# Patient Record
Sex: FEMALE | Race: WHITE | Employment: OTHER | URBAN - METROPOLITAN AREA
[De-identification: names, ages, dates, MRNs, and addresses within clinical notes are randomized per-mention and may not be internally consistent; named-entity substitution may affect disease eponyms.]

---

## 2018-10-03 NOTE — TELEPHONE ENCOUNTER
Current Medications:   Last Reviewed on 7/25/2018 02:35 PM by Brandon WINTER  Pravachol 40mg Tablet Take 2 tablet(s) by mouth at bedtime   Seroquel 50mg Tablet 1 tablet PO QHS   Losartan 100mg Tablet Take 1 tablet(s) by mouth daily (replaces lisinopril)   Metformin HCl 1,000mg Tablet 1 PO QD   Novolin N 100units/1ml Injection 40 U AM and 40 U PM   Metoprolol Succinate 25mg Tablets, Extended Release Take 1 tablet(s) by mouth daily   Norvasc 5mg Tablet Take 1 tablet(s) by mouth daily   Hydrochlorothiazide (HCTZ) 25mg Tablet 1 tab by mouth every morning.    Trilipix 135mg Capsules, Delayed Release Take 1 capsule(s) by mouth daily   Ranitidine 150mg Tablet 1 po bid   glucometer strips* 1 strip as directed Accucheck compact test strip four times a day   Melatonin 3mg Tablet Take 1 tablet(s) by mouth at bedtime prn sleep   Fish oil 2000 mg BID   Novolin R 100units/1ml Injection 25 units before breakfast, 25 units before lunch, 25 units before dinner subcutaneously   Protonix 40mg Tablets, Delayed Release Take 1 tablet(s) by mouth daily

## 2018-11-08 ENCOUNTER — OFFICE VISIT (OUTPATIENT)
Dept: FAMILY MEDICINE CLINIC | Age: 73
End: 2018-11-08

## 2018-11-08 VITALS
SYSTOLIC BLOOD PRESSURE: 114 MMHG | WEIGHT: 174 LBS | BODY MASS INDEX: 29.71 KG/M2 | DIASTOLIC BLOOD PRESSURE: 74 MMHG | HEIGHT: 64 IN | TEMPERATURE: 97.7 F | OXYGEN SATURATION: 98 % | RESPIRATION RATE: 17 BRPM | HEART RATE: 93 BPM

## 2018-11-08 DIAGNOSIS — Z00.00 WELLNESS EXAMINATION: Primary | ICD-10-CM

## 2018-11-08 DIAGNOSIS — I10 ESSENTIAL HYPERTENSION: ICD-10-CM

## 2018-11-08 DIAGNOSIS — Z79.4 CONTROLLED TYPE 2 DIABETES MELLITUS WITHOUT COMPLICATION, WITH LONG-TERM CURRENT USE OF INSULIN (HCC): ICD-10-CM

## 2018-11-08 DIAGNOSIS — Z23 ENCOUNTER FOR IMMUNIZATION: ICD-10-CM

## 2018-11-08 DIAGNOSIS — E78.5 HYPERLIPIDEMIA, UNSPECIFIED HYPERLIPIDEMIA TYPE: ICD-10-CM

## 2018-11-08 DIAGNOSIS — E11.9 CONTROLLED TYPE 2 DIABETES MELLITUS WITHOUT COMPLICATION, WITH LONG-TERM CURRENT USE OF INSULIN (HCC): ICD-10-CM

## 2018-11-08 RX ORDER — QUETIAPINE FUMARATE 50 MG/1
50 TABLET, FILM COATED ORAL
COMMUNITY
Start: 2018-04-13 | End: 2019-10-16 | Stop reason: SDUPTHER

## 2018-11-08 RX ORDER — PRAVASTATIN SODIUM 40 MG/1
80 TABLET ORAL
COMMUNITY
Start: 2018-07-16 | End: 2019-10-16 | Stop reason: SDUPTHER

## 2018-11-08 RX ORDER — METFORMIN HYDROCHLORIDE 1000 MG/1
1000 TABLET ORAL DAILY
COMMUNITY
Start: 2018-06-04 | End: 2019-10-16 | Stop reason: DRUGHIGH

## 2018-11-08 RX ORDER — PRAVASTATIN SODIUM 20 MG/1
TABLET ORAL
COMMUNITY
End: 2018-11-08 | Stop reason: SDUPTHER

## 2018-11-08 RX ORDER — METFORMIN HYDROCHLORIDE 500 MG/1
TABLET ORAL
COMMUNITY
Start: 2018-03-05 | End: 2019-09-23

## 2018-11-08 RX ORDER — MELOXICAM 15 MG/1
15 TABLET ORAL
COMMUNITY
Start: 2018-05-17 | End: 2019-05-17

## 2018-11-08 RX ORDER — DICLOFENAC SODIUM 10 MG/G
GEL TOPICAL
COMMUNITY
Start: 2018-05-04 | End: 2019-09-23

## 2018-11-08 NOTE — PROGRESS NOTES
SUBJECTIVE:     68 y.o. female for annual routine checkup. Last annual visit on 3/2017    HTN:  -BPs at home are usually 120s-130s/70s-80s  -\"soreness in chest\" at right chest, pleuritic, not felt on exertion, onset 2-3wks  -Does notice some SOB on exertion while walking at least 100ft to her mailbox   -some dizziness <30 sec duration, unsure exactly when it occurs, not positional, episodic   -Hx CAD s/p stent - reports having it 5 or more yrs ago, followed by Cardiologist Dr. Adele Paredes     DM:  -Blood sugars usually 120s -130s , has some low BS some times twice per week and sometimes once per week - takes glu tabs during episodes, usually occurs in the middle of the night   -diet: low carbs, lots of veggies  -exercise: not much exercise due to knee pain (BL) - has partial knee replacements BL  -eyes: last eye exam April-May 2018, told she has a normal exam  -endorses polyuria every 2-3hrs, no polydipsia  , nocturia ever 2-3hrs overnight   -Takes 40 units NPH BID, 30 units of novolin with meals (2-3 times per day based on how many meals she eats) - recently increased to 30 units from 25 units one week ago    HM:  - Is due for flu shot   - next colonoscopy 6/2019   - Gyn: is due for mammogram, states she needs to schedule it       Current Outpatient Medications   Medication Sig Dispense Refill    meloxicam (MOBIC) 15 mg tablet Take 15 mg by mouth.  insulin NPH (NOVOLIN N NPH U-100 INSULIN) 100 unit/mL injection 40 Units by SubCUTAneous route two (2) times a day.  metFORMIN (GLUCOPHAGE) 500 mg tablet       pravastatin (PRAVACHOL) 40 mg tablet Take 80 mg by mouth nightly.  QUEtiapine (SEROQUEL) 50 mg tablet Take 50 mg by mouth nightly.  hydroCHLOROthiazide (HYDRODIURIL) 25 mg tablet TAKE ONE TABLET BY MOUTH ONCE DAILY IN THE MORNING 90 Tab 1    insulin regular (NOVOLIN R, HUMULIN R) 100 unit/mL injection 25 units Subcutaneously 5-10 minutes prior to meals.  (Patient taking differently: 30 Units by SubCUTAneous route Before breakfast, lunch, and dinner. 25 units Subcutaneously 5-10 minutes prior to meals. ) 10 Vial 1    omeprazole (PRILOSEC) 20 mg capsule Take 20 mg by mouth daily.  metoprolol-XL (TOPROL-XL) 25 mg XL tablet Take 25 mg by mouth daily.  amLODIPine (NORVASC) 5 mg tablet Take 5 mg by mouth daily.  butalbital-aspirin-caffeine (BUTALBITAL COMPOUND) -40 mg tablet Take 1 Tab by mouth every four (4) hours as needed.  losartan (COZAAR) 100 mg tablet Take 100 mg by mouth daily.  ranitidine (ZANTAC) 150 mg tablet Take 150 mg by mouth nightly.  multivitamin (ONE A DAY) tablet Take 1 Tab by mouth daily.  diclofenac (VOLTAREN) 1 % gel       metFORMIN (GLUCOPHAGE) 1,000 mg tablet Take 1,000 mg by mouth daily.  traMADol (ULTRAM) 50 mg tablet Take 1 Tab by mouth every eight (8) hours as needed for Pain. Max Daily Amount: 150 mg. 20 Tab 0    acetaminophen (TYLENOL) 325 mg tablet Take 2 Tabs by mouth every four (4) hours as needed for Pain.  venlafaxine (EFFEXOR) 75 mg tablet Take 25 mg by mouth three (3) times daily.  QUEtiapine (SEROQUEL) 25 mg tablet Take 25 mg by mouth nightly.  rosuvastatin (CRESTOR) 20 mg tablet Take 10 mg by mouth nightly.  naproxen sodium 220 mg Cap Take 1 Tab by mouth two (2) times daily as needed. Allergies: Advicor [niacin-lovastatin]; Ambien [zolpidem]; Atorvastatin; Erythromycin; Niacin; Pitavastatin; Premarin [conjugated estrogens]; and Simvastatin   No LMP recorded. Patient has had a hysterectomy. ROS:  Feeling well. No dyspnea or chest pain on exertion. No abdominal pain, change in bowel habits, black or bloody stools. No urinary tract symptoms. GYN ROS: no vaginal bleeding. No neurological complaints. OBJECTIVE:   The patient appears well, alert, oriented x 3, in no distress.   Visit Vitals  /74 (BP 1 Location: Right arm, BP Patient Position: Sitting)   Pulse 93   Temp 97.7 °F (36.5 °C) (Oral)   Resp 17   Ht 5' 4\" (1.626 m)   Wt 174 lb (78.9 kg)   SpO2 98%   BMI 29.87 kg/m²     ENT normal.  Neck supple. No adenopathy or thyromegaly. JOESPH. Lungs are clear, good air entry, no wheezes, rhonchi or rales. S1 and S2 normal, no murmurs, regular rate and rhythm. Abdomen soft without tenderness, guarding, mass or organomegaly. Extremities show no edema, normal peripheral pulses. Neurological is normal, no focal findings. ASSESSMENT/plan: 79yo with hx DM, HTN presents to clinic for annual well women exam. Will obtain routine labs CBC, CMP, Lipid panel, A1c and refill prescriptions. Chest pain and SOB: Patient with pleuritic chest pain at rest and SOB on exertion.  Will send back to cardiologist to have repeat stress test.       RTC 1 yr for annual wellness visit       Patient discussed with Dr. Rohit Blair MD  Family Medicine Resident

## 2018-11-08 NOTE — PROGRESS NOTES
SUBJECTIVE:  
 
68 y.o. female for annual routine checkup. Last annual 3/2017 PmHx: DM on insulin, depression, insomnia, OA, HTN, HLD, GERD 
 
HTN: 
-monitors at home 120s-130s/70s-80s 
-\"soreness in chest\" at right chest, pleuritic, onset 2-3wks 
-some dizziness <30 sec, unsure exactly when it occurs, not positional, episodic  
-Some SOB on exertion when carying things in arms and walking about 100ft  
-Hx CAD s/p stent - 5 or more yrs ago, Dr Will Crump DM: 
-BS: 120s, 130s , some low BS some times twice per week sometimes once per week - takes glu tabs, usually occurs in the middle of the night , checks it 3-4x per day  
-diet: cereal, low carbs, lots of veggies,  
-exercise: not much exercise due to knee pain (BL) - has partial knee replacements BL and also getting injections  
-eyes: last eye exam April-May 2018, normal exam 
-foot: good foot care 
-urine: no polyuria every 2-3hrs, no polydipsia  , nocturia ever 2-3hrs overnight  
-40lantus BID, 30units of novolin with meals (2-3 times per day based on how many meals eat) - recently increased to 30 from 25 last week HM: 
- flu shot? - Due for your today  
- next colonoscopy 6/2019 - (DEXA screening? - had it 4-5yrs ago) - Gyn: mast matilde, due for it this, going to schedule it, previous have been negative Refills: insulin, PPI , amlodipine, metoprolol, losartan, zantac , metfromin 500mg daily - not higher due to GI upset, seroquel, pravastatin 80mg daily Current Outpatient Medications Medication Sig Dispense Refill  meloxicam (MOBIC) 15 mg tablet Take 15 mg by mouth.  insulin NPH (NOVOLIN N NPH U-100 INSULIN) 100 unit/mL injection 40 Units by SubCUTAneous route two (2) times a day.  metFORMIN (GLUCOPHAGE) 1,000 mg tablet Take 1,000 mg by mouth daily.  pravastatin (PRAVACHOL) 40 mg tablet Take 80 mg by mouth nightly.  QUEtiapine (SEROQUEL) 50 mg tablet Take 50 mg by mouth nightly.  hydroCHLOROthiazide (HYDRODIURIL) 25 mg tablet TAKE ONE TABLET BY MOUTH ONCE DAILY IN THE MORNING 90 Tab 1  
 insulin regular (NOVOLIN R, HUMULIN R) 100 unit/mL injection 25 units Subcutaneously 5-10 minutes prior to meals. (Patient taking differently: 30 Units by SubCUTAneous route Before breakfast, lunch, and dinner. 25 units Subcutaneously 5-10 minutes prior to meals. ) 10 Vial 1  
 traMADol (ULTRAM) 50 mg tablet Take 1 Tab by mouth every eight (8) hours as needed for Pain. Max Daily Amount: 150 mg. 20 Tab 0  
 omeprazole (PRILOSEC) 20 mg capsule Take 20 mg by mouth daily.  metoprolol-XL (TOPROL-XL) 25 mg XL tablet Take 25 mg by mouth daily.  amLODIPine (NORVASC) 5 mg tablet Take 5 mg by mouth daily.  butalbital-aspirin-caffeine (BUTALBITAL COMPOUND) -40 mg tablet Take 1 Tab by mouth every four (4) hours as needed.  losartan (COZAAR) 100 mg tablet Take 100 mg by mouth daily.  ranitidine (ZANTAC) 150 mg tablet Take 150 mg by mouth nightly.  multivitamin (ONE A DAY) tablet Take 1 Tab by mouth daily.  diclofenac (VOLTAREN) 1 % gel  metFORMIN (GLUCOPHAGE) 500 mg tablet  acetaminophen (TYLENOL) 325 mg tablet Take 2 Tabs by mouth every four (4) hours as needed for Pain.  venlafaxine (EFFEXOR) 75 mg tablet Take 25 mg by mouth three (3) times daily.  QUEtiapine (SEROQUEL) 25 mg tablet Take 25 mg by mouth nightly.  rosuvastatin (CRESTOR) 20 mg tablet Take 10 mg by mouth nightly.  naproxen sodium 220 mg Cap Take 1 Tab by mouth two (2) times daily as needed. Allergies: Advicor [niacin-lovastatin]; Ambien [zolpidem]; Atorvastatin; Erythromycin; Niacin; Pitavastatin; Premarin [conjugated estrogens]; and Simvastatin No LMP recorded. Patient has had a hysterectomy. ROS:  Feeling well. No dyspnea or chest pain on exertion. No abdominal pain, change in bowel habits, black or bloody stools.   No urinary tract symptoms. GYN ROS: {gyn ros:864744::\"normal menses, no abnormal bleeding, pelvic pain or discharge\",\"no breast pain or new or enlarging lumps on self exam\"}. No neurological complaints. OBJECTIVE: The patient appears well, alert, oriented x 3, in no distress. Visit Vitals /74 (BP 1 Location: Right arm, BP Patient Position: Sitting) Pulse 93 Temp 97.7 °F (36.5 °C) (Oral) Resp 17 Ht 5' 4\" (1.626 m) Wt 174 lb (78.9 kg) SpO2 98% BMI 29.87 kg/m² ENT normal.  Neck supple. No adenopathy or thyromegaly. JOESPH. Lungs are clear, good air entry, no wheezes, rhonchi or rales. S1 and S2 normal, no murmurs, regular rate and rhythm. Abdomen soft without tenderness, guarding, mass or organomegaly. Extremities show no edema, normal peripheral pulses. Neurological is normal, no focal findings. BREAST EXAM: {pe breast exam:782490::\"breasts appear normal, no suspicious masses, no skin or nipple changes or axillary nodes\"} PELVIC EXAM: {pelvic exam:349470::\"normal external genitalia, vulva, vagina, cervix, uterus and adnexa\"} ASSESSMENT:  
{gyn assessment:521539::\"well woman\"} PLAN:  
{gyn plan:189349::\"mammogram\",\"pap smear\",\"return annually or prn\"} CC: med refill, annual visit

## 2018-11-08 NOTE — PROGRESS NOTES
Identified pt with two pt identifiers(name and ). Chief Complaint   Patient presents with    Medication Refill     all medications         Health Maintenance Due   Topic    Hepatitis C Screening     DTaP/Tdap/Td series (1 - Tdap)    Shingrix Vaccine Age 50> (1 of 2)    BREAST CANCER SCRN MAMMOGRAM     FOBT Q 1 YEAR AGE 50-75     GLAUCOMA SCREENING Q2Y     Bone Densitometry (Dexa) Screening     Pneumococcal 65+ Low/Medium Risk (1 of 2 - PCV13)    MEDICARE YEARLY EXAM     Influenza Age 5 to Adult        Wt Readings from Last 3 Encounters:   05/24/15 168 lb (76.2 kg)   14 165 lb (74.8 kg)   13 168 lb (76.2 kg)     Temp Readings from Last 3 Encounters:   05/24/15 98.1 °F (36.7 °C)   14 98.3 °F (36.8 °C)   13 98 °F (36.7 °C)     BP Readings from Last 3 Encounters:   05/24/15 114/59   14 115/57   13 140/83     Pulse Readings from Last 3 Encounters:   05/24/15 88   14 (!) 110   13 82         Learning Assessment:  :     No flowsheet data found. Depression Screening:  :     No flowsheet data found. Fall Risk Assessment:  :     No flowsheet data found. Abuse Screening:  :     No flowsheet data found. Coordination of Care Questionnaire:  :     1) Have you been to an emergency room, urgent care clinic since your last visit? no   Hospitalized since your last visit? no             2) Have you seen or consulted any other health care providers outside of [address] [place] [address] since your last visit? yes [facility] for knees (Include any pap smears or colon screenings in this section.)    3) Do you have an Advance Directive on file? no  Are you interested in receiving information about Advance Directives? no    Patient is accompanied by self I have received verbal consent from Varun Kong to discuss any/all medical information while they are present in the room.     Reviewed record in preparation for visit and have obtained necessary documentation. Medication reconciliation up to date and corrected with patient at this time.

## 2018-11-09 LAB
ALBUMIN SERPL-MCNC: 4.3 G/DL (ref 3.5–4.8)
ALBUMIN/GLOB SERPL: 1.4 {RATIO} (ref 1.2–2.2)
ALP SERPL-CCNC: 110 IU/L (ref 39–117)
ALT SERPL-CCNC: 15 IU/L (ref 0–32)
AST SERPL-CCNC: 17 IU/L (ref 0–40)
BILIRUB SERPL-MCNC: 0.4 MG/DL (ref 0–1.2)
BUN SERPL-MCNC: 8 MG/DL (ref 8–27)
BUN/CREAT SERPL: 11 (ref 12–28)
CALCIUM SERPL-MCNC: 9.9 MG/DL (ref 8.7–10.3)
CHLORIDE SERPL-SCNC: 93 MMOL/L (ref 96–106)
CHOLEST SERPL-MCNC: 197 MG/DL (ref 100–199)
CO2 SERPL-SCNC: 29 MMOL/L (ref 20–29)
CREAT SERPL-MCNC: 0.72 MG/DL (ref 0.57–1)
ERYTHROCYTE [DISTWIDTH] IN BLOOD BY AUTOMATED COUNT: 14.8 % (ref 12.3–15.4)
EST. AVERAGE GLUCOSE BLD GHB EST-MCNC: 157 MG/DL
GLOBULIN SER CALC-MCNC: 3.1 G/DL (ref 1.5–4.5)
GLUCOSE SERPL-MCNC: 208 MG/DL (ref 65–99)
HBA1C MFR BLD: 7.1 % (ref 4.8–5.6)
HCT VFR BLD AUTO: 42.6 % (ref 34–46.6)
HDLC SERPL-MCNC: 36 MG/DL
HGB BLD-MCNC: 14.2 G/DL (ref 11.1–15.9)
LDLC SERPL CALC-MCNC: 112 MG/DL (ref 0–99)
MCH RBC QN AUTO: 28.8 PG (ref 26.6–33)
MCHC RBC AUTO-ENTMCNC: 33.3 G/DL (ref 31.5–35.7)
MCV RBC AUTO: 86 FL (ref 79–97)
PLATELET # BLD AUTO: 336 X10E3/UL (ref 150–379)
POTASSIUM SERPL-SCNC: 4.4 MMOL/L (ref 3.5–5.2)
PROT SERPL-MCNC: 7.4 G/DL (ref 6–8.5)
RBC # BLD AUTO: 4.93 X10E6/UL (ref 3.77–5.28)
SODIUM SERPL-SCNC: 137 MMOL/L (ref 134–144)
TRIGL SERPL-MCNC: 246 MG/DL (ref 0–149)
VLDLC SERPL CALC-MCNC: 49 MG/DL (ref 5–40)
WBC # BLD AUTO: 10 X10E3/UL (ref 3.4–10.8)

## 2018-11-09 NOTE — PROGRESS NOTES
I have reviewed the notes, assessments, and/or procedures performed, I concur with the residents documentation of Kori Bustillos.

## 2018-11-12 NOTE — PROGRESS NOTES
CMP with elevated GLu 208 but otherwise wnL. CBC wnL. Lipid panel with elevated  and , low HDL 36, but total cholesterol wnL. A1c 7.1, within target of <8 for age group.

## 2019-09-10 ENCOUNTER — OFFICE VISIT (OUTPATIENT)
Dept: FAMILY MEDICINE CLINIC | Age: 74
End: 2019-09-10

## 2019-09-10 VITALS
DIASTOLIC BLOOD PRESSURE: 67 MMHG | HEIGHT: 64 IN | WEIGHT: 177 LBS | TEMPERATURE: 98.1 F | HEART RATE: 86 BPM | SYSTOLIC BLOOD PRESSURE: 107 MMHG | OXYGEN SATURATION: 95 % | RESPIRATION RATE: 16 BRPM | BODY MASS INDEX: 30.22 KG/M2

## 2019-09-10 DIAGNOSIS — I10 ESSENTIAL HYPERTENSION: ICD-10-CM

## 2019-09-10 DIAGNOSIS — Z79.4 CONTROLLED TYPE 2 DIABETES MELLITUS WITHOUT COMPLICATION, WITH LONG-TERM CURRENT USE OF INSULIN (HCC): ICD-10-CM

## 2019-09-10 DIAGNOSIS — E11.9 CONTROLLED TYPE 2 DIABETES MELLITUS WITHOUT COMPLICATION, WITH LONG-TERM CURRENT USE OF INSULIN (HCC): ICD-10-CM

## 2019-09-10 DIAGNOSIS — E78.5 HYPERLIPIDEMIA, UNSPECIFIED HYPERLIPIDEMIA TYPE: ICD-10-CM

## 2019-09-10 DIAGNOSIS — R60.0 BILATERAL LEG EDEMA: Primary | ICD-10-CM

## 2019-09-10 PROBLEM — E11.319 DIABETIC RETINOPATHY (HCC): Status: ACTIVE | Noted: 2019-09-10

## 2019-09-10 NOTE — PROGRESS NOTES
Identified pt with two pt identifiers(name and ). Reviewed record in preparation for visit and have obtained necessary documentation. Chief Complaint   Patient presents with    Foot Swelling     Patient states that both feet are in pain for a week; swelling started last night        Health Maintenance Due   Topic    Hepatitis C Screening     FOOT EXAM Q1     MICROALBUMIN Q1     EYE EXAM RETINAL OR DILATED     DTaP/Tdap/Td series (1 - Tdap)    Shingrix Vaccine Age 50> (1 of 2)    BREAST CANCER SCRN MAMMOGRAM     GLAUCOMA SCREENING Q2Y     Bone Densitometry (Dexa) Screening     Pneumococcal 65+ years (1 of 2 - PCV13)    MEDICARE YEARLY EXAM     COLONOSCOPY     HEMOGLOBIN A1C Q6M     Influenza Age 5 to Adult        Coordination of Care Questionnaire:  :   1) Have you been to an emergency room, urgent care, or hospitalized since your last visit? If yes, where when, and reason for visit? no       2. Have seen or consulted any other health care provider since your last visit? If yes, where when, and reason for visit? NO        Patient is accompanied by self I have received verbal consent from Paul Lopez to discuss any/all medical information while they are present in the room.

## 2019-09-10 NOTE — PROGRESS NOTES
Patricio Plane  76 y.o. female  1945  [id number]    [facility]  Progress Note     Encounter Date: 9/10/2019    Assessment and Plan:     Encounter Diagnoses     ICD-10-CM ICD-9-CM   1. Bilateral leg edema R60.0 782.3   2. Essential hypertension I10 401.9   3. Controlled type 2 diabetes mellitus without complication, with long-term current use of insulin (HCC) E11.9 250.00    Z79.4 V58.67   4. Hyperlipidemia, unspecified hyperlipidemia type E78.5 272.4       1. Bilateral leg edema  Bilateral swelling no recent injury. Check duplex given recent prolonged sitting. Advised patient to wear more supportive footwear (currently wears flats or flip-flops without support). Reviewed that elevation needs to be above heart. - DUPLEX LOWER EXT VENOUS BILAT; Future    2. Essential hypertension  3. Controlled type 2 diabetes mellitus without complication, with long-term current use of insulin ([address]  4. Hyperlipidemia, unspecified hyperlipidemia type  Blood work ordered in advance of appt for chronic conditions.   - METABOLIC PANEL, BASIC  - HEMOGLOBIN A1C WITH EAG  - MICROALBUMIN, UR, RAND W/ MICROALB/CREAT RATIO  - LIPID PANEL      I have discussed the diagnosis with the patient and the intended plan as seen in the above orders. she has expressed understanding. The patient has received an after-visit summary and questions were answered concerning future plans. I have discussed medication side effects and warnings with the patient as well. Electronically Signed: Isatu Spears MD    Current Medications after this visit     Current Outpatient Medications   Medication Sig    insulin NPH (NOVOLIN N NPH U-100 INSULIN) 100 unit/mL injection 40 Units by SubCUTAneous route two (2) times a day. Please schedule appt for exam and lab work    metFORMIN (GLUCOPHAGE) 500 mg tablet     pravastatin (PRAVACHOL) 40 mg tablet Take 80 mg by mouth nightly.     QUEtiapine (SEROQUEL) 50 mg tablet Take 50 mg by mouth nightly.  hydroCHLOROthiazide (HYDRODIURIL) 25 mg tablet TAKE ONE TABLET BY MOUTH ONCE DAILY IN THE MORNING    insulin regular (NOVOLIN R, HUMULIN R) 100 unit/mL injection 25 units Subcutaneously 5-10 minutes prior to meals. (Patient taking differently: 30 Units by SubCUTAneous route Before breakfast, lunch, and dinner. 25 units Subcutaneously 5-10 minutes prior to meals.)    amLODIPine (NORVASC) 5 mg tablet Take 5 mg by mouth daily.  rosuvastatin (CRESTOR) 20 mg tablet Take 10 mg by mouth nightly.  losartan (COZAAR) 100 mg tablet Take 100 mg by mouth daily.  ranitidine (ZANTAC) 150 mg tablet Take 150 mg by mouth nightly.  naproxen sodium 220 mg Cap Take 1 Tab by mouth two (2) times daily as needed.  diclofenac (VOLTAREN) 1 % gel     metFORMIN (GLUCOPHAGE) 1,000 mg tablet Take 1,000 mg by mouth daily. No current facility-administered medications for this visit.       Medications Discontinued During This Encounter   Medication Reason    traMADol (ULTRAM) 50 mg tablet Not A Current Medication    acetaminophen (TYLENOL) 325 mg tablet Not A Current Medication    venlafaxine (EFFEXOR) 75 mg tablet Not A Current Medication    omeprazole (PRILOSEC) 20 mg capsule Not A Current Medication    metoprolol-XL (TOPROL-XL) 25 mg XL tablet Not A Current Medication    butalbital-aspirin-caffeine (BUTALBITAL COMPOUND) -40 mg tablet Not A Current Medication    QUEtiapine (SEROQUEL) 25 mg tablet Not A Current Medication    multivitamin (ONE A DAY) tablet Not A Current Medication     ~~~~~~~~~~~~~~~~~~~~~~~~~~~~~~~~~~~~~~~~~~~~~~    Chief Complaint   Patient presents with    Foot Swelling     Patient states that both feet are in pain for a week; swelling started last night       History provided by patient  History of Present Illness   Hayes Givens is a 76 y.o. female who presents to clinic today for:    Bilateral foot swelling  Patient present with cc of bilateral feet swelling. Patient reports that 1 week ago she had taken 4-5 hours bus trip and several other long car rides. She has never this before. Associated with medial ankle pain, described as throbbing. No hx of DVT pr PE. No CP, SOB or wheezing. Health Maintenance  Health Maintenance Due   Topic Date Due    Hepatitis C Screening  1945    FOOT EXAM Q1  03/24/1955    MICROALBUMIN Q1  03/24/1955    EYE EXAM RETINAL OR DILATED  03/24/1955    DTaP/Tdap/Td series (1 - Tdap) 03/24/1966    Shingrix Vaccine Age 50> (1 of 2) 03/24/1995    BREAST CANCER SCRN MAMMOGRAM  03/24/1995    GLAUCOMA SCREENING Q2Y  03/24/2010    Bone Densitometry (Dexa) Screening  03/24/2010    Pneumococcal 65+ years (1 of 2 - PCV13) 03/24/2010    MEDICARE YEARLY EXAM  03/20/2018    COLONOSCOPY  02/26/2019    HEMOGLOBIN A1C Q6M  05/08/2019    Influenza Age 9 to Adult  08/01/2019     Review of Systems   Review of Systems   Constitutional: Negative for chills and fever. Respiratory: Negative for cough, sputum production, shortness of breath and wheezing. Cardiovascular: Positive for leg swelling. Negative for chest pain. Musculoskeletal: Positive for joint pain. Negative for falls and myalgias. Skin: Negative for itching and rash. Vitals/Objective:     Vitals:    09/10/19 1331   BP: 107/67   Pulse: (!) 102   Resp: 16   Temp: 98.1 °F (36.7 °C)   TempSrc: Oral   SpO2: 95%   Weight: 177 lb (80.3 kg)   Height: 5' 4\" (1.626 m)     Body mass index is 30.38 kg/m². Wt Readings from Last 3 Encounters:   09/10/19 177 lb (80.3 kg)   11/08/18 174 lb (78.9 kg)   05/24/15 168 lb (76.2 kg)       Physical Exam   Constitutional: She is oriented to person, place, and time. She appears well-developed and well-nourished. No distress. HENT:   Head: Normocephalic and atraumatic. Right Ear: External ear normal.   Left Ear: External ear normal.   Mouth/Throat: Oropharynx is clear and moist. No oropharyngeal exudate.    Cardiovascular: Normal rate, S1 normal and S2 normal.   No murmur heard. Pulmonary/Chest: Effort normal and breath sounds normal. She has no rales. Musculoskeletal: She exhibits no edema. Right ankle: She exhibits swelling (pitting edema). Tenderness. Medial malleolus tenderness found. No lateral malleolus, no AITFL, no CF ligament, no posterior TFL, no head of 5th metatarsal and no proximal fibula tenderness found. Left ankle: She exhibits swelling (1 pitting edema). She exhibits normal range of motion, no ecchymosis, no deformity and no laceration. Tenderness. Medial malleolus tenderness found. No lateral malleolus, no AITFL, no CF ligament, no posterior TFL, no head of 5th metatarsal and no proximal fibula tenderness found. Neurological: She is alert and oriented to person, place, and time. Skin: Skin is warm and dry. No results found for this or any previous visit (from the past 24 hour(s)). Disposition     Follow-up and Dispositions  ·   Return if symptoms worsen or fail to improve. Future Appointments   Date Time Provider Jeffrey Willard   9/23/2019  2:00 PM Quentin Lowry MD [facility] SCHED       History   Patient's past medical, surgical and family histories were reviewed and updated.     Past Medical History:   Diagnosis Date    CAD (coronary artery disease)     Diabetes ([address]     Diabetes ([address]     type 2    GERD (gastroesophageal reflux disease)     H/O seasonal allergies     Hyperlipidemia     Hypertension     Insomnia     Nausea & vomiting     PUD (peptic ulcer disease)     Small bowel obstruction (HCC)     Vitamin D deficiency      Past Surgical History:   Procedure Laterality Date    BREAST SURGERY PROCEDURE UNLISTED      breast reduction    CARDIAC SURG PROCEDURE UNLIST      heart stent    HX APPENDECTOMY      HX BREAST BIOPSY      HX GYN  1971    partial hysterectomy    HX GYN  1991    complete hysterectomy    HX HYSTERECTOMY      HX ORTHOPAEDIC right rotator cuff repair    HX ORTHOPAEDIC      bilaterral wrist surgery    HX ORTHOPAEDIC      right knee surgery    HX ORTHOPAEDIC      bilateral bone spur removal from big toe    HX TONSIL AND ADENOIDECTOMY      HX TONSILLECTOMY       Family History   Problem Relation Age of Onset    Cancer Maternal Aunt         pancreatic cancer    Hypertension Mother     COPD Mother     Diabetes Father     Alcohol abuse Brother         murdered      Social History     Tobacco Use    Smoking status: Never Smoker    Smokeless tobacco: Never Used   Substance Use Topics    Alcohol use: No    Drug use: No       Allergies     Allergies   Allergen Reactions    Advicor [Niacin-Lovastatin] Swelling    Ambien [Zolpidem] Other (comments)    Atorvastatin Other (comments)     Muscle cramps    Erythromycin Nausea Only    Niacin Swelling    Pitavastatin Other (comments)     Abdominal pain, nausea    Premarin [Conjugated Estrogens] Other (comments)     Headaches        Simvastatin Other (comments)     Muscle cramps

## 2019-09-11 LAB
ALBUMIN/CREAT UR: 14 MG/G CREAT (ref 0–30)
BUN SERPL-MCNC: 11 MG/DL (ref 8–27)
BUN/CREAT SERPL: 16 (ref 12–28)
CALCIUM SERPL-MCNC: 9.4 MG/DL (ref 8.7–10.3)
CHLORIDE SERPL-SCNC: 102 MMOL/L (ref 96–106)
CHOLEST SERPL-MCNC: 215 MG/DL (ref 100–199)
CO2 SERPL-SCNC: 24 MMOL/L (ref 20–29)
CREAT SERPL-MCNC: 0.69 MG/DL (ref 0.57–1)
CREAT UR-MCNC: 105.5 MG/DL
EST. AVERAGE GLUCOSE BLD GHB EST-MCNC: 160 MG/DL
GLUCOSE SERPL-MCNC: 119 MG/DL (ref 65–99)
HBA1C MFR BLD: 7.2 % (ref 4.8–5.6)
HDLC SERPL-MCNC: 32 MG/DL
LDLC SERPL CALC-MCNC: 116 MG/DL (ref 0–99)
MICROALBUMIN UR-MCNC: 14.8 UG/ML
POTASSIUM SERPL-SCNC: 4 MMOL/L (ref 3.5–5.2)
SODIUM SERPL-SCNC: 142 MMOL/L (ref 134–144)
TRIGL SERPL-MCNC: 336 MG/DL (ref 0–149)
VLDLC SERPL CALC-MCNC: 67 MG/DL (ref 5–40)

## 2019-09-23 ENCOUNTER — OFFICE VISIT (OUTPATIENT)
Dept: FAMILY MEDICINE CLINIC | Age: 74
End: 2019-09-23

## 2019-09-23 VITALS
WEIGHT: 175 LBS | RESPIRATION RATE: 18 BRPM | OXYGEN SATURATION: 98 % | TEMPERATURE: 97.9 F | HEIGHT: 64 IN | BODY MASS INDEX: 29.88 KG/M2 | HEART RATE: 89 BPM | DIASTOLIC BLOOD PRESSURE: 88 MMHG | SYSTOLIC BLOOD PRESSURE: 133 MMHG

## 2019-09-23 DIAGNOSIS — E16.2 HYPOGLYCEMIA: Primary | ICD-10-CM

## 2019-09-23 DIAGNOSIS — E78.00 HYPERCHOLESTEREMIA: ICD-10-CM

## 2019-09-23 DIAGNOSIS — E11.9 CONTROLLED TYPE 2 DIABETES MELLITUS WITHOUT COMPLICATION, WITH LONG-TERM CURRENT USE OF INSULIN (HCC): ICD-10-CM

## 2019-09-23 DIAGNOSIS — Z79.4 CONTROLLED TYPE 2 DIABETES MELLITUS WITHOUT COMPLICATION, WITH LONG-TERM CURRENT USE OF INSULIN (HCC): ICD-10-CM

## 2019-09-23 DIAGNOSIS — R60.9 EDEMA, UNSPECIFIED TYPE: ICD-10-CM

## 2019-09-23 DIAGNOSIS — I10 ESSENTIAL HYPERTENSION: ICD-10-CM

## 2019-09-23 RX ORDER — HYDROCHLOROTHIAZIDE 25 MG/1
25 TABLET ORAL DAILY
Qty: 90 TAB | Refills: 1 | Status: SHIPPED | OUTPATIENT
Start: 2019-09-23 | End: 2019-10-16 | Stop reason: SDUPTHER

## 2019-09-23 NOTE — PATIENT INSTRUCTIONS
Bring all medicaDiabetes: 
Blood sugar goals: 
Hemoglobin A1c under 7 Fasting blood sugar  Blood sugar 2 hours after a meal under 180, 4 hours after a meal under 120 No hypoglycemia (sugars under 70 and symptomatic low sugars) Blood sugar control with diet and exercise: 
Exercise 45 minutes per day. This makes your insulin work better. It also allows insulin levels to fall helping with weight loss. Every night, try to fast from your evening meal to breakfast (at least 12 hours) without eating anything. This uses stored energy in your liver and makes insulin work better. Avoid simples sugars such as table sugar in drinks (sodas, lemonade, sweet tea, wine), desserts, candy. Also avoid fruit juices and high fructose corn syrup. Avoid frequent consumption of fruit especially grapes and bananas. A single serving of fruit daily is all you should have. Finally, drink less milk which has milk sugar in it. Control your starch intake. Men should have 3-4 carb portions per meal.  Women should have 2-3 carb portions per meal.  A carb serving is the equivalent of a slice of bread. Control your blood pressure and cholesterol. These problems are common in diabetes. AND, don't smoke. All of these problems contribute to heart disease and stroke risk. Get a yearly eye exam and flu shot. Make sure your vaccines are up to date particularly the pneumonia vaccines. Inspect your feet daily. Wear comfortable protective shoes and clean socks. Seek medical care if there are sore, calluses or numbness and burning of your feet. See your doctor at least every 6 months if you are on oral medicines or more often if the diabetic control is not at goal or if you are on insulin. Take your medicines religiously. tions to next appointment Learning About Low Blood Sugar (Hypoglycemia) in Diabetes What is low blood sugar (hypoglycemia)? Hypoglycemia means that your blood sugar is low and your body (especially your brain) is not getting enough fuel. If you have diabetes, your blood sugar can go too low if you take too much of some diabetes medicines. It can also go too low if you miss a meal. And it can happen if you exercise too hard without eating enough food. Some medicines used to treat other health problems can cause low blood sugar too. What are the symptoms? Symptoms of low blood sugar can start quickly. It may take just 10 to 15 minutes. If you have had diabetes for many years, you may not realize that your blood sugar is low until it drops very low. · If your blood sugar level drops below 70 (mild low blood sugar), you may feel tired, anxious, dizzy, weak, shaky, or sweaty. You may have a fast heartbeat or blurry vision. · If your blood sugar level continues to drop (usually below 40), your behavior may change. You may feel more irritable. You may find it hard to concentrate or talk. And you may feel unsteady when you stand or walk. You may become too weak or confused to eat something with sugar to raise your blood sugar level. · If your blood sugar level drops very low (usually below 20), you may pass out (lose consciousness). Or you may have a seizure or stroke. If you have symptoms of severe low blood sugar, you need to get medical care right away. If you had a low blood sugar level during the night, you may wake up tired or with a headache. Or you may sweat so much during the night that your pajamas or sheets are damp when you wake up. How is low blood sugar treated? You can treat low blood sugar by eating or drinking something that has 15 grams of carbohydrate. These should be quick-sugar foods. Check your blood sugar level again 15 minutes after having a quick-sugar food to make sure your level is getting back to your target range. Here are examples of quick-sugar foods that have 15 grams of carbohydrate: · 3 to 4 glucose tablets · 1 tube of glucose gel · Hard candy (such as 3 Jolly Ranchers or 5 to [employer]) · 1 tablespoon honey · 2 tablespoons of raisins · ½ cup to ¾ cup (4 to 6 ounces) of fruit juice or regular (not diet) soda · 1 tablespoon of sugar · 1 cup of fat-free milk If you have problems with severe low blood sugar, someone else may have to give you a shot of glucagon. This is a hormone that raises blood sugar levels quickly. How can you prevent low blood sugar? You can take steps to prevent low blood sugar. · Follow your treatment plan. Take your insulin or other diabetes medicine exactly as your doctor prescribed it. Talk with your doctor if you're having low blood sugar often. Your medicine may need to be adjusted if it's causing your low blood sugar. · Check your blood sugar levels often. This helps you find early changes before an emergency happens. · Keep a quick-sugar food with you in case your blood sugar level drops low. · Eat small meals more often so that you don't get too hungry between meals. Don't skip meals. · Balance extra exercise with eating more. Check your blood sugar and learn how it changes after exercise. If your blood sugar stays at a normal level, you may not need to eat after you exercise. · Limit how much alcohol you drink. Alcohol can make low blood sugar go even lower. Don't drink alcohol if you have problems recognizing the early signs of low blood sugar. · Keep a diary of your symptoms. This helps you learn when changes in your body may signal low blood sugar. And keep track of how often you have low blood sugar, including when you last ate and what you ate. This will help you learn what causes your blood sugar to drop. · Learn about diabetes and low blood sugar. Support groups or a diabetes education center can help you understand how medicines, diet, and exercise affect your blood sugar levels. Since low blood sugar levels can quickly become an emergency, be sure to wear medical alert jewelry, such as a medical alert bracelet. This is to let people know you have diabetes so they can get help for you. You can buy this at most drugstores. And make sure your family, friends, and coworkers know the symptoms of low blood sugar. Teach them what to do to get your sugar level up. Follow-up care is a key part of your treatment and safety. Be sure to make and go to all appointments, and call your doctor if you are having problems. It's also a good idea to know your test results and keep a list of the medicines you take. Where can you learn more? Go to http://guy-raina.info/. Enter L258 in the search box to learn more about \"Learning About Low Blood Sugar (Hypoglycemia) in Diabetes. \" Current as of: April 16, 2019 Content Version: 12.2 © 6132-0557 [employer]. Care instructions adapted under license by [employer] (which disclaims liability or warranty for this information). If you have questions about a medical condition or this instruction, always ask your healthcare professional. [address] any warranty or liability for your use of this information.

## 2019-09-23 NOTE — PROGRESS NOTES
Arelis Andrew is a 76 y.o. female      Chief Complaint   Patient presents with    Ankle swelling     Went to [facility]] last week for swelling feet and ankles     Labs     Patient had lab work last wek would like to discuss results          1. Have you been to the ER, urgent care clinic since your last visit? Hospitalized since your last visit? Yes Went to [facility]] last week for swelling feet and ankles       2. Have you seen or consulted any other health care providers outside of the [address] [place] [address] since your last visit? Include any pap smears or colon screening.   No

## 2019-09-23 NOTE — PROGRESS NOTES
Assessment and Plan    1. Controlled type 2 diabetes mellitus without complication, with long-term current use of insulin (HCC)  Reduce insulin dose as follows  Follow up in one month and bring all meds to the appointment  - insulin NPH (NOVOLIN N NPH U-100 INSULIN) 100 unit/mL injection; 36 Units by SubCUTAneous route Before breakfast and dinner. Please schedule appt for exam and lab work  Dispense: 1 Vial; Refill: 1  - insulin regular (NOVOLIN R, HUMULIN R) 100 unit/mL injection; 25 units Subcutaneously 5-10 minutes prior to meals. Dispense: 10 Vial; Refill: 1    2. Hypoglycemia  On insulin doses that are too high  -  DIABETES FOOT EXAM    3. Edema, unspecified type  Due to amlodipine. No signs of renal disease, CHF, cirrhosis    4. Essential hypertension  At goal. Refill HCTZ    5. Hypercholesteremia  Continue statin. Follow-up and Dispositions    · Return in about 1 month (around 10/23/2019) for Diabetes follow up, Blood pressure follow up, Medication follow up. Diagnosis and plan discussed with patient who verbillized understanding. History of present Albertina Garcia is a 76 y.o. female presenting for Ankle swelling (Went to [facility] last week for swelling feet and ankles ) and Labs (Patient had lab work last wek would like to discuss results )    Notes edema both legs to knees  Swollen for a month. No other sx. Breathing is fine. No problems with dyspnea including at night  No chest pain. DM2  Hypoglycemia daily or every other day  Has had low sugars at night, 5 times in last 2 weeks, sugars wake her in ringing sweat, as low as 41  Unclear on meds and doses  States on humulin N 40 units twice a day   Humulin R 30 with each meal.  A1c 7.2    Normal renal function and negative AC ratio    Review of Systems   Constitutional: Negative for chills and fever. Respiratory: Negative for shortness of breath. Cardiovascular: Positive for leg swelling.  Negative for chest pain, palpitations and orthopnea. Gastrointestinal: Negative for abdominal pain. Genitourinary: Negative. Psychiatric/Behavioral: Negative.           Past Medical History:   Diagnosis Date    CAD (coronary artery disease)     [facility] [address]     Diabetes ([facility] [address]     type 2    GERD (gastroesophageal reflux disease)     H/O seasonal allergies     Hyperlipidemia     Hypertension     Insomnia     Nausea & vomiting     PUD (peptic ulcer disease)     Small bowel obstruction (HCC)     Vitamin D deficiency      Past Surgical History:   Procedure Laterality Date    BREAST SURGERY PROCEDURE UNLISTED      breast reduction    CARDIAC SURG PROCEDURE UNLIST      heart stent    HX APPENDECTOMY      HX BREAST BIOPSY      HX GYN  1971    partial hysterectomy    HX GYN  1991    complete hysterectomy    HX HYSTERECTOMY      HX ORTHOPAEDIC      right rotator cuff repair    HX ORTHOPAEDIC      bilaterral wrist surgery    HX ORTHOPAEDIC      right knee surgery    HX ORTHOPAEDIC      bilateral bone spur removal from big toe    HX TONSIL AND ADENOIDECTOMY      HX TONSILLECTOMY       Family History   Problem Relation Age of Onset    Cancer Maternal Aunt         pancreatic cancer    Hypertension Mother     COPD Mother     Diabetes Father     Alcohol abuse Brother         murdered      Social History     Socioeconomic History    Marital status:      Spouse name: Not on file    Number of children: Not on file    Years of education: Not on file    Highest education level: Not on file   Occupational History    Not on file   Social Needs    Financial resource strain: Not on file    Food insecurity:     Worry: Not on file     Inability: Not on file    Transportation needs:     Medical: Not on file     Non-medical: Not on file   Tobacco Use    Smoking status: Never Smoker    Smokeless tobacco: Never Used   Substance and Sexual Activity    Alcohol use: No    Drug use: No    Sexual activity: Not Currently   Lifestyle    Physical activity:     Days per week: Not on file     Minutes per session: Not on file    Stress: Not on file   Relationships    Social connections:     Talks on phone: Not on file     Gets together: Not on file     Attends Gnosticist service: Not on file     Active member of club or organization: Not on file     Attends meetings of clubs or organizations: Not on file     Relationship status: Not on file    Intimate partner violence:     Fear of current or ex partner: Not on file     Emotionally abused: Not on file     Physically abused: Not on file     Forced sexual activity: Not on file   Other Topics Concern    Not on file   Social History Narrative    Not on file         Current Outpatient Medications   Medication Sig Dispense Refill    insulin NPH (NOVOLIN N NPH U-100 INSULIN) 100 unit/mL injection 40 Units by SubCUTAneous route two (2) times a day. Please schedule appt for exam and lab work 1 Vial 1    metFORMIN (GLUCOPHAGE) 1,000 mg tablet Take 1,000 mg by mouth daily.  pravastatin (PRAVACHOL) 40 mg tablet Take 80 mg by mouth nightly.  QUEtiapine (SEROQUEL) 50 mg tablet Take 50 mg by mouth nightly.  hydroCHLOROthiazide (HYDRODIURIL) 25 mg tablet TAKE ONE TABLET BY MOUTH ONCE DAILY IN THE MORNING 90 Tab 1    insulin regular (NOVOLIN R, HUMULIN R) 100 unit/mL injection 25 units Subcutaneously 5-10 minutes prior to meals. (Patient taking differently: 30 Units by SubCUTAneous route Before breakfast, lunch, and dinner. 25 units Subcutaneously 5-10 minutes prior to meals. ) 10 Vial 1    amLODIPine (NORVASC) 5 mg tablet Take 5 mg by mouth daily.  losartan (COZAAR) 100 mg tablet Take 100 mg by mouth daily.  ranitidine (ZANTAC) 150 mg tablet Take 150 mg by mouth nightly.  naproxen sodium 220 mg Cap Take 1 Tab by mouth two (2) times daily as needed.              Allergies   Allergen Reactions    Advicor [Niacin-Lovastatin] Swelling    Ambien [Zolpidem] Other (comments)    Deflazacort Unable to Obtain    Erythromycin Nausea Only    Erythromycin Base Nausea and Vomiting    Niacin Swelling    Pitavastatin Other (comments)     Abdominal pain, nausea    Premarin [Conjugated Estrogens] Other (comments)     Headaches        Simvastatin Other (comments)     Muscle cramps    Atorvastatin Other (comments)     Muscle cramps    Triamcinolone Unable to Obtain       Vitals:    09/23/19 1353   BP: 133/88   Pulse: 89   Resp: 18   Temp: 97.9 °F (36.6 °C)   TempSrc: Oral   SpO2: 98%   Weight: 175 lb (79.4 kg)   Height: 5' 4\" (1.626 m)     Body mass index is 30.04 kg/m². Objective  Physical Exam  General: Patient alert and oriented and in NAD  Neck: No thyromegaly or cervical lymphadenopathy  Cardiovascular: Heart has regular rate and rhythm, No murmurs, rubs or gallops. No edema  Respiratory: Lungs are clear to auscultation bilaterally, no wheezing, rales or rhonchi, normal chest excursion and no increased work of breathing. Musculoskeletal: All four extremities present and functional.   Skin: No rashes or lesions noted on exposed skin  Neuro: AAOx3, normal gait and speech. No gross neurologic deficits. Psych: Appropriate mood and affect, no homicidal or suicidal ideation, no obsessions, delusions or hallucinations, normal psychomotor status.     Diabetic Foot Exam:1+ edema bilaterall  Protective sensation is intact bilaterally. Pedal pulses are 2+ and normal bilaterally.   L foot skin inspection:  normal skin and soft tissue with no gross edema or evidence of acute injury or foot ulcer  R foot skin inspection:  skin and soft tissue appear normal with no significant edema or evidence of acute injury or foot ulcer

## 2019-10-16 ENCOUNTER — OFFICE VISIT (OUTPATIENT)
Dept: FAMILY MEDICINE CLINIC | Age: 74
End: 2019-10-16

## 2019-10-16 VITALS
OXYGEN SATURATION: 97 % | WEIGHT: 175 LBS | HEART RATE: 83 BPM | DIASTOLIC BLOOD PRESSURE: 75 MMHG | SYSTOLIC BLOOD PRESSURE: 122 MMHG | TEMPERATURE: 97.8 F | RESPIRATION RATE: 18 BRPM | HEIGHT: 64 IN | BODY MASS INDEX: 29.88 KG/M2

## 2019-10-16 DIAGNOSIS — Z79.4 CONTROLLED TYPE 2 DIABETES MELLITUS WITHOUT COMPLICATION, WITH LONG-TERM CURRENT USE OF INSULIN (HCC): ICD-10-CM

## 2019-10-16 DIAGNOSIS — Z00.00 MEDICARE ANNUAL WELLNESS VISIT, SUBSEQUENT: Primary | ICD-10-CM

## 2019-10-16 DIAGNOSIS — Z12.11 SCREEN FOR COLON CANCER: ICD-10-CM

## 2019-10-16 DIAGNOSIS — G47.00 INSOMNIA, UNSPECIFIED TYPE: ICD-10-CM

## 2019-10-16 DIAGNOSIS — Z13.39 SCREENING FOR ALCOHOLISM: ICD-10-CM

## 2019-10-16 DIAGNOSIS — Z11.59 NEED FOR HEPATITIS C SCREENING TEST: ICD-10-CM

## 2019-10-16 DIAGNOSIS — E11.9 CONTROLLED TYPE 2 DIABETES MELLITUS WITHOUT COMPLICATION, WITH LONG-TERM CURRENT USE OF INSULIN (HCC): ICD-10-CM

## 2019-10-16 DIAGNOSIS — K21.9 GASTROESOPHAGEAL REFLUX DISEASE WITHOUT ESOPHAGITIS: ICD-10-CM

## 2019-10-16 DIAGNOSIS — Z13.31 SCREENING FOR DEPRESSION: ICD-10-CM

## 2019-10-16 DIAGNOSIS — E78.5 HYPERLIPIDEMIA, UNSPECIFIED HYPERLIPIDEMIA TYPE: ICD-10-CM

## 2019-10-16 DIAGNOSIS — I10 ESSENTIAL HYPERTENSION: ICD-10-CM

## 2019-10-16 DIAGNOSIS — Z23 ENCOUNTER FOR IMMUNIZATION: ICD-10-CM

## 2019-10-16 RX ORDER — PRAVASTATIN SODIUM 40 MG/1
80 TABLET ORAL
Qty: 180 TAB | Refills: 1 | Status: SHIPPED | OUTPATIENT
Start: 2019-10-16 | End: 2020-03-02 | Stop reason: SDUPTHER

## 2019-10-16 RX ORDER — HYDROCHLOROTHIAZIDE 25 MG/1
25 TABLET ORAL DAILY
Qty: 90 TAB | Refills: 1 | Status: SHIPPED | OUTPATIENT
Start: 2019-10-16 | End: 2020-03-02 | Stop reason: SDUPTHER

## 2019-10-16 RX ORDER — METFORMIN HYDROCHLORIDE 500 MG/1
500 TABLET ORAL DAILY
COMMUNITY
End: 2019-10-16 | Stop reason: SDUPTHER

## 2019-10-16 RX ORDER — AMLODIPINE BESYLATE 5 MG/1
5 TABLET ORAL DAILY
Qty: 90 TAB | Refills: 1 | Status: SHIPPED | OUTPATIENT
Start: 2019-10-16 | End: 2020-03-02 | Stop reason: SDUPTHER

## 2019-10-16 RX ORDER — METFORMIN HYDROCHLORIDE 500 MG/1
500 TABLET ORAL DAILY
Qty: 90 TAB | Refills: 1 | Status: SHIPPED | OUTPATIENT
Start: 2019-10-16 | End: 2020-03-02 | Stop reason: SDUPTHER

## 2019-10-16 RX ORDER — MELATONIN 5 MG
5 CAPSULE ORAL
COMMUNITY
End: 2021-05-10

## 2019-10-16 RX ORDER — QUETIAPINE FUMARATE 50 MG/1
50 TABLET, FILM COATED ORAL
Qty: 90 TAB | Refills: 1 | Status: SHIPPED | OUTPATIENT
Start: 2019-10-16 | End: 2020-03-02 | Stop reason: SDUPTHER

## 2019-10-16 RX ORDER — LOSARTAN POTASSIUM 100 MG/1
100 TABLET ORAL DAILY
Qty: 90 TAB | Refills: 1 | Status: SHIPPED | OUTPATIENT
Start: 2019-10-16 | End: 2020-03-02 | Stop reason: SDUPTHER

## 2019-10-16 RX ORDER — ASCORBIC ACID 500 MG
1000 TABLET ORAL DAILY
COMMUNITY
End: 2021-08-08

## 2019-10-16 RX ORDER — CETIRIZINE HCL 10 MG
TABLET ORAL
COMMUNITY
End: 2020-09-30

## 2019-10-16 RX ORDER — RANITIDINE 150 MG/1
150 TABLET, FILM COATED ORAL
Qty: 90 TAB | Refills: 1 | Status: SHIPPED | OUTPATIENT
Start: 2019-10-16 | End: 2020-03-02 | Stop reason: SDUPTHER

## 2019-10-16 RX ORDER — OMEPRAZOLE 20 MG/1
20 CAPSULE, DELAYED RELEASE ORAL DAILY
COMMUNITY
End: 2019-10-16 | Stop reason: SDUPTHER

## 2019-10-16 RX ORDER — BUTALBITAL, ASPIRIN, AND CAFFEINE 325; 50; 40 MG/1; MG/1; MG/1
1 CAPSULE ORAL
COMMUNITY

## 2019-10-16 RX ORDER — OMEPRAZOLE 20 MG/1
20 CAPSULE, DELAYED RELEASE ORAL DAILY
Qty: 90 CAP | Refills: 1 | Status: SHIPPED | OUTPATIENT
Start: 2019-10-16 | End: 2020-03-02 | Stop reason: SDUPTHER

## 2019-10-16 NOTE — PROGRESS NOTES
Assessment and Plan    1. Essential hypertension  At goal, continue meds  - losartan (COZAAR) 100 mg tablet; Take 1 Tab by mouth daily. Dispense: 90 Tab; Refill: 1  - amLODIPine (NORVASC) 5 mg tablet; Take 1 Tab by mouth daily. Dispense: 90 Tab; Refill: 1  - hydroCHLOROthiazide (HYDRODIURIL) 25 mg tablet; Take 1 Tab by mouth daily. Dispense: 90 Tab; Refill: 1    2. Controlled type 2 diabetes mellitus without complication, with long-term current use of insulin (formerly [facility])  A1C at goal  No longer having hypoglycemia  - metFORMIN (GLUCOPHAGE) 500 mg tablet; Take 1 Tab by mouth daily. Dispense: 90 Tab; Refill: 1  - insulin NPH (NOVOLIN N NPH U-100 INSULIN) 100 unit/mL injection; 36 Units by SubCUTAneous route Before breakfast and dinner. Please schedule appt for exam and lab work  Dispense: 3 Vial; Refill: 1  - insulin regular (NOVOLIN R, HUMULIN R) 100 unit/mL injection; 25 units Subcutaneously 5-10 minutes prior to meals. Dispense: 4 Vial; Refill: 1    3. Hyperlipidemia, unspecified hyperlipidemia type  Continue statin  - pravastatin (PRAVACHOL) 40 mg tablet; Take 2 Tabs by mouth nightly. Indications: 2 tablets at bedtime  Dispense: 180 Tab; Refill: 1    4. Gastroesophageal reflux disease without esophagitis  Cont current rx  - raNITIdine (ZANTAC) 150 mg tablet; Take 1 Tab by mouth nightly. Dispense: 90 Tab; Refill: 1  - omeprazole (PRILOSEC) 20 mg capsule; Take 1 Cap by mouth daily. Dispense: 90 Cap; Refill: 1    5. Insomnia, unspecified type  refilled  - QUEtiapine (SEROQUEL) 50 mg tablet; Take 1 Tab by mouth nightly. Dispense: 90 Tab; Refill: 1    6. Medicare annual wellness visit, subsequent  Updated,  Mammogram already scheduled  Needs eye exam, she will schedule. Colonoscopy due, scheduled. - pneumococcal 23-preston ps vaccine (PNEUMOVAX 23) 25 mcg/0.5 mL injection; 0.5 mL by IntraMUSCular route once for 1 dose.   Dispense: 0.5 mL; Refill: 0  - varicella-zoster recombinant, PF, (SHINGRIX, PF,) 50 mcg/0.5 mL susr injection; 0.5mL by IntraMUSCular route once now and then repeat in 2-6 months  Dispense: 0.5 mL; Refill: 1  - diph,pertuss,acel,,tetanus vac,PF, (ADACEL) 2 Lf-(2.5-5-3-5 mcg)-5Lf/0.5 mL syrg vaccine; 0.5 mL by IntraMUSCular route once for 1 dose. Dispense: 0.5 mL; Refill: 0    7. Screening for alcoholism  No issues  - [place] ANNUAL ALCOHOL SCREEN 15 MIN    8. Screening for depression  No issues  - DEPRESSION SCREEN ANNUAL    9. Encounter for immunization  Flu vaccine  - ADMIN INFLUENZA VIRUS VAC  - INFLUENZA VIRUS VACCINE, HIGH DOSE SEASONAL, PRESERVATIVE FREE    10. Need for hepatitis C screening test  screening  - HEPATITIS C AB    11. Screen for colon cancer  Due. To Dr. Choco Nye and Dispositions    · Return in about 6 months (around 4/16/2020) for Diabetes follow up, Blood pressure follow up, Medication follow up. Diagnosis and plan discussed with patient who verbillized understanding. History of present Jessie Jane is a 76 y.o. female presenting for Annual Wellness Visit; Ankle swelling; and Medication Refill    Here to review her complicated med regimen and get refills. Meds reviewed, reconciled and refilled    DM2  On insulin, dose reduced. No longer having hypoglycemia  (except one occasion when she missed a meal)    Hypertension  At goal    Hypercholesterolemia  Tolerating statin well    Insomnia  Melatonin and seroquel working well. Review of Systems   Respiratory: Negative for shortness of breath. Cardiovascular: Positive for leg swelling (better with support hose). Negative for chest pain. Gastrointestinal: Negative for blood in stool. Genitourinary: Negative for hematuria.          Past Medical History:   Diagnosis Date    CAD (coronary artery disease)     Diabetes [facility]     Diabetes ([facility]     type 2    GERD (gastroesophageal reflux disease)     H/O seasonal allergies     Hyperlipidemia     Hypertension     Insomnia     Nausea & vomiting     PUD (peptic ulcer disease)     Small bowel obstruction (HCC)     Vitamin D deficiency      Past Surgical History:   Procedure Laterality Date    BREAST SURGERY PROCEDURE UNLISTED      breast reduction    CARDIAC SURG PROCEDURE UNLIST      heart stent    HX APPENDECTOMY      HX BREAST BIOPSY      HX GYN  1971    partial hysterectomy    HX GYN  1991    complete hysterectomy    HX HYSTERECTOMY      HX ORTHOPAEDIC      right rotator cuff repair    HX ORTHOPAEDIC      bilaterral wrist surgery    HX ORTHOPAEDIC      right knee surgery    HX ORTHOPAEDIC      bilateral bone spur removal from big toe    HX TONSIL AND ADENOIDECTOMY      HX TONSILLECTOMY       Family History   Problem Relation Age of Onset    Cancer Maternal Aunt         pancreatic cancer    Hypertension Mother     COPD Mother     Diabetes Father     Alcohol abuse Brother         murdered      Social History     Socioeconomic History    Marital status:      Spouse name: Not on file    Number of children: Not on file    Years of education: Not on file    Highest education level: Not on file   Occupational History    Not on file   Social Needs    Financial resource strain: Not on file    Food insecurity:     Worry: Not on file     Inability: Not on file    Transportation needs:     Medical: Not on file     Non-medical: Not on file   Tobacco Use    Smoking status: Never Smoker    Smokeless tobacco: Never Used   Substance and Sexual Activity    Alcohol use: No    Drug use: No    Sexual activity: Not Currently   Lifestyle    Physical activity:     Days per week: Not on file     Minutes per session: Not on file    Stress: Not on file   Relationships    Social connections:     Talks on phone: Not on file     Gets together: Not on file     Attends Jew service: Not on file     Active member of club or organization: Not on file     Attends meetings of clubs or organizations: Not on file Relationship status: Not on file    Intimate partner violence:     Fear of current or ex partner: Not on file     Emotionally abused: Not on file     Physically abused: Not on file     Forced sexual activity: Not on file   Other Topics Concern    Not on file   Social History Narrative    Not on file         Current Outpatient Medications   Medication Sig Dispense Refill    butalbital-aspirin-caffeine (FIORINAL) capsule Take 1 Cap by mouth every four (4) hours as needed for Pain.  loratadine 10 mg cap Take  by mouth.  cetirizine (ZYRTEC) 10 mg tablet Take  by mouth.  ZINC PO Take  by mouth.  melatonin 5 mg cap capsule Take 5 mg by mouth two (2) times a day.  ascorbic acid, vitamin C, (VITAMIN C) 500 mg tablet Take 1,000 mg by mouth daily.  pneumococcal 23-preston ps vaccine (PNEUMOVAX 23) 25 mcg/0.5 mL injection 0.5 mL by IntraMUSCular route once for 1 dose. 0.5 mL 0    varicella-zoster recombinant, PF, (SHINGRIX, PF,) 50 mcg/0.5 mL susr injection 0.5mL by IntraMUSCular route once now and then repeat in 2-6 months 0.5 mL 1    diph,pertuss,acel,,tetanus vac,PF, (ADACEL) 2 Lf-(2.5-5-3-5 mcg)-5Lf/0.5 mL syrg vaccine 0.5 mL by IntraMUSCular route once for 1 dose. 0.5 mL 0    metFORMIN (GLUCOPHAGE) 500 mg tablet Take 1 Tab by mouth daily. 90 Tab 1    QUEtiapine (SEROQUEL) 50 mg tablet Take 1 Tab by mouth nightly. 90 Tab 1    raNITIdine (ZANTAC) 150 mg tablet Take 1 Tab by mouth nightly. 90 Tab 1    omeprazole (PRILOSEC) 20 mg capsule Take 1 Cap by mouth daily. 90 Cap 1    pravastatin (PRAVACHOL) 40 mg tablet Take 2 Tabs by mouth nightly. Indications: 2 tablets at bedtime 180 Tab 1    losartan (COZAAR) 100 mg tablet Take 1 Tab by mouth daily. 90 Tab 1    amLODIPine (NORVASC) 5 mg tablet Take 1 Tab by mouth daily. 90 Tab 1    hydroCHLOROthiazide (HYDRODIURIL) 25 mg tablet Take 1 Tab by mouth daily.  90 Tab 1    insulin NPH (NOVOLIN N NPH U-100 INSULIN) 100 unit/mL injection 36 Units by SubCUTAneous route Before breakfast and dinner. Please schedule appt for exam and lab work 3 Vial 1    insulin regular (NOVOLIN R, HUMULIN R) 100 unit/mL injection 25 units Subcutaneously 5-10 minutes prior to meals. 4 Vial 1    naproxen sodium 220 mg Cap Take 1 Tab by mouth two (2) times daily as needed. Allergies   Allergen Reactions    Advicor [Niacin-Lovastatin] Swelling    Ambien [Zolpidem] Other (comments)    Deflazacort Unable to Obtain    Erythromycin Nausea Only    Erythromycin Base Nausea and Vomiting    Niacin Swelling    Pitavastatin Other (comments)     Abdominal pain, nausea    Premarin [Conjugated Estrogens] Other (comments)     Headaches        Simvastatin Other (comments)     Muscle cramps    Atorvastatin Other (comments)     Muscle cramps    Triamcinolone Unable to Obtain       Vitals:    10/16/19 1435   BP: 122/75   Pulse: 83   Resp: 18   Temp: 97.8 °F (36.6 °C)   TempSrc: Oral   SpO2: 97%   Weight: 175 lb (79.4 kg)   Height: 5' 4\" (1.626 m)     Body mass index is 30.04 kg/m². Objective  Physical Exam   Constitutional: She is oriented to person, place, and time. She appears well-developed and well-nourished. No distress. Eyes: Conjunctivae are normal.   Neck: Neck supple. No thyromegaly present. Cardiovascular: Normal rate, regular rhythm and normal heart sounds. Exam reveals no gallop and no friction rub. No murmur heard. Pulmonary/Chest: Effort normal and breath sounds normal. No respiratory distress. She has no wheezes. She has no rales. Musculoskeletal: She exhibits no edema. Lymphadenopathy:     She has no cervical adenopathy. Neurological: She is alert and oriented to person, place, and time. Skin: She is not diaphoretic. Psychiatric: She has a normal mood and affect. Her behavior is normal. Judgment and thought content normal.   Nursing note and vitals reviewed.             This is the Subsequent Medicare Annual Wellness Exam, performed 12 months or more after the Initial AWV or the last Subsequent AWV    I have reviewed the patient's medical history in detail and updated the computerized patient record. History     Past Medical History:   Diagnosis Date    CAD (coronary artery disease)     Diabetes ([facility]     Diabetes ([facility]     type 2    GERD (gastroesophageal reflux disease)     H/O seasonal allergies     Hyperlipidemia     Hypertension     Insomnia     Nausea & vomiting     PUD (peptic ulcer disease)     Small bowel obstruction (HCC)     Vitamin D deficiency       Past Surgical History:   Procedure Laterality Date    BREAST SURGERY PROCEDURE UNLISTED      breast reduction    CARDIAC SURG PROCEDURE UNLIST      heart stent    HX APPENDECTOMY      HX BREAST BIOPSY      HX GYN  1971    partial hysterectomy    HX GYN  1991    complete hysterectomy    HX HYSTERECTOMY      HX ORTHOPAEDIC      right rotator cuff repair    HX ORTHOPAEDIC      bilaterral wrist surgery    HX ORTHOPAEDIC      right knee surgery    HX ORTHOPAEDIC      bilateral bone spur removal from big toe    HX TONSIL AND ADENOIDECTOMY      HX TONSILLECTOMY       Current Outpatient Medications   Medication Sig Dispense Refill    butalbital-aspirin-caffeine (FIORINAL) capsule Take 1 Cap by mouth every four (4) hours as needed for Pain.  loratadine 10 mg cap Take  by mouth.  cetirizine (ZYRTEC) 10 mg tablet Take  by mouth.  ZINC PO Take  by mouth.  melatonin 5 mg cap capsule Take 5 mg by mouth two (2) times a day.  ascorbic acid, vitamin C, (VITAMIN C) 500 mg tablet Take 1,000 mg by mouth daily.  pneumococcal 23-preston ps vaccine (PNEUMOVAX 23) 25 mcg/0.5 mL injection 0.5 mL by IntraMUSCular route once for 1 dose.  0.5 mL 0    varicella-zoster recombinant, PF, (SHINGRIX, PF,) 50 mcg/0.5 mL susr injection 0.5mL by IntraMUSCular route once now and then repeat in 2-6 months 0.5 mL 1    diph,pertuss,acel,,tetanus vac,PF, (ADACEL) 2 Lf-(2.5-5-3-5 mcg)-5Lf/0.5 mL syrg vaccine 0.5 mL by IntraMUSCular route once for 1 dose. 0.5 mL 0    metFORMIN (GLUCOPHAGE) 500 mg tablet Take 1 Tab by mouth daily. 90 Tab 1    QUEtiapine (SEROQUEL) 50 mg tablet Take 1 Tab by mouth nightly. 90 Tab 1    raNITIdine (ZANTAC) 150 mg tablet Take 1 Tab by mouth nightly. 90 Tab 1    omeprazole (PRILOSEC) 20 mg capsule Take 1 Cap by mouth daily. 90 Cap 1    pravastatin (PRAVACHOL) 40 mg tablet Take 2 Tabs by mouth nightly. Indications: 2 tablets at bedtime 180 Tab 1    losartan (COZAAR) 100 mg tablet Take 1 Tab by mouth daily. 90 Tab 1    amLODIPine (NORVASC) 5 mg tablet Take 1 Tab by mouth daily. 90 Tab 1    hydroCHLOROthiazide (HYDRODIURIL) 25 mg tablet Take 1 Tab by mouth daily. 90 Tab 1    insulin NPH (NOVOLIN N NPH U-100 INSULIN) 100 unit/mL injection 36 Units by SubCUTAneous route Before breakfast and dinner. Please schedule appt for exam and lab work 3 Vial 1    insulin regular (NOVOLIN R, HUMULIN R) 100 unit/mL injection 25 units Subcutaneously 5-10 minutes prior to meals. 4 Vial 1    naproxen sodium 220 mg Cap Take 1 Tab by mouth two (2) times daily as needed.          Allergies   Allergen Reactions    Advicor [Niacin-Lovastatin] Swelling    Ambien [Zolpidem] Other (comments)    Deflazacort Unable to Obtain    Erythromycin Nausea Only    Erythromycin Base Nausea and Vomiting    Niacin Swelling    Pitavastatin Other (comments)     Abdominal pain, nausea    Premarin [Conjugated Estrogens] Other (comments)     Headaches        Simvastatin Other (comments)     Muscle cramps    Atorvastatin Other (comments)     Muscle cramps    Triamcinolone Unable to Obtain     Family History   Problem Relation Age of Onset    Cancer Maternal Aunt         pancreatic cancer    Hypertension Mother     COPD Mother     Diabetes Father     Alcohol abuse Brother         murdered      Social History     Tobacco Use    Smoking status: Never Smoker    Smokeless tobacco: Never Used   Substance Use Topics    Alcohol use: No     Patient Active Problem List   Diagnosis Code    Diabetic retinopathy ([facility] E11.319    Essential hypertension I10    Hypercholesteremia E78.00    Controlled type 2 diabetes mellitus without complication, with long-term current use of insulin ([facility] E11.9, Z79.4       Depression Risk Factor Screening:     3 most recent PHQ Screens 9/10/2019   Little interest or pleasure in doing things Not at all   Feeling down, depressed, irritable, or hopeless Not at all   Total Score PHQ 2 0     Alcohol Risk Factor Screening: You do not drink alcohol or very rarely. Functional Ability and Level of Safety:   Hearing Loss  Hearing is good. The patient wears hearing aids. Activities of Daily Living  The home contains: no safety equipment. Patient does total self care    Fall Risk  Fall Risk Assessment, last 12 mths 9/23/2019   Able to walk? Yes   Fall in past 12 months? No       Abuse Screen  Patient is not abused    Cognitive Screening   Evaluation of Cognitive Function:  Has your family/caregiver stated any concerns about your memory: no  Normal    Patient Care Team   Patient Care Team:  Yeimi Yanez MD as PCP - General (Family Practice)  Luis Morales (Optometry)    Assessment/Plan   Education and counseling provided:  Are appropriate based on today's review and evaluation  Pneumococcal Vaccine  Influenza Vaccine  Screening Mammography  Screening Pap and pelvic (covered once every 2 years)  Colorectal cancer screening tests  Bone mass measurement (DEXA)  Screening for glaucoma    Diagnoses and all orders for this visit:    1. Hyperlipidemia, unspecified hyperlipidemia type  -     pravastatin (PRAVACHOL) 40 mg tablet; Take 2 Tabs by mouth nightly. Indications: 2 tablets at bedtime    2. Essential hypertension  -     losartan (COZAAR) 100 mg tablet; Take 1 Tab by mouth daily. -     amLODIPine (NORVASC) 5 mg tablet; Take 1 Tab by mouth daily.   - hydroCHLOROthiazide (HYDRODIURIL) 25 mg tablet; Take 1 Tab by mouth daily. 3. Controlled type 2 diabetes mellitus without complication, with long-term current use of insulin (HCC)  -     metFORMIN (GLUCOPHAGE) 500 mg tablet; Take 1 Tab by mouth daily. -     insulin NPH (NOVOLIN N NPH U-100 INSULIN) 100 unit/mL injection; 36 Units by SubCUTAneous route Before breakfast and dinner. Please schedule appt for exam and lab work  -     insulin regular (NOVOLIN R, HUMULIN R) 100 unit/mL injection; 25 units Subcutaneously 5-10 minutes prior to meals. 4. Gastroesophageal reflux disease without esophagitis  -     raNITIdine (ZANTAC) 150 mg tablet; Take 1 Tab by mouth nightly. -     omeprazole (PRILOSEC) 20 mg capsule; Take 1 Cap by mouth daily. 5. Insomnia, unspecified type  -     QUEtiapine (SEROQUEL) 50 mg tablet; Take 1 Tab by mouth nightly. 6. Medicare annual wellness visit, subsequent  -     pneumococcal 23-preston ps vaccine (PNEUMOVAX 23) 25 mcg/0.5 mL injection; 0.5 mL by IntraMUSCular route once for 1 dose.  -     varicella-zoster recombinant, PF, (SHINGRIX, PF,) 50 mcg/0.5 mL susr injection; 0.5mL by IntraMUSCular route once now and then repeat in 2-6 months  -     diph,pertuss,acel,,tetanus vac,PF, (ADACEL) 2 Lf-(2.5-5-3-5 mcg)-5Lf/0.5 mL syrg vaccine; 0.5 mL by IntraMUSCular route once for 1 dose. 7. Screening for alcoholism  -     [place] ANNUAL ALCOHOL SCREEN 15 MIN    8. Screening for depression  -     DEPRESSION SCREEN ANNUAL    9. Encounter for immunization  -     ADMIN INFLUENZA VIRUS VAC  -     INFLUENZA VIRUS VACCINE, HIGH DOSE SEASONAL, PRESERVATIVE FREE    10. Need for hepatitis C screening test  -     HEPATITIS C AB    11.  Screen for colon cancer  -     REFERRAL FOR COLONOSCOPY        Health Maintenance Due   Topic Date Due    Hepatitis C Screening  1945    DTaP/Tdap/Td series (1 - Tdap) 03/24/1966    Shingrix Vaccine Age 50> (1 of 2) 03/24/1995    BREAST CANCER SCRN MAMMOGRAM 03/24/1995    Bone Densitometry (Dexa) Screening  03/24/2010    Pneumococcal 65+ years (1 of 2 - PCV13) 03/24/2010    MEDICARE YEARLY EXAM  03/20/2018    COLONOSCOPY  02/26/2019    GLAUCOMA SCREENING Q2Y  04/27/2019    EYE EXAM RETINAL OR DILATED  04/27/2019    Influenza Age 5 to Adult  08/01/2019

## 2019-10-16 NOTE — PATIENT INSTRUCTIONS
Medicare Wellness Visit, Female The best way to live healthy is to have a lifestyle where you eat a well-balanced diet, exercise regularly, limit alcohol use, and quit all forms of tobacco/nicotine, if applicable. Regular preventive services are another way to keep healthy. Preventive services (vaccines, screening tests, monitoring & exams) can help personalize your care plan, which helps you manage your own care. Screening tests can find health problems at the earliest stages, when they are easiest to treat. [facility] Mach[facility] follows the current, evidence-based guidelines published by the [facility] ([facility]) when recommending preventive services for our patients. Because we follow these guidelines, sometimes recommendations change over time as research supports it. (For example, mammograms used to be recommended annually. Even though Medicare will still pay for an annual mammogram, the newer guidelines recommend a mammogram every two years for women of average risk.) Of course, you and your doctor may decide to screen more often for some diseases, based on your risk and your health status. Preventive services for you include: - Medicare offers their members a free annual wellness visit, which is time for you and your primary care provider to discuss and plan for your preventive service needs. Take advantage of this benefit every year! 
-All adults over the age of 72 should receive the recommended pneumonia vaccines. Current USPSTF guidelines recommend a series of two vaccines for the best pneumonia protection.  
-All adults should have a flu vaccine yearly and a tetanus vaccine every 10 years. All adults age 61 and older should receive a shingles vaccine once in their lifetime.   
-A bone mass density test is recommended when a woman turns 65 to screen for osteoporosis. This test is only recommended one time, as a screening. Some providers will use this same test as a disease monitoring tool if you already have osteoporosis. -All adults age 38-68 who are overweight should have a diabetes screening test once every three years.  
-Other screening tests and preventive services for persons with diabetes include: an eye exam to screen for diabetic retinopathy, a kidney function test, a foot exam, and stricter control over your cholesterol.  
-Cardiovascular screening for adults with routine risk involves an electrocardiogram (ECG) at intervals determined by your doctor.  
-Colorectal cancer screenings should be done for adults age 54-65 with no increased risk factors for colorectal cancer. There are a number of acceptable methods of screening for this type of cancer. Each test has its own benefits and drawbacks. Discuss with your doctor what is most appropriate for you during your annual wellness visit. The different tests include: colonoscopy (considered the best screening method), a fecal occult blood test, a fecal DNA test, and sigmoidoscopy. -Breast cancer screenings are recommended every other year for women of normal risk, age 54-69. 
-Cervical cancer screenings for women over age 72 are only recommended with certain risk factors.  
-All adults born between [facility] should be screened once for Hepatitis C. Here is a list of your current Health Maintenance items (your personalized list of preventive services) with a due date: 
Health Maintenance Due Topic Date Due  
Clorinda Barley Test  1945  
 DTaP/Tdap/Td  (1 - Tdap) 03/24/1966  Shingles Vaccine (1 of 2) 03/24/1995  Mammogram  03/24/1995  Bone Mineral Density   03/24/2010  Pneumococcal Vaccine (1 of 2 - PCV13) 03/24/2010 Aidan Kenny Annual Well Visit  03/20/2018  Colonoscopy  02/26/2019  Glaucoma Screening   04/27/2019 Aidan Kenny Eye Exam  04/27/2019  Flu Vaccine  08/01/2019

## 2019-10-17 LAB — HCV AB S/CO SERPL IA: <0.1 S/CO RATIO (ref 0–0.9)

## 2019-12-30 DIAGNOSIS — E11.9 CONTROLLED TYPE 2 DIABETES MELLITUS WITHOUT COMPLICATION, WITH LONG-TERM CURRENT USE OF INSULIN (HCC): ICD-10-CM

## 2019-12-30 DIAGNOSIS — Z79.4 CONTROLLED TYPE 2 DIABETES MELLITUS WITHOUT COMPLICATION, WITH LONG-TERM CURRENT USE OF INSULIN (HCC): ICD-10-CM

## 2019-12-31 RX ORDER — HUMAN INSULIN 100 [IU]/ML
INJECTION, SUSPENSION SUBCUTANEOUS
Qty: 30 ML | Refills: 0 | Status: SHIPPED | OUTPATIENT
Start: 2019-12-31 | End: 2020-03-02 | Stop reason: SDUPTHER

## 2020-03-02 ENCOUNTER — TELEPHONE (OUTPATIENT)
Dept: FAMILY MEDICINE CLINIC | Age: 75
End: 2020-03-02

## 2020-03-02 ENCOUNTER — HOSPITAL ENCOUNTER (OUTPATIENT)
Dept: LAB | Age: 75
Discharge: HOME | End: 2020-03-02

## 2020-03-02 ENCOUNTER — OFFICE VISIT (OUTPATIENT)
Dept: FAMILY MEDICINE CLINIC | Age: 75
End: 2020-03-02

## 2020-03-02 VITALS
SYSTOLIC BLOOD PRESSURE: 122 MMHG | BODY MASS INDEX: 29.53 KG/M2 | TEMPERATURE: 98.1 F | RESPIRATION RATE: 16 BRPM | HEIGHT: 64 IN | OXYGEN SATURATION: 98 % | HEART RATE: 89 BPM | DIASTOLIC BLOOD PRESSURE: 76 MMHG | WEIGHT: 173 LBS

## 2020-03-02 DIAGNOSIS — I10 ESSENTIAL HYPERTENSION: ICD-10-CM

## 2020-03-02 DIAGNOSIS — Z79.4 CONTROLLED TYPE 2 DIABETES MELLITUS WITHOUT COMPLICATION, WITH LONG-TERM CURRENT USE OF INSULIN (HCC): ICD-10-CM

## 2020-03-02 DIAGNOSIS — Z79.4 CONTROLLED TYPE 2 DIABETES MELLITUS WITHOUT COMPLICATION, WITH LONG-TERM CURRENT USE OF INSULIN (HCC): Primary | ICD-10-CM

## 2020-03-02 DIAGNOSIS — E11.9 CONTROLLED TYPE 2 DIABETES MELLITUS WITHOUT COMPLICATION, WITH LONG-TERM CURRENT USE OF INSULIN (HCC): Primary | ICD-10-CM

## 2020-03-02 DIAGNOSIS — G47.00 INSOMNIA, UNSPECIFIED TYPE: ICD-10-CM

## 2020-03-02 DIAGNOSIS — K21.9 GASTROESOPHAGEAL REFLUX DISEASE WITHOUT ESOPHAGITIS: Primary | ICD-10-CM

## 2020-03-02 DIAGNOSIS — E11.9 CONTROLLED TYPE 2 DIABETES MELLITUS WITHOUT COMPLICATION, WITH LONG-TERM CURRENT USE OF INSULIN (HCC): ICD-10-CM

## 2020-03-02 DIAGNOSIS — E78.5 HYPERLIPIDEMIA, UNSPECIFIED HYPERLIPIDEMIA TYPE: ICD-10-CM

## 2020-03-02 DIAGNOSIS — K21.9 GASTROESOPHAGEAL REFLUX DISEASE WITHOUT ESOPHAGITIS: ICD-10-CM

## 2020-03-02 LAB
COMMENT, HOLDF: NORMAL
SAMPLES BEING HELD,HOLD: NORMAL

## 2020-03-02 RX ORDER — QUETIAPINE FUMARATE 50 MG/1
50 TABLET, FILM COATED ORAL
Qty: 90 TAB | Refills: 1 | Status: SHIPPED | OUTPATIENT
Start: 2020-03-02 | End: 2020-09-30 | Stop reason: SDUPTHER

## 2020-03-02 RX ORDER — LOSARTAN POTASSIUM 100 MG/1
TABLET ORAL
COMMUNITY
Start: 2018-03-03 | End: 2020-03-02 | Stop reason: SDUPTHER

## 2020-03-02 RX ORDER — METFORMIN HYDROCHLORIDE 500 MG/1
500 TABLET ORAL DAILY
Qty: 90 TAB | Refills: 1 | Status: SHIPPED | OUTPATIENT
Start: 2020-03-02 | End: 2020-09-30 | Stop reason: SDUPTHER

## 2020-03-02 RX ORDER — RANITIDINE 150 MG/1
150 TABLET, FILM COATED ORAL
Qty: 90 TAB | Refills: 1 | Status: SHIPPED | OUTPATIENT
Start: 2020-03-02 | End: 2020-03-02 | Stop reason: ALTCHOICE

## 2020-03-02 RX ORDER — AMLODIPINE BESYLATE 5 MG/1
5 TABLET ORAL DAILY
Qty: 90 TAB | Refills: 1 | Status: SHIPPED | OUTPATIENT
Start: 2020-03-02 | End: 2020-09-30 | Stop reason: SDUPTHER

## 2020-03-02 RX ORDER — LOSARTAN POTASSIUM 100 MG/1
100 TABLET ORAL DAILY
Qty: 90 TAB | Refills: 1 | Status: SHIPPED | OUTPATIENT
Start: 2020-03-02 | End: 2020-09-30 | Stop reason: SDUPTHER

## 2020-03-02 RX ORDER — OMEPRAZOLE 20 MG/1
20 CAPSULE, DELAYED RELEASE ORAL DAILY
Qty: 90 CAP | Refills: 1 | Status: SHIPPED | OUTPATIENT
Start: 2020-03-02 | End: 2020-09-30

## 2020-03-02 RX ORDER — PRAVASTATIN SODIUM 40 MG/1
80 TABLET ORAL
Qty: 180 TAB | Refills: 1 | Status: SHIPPED | OUTPATIENT
Start: 2020-03-02 | End: 2020-09-30 | Stop reason: SDUPTHER

## 2020-03-02 RX ORDER — HYDROCHLOROTHIAZIDE 25 MG/1
25 TABLET ORAL DAILY
Qty: 90 TAB | Refills: 1 | Status: SHIPPED | OUTPATIENT
Start: 2020-03-02 | End: 2020-09-30 | Stop reason: SDUPTHER

## 2020-03-02 RX ORDER — FAMOTIDINE 20 MG/1
20 TABLET, FILM COATED ORAL
Qty: 90 TAB | Refills: 1 | Status: SHIPPED | OUTPATIENT
Start: 2020-03-02 | End: 2020-09-30 | Stop reason: SDUPTHER

## 2020-03-02 NOTE — PROGRESS NOTES
Identified pt with two pt identifiers(name and ). Reviewed record in preparation for visit and have obtained necessary documentation. Chief Complaint   Patient presents with    Diabetes    Insomnia     Still not sleeping         Health Maintenance Due   Topic    Breast Cancer Screen Mammogram     Colonoscopy     GLAUCOMA SCREENING Q2Y     Eye Exam Retinal or Dilated     Shingrix Vaccine Age 50> (2 of 2)       Coordination of Care Questionnaire:  :   1) Have you been to an emergency room, urgent care, or hospitalized since your last visit? If yes, where when, and reason for visit? No       2. Have seen or consulted any other health care provider since your last visit? If yes, where when, and reason for visit?   Yes Gastrologist

## 2020-03-02 NOTE — PROGRESS NOTES
Assessment and Plan    1. Essential hypertension  At goal, continue current meds  - amLODIPine (NORVASC) 5 mg tablet; Take 1 Tab by mouth daily. Dispense: 90 Tab; Refill: 1  - hydroCHLOROthiazide (HYDRODIURIL) 25 mg tablet; Take 1 Tab by mouth daily. Dispense: 90 Tab; Refill: 1  - losartan (COZAAR) 100 mg tablet; Take 1 Tab by mouth daily. Dispense: 90 Tab; Refill: 1  - METABOLIC PANEL, BASIC; Future  - MICROALBUMIN, UR, RAND W/ MICROALB/CREAT RATIO; Future    2. Controlled type 2 diabetes mellitus without complication, with long-term current use of insulin (HCC)  Hypoglycemia is a concern. Patient to track this and if anything but rare, to be seen for insulin adjustment  - insulin regular (NOVOLIN R, HUMULIN R) 100 unit/mL injection; 25 units Subcutaneously 5-10 minutes prior to meals. Dispense: 50 mL; Refill: 1  - metFORMIN (GLUCOPHAGE) 500 mg tablet; Take 1 Tab by mouth daily. Dispense: 90 Tab; Refill: 1  - insulin NPH (NOVOLIN N NPH U-100 INSULIN) 100 unit/mL injection; 40 Units by SubCUTAneous route two (2) times a day. Dispense: 60 mL; Refill: 1  - CBC WITH AUTOMATED DIFF; Future  - HEMOGLOBIN A1C WITH EAG; Future    3. Gastroesophageal reflux disease without esophagitis  For EGD with Dr. Linzy Alpers next weeks  - omeprazole (PRILOSEC) 20 mg capsule; Take 1 Cap by mouth daily. Dispense: 90 Cap; Refill: 1  - raNITIdine (ZANTAC) 150 mg tablet; Take 1 Tab by mouth nightly. Dispense: 90 Tab; Refill: 1    4. Hyperlipidemia, unspecified hyperlipidemia type  Continue statin  - pravastatin (PRAVACHOL) 40 mg tablet; Take 2 Tabs by mouth nightly. Indications: 2 tablets at bedtime  Dispense: 180 Tab; Refill: 1  - HEPATIC FUNCTION PANEL; Future  - LIPID PANEL; Future    5. Insomnia, unspecified type  Remains an issues, continue seroquel  - QUEtiapine (SEROQUEL) 50 mg tablet; Take 1 Tab by mouth nightly. Dispense: 90 Tab;  Refill: 1      Follow-up and Dispositions    · Return in about 6 months (around 9/2/2020) for Blood pressure follow up, Diabetes follow up, Medication follow up. Diagnosis and plan discussed with patient who verbillized understanding. History of present Jena Arenas is a 76 y.o. female presenting for Diabetes and Insomnia (Still not sleeping )    Hypertension  At goal  Wonders if she should decrease her dose  Less swelling and feels skin is dry  Wearing support stockings. Hypercholesterolemia  On pravastatin    DM2  Remains on insulin N and R  Eats two meals per day, not much of a meal at night sometimes does not take evening insulin if only eats a little  Fasting sugars today 112, lowest in AM 82, range   Lowest sugar 42      Review of Systems   Respiratory: Negative for cough and shortness of breath. Cardiovascular: Negative for chest pain and palpitations. Gastrointestinal: Negative for abdominal pain and blood in stool. Genitourinary: Negative for hematuria. Psychiatric/Behavioral: Negative for depression and memory loss. The patient has insomnia. The patient is not nervous/anxious.           Past Medical History:   Diagnosis Date    CAD (coronary artery disease)     Diabetes ([facility] [address]     Diabetes ([facility] [address]     type 2    GERD (gastroesophageal reflux disease)     H/O seasonal allergies     Hyperlipidemia     Hypertension     Insomnia     Nausea & vomiting     PUD (peptic ulcer disease)     Small bowel obstruction (HCC)     Vitamin D deficiency      Past Surgical History:   Procedure Laterality Date    BREAST SURGERY PROCEDURE UNLISTED      breast reduction    CARDIAC SURG PROCEDURE UNLIST      heart stent    HX APPENDECTOMY      HX BREAST BIOPSY      HX GYN  1971    partial hysterectomy    HX GYN  1991    complete hysterectomy    HX HYSTERECTOMY      HX ORTHOPAEDIC      right rotator cuff repair    HX ORTHOPAEDIC      bilaterral wrist surgery    HX ORTHOPAEDIC      right knee surgery    HX ORTHOPAEDIC      bilateral bone spur removal from big toe    HX TONSIL AND ADENOIDECTOMY      HX TONSILLECTOMY       Family History   Problem Relation Age of Onset    Cancer Maternal Aunt         pancreatic cancer    Hypertension Mother     COPD Mother     Diabetes Father     Alcohol abuse Brother         murdered      Social History     Socioeconomic History    Marital status:      Spouse name: Not on file    Number of children: Not on file    Years of education: Not on file    Highest education level: Not on file   Occupational History    Not on file   Social Needs    Financial resource strain: Not on file    Food insecurity:     Worry: Not on file     Inability: Not on file    Transportation needs:     Medical: Not on file     Non-medical: Not on file   Tobacco Use    Smoking status: Never Smoker    Smokeless tobacco: Never Used   Substance and Sexual Activity    Alcohol use: No    Drug use: No    Sexual activity: Not Currently   Lifestyle    Physical activity:     Days per week: Not on file     Minutes per session: Not on file    Stress: Not on file   Relationships    Social connections:     Talks on phone: Not on file     Gets together: Not on file     Attends Muslim service: Not on file     Active member of club or organization: Not on file     Attends meetings of clubs or organizations: Not on file     Relationship status: Not on file    Intimate partner violence:     Fear of current or ex partner: Not on file     Emotionally abused: Not on file     Physically abused: Not on file     Forced sexual activity: Not on file   Other Topics Concern    Not on file   Social History Narrative    Not on file         Prior to Admission medications    Medication Sig Start Date End Date Taking? Authorizing Provider   amLODIPine (NORVASC) 5 mg tablet Take 1 Tab by mouth daily. 3/2/20  Yes Ramona Tolliver MD   hydroCHLOROthiazide (HYDRODIURIL) 25 mg tablet Take 1 Tab by mouth daily.  3/2/20  Yes Ramona Tolliver MD   insulin regular (Nir Joya HUMULIN R) 100 unit/mL injection 25 units Subcutaneously 5-10 minutes prior to meals. 3/2/20  Yes Gunnar Amaya MD   losartan (COZAAR) 100 mg tablet Take 1 Tab by mouth daily. 3/2/20  Yes Gunnar Amaya MD   metFORMIN (GLUCOPHAGE) 500 mg tablet Take 1 Tab by mouth daily. 3/2/20  Yes Gunnar Amaya MD   insulin NPH (NOVOLIN N NPH U-100 INSULIN) 100 unit/mL injection 40 Units by SubCUTAneous route two (2) times a day. 3/2/20  Yes Gunnar Amaya MD   omeprazole (PRILOSEC) 20 mg capsule Take 1 Cap by mouth daily. 3/2/20  Yes Gunnar Amaya MD   pravastatin (PRAVACHOL) 40 mg tablet Take 2 Tabs by mouth nightly. Indications: 2 tablets at bedtime 3/2/20  Yes Gunnar Amaya MD   QUEtiapine (SEROQUEL) 50 mg tablet Take 1 Tab by mouth nightly. 3/2/20  Yes Gunnar Amaya MD   raNITIdine (ZANTAC) 150 mg tablet Take 1 Tab by mouth nightly. 3/2/20  Yes Gunnar Amaya MD   butalbital-aspirin-caffeine [facility]) capsule Take 1 Cap by mouth every four (4) hours as needed for Pain. Yes Provider, Historical   loratadine 10 mg cap Take  by mouth. Yes Provider, Historical   cetirizine (ZYRTEC) 10 mg tablet Take  by mouth. Yes Provider, Historical   ZINC PO Take  by mouth. Yes Provider, Historical   melatonin 5 mg cap capsule Take 5 mg by mouth two (2) times a day. Yes Provider, Historical   ascorbic acid, vitamin C, (VITAMIN C) 500 mg tablet Take 1,000 mg by mouth daily. Yes Provider, Historical   naproxen sodium 220 mg Cap Take 1 Tab by mouth two (2) times daily as needed. Yes Provider, Historical   losartan (COZAAR) 100 mg tablet  3/3/18 3/2/20  Provider, Historical   NOVOLIN N NPH U-100 INSULIN 100 unit/mL injection INJECT 36 UNITS SUBCUTANEOUSLY BEFORE BREAKFAST AND DINNER. PLEASE SCHEDULE APPOINTMENT FOR EXAM AND LAB WORK  Patient taking differently: 40 Units by SubCUTAneous route two (2) times a day. If patient is NPO, contact physician for possible dose reduction.  12/31/19 3/2/20  Gene Irby MD   varicella-zoster recombinant, PF, [facility], PF,) 50 mcg/0.5 mL susr injection 0.5mL by IntraMUSCular route once now and then repeat in 2-6 months 10/16/19   Hernan Slaughter MD   metFORMIN (GLUCOPHAGE) 500 mg tablet Take 1 Tab by mouth daily. 10/16/19 3/2/20  Hernan Slaughter MD   QUEtiapine (SEROQUEL) 50 mg tablet Take 1 Tab by mouth nightly. 10/16/19 3/2/20  Hernan Slaughter MD   raNITIdine (ZANTAC) 150 mg tablet Take 1 Tab by mouth nightly. 10/16/19 3/2/20  Hernan Slaughter MD   omeprazole (PRILOSEC) 20 mg capsule Take 1 Cap by mouth daily. 10/16/19 3/2/20  Hernan Slaughter MD   pravastatin (PRAVACHOL) 40 mg tablet Take 2 Tabs by mouth nightly. Indications: 2 tablets at bedtime 10/16/19 3/2/20  Hernan Slaughter MD   losartan (COZAAR) 100 mg tablet Take 1 Tab by mouth daily. 10/16/19 3/2/20  Hernan Slaughter MD   amLODIPine (NORVASC) 5 mg tablet Take 1 Tab by mouth daily. 10/16/19 3/2/20  Hernan Slaughter MD   hydroCHLOROthiazide (HYDRODIURIL) 25 mg tablet Take 1 Tab by mouth daily. 10/16/19 3/2/20  Hernan Slaughter MD   insulin regular (NOVOLIN R, HUMULIN R) 100 unit/mL injection 25 units Subcutaneously 5-10 minutes prior to meals.  10/16/19 3/2/20  Hernan Slaughter MD        Allergies   Allergen Reactions    Advicor [Niacin-Lovastatin] Swelling    Ambien [Zolpidem] Other (comments)    Deflazacort Unable to Obtain    Erythromycin Nausea Only    Erythromycin Base Nausea and Vomiting    Niacin Swelling    Pitavastatin Other (comments)     Abdominal pain, nausea    Premarin [Conjugated Estrogens] Other (comments)     Headaches        Simvastatin Other (comments)     Muscle cramps    Atorvastatin Other (comments)     Muscle cramps    Triamcinolone Unable to Obtain       Vitals:    03/02/20 0927   BP: 122/76   Pulse: 89   Resp: 16   Temp: 98.1 °F (36.7 °C)   TempSrc: Oral   SpO2: 98%   Weight: 173 lb (78.5 kg)   Height: 5' 4\" (1.626 m)     Body mass index is 29.7 kg/m². Objective  Physical Exam  Vitals signs and nursing note reviewed. Constitutional:       Appearance: Normal appearance. She is not toxic-appearing. HENT:      Head: Normocephalic and atraumatic. Neck:      Musculoskeletal: No muscular tenderness. Cardiovascular:      Rate and Rhythm: Normal rate and regular rhythm. Heart sounds: Normal heart sounds. No murmur. No gallop. Pulmonary:      Effort: Pulmonary effort is normal. No respiratory distress. Breath sounds: Normal breath sounds. No wheezing, rhonchi or rales. Lymphadenopathy:      Cervical: No cervical adenopathy. Neurological:      Mental Status: She is alert. Psychiatric:         Mood and Affect: Mood normal.         Behavior: Behavior normal.         Thought Content: Thought content normal.         Judgment: Judgment normal.          Diabetic Foot Exam:  Protective sensation is intact bilaterally. Pedal pulses are 2+ and normal bilaterally.   L foot skin inspection:  normal skin and soft tissue with no gross edema or evidence of acute injury or foot ulcer  R foot skin inspection:  skin and soft tissue appear normal with no significant edema or evidence of acute injury or foot ulcer

## 2020-03-02 NOTE — PATIENT INSTRUCTIONS
Diabetes: 
Blood sugar goals: 
Hemoglobin A1c under 7 Fasting blood sugar  Blood sugar 2 hours after a meal under 180, 4 hours after a meal under 120 No hypoglycemia (sugars under 70 and symptomatic low sugars) Blood sugar control with diet and exercise: 
Exercise 45 minutes per day. This makes your insulin work better. It also allows insulin levels to fall helping with weight loss. Every night, try to fast from your evening meal to breakfast (at least 12 hours) without eating anything. This uses stored energy in your liver and makes insulin work better. Avoid simples sugars such as table sugar in drinks (sodas, lemonade, sweet tea, wine), desserts, candy. Also avoid fruit juices and high fructose corn syrup. Avoid frequent consumption of fruit especially grapes and bananas. A single serving of fruit daily is all you should have. Finally, drink less milk which has milk sugar in it. Control your starch intake. Men should have 3-4 carb portions per meal.  Women should have 2-3 carb portions per meal.  A carb serving is the equivalent of a slice of bread. Control your blood pressure and cholesterol. These problems are common in diabetes. AND, don't smoke. All of these problems contribute to heart disease and stroke risk. Get a yearly eye exam and flu shot. Make sure your vaccines are up to date particularly the pneumonia vaccines. Inspect your feet daily. Wear comfortable protective shoes and clean socks. Seek medical care if there are sore, calluses or numbness and burning of your feet. See your doctor at least every 6 months if you are on oral medicines or more often if the diabetic control is not at goal or if you are on insulin. Take your medicines religiously. Hypoglycemia: Care Instructions Your Care Instructions Hypoglycemia means that your blood sugar is low and your body is not getting enough fuel. Some people get low blood sugar from not eating often enough. Some medicines to treat diabetes can cause low blood sugar. People who have had surgery on their stomachs or intestines may get hypoglycemia. Problems with the pancreas, kidneys, or liver also can cause low blood sugar. A snack or drink with sugar in it will raise your blood sugar and should ease your symptoms right away. Your doctor may recommend that you change or stop your medicines until you can get your blood sugar levels under control. In the long run, you may need to change your diet and eating habits so that you get enough fuel for your body throughout the day. Follow-up care is a key part of your treatment and safety. Be sure to make and go to all appointments, and call your doctor if you are having problems. It's also a good idea to know your test results and keep a list of the medicines you take. How can you care for yourself at home? · Learn to recognize the early signs of low blood sugar. Signs include: 
? Nausea. ? Hunger. ? Feeling nervous, irritable, or shaky. ? Cold, clammy, wet skin. ? Sweating (when you are not exercising). ? A fast heartbeat. 
? Numbness or tingling of the fingertips or lips. · If you feel an episode of low blood sugar coming on, drink fruit juice or sugared (not diet) soda, or eat sugar in the form of candy, cubes, or tablets. [employer] are another [employer]. · Eat small, frequent meals so that you do not get too hungry between meals. · Balance extra exercise with eating more. · Keep a written record of your low blood sugar episodes, including when you last ate and what you ate, so that you can learn what causes your blood sugar to drop. · Make sure your family, friends, and coworkers know the symptoms of low blood sugar and know what to do to get your sugar level up. · Wear medical alert jewelry that lists your condition. You can buy this at most drugstores. When should you call for help? Call 911 anytime you think you may need emergency care. For example, call if: 
  · You passed out (lost consciousness).  
  · You are confused or cannot think clearly.  
  · Your blood sugar is very high or very low.  
 Watch closely for changes in your health, and be sure to contact your doctor if: 
  · Your blood sugar stays outside the level your doctor set for you.  
  · You have any problems. Where can you learn more? Go to http://guy-raina.info/. Enter T571 in the search box to learn more about \"Hypoglycemia: Care Instructions. \" Current as of: April 16, 2019 Content Version: 12.2 © 9352-5526 [employer], [employer]. Care instructions adapted under license by [employer] (which disclaims liability or warranty for this information). If you have questions about a medical condition or this instruction, always ask your healthcare professional. [address] any warranty or liability for your use of this information.

## 2020-03-03 ENCOUNTER — TELEPHONE (OUTPATIENT)
Dept: FAMILY MEDICINE CLINIC | Age: 75
End: 2020-03-03

## 2020-03-03 DIAGNOSIS — E03.9 ACQUIRED HYPOTHYROIDISM: Primary | ICD-10-CM

## 2020-03-03 LAB
ALBUMIN SERPL-MCNC: 3.7 G/DL (ref 3.5–5)
ALBUMIN/GLOB SERPL: 1 {RATIO} (ref 1.1–2.2)
ALP SERPL-CCNC: 98 U/L (ref 45–117)
ALT SERPL-CCNC: 21 U/L (ref 12–78)
ANION GAP SERPL CALC-SCNC: 9 MMOL/L (ref 5–15)
AST SERPL-CCNC: 12 U/L (ref 15–37)
BASOPHILS # BLD: 0.1 K/UL (ref 0–0.1)
BASOPHILS NFR BLD: 1 % (ref 0–1)
BILIRUB DIRECT SERPL-MCNC: 0.1 MG/DL (ref 0–0.2)
BILIRUB SERPL-MCNC: 0.4 MG/DL (ref 0.2–1)
BUN SERPL-MCNC: 11 MG/DL (ref 6–20)
BUN/CREAT SERPL: 14 (ref 12–20)
CALCIUM SERPL-MCNC: 9.3 MG/DL (ref 8.5–10.1)
CHLORIDE SERPL-SCNC: 97 MMOL/L (ref 97–108)
CHOLEST SERPL-MCNC: 295 MG/DL
CO2 SERPL-SCNC: 28 MMOL/L (ref 21–32)
CREAT SERPL-MCNC: 0.76 MG/DL (ref 0.55–1.02)
CREAT UR-MCNC: 165 MG/DL
DIFFERENTIAL METHOD BLD: ABNORMAL
EOSINOPHIL # BLD: 0.2 K/UL (ref 0–0.4)
EOSINOPHIL NFR BLD: 2 % (ref 0–7)
ERYTHROCYTE [DISTWIDTH] IN BLOOD BY AUTOMATED COUNT: 14.2 % (ref 11.5–14.5)
EST. AVERAGE GLUCOSE BLD GHB EST-MCNC: 174 MG/DL
GLOBULIN SER CALC-MCNC: 3.7 G/DL (ref 2–4)
GLUCOSE SERPL-MCNC: 187 MG/DL (ref 65–100)
HBA1C MFR BLD: 7.7 % (ref 4–5.6)
HCT VFR BLD AUTO: 43 % (ref 35–47)
HDLC SERPL-MCNC: 36 MG/DL
HDLC SERPL: 8.2 {RATIO} (ref 0–5)
HGB BLD-MCNC: 14 G/DL (ref 11.5–16)
IMM GRANULOCYTES # BLD AUTO: 0.1 K/UL (ref 0–0.04)
IMM GRANULOCYTES NFR BLD AUTO: 1 % (ref 0–0.5)
LDLC SERPL CALC-MCNC: 197.6 MG/DL (ref 0–100)
LIPID PROFILE,FLP: ABNORMAL
LYMPHOCYTES # BLD: 2.5 K/UL (ref 0.8–3.5)
LYMPHOCYTES NFR BLD: 24 % (ref 12–49)
MCH RBC QN AUTO: 27.5 PG (ref 26–34)
MCHC RBC AUTO-ENTMCNC: 32.6 G/DL (ref 30–36.5)
MCV RBC AUTO: 84.5 FL (ref 80–99)
MICROALBUMIN UR-MCNC: 0.91 MG/DL
MICROALBUMIN/CREAT UR-RTO: 6 MG/G (ref 0–30)
MONOCYTES # BLD: 0.7 K/UL (ref 0–1)
MONOCYTES NFR BLD: 7 % (ref 5–13)
NEUTS SEG # BLD: 7 K/UL (ref 1.8–8)
NEUTS SEG NFR BLD: 65 % (ref 32–75)
NRBC # BLD: 0 K/UL (ref 0–0.01)
NRBC BLD-RTO: 0 PER 100 WBC
PLATELET # BLD AUTO: 373 K/UL (ref 150–400)
PMV BLD AUTO: 11.2 FL (ref 8.9–12.9)
POTASSIUM SERPL-SCNC: 3.7 MMOL/L (ref 3.5–5.1)
PROT SERPL-MCNC: 7.4 G/DL (ref 6.4–8.2)
RBC # BLD AUTO: 5.09 M/UL (ref 3.8–5.2)
SODIUM SERPL-SCNC: 134 MMOL/L (ref 136–145)
TRIGL SERPL-MCNC: 307 MG/DL (ref ?–150)
VLDLC SERPL CALC-MCNC: 61.4 MG/DL
WBC # BLD AUTO: 10.6 K/UL (ref 3.6–11)

## 2020-03-03 NOTE — TELEPHONE ENCOUNTER
I have attempted without success to contact this patient by phone - patient phone does not have voice mail

## 2020-03-03 NOTE — TELEPHONE ENCOUNTER
----- Message from Equilla Krabbe, MD sent at 3/3/2020  8:12 AM EST -----  Call patient. Her labs look OK. Her diabetic control is close to where we want it. However, her cholesterol is much higher than it was previously. I would like her to come back in to get a thyroid check because an underactive thyroid can cause a high cholesterol. I will send that order to the lab and she can get it done anytime in the next week.   [facility]

## 2020-03-03 NOTE — PROGRESS NOTES
Call patient. Her labs look OK. Her diabetic control is close to where we want it. However, her cholesterol is much higher than it was previously. I would like her to come back in to get a thyroid check because an underactive thyroid can cause a high cholesterol. I will send that order to the lab and she can get it done anytime in the next week.   [facility]

## 2020-03-05 NOTE — TELEPHONE ENCOUNTER
RC-  ( 2 x Identifier) to Mrs. Holland- with recent lab results - patient verbalized lab results and will get lab done then make a f/u appt

## 2020-03-06 ENCOUNTER — HOSPITAL ENCOUNTER (OUTPATIENT)
Dept: LAB | Age: 75
Discharge: HOME | End: 2020-03-06

## 2020-03-06 DIAGNOSIS — E03.9 ACQUIRED HYPOTHYROIDISM: ICD-10-CM

## 2020-03-06 LAB — TSH SERPL DL<=0.05 MIU/L-ACNC: 1.86 UIU/ML (ref 0.36–3.74)

## 2020-03-09 ENCOUNTER — TELEPHONE (OUTPATIENT)
Dept: FAMILY MEDICINE CLINIC | Age: 75
End: 2020-03-09

## 2020-03-09 NOTE — TELEPHONE ENCOUNTER
----- Message from Jade Rinne, MD sent at 3/9/2020  8:24 AM EDT -----  Call patient.   Repeat blood test on her thyroid was normal.  [facility]

## 2020-03-10 NOTE — TELEPHONE ENCOUNTER
TC - To Mrs. Holland ( x 2 identifier)     Per Dr Leo Gale recent lab work was normal- no f/u is needed at this time .  Patient verbally understood

## 2020-09-30 ENCOUNTER — OFFICE VISIT (OUTPATIENT)
Dept: FAMILY MEDICINE CLINIC | Age: 75
End: 2020-09-30
Payer: MEDICARE

## 2020-09-30 VITALS
HEART RATE: 74 BPM | HEIGHT: 64 IN | DIASTOLIC BLOOD PRESSURE: 81 MMHG | TEMPERATURE: 98.4 F | RESPIRATION RATE: 16 BRPM | BODY MASS INDEX: 29.98 KG/M2 | SYSTOLIC BLOOD PRESSURE: 146 MMHG | OXYGEN SATURATION: 98 % | WEIGHT: 175.6 LBS

## 2020-09-30 DIAGNOSIS — Z23 NEEDS FLU SHOT: ICD-10-CM

## 2020-09-30 DIAGNOSIS — K21.9 GASTROESOPHAGEAL REFLUX DISEASE WITHOUT ESOPHAGITIS: ICD-10-CM

## 2020-09-30 DIAGNOSIS — Z79.4 CONTROLLED TYPE 2 DIABETES MELLITUS WITHOUT COMPLICATION, WITH LONG-TERM CURRENT USE OF INSULIN (HCC): Primary | ICD-10-CM

## 2020-09-30 DIAGNOSIS — M17.12 PRIMARY OSTEOARTHRITIS OF LEFT KNEE: ICD-10-CM

## 2020-09-30 DIAGNOSIS — E11.9 CONTROLLED TYPE 2 DIABETES MELLITUS WITHOUT COMPLICATION, WITH LONG-TERM CURRENT USE OF INSULIN (HCC): Primary | ICD-10-CM

## 2020-09-30 DIAGNOSIS — E78.5 HYPERLIPIDEMIA, UNSPECIFIED HYPERLIPIDEMIA TYPE: ICD-10-CM

## 2020-09-30 DIAGNOSIS — G47.00 INSOMNIA, UNSPECIFIED TYPE: ICD-10-CM

## 2020-09-30 DIAGNOSIS — I10 ESSENTIAL HYPERTENSION: ICD-10-CM

## 2020-09-30 LAB
ANION GAP SERPL CALC-SCNC: 9 MMOL/L (ref 5–15)
BASOPHILS # BLD: 0.1 K/UL (ref 0–0.1)
BASOPHILS NFR BLD: 1 % (ref 0–1)
BUN SERPL-MCNC: 12 MG/DL (ref 6–20)
BUN/CREAT SERPL: 16 (ref 12–20)
CALCIUM SERPL-MCNC: 9.7 MG/DL (ref 8.5–10.1)
CHLORIDE SERPL-SCNC: 102 MMOL/L (ref 97–108)
CO2 SERPL-SCNC: 27 MMOL/L (ref 21–32)
CREAT SERPL-MCNC: 0.73 MG/DL (ref 0.55–1.02)
DIFFERENTIAL METHOD BLD: ABNORMAL
EOSINOPHIL # BLD: 0.2 K/UL (ref 0–0.4)
EOSINOPHIL NFR BLD: 2 % (ref 0–7)
ERYTHROCYTE [DISTWIDTH] IN BLOOD BY AUTOMATED COUNT: 14.5 % (ref 11.5–14.5)
GLUCOSE SERPL-MCNC: 108 MG/DL (ref 65–100)
HCT VFR BLD AUTO: 45.6 % (ref 35–47)
HGB BLD-MCNC: 14.5 G/DL (ref 11.5–16)
IMM GRANULOCYTES # BLD AUTO: 0.1 K/UL (ref 0–0.04)
IMM GRANULOCYTES NFR BLD AUTO: 1 % (ref 0–0.5)
LYMPHOCYTES # BLD: 2.2 K/UL (ref 0.8–3.5)
LYMPHOCYTES NFR BLD: 26 % (ref 12–49)
MCH RBC QN AUTO: 27.6 PG (ref 26–34)
MCHC RBC AUTO-ENTMCNC: 31.8 G/DL (ref 30–36.5)
MCV RBC AUTO: 86.7 FL (ref 80–99)
MONOCYTES # BLD: 0.7 K/UL (ref 0–1)
MONOCYTES NFR BLD: 8 % (ref 5–13)
NEUTS SEG # BLD: 5.3 K/UL (ref 1.8–8)
NEUTS SEG NFR BLD: 62 % (ref 32–75)
NRBC # BLD: 0 K/UL (ref 0–0.01)
NRBC BLD-RTO: 0 PER 100 WBC
PLATELET # BLD AUTO: 337 K/UL (ref 150–400)
PMV BLD AUTO: 11.2 FL (ref 8.9–12.9)
POTASSIUM SERPL-SCNC: 4 MMOL/L (ref 3.5–5.1)
RBC # BLD AUTO: 5.26 M/UL (ref 3.8–5.2)
SODIUM SERPL-SCNC: 138 MMOL/L (ref 136–145)
WBC # BLD AUTO: 8.5 K/UL (ref 3.6–11)

## 2020-09-30 PROCEDURE — 90694 VACC AIIV4 NO PRSRV 0.5ML IM: CPT | Performed by: FAMILY MEDICINE

## 2020-09-30 PROCEDURE — 1101F PT FALLS ASSESS-DOCD LE1/YR: CPT | Performed by: FAMILY MEDICINE

## 2020-09-30 PROCEDURE — 3017F COLORECTAL CA SCREEN DOC REV: CPT | Performed by: FAMILY MEDICINE

## 2020-09-30 PROCEDURE — 1090F PRES/ABSN URINE INCON ASSESS: CPT | Performed by: FAMILY MEDICINE

## 2020-09-30 PROCEDURE — G8536 NO DOC ELDER MAL SCRN: HCPCS | Performed by: FAMILY MEDICINE

## 2020-09-30 PROCEDURE — G0008 ADMIN INFLUENZA VIRUS VAC: HCPCS | Performed by: FAMILY MEDICINE

## 2020-09-30 PROCEDURE — G8399 PT W/DXA RESULTS DOCUMENT: HCPCS | Performed by: FAMILY MEDICINE

## 2020-09-30 PROCEDURE — G8754 DIAS BP LESS 90: HCPCS | Performed by: FAMILY MEDICINE

## 2020-09-30 PROCEDURE — G8427 DOCREV CUR MEDS BY ELIG CLIN: HCPCS | Performed by: FAMILY MEDICINE

## 2020-09-30 PROCEDURE — 99215 OFFICE O/P EST HI 40 MIN: CPT | Performed by: FAMILY MEDICINE

## 2020-09-30 PROCEDURE — 2022F DILAT RTA XM EVC RTNOPTHY: CPT | Performed by: FAMILY MEDICINE

## 2020-09-30 PROCEDURE — G8417 CALC BMI ABV UP PARAM F/U: HCPCS | Performed by: FAMILY MEDICINE

## 2020-09-30 PROCEDURE — G8510 SCR DEP NEG, NO PLAN REQD: HCPCS | Performed by: FAMILY MEDICINE

## 2020-09-30 PROCEDURE — G8753 SYS BP > OR = 140: HCPCS | Performed by: FAMILY MEDICINE

## 2020-09-30 RX ORDER — QUETIAPINE FUMARATE 50 MG/1
50 TABLET, FILM COATED ORAL
Qty: 90 TAB | Refills: 1 | Status: SHIPPED | OUTPATIENT
Start: 2020-09-30 | End: 2021-01-13 | Stop reason: SDUPTHER

## 2020-09-30 RX ORDER — PRAVASTATIN SODIUM 40 MG/1
80 TABLET ORAL
Qty: 180 TAB | Refills: 1 | Status: SHIPPED | OUTPATIENT
Start: 2020-09-30 | End: 2021-02-16

## 2020-09-30 RX ORDER — METFORMIN HYDROCHLORIDE 500 MG/1
500 TABLET ORAL DAILY
Qty: 90 TAB | Refills: 1 | Status: SHIPPED | OUTPATIENT
Start: 2020-09-30 | End: 2021-02-16

## 2020-09-30 RX ORDER — AMLODIPINE BESYLATE 5 MG/1
5 TABLET ORAL DAILY
Qty: 90 TAB | Refills: 1 | Status: SHIPPED | OUTPATIENT
Start: 2020-09-30 | End: 2021-02-10 | Stop reason: SDUPTHER

## 2020-09-30 RX ORDER — LOSARTAN POTASSIUM 100 MG/1
100 TABLET ORAL DAILY
Qty: 90 TAB | Refills: 1 | Status: SHIPPED | OUTPATIENT
Start: 2020-09-30 | End: 2021-02-10 | Stop reason: SDUPTHER

## 2020-09-30 RX ORDER — FAMOTIDINE 20 MG/1
20 TABLET, FILM COATED ORAL 2 TIMES DAILY
Qty: 180 TAB | Refills: 1 | Status: SHIPPED | OUTPATIENT
Start: 2020-09-30 | End: 2021-02-10 | Stop reason: SDUPTHER

## 2020-09-30 RX ORDER — HYDROCHLOROTHIAZIDE 25 MG/1
25 TABLET ORAL DAILY
Qty: 90 TAB | Refills: 1 | Status: SHIPPED | OUTPATIENT
Start: 2020-09-30 | End: 2021-02-10 | Stop reason: SDUPTHER

## 2020-09-30 NOTE — PROGRESS NOTES
Assessment and Plan    1. Needs flu shot  given  - INFLUENZA, HIGH DOSE SEASONAL    2. Primary osteoarthritis of left knee  Referral Dr. Clement Lloyd at her request  - REFERRAL TO ORTHOPEDICS    3. Insomnia, unspecified type  Refilled, doing well  - QUEtiapine (SEROquel) 50 mg tablet; Take 1 Tab by mouth nightly. Dispense: 90 Tab; Refill: 1    4. Hyperlipidemia, unspecified hyperlipidemia type  Continue statin. - pravastatin (PRAVACHOL) 40 mg tablet; Take 2 Tabs by mouth nightly. Indications: 2 tablets at bedtime  Dispense: 180 Tab; Refill: 1    5. Controlled type 2 diabetes mellitus without complication, with long-term current use of insulin (HCC)  Low sugars are concerned. Reduced insulin dose. FU one month with list of sugars   Will set up sliding scale for R  - metFORMIN (GLUCOPHAGE) 500 mg tablet; Take 1 Tab by mouth daily. Dispense: 90 Tab; Refill: 1  - insulin regular (NOVOLIN R, HUMULIN R) 100 unit/mL injection; 20 units Subcutaneously 5-10 minutes prior to meals. Dispense: 50 mL; Refill: 1  - insulin NPH (NovoLIN N NPH U-100 Insulin) 100 unit/mL injection; 30 Units by SubCUTAneous route two (2) times a day. Dispense: 60 mL; Refill: 1  - CBC WITH AUTOMATED DIFF; Future  - HEMOGLOBIN A1C WITH EAG; Future  -  DIABETES FOOT EXAM  - METABOLIC PANEL, BASIC; Future    6. Essential hypertension  At [facility]  - losartan (COZAAR) 100 mg tablet; Take 1 Tab by mouth daily. Dispense: 90 Tab; Refill: 1  - hydroCHLOROthiazide (HYDRODIURIL) 25 mg tablet; Take 1 Tab by mouth daily. Dispense: 90 Tab; Refill: 1  - amLODIPine (NORVASC) 5 mg tablet; Take 1 Tab by mouth daily. Dispense: 90 Tab; Refill: 1    7. Gastroesophageal reflux disease without esophagitis  Take consistently. Reassured that it is safe  - famotidine (PEPCID) 20 mg tablet; Take 1 Tab by mouth two (2) times a day. Dispense: 180 Tab;  Refill: 1      Follow-up and Dispositions    · Return in about 1 month (around 10/30/2020) for Diabetes follow up, Medication follow up. Diagnosis and plan discussed with patient who verbillized understanding. History of present Ge Manuel is a 76 y.o. female presenting for Medication Refill (All medications); Skin Problem (Behind ear, has \"spots\"    Tried Cortiaide & Neosporin); and Injection (Flu shot)    DM2  Fasting this morning 71  Usually has 60-90  Appetite lower since had bowel blockage. Sugars as low as 41  Skipping some doses  Humulin 40 units BID  Humulin R 30 units BID with meals. Remains on metformin as well  Eats breakfast.  Needs eye exam Dr. Ruma Jacobs are OK    Hypertension  Slightly up today. BP good at home and at [facility]    Hypercholesterolemia  On statin    No longer on PPI or H2 blocker due to fear that PPI caused cancer. Needs flu shot        Review of Systems   Constitutional: Negative for chills and fever. HENT: Negative for congestion and sore throat. Respiratory: Positive for shortness of breath. Cardiovascular: Negative for chest pain and palpitations. Gastrointestinal: Negative for blood in stool. Genitourinary: Negative for hematuria. Musculoskeletal: Positive for joint pain. Psychiatric/Behavioral: Negative.           Past Medical History:   Diagnosis Date    CAD (coronary artery disease)     Diabetes ([address]     Diabetes ([address]     type 2    GERD (gastroesophageal reflux disease)     H/O seasonal allergies     Hyperlipidemia     Hypertension     Insomnia     Nausea & vomiting     PUD (peptic ulcer disease)     Small bowel obstruction (HCC)     Vitamin D deficiency      Past Surgical History:   Procedure Laterality Date    BREAST SURGERY PROCEDURE UNLISTED      breast reduction    CARDIAC SURG PROCEDURE UNLIST      heart stent    HX APPENDECTOMY      HX BREAST BIOPSY      HX GYN  1971    partial hysterectomy    HX GYN  1991    complete hysterectomy    HX HYSTERECTOMY      HX ORTHOPAEDIC      right rotator cuff repair    HX ORTHOPAEDIC bilaterral wrist surgery    HX ORTHOPAEDIC      right knee surgery    HX ORTHOPAEDIC      bilateral bone spur removal from big toe    HX TONSIL AND ADENOIDECTOMY      HX TONSILLECTOMY       Family History   Problem Relation Age of Onset    Cancer Maternal Aunt         pancreatic cancer    Hypertension Mother     COPD Mother     Diabetes Father     Alcohol abuse Brother         murdered      Social History     Socioeconomic History    Marital status:      Spouse name: Not on file    Number of children: Not on file    Years of education: Not on file    Highest education level: Not on file   Occupational History    Not on file   Social Needs    Financial resource strain: Not on file    Food insecurity     Worry: Not on file     Inability: Not on file    Transportation needs     Medical: Not on file     Non-medical: Not on file   Tobacco Use    Smoking status: Never Smoker    Smokeless tobacco: Never Used   Substance and Sexual Activity    Alcohol use: No    Drug use: No    Sexual activity: Not Currently   Lifestyle    Physical activity     Days per week: Not on file     Minutes per session: Not on file    Stress: Not on file   Relationships    Social connections     Talks on phone: Not on file     Gets together: Not on file     Attends Mu-ism service: Not on file     Active member of club or organization: Not on file     Attends meetings of clubs or organizations: Not on file     Relationship status: Not on file    Intimate partner violence     Fear of current or ex partner: Not on file     Emotionally abused: Not on file     Physically abused: Not on file     Forced sexual activity: Not on file   Other Topics Concern    Not on file   Social History Narrative    Not on file         Prior to Admission medications    Medication Sig Start Date End Date Taking? Authorizing Provider   QUEtiapine (SEROquel) 50 mg tablet Take 1 Tab by mouth nightly.  9/30/20  Yes Avery Holbrook MD pravastatin (PRAVACHOL) 40 mg tablet Take 2 Tabs by mouth nightly. Indications: 2 tablets at bedtime 9/30/20  Yes Emmett Llanos MD   metFORMIN (GLUCOPHAGE) 500 mg tablet Take 1 Tab by mouth daily. 9/30/20  Yes Emmett Llanos MD   losartan (COZAAR) 100 mg tablet Take 1 Tab by mouth daily. 9/30/20  Yes Emmett Llanos MD   hydroCHLOROthiazide (HYDRODIURIL) 25 mg tablet Take 1 Tab by mouth daily. 9/30/20  Yes Emmett Llanos MD   famotidine (PEPCID) 20 mg tablet Take 1 Tab by mouth two (2) times a day. 9/30/20  Yes Emmett Llanos MD   amLODIPine (NORVASC) 5 mg tablet Take 1 Tab by mouth daily. 9/30/20  Yes Emmett Llanos MD   insulin regular (NOVOLIN R, HUMULIN R) 100 unit/mL injection 20 units Subcutaneously 5-10 minutes prior to meals. 9/30/20  Yes Emmett Llanos MD   insulin NPH (NovoLIN N NPH U-100 Insulin) 100 unit/mL injection 30 Units by SubCUTAneous route two (2) times a day. 9/30/20  Yes Emmett Llanos MD   butalbital-aspirin-caffeine [facility]) capsule Take 1 Cap by mouth every four (4) hours as needed for Pain. Yes Provider, Historical   loratadine 10 mg cap Take  by mouth. Yes Provider, Historical   melatonin 5 mg cap capsule Take 5 mg by mouth two (2) times a day. Yes Provider, Historical   ascorbic acid, vitamin C, (VITAMIN C) 500 mg tablet Take 1,000 mg by mouth daily. Yes Provider, Historical   naproxen sodium 220 mg Cap Take 1 Tab by mouth two (2) times daily as needed. Yes Provider, Historical   amLODIPine (NORVASC) 5 mg tablet Take 1 Tab by mouth daily. 3/2/20 9/30/20  Emmett Llanos MD   hydroCHLOROthiazide (HYDRODIURIL) 25 mg tablet Take 1 Tab by mouth daily. 3/2/20 9/30/20  Emmett Llanos MD   insulin regular (NOVOLIN R, HUMULIN R) 100 unit/mL injection 25 units Subcutaneously 5-10 minutes prior to meals. 3/2/20 9/30/20  Emmett Llanos MD   losartan (COZAAR) 100 mg tablet Take 1 Tab by mouth daily.  3/2/20 9/30/20  Charisma Vásquez MD MOY   metFORMIN (GLUCOPHAGE) 500 mg tablet Take 1 Tab by mouth daily. 3/2/20 9/30/20  James Liao MD   insulin NPH (NOVOLIN N NPH U-100 INSULIN) 100 unit/mL injection 40 Units by SubCUTAneous route two (2) times a day. 3/2/20 9/30/20  James Liao MD   omeprazole (PRILOSEC) 20 mg capsule Take 1 Cap by mouth daily. 3/2/20 9/30/20  James Liao MD   pravastatin (PRAVACHOL) 40 mg tablet Take 2 Tabs by mouth nightly. Indications: 2 tablets at bedtime 3/2/20 9/30/20  James Liao MD   QUEtiapine (SEROQUEL) 50 mg tablet Take 1 Tab by mouth nightly. 3/2/20 9/30/20  James Liao MD   famotidine (PEPCID) 20 mg tablet Take 1 Tab by mouth nightly. 3/2/20 9/30/20  James Liao MD   cetirizine (ZYRTEC) 10 mg tablet Take  by mouth. 9/30/20  Provider, Historical   ZINC PO Take  by mouth. 9/30/20  Provider, Historical   varicella-zoster recombinant, PF, (SHINGRIX, PF,) 50 mcg/0.5 mL susr injection 0.5mL by IntraMUSCular route once now and then repeat in 2-6 months 10/16/19 9/30/20  James Liao MD        Allergies   Allergen Reactions    Advicor [Niacin-Lovastatin] Swelling    Ambien [Zolpidem] Other (comments)    Deflazacort Unable to Obtain    Erythromycin Nausea Only    Erythromycin Base Nausea and Vomiting    Niacin Swelling    Pitavastatin Other (comments)     Abdominal pain, nausea    Premarin [Conjugated Estrogens] Other (comments)     Headaches        Simvastatin Other (comments)     Muscle cramps    Atorvastatin Other (comments)     Muscle cramps    Triamcinolone Unable to Obtain       Vitals:    09/30/20 1334 09/30/20 1340   BP: (!) 177/85 (!) 146/81   Pulse: 74    Resp: 16    Temp: 98.4 °F (36.9 °C)    TempSrc: Oral    SpO2: 98%    Weight: 175 lb 9.6 oz (79.7 kg)    Height: 5' 4\" (1.626 m)      Body mass index is 30.14 kg/m². Objective  Physical Exam  Vitals signs and nursing note reviewed. Constitutional:       Appearance: Normal appearance.  She is not toxic-appearing. HENT:      Head: Normocephalic and atraumatic. Neck:      Musculoskeletal: No muscular tenderness. Cardiovascular:      Rate and Rhythm: Normal rate and regular rhythm. Heart sounds: Normal heart sounds. No murmur. No gallop. Pulmonary:      Effort: Pulmonary effort is normal. No respiratory distress. Breath sounds: Normal breath sounds. No wheezing, rhonchi or rales. Lymphadenopathy:      Cervical: No cervical adenopathy. Neurological:      Mental Status: She is alert. Psychiatric:         Mood and Affect: Mood normal.         Behavior: Behavior normal.         Thought Content: Thought content normal.         Judgment: Judgment normal.          Diabetic Foot Exam: Bilateral hallux valgus. Protective sensation is intact bilaterally. Pedal pulses are 2+ and normal bilaterally.   L foot skin inspection:  normal skin and soft tissue with no gross edema or evidence of acute injury or foot ulcer  R foot skin inspection:  skin and soft tissue appear normal with no significant edema or evidence of acute injury or foot ulcer

## 2020-09-30 NOTE — PATIENT INSTRUCTIONS
Vaccine Information Statement Influenza (Flu) Vaccine (Inactivated or Recombinant): What You Need to Know Many Vaccine Information Statements are available in Persian and other languages. See www.immunize.org/vis Hojas de información sobre vacunas están disponibles en español y en muchos otros idiomas. Visite www.immunize.org/vis 1. Why get vaccinated? Influenza vaccine can prevent influenza (flu). Flu is a contagious disease that spreads around the [facility] every year, usually between October and May. Anyone can get the flu, but it is more dangerous for some people. Infants and young children, people 72years of age and older, pregnant women, and people with certain health conditions or a weakened immune system are at greatest risk of flu complications. Pneumonia, bronchitis, sinus infections and ear infections are examples of flu-related complications. If you have a medical condition, such as heart disease, cancer or diabetes, flu can make it worse. Flu can cause fever and chills, sore throat, muscle aches, fatigue, cough, headache, and runny or stuffy nose. Some people may have vomiting and diarrhea, though this is more common in children than adults. Each year thousands of people in the [facility] die from flu, and many more are hospitalized. Flu vaccine prevents millions of illnesses and flu-related visits to the doctor each year. 2. Influenza vaccines CDC recommends everyone 10months of age and older get vaccinated every flu season. Children 6 months through 6years of age may need 2 doses during a single flu season. Everyone else needs only 1 dose each flu season. It takes about 2 weeks for protection to develop after vaccination. There are many flu viruses, and they are always changing. Each year a new flu vaccine is made to protect against three or four viruses that are likely to cause disease in the upcoming flu season.  Even when the vaccine doesnt exactly match these viruses, it may still provide some protection. Influenza vaccine does not cause flu. Influenza vaccine may be given at the same time as other vaccines. 3. Talk with your health care provider Tell your vaccine provider if the person getting the vaccine: 
 Has had an allergic reaction after a previous dose of influenza vaccine, or has any severe, life-threatening allergies.  Has ever had Guillain-Barré Syndrome (also called GBS). In some cases, your health care provider may decide to postpone influenza vaccination to a future visit. People with minor illnesses, such as a cold, may be vaccinated. People who are moderately or severely ill should usually wait until they recover before getting influenza vaccine. Your health care provider can give you more information. 4. Risks of a reaction  Soreness, redness, and swelling where shot is given, fever, muscle aches, and headache can happen after influenza vaccine.  There may be a very small increased risk of Guillain-Barré Syndrome (GBS) after inactivated influenza vaccine (the flu shot). [facility] children who get the flu shot along with pneumococcal vaccine (PCV13), and/or DTaP vaccine at the same time might be slightly more likely to have a seizure caused by fever. Tell your health care provider if a child who is getting flu vaccine has ever had a seizure. People sometimes faint after medical procedures, including vaccination. Tell your provider if you feel dizzy or have vision changes or ringing in the ears. As with any medicine, there is a very remote chance of a vaccine causing a severe allergic reaction, other serious injury, or death. 5. What if there is a serious problem? An allergic reaction could occur after the vaccinated person leaves the clinic.  If you see signs of a severe allergic reaction (hives, swelling of the face and throat, difficulty breathing, a fast heartbeat, dizziness, or weakness), call 9-1-1 and get the person to the nearest hospital. 
 
For other signs that concern you, call your health care provider. Adverse reactions should be reported to the Vaccine Adverse Event Reporting System (VAERS). Your health care provider will usually file this report, or you can do it yourself. Visit the VAERS website at www.vaers. hhs.gov or call 6-948.730.2229. VAERS is only for reporting reactions, and VAERS staff do not give medical advice. 6. The National Vaccine Injury Compensation Program 
 
The [facility] Vaccine Injury Compensation Program (VICP) is a federal program that was created to compensate people who may have been injured by certain vaccines. Visit the VICP website at www.[facility].gov/vaccinecompensation or call 0-517.795.7686 to learn about the program and about filing a claim. There is a time limit to file a claim for compensation. 7. How can I learn more?  Ask your health care provider.  Call your local or state health department.  Contact the Centers for Disease Control and Prevention (CDC): 
- Call 6-166.696.4321 (8-871-UNL-INFO) or 
- Visit CDCs influenza website at www.cdc.gov/flu Vaccine Information Statement (Interim) Inactivated Influenza Vaccine 8/15/2019 
[address] [id number] Department of Health and [employer] Centers for Disease Control and Prevention Office Use Only Diabetes: 
Blood sugar goals: 
Hemoglobin A1c under 7 Fasting blood sugar  Blood sugar 2 hours after a meal under 180, 4 hours after a meal under 120 No hypoglycemia (sugars under 70 and symptomatic low sugars) Blood sugar control with diet and exercise: 
Exercise 45 minutes per day. This makes your insulin work better. It also allows insulin levels to fall helping with weight loss. Every night, try to fast from your evening meal to breakfast (at least 12 hours) without eating anything. This uses stored energy in your liver and makes insulin work better. Avoid simples sugars such as table sugar in drinks (sodas, lemonade, sweet tea, wine), desserts, candy. Also avoid fruit juices and high fructose corn syrup. Avoid frequent consumption of fruit especially grapes and bananas. A single serving of fruit daily is all you should have. Finally, drink less milk which has milk sugar in it. Control your starch intake. Men should have 3-4 carb portions per meal.  Women should have 2-3 carb portions per meal.  A carb serving is the equivalent of a slice of bread. Control your blood pressure and cholesterol. These problems are common in diabetes. AND, don't smoke. All of these problems contribute to heart disease and stroke risk. Get a yearly eye exam and flu shot. Make sure your vaccines are up to date particularly the pneumonia vaccines. Inspect your feet daily. Wear comfortable protective shoes and clean socks. Seek medical care if there are sore, calluses or numbness and burning of your feet. See your doctor at least every 6 months if you are on oral medicines or more often if the diabetic control is not at goal or if you are on insulin. Take your medicines religiously. Hypoglycemia: Care Instructions Your Care Instructions Hypoglycemia means that your blood sugar is low and your body is not getting enough fuel. Some people get low blood sugar from not eating often enough. Some medicines to treat diabetes can cause low blood sugar. People who have had surgery on their stomachs or intestines may get hypoglycemia. Problems with the pancreas, kidneys, or liver also can cause low blood sugar. A snack or drink with sugar in it will raise your blood sugar and should ease your symptoms right away. Your doctor may recommend that you change or stop your medicines until you can get your blood sugar levels under control.  In the long run, you may need to change your diet and eating habits so that you get enough fuel for your body throughout the day. Follow-up care is a key part of your treatment and safety. Be sure to make and go to all appointments, and call your doctor if you are having problems. It's also a good idea to know your test results and keep a list of the medicines you take. How can you care for yourself at home? · Know the early signs of low blood sugar. Signs include: 
? Nausea. ? Hunger. ? Feeling nervous, irritable, or shaky. ? Cold, clammy skin. ? Sweating (when you're not exercising). ? A fast heartbeat. 
? Numbness or tingling in fingertips or lips. · If you have early signs of low blood sugar, eat or drink a quick-sugar food. Examples are glucose tablets, table sugar, hard candy (such as Life Savers), fruit juice, and regular (not diet) soda. · Eat small, frequent meals so you don't get too hungry between meals. · Balance extra exercise with eating more. · Keep a written record of your low blood sugar episodes, including what and when you last ate. This helps you know what causes your blood sugar to drop. · Make sure family, friends, and coworkers know the symptoms of low blood sugar and know how to get your sugar level up. · Wear medical alert jewelry that lists your condition. You can buy this at most drugstores. When should you call for help? Call 911 anytime you think you may need emergency care. For example, call if: 
  · You passed out (lost consciousness).  
  · You are confused or cannot think clearly.  
  · Your blood sugar is very high or very low. Watch closely for changes in your health, and be sure to contact your doctor if: 
  · Your blood sugar stays outside the level your doctor set for you.  
  · You have any problems. Where can you learn more? Go to http://guy-raina.info/ Enter Y260 in the search box to learn more about \"Hypoglycemia: Care Instructions. \" Current as of: December 20, 2019               Content Version: 12.6 © 2932-6747 [employer]wise[employer]. Care instructions adapted under license by [employer] (which disclaims liability or warranty for this information). If you have questions about a medical condition or this instruction, always ask your healthcare professional. [address] any warranty or liability for your use of this information.

## 2020-09-30 NOTE — PROGRESS NOTES
Patient stated name &     Chief Complaint   Patient presents with    Medication Refill     All medications    Skin Problem     Behind ear, has \"spots\"    Tried Cortiaide & Neosporin    Injection     Flu shot        Health Maintenance Due   Topic    GLAUCOMA SCREENING Q2Y     Eye Exam Retinal or Dilated     Shingrix Vaccine Age 50> (2 of 2)    Flu Vaccine (1)    Medicare Yearly Exam        Wt Readings from Last 3 Encounters:   20 175 lb 9.6 oz (79.7 kg)   20 173 lb (78.5 kg)   10/16/19 175 lb (79.4 kg)     Temp Readings from Last 3 Encounters:   20 98.4 °F (36.9 °C) (Oral)   20 98.1 °F (36.7 °C) (Oral)   10/16/19 97.8 °F (36.6 °C) (Oral)     BP Readings from Last 3 Encounters:   20 (!) 177/85   20 122/76   10/16/19 122/75     Pulse Readings from Last 3 Encounters:   20 74   20 89   10/16/19 83         Learning Assessment:  :     Learning Assessment 2019   PRIMARY LEARNER Patient   HIGHEST LEVEL OF EDUCATION - PRIMARY LEARNER  SOME COLLEGE   PRIMARY LANGUAGE ENGLISH   LEARNER PREFERENCE PRIMARY READING   ANSWERED BY self   RELATIONSHIP SELF       Depression Screening:  :     3 most recent PHQ Screens 2020   Little interest or pleasure in doing things Not at all   Feeling down, depressed, irritable, or hopeless Not at all   Total Score PHQ 2 0       Fall Risk Assessment:  :     Fall Risk Assessment, last 12 mths 2020   Able to walk? Yes   Fall in past 12 months? No       Abuse Screening:  :     Abuse Screening Questionnaire 2019   Do you ever feel afraid of your partner? N   Are you in a relationship with someone who physically or mentally threatens you? N   Is it safe for you to go home? Y       Coordination of Care Questionnaire:  :     1) Have you been to an emergency room, urgent care clinic since your last visit? No    Hospitalized since your last visit?  No             2) Have you seen or consulted any other health care providers outside of [address] [place] [address] since your last visit? No  (Include any pap smears or colon screenings in this section.)    Patient is accompanied by self I have received verbal consent from Davy Talavera to discuss any/all medical information while they are present in the room.

## 2020-10-01 LAB
EST. AVERAGE GLUCOSE BLD GHB EST-MCNC: 160 MG/DL
HBA1C MFR BLD: 7.2 % (ref 4–5.6)

## 2020-11-02 ENCOUNTER — OFFICE VISIT (OUTPATIENT)
Dept: FAMILY MEDICINE CLINIC | Age: 75
End: 2020-11-02
Payer: MEDICARE

## 2020-11-02 VITALS
TEMPERATURE: 98 F | HEIGHT: 64 IN | SYSTOLIC BLOOD PRESSURE: 143 MMHG | RESPIRATION RATE: 16 BRPM | OXYGEN SATURATION: 98 % | WEIGHT: 176.6 LBS | HEART RATE: 49 BPM | DIASTOLIC BLOOD PRESSURE: 81 MMHG | BODY MASS INDEX: 30.15 KG/M2

## 2020-11-02 DIAGNOSIS — L40.9 PSORIASIS: Primary | ICD-10-CM

## 2020-11-02 DIAGNOSIS — Z79.4 CONTROLLED TYPE 2 DIABETES MELLITUS WITHOUT COMPLICATION, WITH LONG-TERM CURRENT USE OF INSULIN (HCC): ICD-10-CM

## 2020-11-02 DIAGNOSIS — E11.9 CONTROLLED TYPE 2 DIABETES MELLITUS WITHOUT COMPLICATION, WITH LONG-TERM CURRENT USE OF INSULIN (HCC): ICD-10-CM

## 2020-11-02 PROCEDURE — G8536 NO DOC ELDER MAL SCRN: HCPCS | Performed by: FAMILY MEDICINE

## 2020-11-02 PROCEDURE — G8753 SYS BP > OR = 140: HCPCS | Performed by: FAMILY MEDICINE

## 2020-11-02 PROCEDURE — G8510 SCR DEP NEG, NO PLAN REQD: HCPCS | Performed by: FAMILY MEDICINE

## 2020-11-02 PROCEDURE — G8399 PT W/DXA RESULTS DOCUMENT: HCPCS | Performed by: FAMILY MEDICINE

## 2020-11-02 PROCEDURE — G8754 DIAS BP LESS 90: HCPCS | Performed by: FAMILY MEDICINE

## 2020-11-02 PROCEDURE — G8427 DOCREV CUR MEDS BY ELIG CLIN: HCPCS | Performed by: FAMILY MEDICINE

## 2020-11-02 PROCEDURE — 1101F PT FALLS ASSESS-DOCD LE1/YR: CPT | Performed by: FAMILY MEDICINE

## 2020-11-02 PROCEDURE — 3017F COLORECTAL CA SCREEN DOC REV: CPT | Performed by: FAMILY MEDICINE

## 2020-11-02 PROCEDURE — G8417 CALC BMI ABV UP PARAM F/U: HCPCS | Performed by: FAMILY MEDICINE

## 2020-11-02 PROCEDURE — 1090F PRES/ABSN URINE INCON ASSESS: CPT | Performed by: FAMILY MEDICINE

## 2020-11-02 PROCEDURE — 2022F DILAT RTA XM EVC RTNOPTHY: CPT | Performed by: FAMILY MEDICINE

## 2020-11-02 PROCEDURE — 99214 OFFICE O/P EST MOD 30 MIN: CPT | Performed by: FAMILY MEDICINE

## 2020-11-02 RX ORDER — BETAMETHASONE DIPROPIONATE 0.5 MG/G
CREAM TOPICAL 2 TIMES DAILY
Qty: 30 G | Refills: 1 | Status: SHIPPED | OUTPATIENT
Start: 2020-11-02 | End: 2021-08-25

## 2020-11-02 NOTE — PROGRESS NOTES
Assessment and Plan    1. Psoriasis  - augmented betamethasone dipropionate (DIPROLENE-AF) 0.05 % topical cream; Apply  to affected area two (2) times a day. Dispense: 30 g; Refill: 1    2. Controlled type 2 diabetes mellitus without complication, with long-term current use of insulin ([address]  Stay on current N dose. Clearly has enough insulin to have low sugars  Try to eat two meals daily at least  Use R by sliding scale  Follow up with list of sugars and times taken in one month      Follow-up and Dispositions    · Return in about 1 month (around 12/2/2020) for Diabetes follow up. Diagnosis and plan discussed with patient who verbillized understanding. History of present Justin Harper is a 76 y.o. female presenting for Follow-up (1 month follow up)    Diabetes FU  Now on 30 units N BID  Sometimes only eats one meal per day. Checks sugars 3-4 times per day. Sugars range from 61, 69, 71, and 77   High sugars 356, 359 and 407 only occurring in the evening before bedtime. Sometimes has low sugars at night but not since we reduced her dose    Rash behind ears  Previous dx of psoriasis    Review of Systems   Constitutional: Negative for chills and fever. HENT: Negative for congestion and sore throat. Respiratory: Negative for cough and shortness of breath. Cardiovascular: Negative for chest pain and leg swelling. Genitourinary: Negative for hematuria. Musculoskeletal: Positive for joint pain. Psychiatric/Behavioral: Negative.           Past Medical History:   Diagnosis Date    CAD (coronary artery disease)     Diabetes ([address]     Diabetes ([address]     type 2    GERD (gastroesophageal reflux disease)     H/O seasonal allergies     Hyperlipidemia     Hypertension     Insomnia     Nausea & vomiting     PUD (peptic ulcer disease)     Small bowel obstruction (HCC)     Vitamin D deficiency      Past Surgical History:   Procedure Laterality Date    BREAST SURGERY PROCEDURE UNLISTED breast reduction    CARDIAC SURG PROCEDURE UNLIST      heart stent    HX APPENDECTOMY      HX BREAST BIOPSY      HX GYN  1971    partial hysterectomy    HX GYN  1991    complete hysterectomy    HX HYSTERECTOMY      HX ORTHOPAEDIC      right rotator cuff repair    HX ORTHOPAEDIC      bilaterral wrist surgery    HX ORTHOPAEDIC      right knee surgery    HX ORTHOPAEDIC      bilateral bone spur removal from big toe    HX TONSIL AND ADENOIDECTOMY      HX TONSILLECTOMY       Family History   Problem Relation Age of Onset    Cancer Maternal Aunt         pancreatic cancer    Hypertension Mother     COPD Mother     Diabetes Father     Alcohol abuse Brother         murdered      Social History     Socioeconomic History    Marital status:      Spouse name: Not on file    Number of children: Not on file    Years of education: Not on file    Highest education level: Not on file   Occupational History    Not on file   Social Needs    Financial resource strain: Not on file    Food insecurity     Worry: Not on file     Inability: Not on file    Transportation needs     Medical: Not on file     Non-medical: Not on file   Tobacco Use    Smoking status: Never Smoker    Smokeless tobacco: Never Used   Substance and Sexual Activity    Alcohol use: No    Drug use: No    Sexual activity: Not Currently   Lifestyle    Physical activity     Days per week: Not on file     Minutes per session: Not on file    Stress: Not on file   Relationships    Social connections     Talks on phone: Not on file     Gets together: Not on file     Attends Yazidism service: Not on file     Active member of club or organization: Not on file     Attends meetings of clubs or organizations: Not on file     Relationship status: Not on file    Intimate partner violence     Fear of current or ex partner: Not on file     Emotionally abused: Not on file     Physically abused: Not on file     Forced sexual activity: Not on file   Other Topics Concern    Not on file   Social History Narrative    Not on file         Prior to Admission medications    Medication Sig Start Date End Date Taking? Authorizing Provider   augmented betamethasone dipropionate (DIPROLENE-AF) 0.05 % topical cream Apply  to affected area two (2) times a day. 11/2/20  Yes Norman Mandujano MD   QUEtiapine (SEROquel) 50 mg tablet Take 1 Tab by mouth nightly. 9/30/20  Yes Norman Mandujano MD   pravastatin (PRAVACHOL) 40 mg tablet Take 2 Tabs by mouth nightly. Indications: 2 tablets at bedtime 9/30/20  Yes Norman Mandujano MD   metFORMIN (GLUCOPHAGE) 500 mg tablet Take 1 Tab by mouth daily. 9/30/20  Yes Norman Mandujano MD   losartan (COZAAR) 100 mg tablet Take 1 Tab by mouth daily. 9/30/20  Yes Nroman Mandujano MD   hydroCHLOROthiazide (HYDRODIURIL) 25 mg tablet Take 1 Tab by mouth daily. 9/30/20  Yes Norman Mandujano MD   famotidine (PEPCID) 20 mg tablet Take 1 Tab by mouth two (2) times a day. 9/30/20  Yes Norman Mandujano MD   amLODIPine (NORVASC) 5 mg tablet Take 1 Tab by mouth daily. 9/30/20  Yes Norman Mandujano MD   insulin regular (NOVOLIN R, HUMULIN R) 100 unit/mL injection 20 units Subcutaneously 5-10 minutes prior to meals. 9/30/20  Yes Norman Mandujano MD   insulin NPH (NovoLIN N NPH U-100 Insulin) 100 unit/mL injection 30 Units by SubCUTAneous route two (2) times a day. 9/30/20  Yes Norman Mandujano MD   butalbital-aspirin-caffeine [facility]) capsule Take 1 Cap by mouth every four (4) hours as needed for Pain. Yes Provider, Historical   loratadine 10 mg cap Take  by mouth. Yes Provider, Historical   melatonin 5 mg cap capsule Take 5 mg by mouth two (2) times a day. Yes Provider, Historical   ascorbic acid, vitamin C, (VITAMIN C) 500 mg tablet Take 1,000 mg by mouth daily. Yes Provider, Historical   naproxen sodium 220 mg Cap Take 1 Tab by mouth two (2) times daily as needed.      Yes Provider, Historical Allergies   Allergen Reactions    Advicor [Niacin-Lovastatin] Swelling    Ambien [Zolpidem] Other (comments)    Deflazacort Unable to Obtain    Erythromycin Nausea Only    Erythromycin Base Nausea and Vomiting    Niacin Swelling    Pitavastatin Other (comments)     Abdominal pain, nausea    Premarin [Conjugated Estrogens] Other (comments)     Headaches        Simvastatin Other (comments)     Muscle cramps    Atorvastatin Other (comments)     Muscle cramps    Triamcinolone Unable to Obtain       Vitals:    11/02/20 1318 11/02/20 1322   BP: (!) 164/86 (!) 143/81   Pulse: (!) 49    Resp: 16    Temp: 98 °F (36.7 °C)    TempSrc: Oral    SpO2: 98%    Weight: 176 lb 9.6 oz (80.1 kg)    Height: 5' 4\" (1.626 m)      Body mass index is 30.31 kg/m². Objective  Physical Exam  Vitals signs and nursing note reviewed. Constitutional:       Appearance: Normal appearance. She is not toxic-appearing. HENT:      Head: Normocephalic and atraumatic. Neck:      Musculoskeletal: No muscular tenderness. Cardiovascular:      Rate and Rhythm: Normal rate and regular rhythm. Heart sounds: Normal heart sounds. No murmur. No gallop. Pulmonary:      Effort: Pulmonary effort is normal. No respiratory distress. Breath sounds: Normal breath sounds. No wheezing, rhonchi or rales. Lymphadenopathy:      Cervical: No cervical adenopathy. Neurological:      Mental Status: She is alert. Psychiatric:         Mood and Affect: Mood normal.         Behavior: Behavior normal.         Thought Content:  Thought content normal.         Judgment: Judgment normal.

## 2020-11-02 NOTE — PATIENT INSTRUCTIONS
Take Humulin N 30 units twice daily about 10-12 hours apart Humulin R as follows: Take 10 minutes prior to meals Check blood sugar prior to meal and take doses as noted below Blood sugar under 100, no insulin Blood sugar 100-150, 8 units Blood sugar 151-200, 12 units Blood sugar 201-250, 16 units Blood sugar 251-300, 20 units Blood sugar 301-350, 24 units Blood sugar 351-400, 28 units.

## 2020-11-02 NOTE — PROGRESS NOTES
Patient stated name &     Chief Complaint   Patient presents with    Follow-up     1 month follow up        Health Maintenance Due   Topic    Colorectal Cancer Screening Combo     GLAUCOMA SCREENING Q2Y     Eye Exam Retinal or Dilated     Shingrix Vaccine Age 50> (2 of 2)    Medicare Yearly Exam        Wt Readings from Last 3 Encounters:   20 176 lb 9.6 oz (80.1 kg)   20 175 lb 9.6 oz (79.7 kg)   20 173 lb (78.5 kg)     Temp Readings from Last 3 Encounters:   20 98 °F (36.7 °C) (Oral)   20 98.4 °F (36.9 °C) (Oral)   20 98.1 °F (36.7 °C) (Oral)     BP Readings from Last 3 Encounters:   20 (!) 164/86   20 (!) 146/81   20 122/76     Pulse Readings from Last 3 Encounters:   20 (!) 49   20 74   20 89         Learning Assessment:  :     Learning Assessment 2019   PRIMARY LEARNER Patient   HIGHEST LEVEL OF EDUCATION - PRIMARY LEARNER  SOME COLLEGE   PRIMARY LANGUAGE ENGLISH   LEARNER PREFERENCE PRIMARY READING   ANSWERED BY self   RELATIONSHIP SELF       Depression Screening:  :     3 most recent PHQ Screens 2020   Little interest or pleasure in doing things Not at all   Feeling down, depressed, irritable, or hopeless Not at all   Total Score PHQ 2 0       Fall Risk Assessment:  :     Fall Risk Assessment, last 12 mths 2020   Able to walk? Yes   Fall in past 12 months? No       Abuse Screening:  :     Abuse Screening Questionnaire 2019   Do you ever feel afraid of your partner? N   Are you in a relationship with someone who physically or mentally threatens you? N   Is it safe for you to go home? Y       Coordination of Care Questionnaire:  :     1) Have you been to an emergency room, urgent care clinic since your last visit? No    Hospitalized since your last visit? No             2) Have you seen or consulted any other health care providers outside of [address] [place] [address] since your last visit?  No  (Include any pap smears or colon screenings in this section.)    Patient is accompanied by self I have received verbal consent from Toro Bruner to discuss any/all medical information while they are present in the room.

## 2020-11-30 ENCOUNTER — TELEPHONE (OUTPATIENT)
Dept: FAMILY MEDICINE CLINIC | Age: 75
End: 2020-11-30

## 2020-11-30 NOTE — TELEPHONE ENCOUNTER
----- Message from Tivis Senegasuzy sent at 11/30/2020  1:32 PM EST -----  Regarding: Dr Betty Ontiveros  Caller's first and last name: N/A  Reason for call: Pt wants to inform her blood sugar has been normal   Callback required yes/no and why: yes, discuss blood sugar  Best contact number(s): [phone]  Details to clarify the request: Pt wants to inform that her blood sugar has not been low

## 2020-11-30 NOTE — TELEPHONE ENCOUNTER
Call her and ask her to bring me in a list of her blood sugars for one week so we can see if insulin needs to be adjusted  [facility]

## 2020-12-01 NOTE — TELEPHONE ENCOUNTER
Pt states that she will try because she been in a lot of pain, she hasn't been eating on a regular due to being in bed due to her pain.  She states that she will going to a back specialist

## 2020-12-19 DIAGNOSIS — E11.9 CONTROLLED TYPE 2 DIABETES MELLITUS WITHOUT COMPLICATION, WITH LONG-TERM CURRENT USE OF INSULIN (HCC): ICD-10-CM

## 2020-12-19 DIAGNOSIS — Z79.4 CONTROLLED TYPE 2 DIABETES MELLITUS WITHOUT COMPLICATION, WITH LONG-TERM CURRENT USE OF INSULIN (HCC): ICD-10-CM

## 2021-01-04 ENCOUNTER — TELEPHONE (OUTPATIENT)
Dept: FAMILY MEDICINE CLINIC | Age: 76
End: 2021-01-04

## 2021-01-04 ENCOUNTER — NURSE TRIAGE (OUTPATIENT)
Dept: OTHER | Facility: CLINIC | Age: 76
End: 2021-01-04

## 2021-01-04 NOTE — TELEPHONE ENCOUNTER
Please advise       ----- Message from Dot Azar sent at 1/4/2021 12:28 PM EST -----  Regarding: Dr. Shagufta Mtz Message/Vendor Calls    Caller's first and last name:Marychuy Holland      Reason for call:doctor call back      Callback required yes/no and why:yes      Best contact number(s):[phone]      Details to clarify the request:Pt stated she was exposed to covid by someone 14 days ago, and requested a call from the doctor to advised if she should be tested. Pt was trasnferred by nurse triage line.       Dot Azar

## 2021-01-04 NOTE — TELEPHONE ENCOUNTER
Wants a COVID test.  Education on home care and symptom monitoring. 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 Reason for Disposition   [1] COVID-19 EXPOSURE AND [2] 15 or more days ago AND [3] NO symptoms    Answer Assessment - Initial Assessment Questions  1. COVID-19 CLOSE CONTACT: \"Who is the person with the confirmed or suspected COVID-19 infection that you were exposed to?\"      Exposed to several positive people at her [employer] 3 weeks ago. 2. PLACE of CONTACT: \"Where were you when you were exposed to COVID-19? \" (e.g., home, school, medical waiting room; which city?)      [facility]    3. TYPE of CONTACT: \"How much contact was there? \" (e.g., sitting next to, live in same house, work in same office, same building)      Close    4. DURATION of CONTACT: \"How long were you in contact with the COVID-19 patient? \" (e.g., a few seconds, passed by person, a few minutes, 15 minutes or longer, live with the patient)      Couple hours    5. MASK: \"Were you wearing a mask? \" \"Was the other person wearing a mask? \" Note: wearing a mask reduces the risk of an   otherwise close contact. No masking    6. DATE of CONTACT: \"When did you have contact with a COVID-19 patient? \" (e.g., how many days ago)      3 weeks ago    7. COMMUNITY SPREAD: \"Are there lots of cases of COVID-19 (community spread) where you live? \" (See public health department website, if unsure)        Yes    8. SYMPTOMS: \"Do you have any symptoms? \" (e.g., fever, cough, breathing difficulty, loss of taste or smell)      Denies    9. PREGNANCY OR POSTPARTUM: \"Is there any chance you are pregnant? \" \"When was your last menstrual period? \" \"Did you deliver in the last 2 weeks? \"      NA    10. HIGH RISK: \"Do you have any heart or lung problems? Do you have a weak immune system? \" (e.g., heart failure, COPD, asthma, HIV positive, chemotherapy, renal failure, diabetes mellitus, sickle cell anemia, obesity)        DM, HTN    11. TRAVEL: \"Have you traveled out of the country recently? \" If so, \"When and where? \"  Also ask about out-of-state travel, since the CDC has identified some high-risk cities for community spread in the [address] [place] [address] [address]. Note: Travel becomes less relevant if there is widespread community transmission where the patient lives. Denies    Protocols used: CORONAVIRUS (COVID-19) EXPOSURE-ADULT-AH    Received call from  at [address] [place] [address]. See above questions and answers. Caller talking full sentences without any distress on phone. Discussed disposition and patient agreeable. Aware to call back with with any concerns or persistent, worsening, or new symptoms develop. Please do not respond to the triage nurse through this encounter. Any subsequent communication should be directly with the patient.

## 2021-01-13 ENCOUNTER — OFFICE VISIT (OUTPATIENT)
Dept: FAMILY MEDICINE CLINIC | Age: 76
End: 2021-01-13
Payer: MEDICARE

## 2021-01-13 VITALS
RESPIRATION RATE: 16 BRPM | HEART RATE: 48 BPM | HEIGHT: 64 IN | TEMPERATURE: 98.1 F | OXYGEN SATURATION: 97 % | BODY MASS INDEX: 28.2 KG/M2 | SYSTOLIC BLOOD PRESSURE: 150 MMHG | DIASTOLIC BLOOD PRESSURE: 74 MMHG | WEIGHT: 165.2 LBS

## 2021-01-13 DIAGNOSIS — Z79.4 CONTROLLED TYPE 2 DIABETES MELLITUS WITHOUT COMPLICATION, WITH LONG-TERM CURRENT USE OF INSULIN (HCC): Primary | ICD-10-CM

## 2021-01-13 DIAGNOSIS — E78.5 HYPERLIPIDEMIA, UNSPECIFIED HYPERLIPIDEMIA TYPE: ICD-10-CM

## 2021-01-13 DIAGNOSIS — E11.9 CONTROLLED TYPE 2 DIABETES MELLITUS WITHOUT COMPLICATION, WITH LONG-TERM CURRENT USE OF INSULIN (HCC): Primary | ICD-10-CM

## 2021-01-13 DIAGNOSIS — G47.00 INSOMNIA, UNSPECIFIED TYPE: ICD-10-CM

## 2021-01-13 DIAGNOSIS — L03.317 CELLULITIS OF LEFT BUTTOCK: ICD-10-CM

## 2021-01-13 DIAGNOSIS — I10 ESSENTIAL HYPERTENSION: ICD-10-CM

## 2021-01-13 PROCEDURE — G8753 SYS BP > OR = 140: HCPCS | Performed by: FAMILY MEDICINE

## 2021-01-13 PROCEDURE — 3046F HEMOGLOBIN A1C LEVEL >9.0%: CPT | Performed by: FAMILY MEDICINE

## 2021-01-13 PROCEDURE — G8536 NO DOC ELDER MAL SCRN: HCPCS | Performed by: FAMILY MEDICINE

## 2021-01-13 PROCEDURE — 2022F DILAT RTA XM EVC RTNOPTHY: CPT | Performed by: FAMILY MEDICINE

## 2021-01-13 PROCEDURE — 1090F PRES/ABSN URINE INCON ASSESS: CPT | Performed by: FAMILY MEDICINE

## 2021-01-13 PROCEDURE — G8432 DEP SCR NOT DOC, RNG: HCPCS | Performed by: FAMILY MEDICINE

## 2021-01-13 PROCEDURE — 99214 OFFICE O/P EST MOD 30 MIN: CPT | Performed by: FAMILY MEDICINE

## 2021-01-13 PROCEDURE — G8754 DIAS BP LESS 90: HCPCS | Performed by: FAMILY MEDICINE

## 2021-01-13 PROCEDURE — 1101F PT FALLS ASSESS-DOCD LE1/YR: CPT | Performed by: FAMILY MEDICINE

## 2021-01-13 PROCEDURE — G8399 PT W/DXA RESULTS DOCUMENT: HCPCS | Performed by: FAMILY MEDICINE

## 2021-01-13 PROCEDURE — G8417 CALC BMI ABV UP PARAM F/U: HCPCS | Performed by: FAMILY MEDICINE

## 2021-01-13 PROCEDURE — G8427 DOCREV CUR MEDS BY ELIG CLIN: HCPCS | Performed by: FAMILY MEDICINE

## 2021-01-13 PROCEDURE — 3017F COLORECTAL CA SCREEN DOC REV: CPT | Performed by: FAMILY MEDICINE

## 2021-01-13 RX ORDER — POVIDONE-IODINE 10 MG/G
OINTMENT TOPICAL AS NEEDED
Qty: 30 G | Refills: 0 | Status: SHIPPED | OUTPATIENT
Start: 2021-01-13 | End: 2021-02-16

## 2021-01-13 RX ORDER — CEPHALEXIN 500 MG/1
500 CAPSULE ORAL 4 TIMES DAILY
Qty: 40 CAP | Refills: 0 | Status: SHIPPED | OUTPATIENT
Start: 2021-01-13 | End: 2021-01-23

## 2021-01-13 RX ORDER — QUETIAPINE FUMARATE 50 MG/1
50 TABLET, FILM COATED ORAL
Qty: 90 TAB | Refills: 1 | Status: SHIPPED | OUTPATIENT
Start: 2021-01-13 | End: 2021-05-10

## 2021-01-13 NOTE — PROGRESS NOTES
Identified pt with two pt identifiers(name and ). Reviewed record in preparation for visit and have obtained necessary documentation. Chief Complaint   Patient presents with    Diabetes        Health Maintenance Due   Topic    Colorectal Cancer Screening Combo     Shingrix Vaccine Age 49> (2 of 2)    Medicare Yearly Exam        Coordination of Care Questionnaire:  :   1) Have you been to an emergency room, urgent care, or hospitalized since your last visit? If yes, where when, and reason for visit? No       2. Have seen or consulted any other health care provider since your last visit? If yes, where when, and reason for visit?   Yes  Dr Chito Ledezma

## 2021-01-13 NOTE — PROGRESS NOTES
Assessment and Plan    1. Controlled type 2 diabetes mellitus without complication, with long-term current use of insulin (HCC)  Uncertain control. Lots of back pain limiting oral intake  - CBC WITH AUTOMATED DIFF; Future  - HEMOGLOBIN A1C WITH EAG; Future  -  DIABETES FOOT EXAM; Future    2. Hyperlipidemia, unspecified hyperlipidemia type  Check status, continue statin  - HEPATIC FUNCTION PANEL; Future  - LIPID PANEL; Future    3. Essential hypertension  Not at goal but in a lot of pain, no changes for now  - METABOLIC PANEL, BASIC; Future  - MICROALBUMIN, UR, RAND W/ MICROALB/CREAT RATIO; Future    4. Cellulitis of left buttock  Declines home care visit. Use egg crate and stay off of buttock, gently wash Daily. Recheck two weeks. - povidone-iodine (BETADINE) 10 % ointment; Apply  to affected area as needed for Decubitis. Dispense: 30 g; Refill: 0  - cephALEXin (KEFLEX) 500 mg capsule; Take 1 Cap by mouth four (4) times daily for 10 days. Dispense: 40 Cap; Refill: 0    5. Refill quetiapine    Follow-up and Dispositions    · Return in about 2 weeks (around 1/27/2021) for Diabetes follow up, Blood pressure follow up. Diagnosis and plan discussed with patient who verbillized understanding. History of present Gibson Santos is a 76 y.o. female presenting for Diabetes    Lots of back pain and had recent left sided injection  Injection helped. Buttock sore since before Thanksgiving  Bigger than a quarter. DM2 on insulin  Humulin N 40 Units BID  Humulin R has been taking 30 per meal  Went back up on her insulin doses on her own  Fasting today 129, 109 yesterday. Almost always under 140  Low fasting sugar 98  Midday (depends on if she eats) 140 if has not eaten 170 if has eating  Evening, checks before meal 170  Bedtime usually 180. No low sugars at all. No new problems with feet or eyes. Lost 11 pounds due to pain and not eating.     Hypertension  Taking meds consistently    Hypercholesterolemia on statin. Review of Systems   Constitutional: Positive for weight loss. Negative for chills and fever. HENT: Negative for congestion and sore throat. Respiratory: Negative for cough and shortness of breath. Cardiovascular: Negative for palpitations and leg swelling. Gastrointestinal: Negative. Genitourinary: Positive for frequency and urgency. Negative for dysuria and hematuria. Musculoskeletal: Positive for back pain and joint pain. Negative for falls. Neurological: Positive for weakness. Negative for focal weakness. Psychiatric/Behavioral: Negative for depression. The patient is nervous/anxious and has insomnia.           Past Medical History:   Diagnosis Date    CAD (coronary artery disease)     Diabetes [facility] [address]     Diabetes ([facility] [address]     type 2    GERD (gastroesophageal reflux disease)     H/O seasonal allergies     Hyperlipidemia     Hypertension     Insomnia     Nausea & vomiting     PUD (peptic ulcer disease)     Small bowel obstruction (HCC)     Vitamin D deficiency      Past Surgical History:   Procedure Laterality Date    HX APPENDECTOMY      HX BREAST BIOPSY      HX GYN  1971    partial hysterectomy    HX GYN  1991    complete hysterectomy    HX HYSTERECTOMY      HX ORTHOPAEDIC      right rotator cuff repair    HX ORTHOPAEDIC      bilaterral wrist surgery    HX ORTHOPAEDIC      right knee surgery    HX ORTHOPAEDIC      bilateral bone spur removal from big toe    HX TONSIL AND ADENOIDECTOMY      HX TONSILLECTOMY      MD BREAST SURGERY PROCEDURE UNLISTED      breast reduction    MD CARDIAC SURG PROCEDURE UNLIST      heart stent     Family History   Problem Relation Age of Onset    Cancer Maternal Aunt         pancreatic cancer    Hypertension Mother     COPD Mother     Diabetes Father     Alcohol abuse Brother         murdered      Social History     Socioeconomic History    Marital status:      Spouse name: Not on file    Number of children: Not on file    Years of education: Not on file    Highest education level: Not on file   Occupational History    Not on file   Social Needs    Financial resource strain: Not on file    Food insecurity     Worry: Not on file     Inability: Not on file    Transportation needs     Medical: Not on file     Non-medical: Not on file   Tobacco Use    Smoking status: Never Smoker    Smokeless tobacco: Never Used   Substance and Sexual Activity    Alcohol use: No    Drug use: No    Sexual activity: Not Currently   Lifestyle    Physical activity     Days per week: Not on file     Minutes per session: Not on file    Stress: Not on file   Relationships    Social connections     Talks on phone: Not on file     Gets together: Not on file     Attends Hindu service: Not on file     Active member of club or organization: Not on file     Attends meetings of clubs or organizations: Not on file     Relationship status: Not on file    Intimate partner violence     Fear of current or ex partner: Not on file     Emotionally abused: Not on file     Physically abused: Not on file     Forced sexual activity: Not on file   Other Topics Concern    Not on file   Social History Narrative    Not on file         Prior to Admission medications    Medication Sig Start Date End Date Taking? Authorizing Provider   povidone-iodine (BETADINE) 10 % ointment Apply  to affected area as needed for Decubitis. 1/13/21  Yes Lopez Alvarez MD   cephALEXin (KEFLEX) 500 mg capsule Take 1 Cap by mouth four (4) times daily for 10 days. 1/13/21 1/23/21 Yes Lopez Alvarez MD   insulin regular (NovoLIN R Regular U-100 Insuln) 100 unit/mL injection INJECT 25 UNITS SUBCUTANEOUSLY 5-10 MINUES BEFORE MEALS AS DIRECTED 12/21/20   Lopez Alvarez MD   augmented betamethasone dipropionate (DIPROLENE-AF) 0.05 % topical cream Apply  to affected area two (2) times a day.  11/2/20   Lopez Alvarez MD QUEtiapine (SEROquel) 50 mg tablet Take 1 Tab by mouth nightly. 9/30/20   Moraima Gale MD   pravastatin (PRAVACHOL) 40 mg tablet Take 2 Tabs by mouth nightly. Indications: 2 tablets at bedtime 9/30/20   Moraima Gale MD   metFORMIN (GLUCOPHAGE) 500 mg tablet Take 1 Tab by mouth daily. 9/30/20   Moraima Gale MD   losartan (COZAAR) 100 mg tablet Take 1 Tab by mouth daily. 9/30/20   Moraima Gale MD   hydroCHLOROthiazide (HYDRODIURIL) 25 mg tablet Take 1 Tab by mouth daily. 9/30/20   Moraima Gale MD   famotidine (PEPCID) 20 mg tablet Take 1 Tab by mouth two (2) times a day. 9/30/20   Moraima Gale MD   amLODIPine (NORVASC) 5 mg tablet Take 1 Tab by mouth daily. 9/30/20   Moraima Gale MD   insulin NPH (NovoLIN N NPH U-100 Insulin) 100 unit/mL injection 30 Units by SubCUTAneous route two (2) times a day. 9/30/20   Moraima Gale MD   butalbital-aspirin-caffeine [facility]) capsule Take 1 Cap by mouth every four (4) hours as needed for Pain. Provider, Historical   loratadine 10 mg cap Take  by mouth. Provider, Historical   melatonin 5 mg cap capsule Take 5 mg by mouth two (2) times a day. Provider, Historical   ascorbic acid, vitamin C, (VITAMIN C) 500 mg tablet Take 1,000 mg by mouth daily. Provider, Historical   naproxen sodium 220 mg Cap Take 1 Tab by mouth two (2) times daily as needed. Provider, Historical        Allergies   Allergen Reactions    Advicor [Niacin-Lovastatin] Swelling    Conjugated Estrogens Other (comments)     Headaches      Other reaction(s): Other (comments)  Headaches    Deflazacort Unable to Obtain     Other reaction(s): Unable to Obtain    Erythromycin Nausea Only    Erythromycin Base Nausea and Vomiting    Niacin Swelling    Pitavastatin Other (comments)     Abdominal pain, nausea  Other reaction(s): Other (comments)  Abdominal pain, nausea    Simvastatin Other (comments)     Muscle cramps  Other reaction(s):  Other (comments)  Muscle cramps    Unable To Obtain Swelling    Zolpidem Other (comments)     Other reaction(s): Other (comments)    Atorvastatin Other (comments)     Muscle cramps  Other reaction(s): Other (comments)  Muscle cramps    Triamcinolone Unable to Obtain     Other reaction(s): Unable to Obtain       Vitals:    01/13/21 1425 01/13/21 1437 01/13/21 1547   BP: (!) 150/78 (!) 168/76 (!) 150/74   Pulse: (!) 48     Resp: 16     Temp: 98.1 °F (36.7 °C)     TempSrc: Oral     SpO2: 97%     Weight: 165 lb 3.2 oz (74.9 kg)     Height: 5' 4\" (1.626 m)       Body mass index is 28.36 kg/m². Objective  Physical Exam  Vitals signs and nursing note reviewed. Constitutional:       Appearance: Normal appearance. She is not toxic-appearing. HENT:      Head: Normocephalic and atraumatic. Neck:      Musculoskeletal: No muscular tenderness. Cardiovascular:      Rate and Rhythm: Normal rate and regular rhythm. Heart sounds: Normal heart sounds. No murmur. No gallop. Pulmonary:      Effort: Pulmonary effort is normal. No respiratory distress. Breath sounds: Normal breath sounds. No wheezing, rhonchi or rales. Lymphadenopathy:      Cervical: No cervical adenopathy. Neurological:      Mental Status: She is alert. Psychiatric:         Mood and Affect: Mood normal.         Behavior: Behavior normal.         Thought Content: Thought content normal.         Judgment: Judgment normal.     Wound buttock-1.5 CM red wound below sacrum and on gluteal area near midline deep to sub Q, nontender. No odor or discharge.     Diabetic Foot Exam:  Protective sensation is intact bilaterally. Pedal pulses are 2+ and normal bilaterally.   L foot skin inspection:  normal skin and soft tissue with no gross edema or evidence of acute injury or foot ulcer  R foot skin inspection:  skin and soft tissue appear normal with no significant edema or evidence of acute injury or foot ulcer

## 2021-01-13 NOTE — PATIENT INSTRUCTIONS
Diabetes: 
Blood sugar goals: 
Hemoglobin A1c under 7 Fasting blood sugar  Blood sugar 2 hours after a meal under 180, 4 hours after a meal under 120 No hypoglycemia (sugars under 70 and symptomatic low sugars) Blood sugar control with diet and exercise: 
Exercise 45 minutes per day. This makes your insulin work better. It also allows insulin levels to fall helping with weight loss. Every night, try to fast from your evening meal to breakfast (at least 12 hours) without eating anything. This uses stored energy in your liver and makes insulin work better. Avoid simples sugars such as table sugar in drinks (sodas, lemonade, sweet tea, wine), desserts, candy. Also avoid fruit juices and high fructose corn syrup. Avoid frequent consumption of fruit especially grapes and bananas. A single serving of fruit daily is all you should have. Finally, drink less milk which has milk sugar in it. Control your starch intake. Men should have 3-4 carb portions per meal.  Women should have 2-3 carb portions per meal.  A carb serving is the equivalent of a slice of bread. Control your blood pressure and cholesterol. These problems are common in diabetes. AND, don't smoke. All of these problems contribute to heart disease and stroke risk. Get a yearly eye exam and flu shot. Make sure your vaccines are up to date particularly the pneumonia vaccines. Inspect your feet daily. Wear comfortable protective shoes and clean socks. Seek medical care if there are sore, calluses or numbness and burning of your feet. See your doctor at least every 6 months if you are on oral medicines or more often if the diabetic control is not at goal or if you are on insulin. Take your medicines religiously.

## 2021-01-27 ENCOUNTER — OFFICE VISIT (OUTPATIENT)
Dept: FAMILY MEDICINE CLINIC | Age: 76
End: 2021-01-27
Payer: MEDICARE

## 2021-01-27 VITALS
HEART RATE: 86 BPM | HEIGHT: 64 IN | TEMPERATURE: 97.1 F | RESPIRATION RATE: 16 BRPM | OXYGEN SATURATION: 97 % | WEIGHT: 163 LBS | DIASTOLIC BLOOD PRESSURE: 71 MMHG | SYSTOLIC BLOOD PRESSURE: 173 MMHG | BODY MASS INDEX: 27.83 KG/M2
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
## 2021-01-27 DIAGNOSIS — L03.317 CELLULITIS OF LEFT BUTTOCK: Primary | ICD-10-CM

## 2021-01-27 DIAGNOSIS — Z79.4 CONTROLLED TYPE 2 DIABETES MELLITUS WITHOUT COMPLICATION, WITH LONG-TERM CURRENT USE OF INSULIN (HCC): ICD-10-CM

## 2021-01-27 DIAGNOSIS — E11.9 CONTROLLED TYPE 2 DIABETES MELLITUS WITHOUT COMPLICATION, WITH LONG-TERM CURRENT USE OF INSULIN (HCC): ICD-10-CM

## 2021-01-27 PROCEDURE — 3017F COLORECTAL CA SCREEN DOC REV: CPT | Performed by: FAMILY MEDICINE

## 2021-01-27 PROCEDURE — 3046F HEMOGLOBIN A1C LEVEL >9.0%: CPT | Performed by: FAMILY MEDICINE

## 2021-01-27 PROCEDURE — 1090F PRES/ABSN URINE INCON ASSESS: CPT | Performed by: FAMILY MEDICINE

## 2021-01-27 PROCEDURE — G8754 DIAS BP LESS 90: HCPCS | Performed by: FAMILY MEDICINE

## 2021-01-27 PROCEDURE — G8432 DEP SCR NOT DOC, RNG: HCPCS | Performed by: FAMILY MEDICINE

## 2021-01-27 PROCEDURE — 2022F DILAT RTA XM EVC RTNOPTHY: CPT | Performed by: FAMILY MEDICINE

## 2021-01-27 PROCEDURE — G8417 CALC BMI ABV UP PARAM F/U: HCPCS | Performed by: FAMILY MEDICINE

## 2021-01-27 PROCEDURE — G8536 NO DOC ELDER MAL SCRN: HCPCS | Performed by: FAMILY MEDICINE

## 2021-01-27 PROCEDURE — G8427 DOCREV CUR MEDS BY ELIG CLIN: HCPCS | Performed by: FAMILY MEDICINE

## 2021-01-27 PROCEDURE — G8399 PT W/DXA RESULTS DOCUMENT: HCPCS | Performed by: FAMILY MEDICINE

## 2021-01-27 PROCEDURE — 1101F PT FALLS ASSESS-DOCD LE1/YR: CPT | Performed by: FAMILY MEDICINE

## 2021-01-27 PROCEDURE — G8753 SYS BP > OR = 140: HCPCS | Performed by: FAMILY MEDICINE

## 2021-01-27 PROCEDURE — 99214 OFFICE O/P EST MOD 30 MIN: CPT | Performed by: FAMILY MEDICINE

## 2021-01-27 NOTE — PROGRESS NOTES
Identified pt with two pt identifiers(name and ). Reviewed record in preparation for visit and have obtained necessary documentation. Chief Complaint   Patient presents with    Follow-up     Lab results , Cellulitis of left buttock        Health Maintenance Due   Topic    COVID-19 Vaccine (1 of 2)    Colorectal Cancer Screening Combo     Shingrix Vaccine Age 49> (2 of 2)    Medicare Yearly Exam        Coordination of Care Questionnaire:  :   1) Have you been to an emergency room, urgent care, or hospitalized since your last visit? If yes, where when, and reason for visit? No       2. Have seen or consulted any other health care provider since your last visit? If yes, where when, and reason for visit?   No

## 2021-01-27 NOTE — PROGRESS NOTES
Assessment and Plan    1. Cellulitis of left buttock  Wound looks good. No odor or signs of infection. Granulating well. Continue to avoid bearing weight on wound. Continue home care as previously discussed. Continue wound care and check q 2 weeks until heals or if worsens to wound care  Patient declines to go to wound care today    2. Controlled type 2 diabetes mellitus without complication, with long-term current use of insulin ([address]  Not great control but adequate for now    3. Fall, initial encounter  In pain which again may account for BP. Recheck next visit. Follow-up and Dispositions    · Return in about 2 weeks (around 2/10/2021) for Wound follow up. Diagnosis and plan discussed with patient who verbillized understanding. History of present Maribel Maldonado is a 76 y.o. female presenting for Follow-up (Lab results , Cellulitis of left buttock)    Carmen Tuttle this morning and missed back appointment  Had trouble getting up without help  Fell on left shoulder. Mild pain currently  Did not have LOC or hit head and does not think she had low sugar. Just lost her balance. DM2  139 earlier today  Wound on left buttock  Using iodine ointment and padding. Took antibx. Finished yesterday. Labs look OK  A1c 8.2  Renal function OK    Review of Systems   Constitutional: Positive for weight loss. Negative for chills and fever. Respiratory: Negative for cough and shortness of breath. Cardiovascular: Negative for chest pain. Musculoskeletal: Positive for back pain, falls and joint pain. Psychiatric/Behavioral: Negative.           Past Medical History:   Diagnosis Date    CAD (coronary artery disease)     Diabetes ([facility] [address]     Diabetes ([address]     type 2    GERD (gastroesophageal reflux disease)     H/O seasonal allergies     Hyperlipidemia     Hypertension     Insomnia     Nausea & vomiting     PUD (peptic ulcer disease)     Small bowel obstruction (HCC)     Vitamin D deficiency      Past Surgical History:   Procedure Laterality Date    HX APPENDECTOMY      HX BREAST BIOPSY      HX GYN  1971    partial hysterectomy    HX GYN  1991    complete hysterectomy    HX HYSTERECTOMY      HX ORTHOPAEDIC      right rotator cuff repair    HX ORTHOPAEDIC      bilaterral wrist surgery    HX ORTHOPAEDIC      right knee surgery    HX ORTHOPAEDIC      bilateral bone spur removal from big toe    HX TONSIL AND ADENOIDECTOMY      HX TONSILLECTOMY      MI BREAST SURGERY PROCEDURE UNLISTED      breast reduction    MI CARDIAC SURG PROCEDURE UNLIST      heart stent     Family History   Problem Relation Age of Onset    Cancer Maternal Aunt         pancreatic cancer    Hypertension Mother     COPD Mother     Diabetes Father     Alcohol abuse Brother         murdered      Social History     Socioeconomic History    Marital status:      Spouse name: Not on file    Number of children: Not on file    Years of education: Not on file    Highest education level: Not on file   Occupational History    Not on file   Social Needs    Financial resource strain: Not on file    Food insecurity     Worry: Not on file     Inability: Not on file    Transportation needs     Medical: Not on file     Non-medical: Not on file   Tobacco Use    Smoking status: Never Smoker    Smokeless tobacco: Never Used   Substance and Sexual Activity    Alcohol use: No    Drug use: No    Sexual activity: Not Currently   Lifestyle    Physical activity     Days per week: Not on file     Minutes per session: Not on file    Stress: Not on file   Relationships    Social connections     Talks on phone: Not on file     Gets together: Not on file     Attends Scientologist service: Not on file     Active member of club or organization: Not on file     Attends meetings of clubs or organizations: Not on file     Relationship status: Not on file    Intimate partner violence     Fear of current or ex partner: Not on file     Emotionally abused: Not on file     Physically abused: Not on file     Forced sexual activity: Not on file   Other Topics Concern    Not on file   Social History Narrative    Not on file         Prior to Admission medications    Medication Sig Start Date End Date Taking? Authorizing Provider   povidone-iodine (BETADINE) 10 % ointment Apply  to affected area as needed for Decubitis. 1/13/21   Serg Davenport MD   QUEtiapine (SEROquel) 50 mg tablet Take 1 Tab by mouth nightly. 1/13/21   Serg Davenport MD   insulin regular (NovoLIN R Regular U-100 Insuln) 100 unit/mL injection INJECT 25 UNITS SUBCUTANEOUSLY 5-10 MINUES BEFORE MEALS AS DIRECTED 12/21/20   Serg Davenport MD   augmented betamethasone dipropionate (DIPROLENE-AF) 0.05 % topical cream Apply  to affected area two (2) times a day. 11/2/20   Serg Davenport MD   pravastatin (PRAVACHOL) 40 mg tablet Take 2 Tabs by mouth nightly. Indications: 2 tablets at bedtime 9/30/20   Serg Davenport MD   metFORMIN (GLUCOPHAGE) 500 mg tablet Take 1 Tab by mouth daily. 9/30/20   Serg Davenport MD   losartan (COZAAR) 100 mg tablet Take 1 Tab by mouth daily. 9/30/20   Serg Davenport MD   hydroCHLOROthiazide (HYDRODIURIL) 25 mg tablet Take 1 Tab by mouth daily. 9/30/20   Serg Davenport MD   famotidine (PEPCID) 20 mg tablet Take 1 Tab by mouth two (2) times a day. 9/30/20   Serg Davenport MD   amLODIPine (NORVASC) 5 mg tablet Take 1 Tab by mouth daily. 9/30/20   Serg Davenport MD   insulin NPH (NovoLIN N NPH U-100 Insulin) 100 unit/mL injection 30 Units by SubCUTAneous route two (2) times a day. 9/30/20   Serg Davenport MD   butalbital-aspirin-caffeine [facility]) capsule Take 1 Cap by mouth every four (4) hours as needed for Pain. Provider, Historical   loratadine 10 mg cap Take  by mouth. Provider, Historical   melatonin 5 mg cap capsule Take 5 mg by mouth two (2) times a day.     Provider, Historical   ascorbic acid, vitamin C, (VITAMIN C) 500 mg tablet Take 1,000 mg by mouth daily. Provider, Historical   naproxen sodium 220 mg Cap Take 1 Tab by mouth two (2) times daily as needed. Provider, Historical        Allergies   Allergen Reactions    Advicor [Niacin-Lovastatin] Swelling    Conjugated Estrogens Other (comments)     Headaches      Other reaction(s): Other (comments)  Headaches    Deflazacort Unable to Obtain     Other reaction(s): Unable to Obtain    Erythromycin Nausea Only    Erythromycin Base Nausea and Vomiting    Niacin Swelling    Pitavastatin Other (comments)     Abdominal pain, nausea  Other reaction(s): Other (comments)  Abdominal pain, nausea    Simvastatin Other (comments)     Muscle cramps  Other reaction(s): Other (comments)  Muscle cramps    Unable To Obtain Swelling    Zolpidem Other (comments)     Other reaction(s): Other (comments)    Atorvastatin Other (comments)     Muscle cramps  Other reaction(s): Other (comments)  Muscle cramps    Triamcinolone Unable to Obtain     Other reaction(s): Unable to Obtain       Vitals:    01/27/21 1453 01/27/21 1501   BP: (!) 177/74 (!) 173/71   Pulse: 86    Resp: 16    Temp: 97.1 °F (36.2 °C)    TempSrc: Temporal    SpO2: 97%    Weight: 163 lb (73.9 kg)    Height: 5' 4\" (1.626 m)      Body mass index is 27.98 kg/m². Objective  Physical Exam  Vitals signs and nursing note reviewed. Constitutional:       Appearance: Normal appearance. She is ill-appearing. She is not toxic-appearing. Comments: In pain from back but not shoulder where she fell   HENT:      Head: Normocephalic and atraumatic. Neck:      Musculoskeletal: No muscular tenderness. Cardiovascular:      Rate and Rhythm: Normal rate and regular rhythm. Heart sounds: Normal heart sounds. No murmur. No gallop. Pulmonary:      Effort: Pulmonary effort is normal. No respiratory distress. Breath sounds: Normal breath sounds. No wheezing, rhonchi or rales. Lymphadenopathy:      Cervical: No cervical adenopathy. Neurological:      Mental Status: She is alert. Psychiatric:         Mood and Affect: Mood normal.         Behavior: Behavior normal. Behavior is cooperative. Thought Content: Thought content normal.         Judgment: Judgment normal.     Left buttock 1.5 CM wound with no oozing or discharge. Pink to red healthy appearing surface, nontender.

## 2021-02-10 ENCOUNTER — OFFICE VISIT (OUTPATIENT)
Dept: FAMILY MEDICINE CLINIC | Age: 76
End: 2021-02-10
Payer: MEDICARE

## 2021-02-10 VITALS
DIASTOLIC BLOOD PRESSURE: 51 MMHG | WEIGHT: 156 LBS | HEIGHT: 64 IN | BODY MASS INDEX: 26.63 KG/M2 | TEMPERATURE: 98.7 F | SYSTOLIC BLOOD PRESSURE: 162 MMHG | OXYGEN SATURATION: 95 % | HEART RATE: 48 BPM | RESPIRATION RATE: 16 BRPM

## 2021-02-10 DIAGNOSIS — L03.317 CELLULITIS OF LEFT BUTTOCK: Primary | ICD-10-CM

## 2021-02-10 DIAGNOSIS — I10 ESSENTIAL HYPERTENSION: ICD-10-CM

## 2021-02-10 DIAGNOSIS — K21.9 GASTROESOPHAGEAL REFLUX DISEASE WITHOUT ESOPHAGITIS: ICD-10-CM

## 2021-02-10 PROCEDURE — 3017F COLORECTAL CA SCREEN DOC REV: CPT | Performed by: FAMILY MEDICINE

## 2021-02-10 PROCEDURE — G8417 CALC BMI ABV UP PARAM F/U: HCPCS | Performed by: FAMILY MEDICINE

## 2021-02-10 PROCEDURE — G8754 DIAS BP LESS 90: HCPCS | Performed by: FAMILY MEDICINE

## 2021-02-10 PROCEDURE — G8753 SYS BP > OR = 140: HCPCS | Performed by: FAMILY MEDICINE

## 2021-02-10 PROCEDURE — 99214 OFFICE O/P EST MOD 30 MIN: CPT | Performed by: FAMILY MEDICINE

## 2021-02-10 PROCEDURE — G8536 NO DOC ELDER MAL SCRN: HCPCS | Performed by: FAMILY MEDICINE

## 2021-02-10 PROCEDURE — 1101F PT FALLS ASSESS-DOCD LE1/YR: CPT | Performed by: FAMILY MEDICINE

## 2021-02-10 PROCEDURE — G8432 DEP SCR NOT DOC, RNG: HCPCS | Performed by: FAMILY MEDICINE

## 2021-02-10 PROCEDURE — G8427 DOCREV CUR MEDS BY ELIG CLIN: HCPCS | Performed by: FAMILY MEDICINE

## 2021-02-10 PROCEDURE — G8399 PT W/DXA RESULTS DOCUMENT: HCPCS | Performed by: FAMILY MEDICINE

## 2021-02-10 PROCEDURE — 1090F PRES/ABSN URINE INCON ASSESS: CPT | Performed by: FAMILY MEDICINE

## 2021-02-10 RX ORDER — FAMOTIDINE 20 MG/1
20 TABLET, FILM COATED ORAL 2 TIMES DAILY
Qty: 180 TAB | Refills: 1 | Status: SHIPPED | OUTPATIENT
Start: 2021-02-10

## 2021-02-10 RX ORDER — LOSARTAN POTASSIUM 100 MG/1
100 TABLET ORAL DAILY
Qty: 90 TAB | Refills: 1 | Status: SHIPPED | OUTPATIENT
Start: 2021-02-10

## 2021-02-10 RX ORDER — HYDROCHLOROTHIAZIDE 25 MG/1
25 TABLET ORAL DAILY
Qty: 90 TAB | Refills: 1 | Status: SHIPPED | OUTPATIENT
Start: 2021-02-10 | End: 2021-08-08

## 2021-02-10 RX ORDER — AMLODIPINE BESYLATE 5 MG/1
5 TABLET ORAL DAILY
Qty: 90 TAB | Refills: 1 | Status: SHIPPED | OUTPATIENT
Start: 2021-02-10

## 2021-02-10 NOTE — PROGRESS NOTES
Identified pt with two pt identifiers(name and ). Reviewed record in preparation for visit and have obtained necessary documentation. All patient medications has been reviewed. Chief Complaint   Patient presents with    Skin Problem     Cellulitis of left buttock    Diabetes       Health Maintenance Due   Topic    COVID-19 Vaccine (1 of 2)    Colorectal Cancer Screening Combo     Shingrix Vaccine Age 50> (2 of 2)    Medicare Yearly Exam     MICROALBUMIN Q1        Vitals:    02/10/21 1515   BP: (!) 162/51   Pulse: (!) 48   Resp: 16   Temp: 98.7 °F (37.1 °C)   TempSrc: Oral   SpO2: 95%   Weight: 156 lb (70.8 kg)   Height: 5' 4\" (1.626 m)   PainSc:   6       4. Have you been to the ER, urgent care clinic since your last visit? Hospitalized since your last visit? No    5. Have you seen or consulted any other health care providers outside of the [address] [place] [address] since your last visit? Include any pap smears or colon screening. No      Patient is accompanied by self I have received verbal consent from Deirdre Gandhi to discuss any/all medical information while they are present in the room.

## 2021-02-10 NOTE — PROGRESS NOTES
Assessment and Plan    1. Cellulitis of left buttock  Improving. Now with fibrous surface  Continue wound care and avoid pressure    2. Essential hypertension  Not at goal likely not taking meds  - losartan (COZAAR) 100 mg tablet; Take 1 Tab by mouth daily. Dispense: 90 Tab; Refill: 1  - hydroCHLOROthiazide (HYDRODIURIL) 25 mg tablet; Take 1 Tab by mouth daily. Dispense: 90 Tab; Refill: 1  - amLODIPine (NORVASC) 5 mg tablet; Take 1 Tab by mouth daily. Dispense: 90 Tab; Refill: 1    3. Gastroesophageal reflux disease without esophagitis  refilled  - famotidine (PEPCID) 20 mg tablet; Take 1 Tab by mouth two (2) times a day. Dispense: 180 Tab; Refill: 1    Patient seems moderately depressed. She apparently is not eating well. To check blood sugar frequently to see if she is hypoglycemic. Try to eat well and get back on her meds  FU sooner if worsening emotional status. Follow-up and Dispositions    · Return in about 1 month (around 3/10/2021) for Blood pressure follow up, Medication follow up. Diagnosis and plan discussed with patient who verbillized understanding. History of present Rosario Covarrubias is a 76 y.o. female presenting for Skin Problem (Cellulitis of left buttock) and Diabetes    Cellulitis left buttock  Washing with povidone iodine   No pain, not sitting on it much    Hypertension  Not at goal past three visits. She thinks she has missed some of her BP meds  Thinks she has not been taking any of them  Family is busy and has trouble helping her  Spent last two weeks writing her obituary    GERD  Needs refill famotidine. Review of Systems   Constitutional: Positive for weight loss. Negative for chills and fever. HENT: Negative for congestion and sore throat. Respiratory: Negative for cough and shortness of breath. Gastrointestinal: Positive for heartburn. Negative for abdominal pain, nausea and vomiting. Psychiatric/Behavioral: Positive for depression.  The patient is not nervous/anxious and does not have insomnia.           Past Medical History:   Diagnosis Date    CAD (coronary artery disease)     Diabetes ([facility] [address]     Diabetes ([facility] [address]     type 2    GERD (gastroesophageal reflux disease)     H/O seasonal allergies     Hyperlipidemia     Hypertension     Insomnia     Nausea & vomiting     PUD (peptic ulcer disease)     Small bowel obstruction (HCC)     Vitamin D deficiency      Past Surgical History:   Procedure Laterality Date    HX APPENDECTOMY      HX BREAST BIOPSY      HX GYN  1971    partial hysterectomy    HX GYN  1991    complete hysterectomy    HX HYSTERECTOMY      HX ORTHOPAEDIC      right rotator cuff repair    HX ORTHOPAEDIC      bilaterral wrist surgery    HX ORTHOPAEDIC      right knee surgery    HX ORTHOPAEDIC      bilateral bone spur removal from big toe    HX TONSIL AND ADENOIDECTOMY      HX TONSILLECTOMY      [place] BREAST SURGERY PROCEDURE UNLISTED      breast reduction    [place] CARDIAC SURG PROCEDURE UNLIST      heart stent     Family History   Problem Relation Age of Onset    Cancer Maternal Aunt         pancreatic cancer    Hypertension Mother     COPD Mother     Diabetes Father     Alcohol abuse Brother         murdered      Social History     Socioeconomic History    Marital status:      Spouse name: Not on file    Number of children: Not on file    Years of education: Not on file    Highest education level: Not on file   Occupational History    Not on file   Social Needs    Financial resource strain: Not on file    Food insecurity     Worry: Not on file     Inability: Not on file    Transportation needs     Medical: Not on file     Non-medical: Not on file   Tobacco Use    Smoking status: Never Smoker    Smokeless tobacco: Never Used   Substance and Sexual Activity    Alcohol use: No    Drug use: No    Sexual activity: Not Currently   Lifestyle    Physical activity     Days per week: Not on file     Minutes per session: Not on file    Stress: Not on file   Relationships    Social connections     Talks on phone: Not on file     Gets together: Not on file     Attends Nondenominational service: Not on file     Active member of club or organization: Not on file     Attends meetings of clubs or organizations: Not on file     Relationship status: Not on file    Intimate partner violence     Fear of current or ex partner: Not on file     Emotionally abused: Not on file     Physically abused: Not on file     Forced sexual activity: Not on file   Other Topics Concern    Not on file   Social History Narrative    Not on file         Prior to Admission medications    Medication Sig Start Date End Date Taking? Authorizing Provider   losartan (COZAAR) 100 mg tablet Take 1 Tab by mouth daily. 2/10/21  Yes Lucas Ward MD   famotidine (PEPCID) 20 mg tablet Take 1 Tab by mouth two (2) times a day. 2/10/21  Yes Lucas Ward MD   hydroCHLOROthiazide (HYDRODIURIL) 25 mg tablet Take 1 Tab by mouth daily. 2/10/21  Yes Lucas Ward MD   amLODIPine (NORVASC) 5 mg tablet Take 1 Tab by mouth daily. 2/10/21  Yes Lucas Ward MD   QUEtiapine (SEROquel) 50 mg tablet Take 1 Tab by mouth nightly. 1/13/21  Yes Lucas Ward MD   pravastatin (PRAVACHOL) 40 mg tablet Take 2 Tabs by mouth nightly. Indications: 2 tablets at bedtime 9/30/20  Yes Lucas Ward MD   insulin NPH (NovoLIN N NPH U-100 Insulin) 100 unit/mL injection 30 Units by SubCUTAneous route two (2) times a day. 9/30/20  Yes Lucas Ward MD   ascorbic acid, vitamin C, (VITAMIN C) 500 mg tablet Take 1,000 mg by mouth daily. Yes Provider, Historical   povidone-iodine (BETADINE) 10 % ointment Apply  to affected area as needed for Decubitis.  1/13/21   Lucas Ward MD   insulin regular (NovoLIN R Regular U-100 Insuln) 100 unit/mL injection INJECT 25 UNITS SUBCUTANEOUSLY 5-10 MINUES BEFORE MEALS AS DIRECTED 12/21/20   Lucas Ward MD augmented betamethasone dipropionate (DIPROLENE-AF) 0.05 % topical cream Apply  to affected area two (2) times a day. 11/2/20   Janet Reed MD   metFORMIN (GLUCOPHAGE) 500 mg tablet Take 1 Tab by mouth daily. 9/30/20   Janet Reed MD   losartan (COZAAR) 100 mg tablet Take 1 Tab by mouth daily. 9/30/20 2/10/21  Janet Reed MD   hydroCHLOROthiazide (HYDRODIURIL) 25 mg tablet Take 1 Tab by mouth daily. 9/30/20 2/10/21  Janet Reed MD   famotidine (PEPCID) 20 mg tablet Take 1 Tab by mouth two (2) times a day. 9/30/20 2/10/21  Janet Reed MD   amLODIPine (NORVASC) 5 mg tablet Take 1 Tab by mouth daily. 9/30/20 2/10/21  Janet Reed MD   butalbital-aspirin-caffeine [facility]) capsule Take 1 Cap by mouth every four (4) hours as needed for Pain. Provider, Historical   loratadine 10 mg cap Take  by mouth. Provider, Historical   melatonin 5 mg cap capsule Take 5 mg by mouth two (2) times a day. Provider, Historical   naproxen sodium 220 mg Cap Take 1 Tab by mouth two (2) times daily as needed. Provider, Historical        Allergies   Allergen Reactions    Advicor [Niacin-Lovastatin] Swelling    Conjugated Estrogens Other (comments)     Headaches      Other reaction(s): Other (comments)  Headaches    Deflazacort Unable to Obtain     Other reaction(s): Unable to Obtain    Erythromycin Nausea Only    Erythromycin Base Nausea and Vomiting    Niacin Swelling    Pitavastatin Other (comments)     Abdominal pain, nausea  Other reaction(s): Other (comments)  Abdominal pain, nausea    Simvastatin Other (comments)     Muscle cramps  Other reaction(s): Other (comments)  Muscle cramps    Unable To Obtain Swelling    Zolpidem Other (comments)     Other reaction(s): Other (comments)    Atorvastatin Other (comments)     Muscle cramps  Other reaction(s):  Other (comments)  Muscle cramps    Triamcinolone Unable to Obtain     Other reaction(s): Unable to Obtain Vitals:    02/10/21 1515   BP: (!) 162/51   Pulse: (!) 48   Resp: 16   Temp: 98.7 °F (37.1 °C)   TempSrc: Oral   SpO2: 95%   Weight: 156 lb (70.8 kg)   Height: 5' 4\" (1.626 m)     Body mass index is 26.78 kg/m². Objective  Physical Exam  Vitals signs and nursing note reviewed. Constitutional:       Appearance: Normal appearance. She is not toxic-appearing. HENT:      Head: Normocephalic and atraumatic. Neck:      Musculoskeletal: No muscular tenderness. Cardiovascular:      Rate and Rhythm: Normal rate and regular rhythm. Heart sounds: Normal heart sounds. No murmur. No gallop. Pulmonary:      Effort: Pulmonary effort is normal. No respiratory distress. Breath sounds: Normal breath sounds. No wheezing, rhonchi or rales. Lymphadenopathy:      Cervical: No cervical adenopathy. Neurological:      Mental Status: She is alert. Psychiatric:         Mood and Affect: Mood normal.         Behavior: Behavior normal.         Thought Content: Thought content normal.         Judgment: Judgment normal.       Buttock wound now with fibrous cap and healing well. NO surrounding redness or discharge.

## 2021-02-16 ENCOUNTER — APPOINTMENT (OUTPATIENT)
Dept: CT IMAGING | Age: 76
DRG: 853 | End: 2021-02-16
Attending: EMERGENCY MEDICINE
Payer: MEDICARE

## 2021-02-16 ENCOUNTER — APPOINTMENT (OUTPATIENT)
Dept: CT IMAGING | Age: 76
DRG: 853 | End: 2021-02-16
Attending: STUDENT IN AN ORGANIZED HEALTH CARE EDUCATION/TRAINING PROGRAM
Payer: MEDICARE

## 2021-02-16 ENCOUNTER — APPOINTMENT (OUTPATIENT)
Dept: GENERAL RADIOLOGY | Age: 76
DRG: 853 | End: 2021-02-16
Attending: EMERGENCY MEDICINE
Payer: MEDICARE

## 2021-02-16 ENCOUNTER — APPOINTMENT (OUTPATIENT)
Dept: NON INVASIVE DIAGNOSTICS | Age: 76
DRG: 853 | End: 2021-02-16
Attending: STUDENT IN AN ORGANIZED HEALTH CARE EDUCATION/TRAINING PROGRAM
Payer: MEDICARE

## 2021-02-16 ENCOUNTER — HOSPITAL ENCOUNTER (INPATIENT)
Age: 76
LOS: 10 days | Discharge: NURSING, REHAB OR LONG-TERM CARE | DRG: 853 | End: 2021-02-26
Attending: EMERGENCY MEDICINE | Admitting: FAMILY MEDICINE
Payer: MEDICARE

## 2021-02-16 DIAGNOSIS — Z79.4 CONTROLLED TYPE 2 DIABETES MELLITUS WITHOUT COMPLICATION, WITH LONG-TERM CURRENT USE OF INSULIN (HCC): ICD-10-CM

## 2021-02-16 DIAGNOSIS — A04.72 C. DIFFICILE COLITIS: ICD-10-CM

## 2021-02-16 DIAGNOSIS — Z20.822 SUSPECTED COVID-19 VIRUS INFECTION: ICD-10-CM

## 2021-02-16 DIAGNOSIS — R00.1 BRADYCARDIA: ICD-10-CM

## 2021-02-16 DIAGNOSIS — R19.7 DIARRHEA, UNSPECIFIED TYPE: ICD-10-CM

## 2021-02-16 DIAGNOSIS — Z95.0 S/P PLACEMENT OF CARDIAC PACEMAKER: ICD-10-CM

## 2021-02-16 DIAGNOSIS — R65.20 SEVERE SEPSIS (HCC): ICD-10-CM

## 2021-02-16 DIAGNOSIS — E11.9 CONTROLLED TYPE 2 DIABETES MELLITUS WITHOUT COMPLICATION, WITH LONG-TERM CURRENT USE OF INSULIN (HCC): ICD-10-CM

## 2021-02-16 DIAGNOSIS — I63.9 ACUTE CVA (CEREBROVASCULAR ACCIDENT) (HCC): ICD-10-CM

## 2021-02-16 DIAGNOSIS — I44.1 MOBITZ TYPE 2 SECOND DEGREE AV BLOCK: ICD-10-CM

## 2021-02-16 DIAGNOSIS — R41.82 ALTERED MENTAL STATUS, UNSPECIFIED ALTERED MENTAL STATUS TYPE: Primary | ICD-10-CM

## 2021-02-16 DIAGNOSIS — N17.9 AKI (ACUTE KIDNEY INJURY) (HCC): ICD-10-CM

## 2021-02-16 DIAGNOSIS — R94.31 PROLONGED Q-T INTERVAL ON ECG: ICD-10-CM

## 2021-02-16 DIAGNOSIS — I44.2 COMPLETE HEART BLOCK BY ELECTROCARDIOGRAM (HCC): ICD-10-CM

## 2021-02-16 DIAGNOSIS — A41.9 SEVERE SEPSIS (HCC): ICD-10-CM

## 2021-02-16 DIAGNOSIS — I10 ESSENTIAL HYPERTENSION: ICD-10-CM

## 2021-02-16 DIAGNOSIS — K21.9 GASTROESOPHAGEAL REFLUX DISEASE, UNSPECIFIED WHETHER ESOPHAGITIS PRESENT: Chronic | ICD-10-CM

## 2021-02-16 DIAGNOSIS — I44.2 COMPLETE HEART BLOCK (HCC): ICD-10-CM

## 2021-02-16 DIAGNOSIS — R78.81 POSITIVE BLOOD CULTURE: ICD-10-CM

## 2021-02-16 DIAGNOSIS — E78.00 HYPERCHOLESTEREMIA: ICD-10-CM

## 2021-02-16 DIAGNOSIS — E11.10 DIABETIC KETOACIDOSIS WITHOUT COMA ASSOCIATED WITH TYPE 2 DIABETES MELLITUS (HCC): ICD-10-CM

## 2021-02-16 DIAGNOSIS — E08.10 DIABETIC KETOACIDOSIS WITHOUT COMA ASSOCIATED WITH DIABETES MELLITUS DUE TO UNDERLYING CONDITION (HCC): ICD-10-CM

## 2021-02-16 DIAGNOSIS — K86.2 PANCREATIC CYST: ICD-10-CM

## 2021-02-16 PROBLEM — G47.00 INSOMNIA: Chronic | Status: ACTIVE | Noted: 2021-02-16

## 2021-02-16 LAB
ACETONE,ACETX: 404 MG/L
ADMINISTERED INITIALS, ADMINIT: NORMAL
ALBUMIN SERPL-MCNC: 4 G/DL (ref 3.5–5)
ALBUMIN/GLOB SERPL: 0.8 {RATIO} (ref 1.1–2.2)
ALP SERPL-CCNC: 125 U/L (ref 45–117)
ALT SERPL-CCNC: 22 U/L (ref 12–78)
AMMONIA PLAS-SCNC: 14 UMOL/L
AMPHET UR QL SCN: NEGATIVE
AMYLASE SERPL-CCNC: 23 U/L (ref 25–115)
ANION GAP SERPL CALC-SCNC: 16 MMOL/L (ref 5–15)
ANION GAP SERPL CALC-SCNC: 17 MMOL/L (ref 5–15)
ANION GAP SERPL CALC-SCNC: 18 MMOL/L (ref 5–15)
ANION GAP SERPL CALC-SCNC: 9 MMOL/L (ref 5–15)
APPEARANCE UR: CLEAR
APTT PPP: 29.5 SEC (ref 22.1–31)
AST SERPL-CCNC: 10 U/L (ref 15–37)
ATRIAL RATE: 136 BPM
B PERT DNA SPEC QL NAA+PROBE: NOT DETECTED
B-OH-BUTYR SERPL-SCNC: >4.42 MMOL/L
BACTERIA URNS QL MICRO: NEGATIVE /HPF
BARBITURATES UR QL SCN: NEGATIVE
BASE DEFICIT BLDV-SCNC: 19.1 MMOL/L
BASOPHILS # BLD: 0 K/UL (ref 0–0.1)
BASOPHILS NFR BLD: 0 % (ref 0–1)
BDY SITE: ABNORMAL
BENZODIAZ UR QL: NEGATIVE
BILIRUB SERPL-MCNC: 0.5 MG/DL (ref 0.2–1)
BILIRUB UR QL CFM: NEGATIVE
BNP SERPL-MCNC: 203 PG/ML
BORDETELLA PARAPERTUSSIS PCR, BORPAR: NOT DETECTED
BUN SERPL-MCNC: 48 MG/DL (ref 6–20)
BUN SERPL-MCNC: 48 MG/DL (ref 6–20)
BUN SERPL-MCNC: 49 MG/DL (ref 6–20)
BUN SERPL-MCNC: 49 MG/DL (ref 6–20)
BUN/CREAT SERPL: 27 (ref 12–20)
BUN/CREAT SERPL: 33 (ref 12–20)
BUN/CREAT SERPL: 34 (ref 12–20)
BUN/CREAT SERPL: 34 (ref 12–20)
C PNEUM DNA SPEC QL NAA+PROBE: NOT DETECTED
CALCIUM SERPL-MCNC: 10.3 MG/DL (ref 8.5–10.1)
CALCIUM SERPL-MCNC: 11.3 MG/DL (ref 8.5–10.1)
CALCIUM SERPL-MCNC: 9.5 MG/DL (ref 8.5–10.1)
CALCIUM SERPL-MCNC: 9.6 MG/DL (ref 8.5–10.1)
CALCULATED R AXIS, ECG10: 14 DEGREES
CALCULATED T AXIS, ECG11: 46 DEGREES
CANNABINOIDS UR QL SCN: NEGATIVE
CHAIN OF CUSTODY,CHC: NO
CHLORIDE SERPL-SCNC: 104 MMOL/L (ref 97–108)
CHLORIDE SERPL-SCNC: 111 MMOL/L (ref 97–108)
CHLORIDE SERPL-SCNC: 115 MMOL/L (ref 97–108)
CHLORIDE SERPL-SCNC: 118 MMOL/L (ref 97–108)
CK SERPL-CCNC: 150 U/L (ref 26–192)
CO2 SERPL-SCNC: 17 MMOL/L (ref 21–32)
CO2 SERPL-SCNC: 18 MMOL/L (ref 21–32)
CO2 SERPL-SCNC: 18 MMOL/L (ref 21–32)
CO2 SERPL-SCNC: 23 MMOL/L (ref 21–32)
COCAINE UR QL SCN: NEGATIVE
COLOR UR: ABNORMAL
COMMENT, HOLDF: NORMAL
COMMENT, HOLDF: NORMAL
CREAT SERPL-MCNC: 1.4 MG/DL (ref 0.55–1.02)
CREAT SERPL-MCNC: 1.46 MG/DL (ref 0.55–1.02)
CREAT SERPL-MCNC: 1.47 MG/DL (ref 0.55–1.02)
CREAT SERPL-MCNC: 1.83 MG/DL (ref 0.55–1.02)
CRP SERPL-MCNC: 7.08 MG/DL (ref 0–0.6)
D DIMER PPP FEU-MCNC: 0.83 MG/L FEU (ref 0–0.65)
D50 ADMINISTERED, D50ADM: 0 ML
D50 ORDER, D50ORD: 0 ML
DIAGNOSIS, 93000: NORMAL
DIFFERENTIAL METHOD BLD: ABNORMAL
DRUG SCRN COMMENT,DRGCM: NORMAL
ECHO AO ROOT DIAM: 3.06 CM
ECHO AV AREA PEAK VELOCITY: 2.53 CM2
ECHO AV AREA/BSA PEAK VELOCITY: 1.5 CM2/M2
ECHO AV PEAK GRADIENT: 8.18 MMHG
ECHO AV PEAK VELOCITY: 143 CM/S
ECHO LA AREA 4C: 15.33 CM2
ECHO LA MAJOR AXIS: 3.37 CM
ECHO LA MINOR AXIS: 1.94 CM
ECHO LA VOL 2C: 30.93 ML (ref 22–52)
ECHO LA VOL 4C: 39.34 ML (ref 22–52)
ECHO LA VOL BP: 39.71 ML (ref 22–52)
ECHO LA VOL/BSA BIPLANE: 22.81 ML/M2 (ref 16–28)
ECHO LA VOLUME INDEX A2C: 17.77 ML/M2 (ref 16–28)
ECHO LA VOLUME INDEX A4C: 22.6 ML/M2 (ref 16–28)
ECHO LV E' LATERAL VELOCITY: 8.78 CENTIMETER/SECOND
ECHO LV E' SEPTAL VELOCITY: 10.57 CENTIMETER/SECOND
ECHO LV INTERNAL DIMENSION DIASTOLIC: 2.99 CM (ref 3.9–5.3)
ECHO LV INTERNAL DIMENSION SYSTOLIC: 2.17 CM
ECHO LV IVSD: 1.53 CM (ref 0.6–0.9)
ECHO LV MASS 2D: 119.8 G (ref 67–162)
ECHO LV MASS INDEX 2D: 68.8 G/M2 (ref 43–95)
ECHO LV POSTERIOR WALL DIASTOLIC: 1.02 CM (ref 0.6–0.9)
ECHO LVOT DIAM: 1.76 CM
ECHO LVOT PEAK GRADIENT: 8.93 MMHG
ECHO LVOT PEAK VELOCITY: 149.38 CM/S
ECHO PV PEAK INSTANTANEOUS GRADIENT SYSTOLIC: 5.25 MMHG
ECHO PV REGURGITANT MAX VELOCITY: 114.5 CM/S
ECHO RV INTERNAL DIMENSION: 2.89 CM
ECHO RV TAPSE: 1.72 CM (ref 1.5–2)
ECHO TV REGURGITANT MAX VELOCITY: 271.89 CM/S
ECHO TV REGURGITANT PEAK GRADIENT: 29.57 MMHG
EOSINOPHIL # BLD: 0 K/UL (ref 0–0.4)
EOSINOPHIL NFR BLD: 0 % (ref 0–7)
EPITH CASTS URNS QL MICRO: ABNORMAL /LPF
ERYTHROCYTE [DISTWIDTH] IN BLOOD BY AUTOMATED COUNT: 16.8 % (ref 11.5–14.5)
EST. AVERAGE GLUCOSE BLD GHB EST-MCNC: 206 MG/DL
ETHANOL SERPL-MCNC: <10 MG/DL
ETHANOL,ETHX: NEGATIVE MG/L
FERRITIN SERPL-MCNC: 124 NG/ML (ref 26–388)
FLUAV H1 2009 PAND RNA SPEC QL NAA+PROBE: NOT DETECTED
FLUAV H1 RNA SPEC QL NAA+PROBE: NOT DETECTED
FLUAV H3 RNA SPEC QL NAA+PROBE: NOT DETECTED
FLUAV SUBTYP SPEC NAA+PROBE: NOT DETECTED
FLUBV RNA SPEC QL NAA+PROBE: NOT DETECTED
GLOBULIN SER CALC-MCNC: 5.2 G/DL (ref 2–4)
GLSCOM COMMENTS: NORMAL
GLUCOSE BLD STRIP.AUTO-MCNC: 174 MG/DL (ref 65–100)
GLUCOSE BLD STRIP.AUTO-MCNC: 174 MG/DL (ref 65–100)
GLUCOSE BLD STRIP.AUTO-MCNC: 192 MG/DL (ref 65–100)
GLUCOSE BLD STRIP.AUTO-MCNC: 198 MG/DL (ref 65–100)
GLUCOSE BLD STRIP.AUTO-MCNC: 224 MG/DL (ref 65–100)
GLUCOSE BLD STRIP.AUTO-MCNC: 293 MG/DL (ref 65–100)
GLUCOSE BLD STRIP.AUTO-MCNC: 462 MG/DL (ref 65–100)
GLUCOSE BLD STRIP.AUTO-MCNC: 474 MG/DL (ref 65–100)
GLUCOSE BLD STRIP.AUTO-MCNC: 517 MG/DL (ref 65–100)
GLUCOSE SERPL-MCNC: 188 MG/DL (ref 65–100)
GLUCOSE SERPL-MCNC: 372 MG/DL (ref 65–100)
GLUCOSE SERPL-MCNC: 529 MG/DL (ref 65–100)
GLUCOSE SERPL-MCNC: 607 MG/DL (ref 65–100)
GLUCOSE UR STRIP.AUTO-MCNC: >1000 MG/DL
GLUCOSE, GLC: 174 MG/DL
GLUCOSE, GLC: 174 MG/DL
GLUCOSE, GLC: 198 MG/DL
GLUCOSE, GLC: 224 MG/DL
GLUCOSE, GLC: 293 MG/DL
GLUCOSE, GLC: 462 MG/DL
GLUCOSE, GLC: 474 MG/DL
GLUCOSE, GLC: 517 MG/DL
HADV DNA SPEC QL NAA+PROBE: NOT DETECTED
HBA1C MFR BLD: 8.8 % (ref 4–5.6)
HCO3 BLDV-SCNC: 7 MMOL/L (ref 23–28)
HCOV 229E RNA SPEC QL NAA+PROBE: NOT DETECTED
HCOV HKU1 RNA SPEC QL NAA+PROBE: NOT DETECTED
HCOV NL63 RNA SPEC QL NAA+PROBE: NOT DETECTED
HCOV OC43 RNA SPEC QL NAA+PROBE: NOT DETECTED
HCT VFR BLD AUTO: 56 % (ref 35–47)
HGB BLD-MCNC: 18.8 G/DL (ref 11.5–16)
HGB UR QL STRIP: NEGATIVE
HIGH TARGET, HITG: 250 MG/DL
HMPV RNA SPEC QL NAA+PROBE: NOT DETECTED
HPIV1 RNA SPEC QL NAA+PROBE: NOT DETECTED
HPIV2 RNA SPEC QL NAA+PROBE: NOT DETECTED
HPIV3 RNA SPEC QL NAA+PROBE: NOT DETECTED
HPIV4 RNA SPEC QL NAA+PROBE: NOT DETECTED
IMM GRANULOCYTES # BLD AUTO: 0.2 K/UL (ref 0–0.04)
IMM GRANULOCYTES NFR BLD AUTO: 1 % (ref 0–0.5)
INR PPP: 1.4 (ref 0.9–1.1)
INSULIN ADMINSTERED, INSADM: 12.4 UNITS/HOUR
INSULIN ADMINSTERED, INSADM: 16.1 UNITS/HOUR
INSULIN ADMINSTERED, INSADM: 4.6 UNITS/HOUR
INSULIN ADMINSTERED, INSADM: 4.6 UNITS/HOUR
INSULIN ADMINSTERED, INSADM: 5.5 UNITS/HOUR
INSULIN ADMINSTERED, INSADM: 6.6 UNITS/HOUR
INSULIN ADMINSTERED, INSADM: 9.1 UNITS/HOUR
INSULIN ADMINSTERED, INSADM: 9.3 UNITS/HOUR
INSULIN ORDER, INSORD: 12.4 UNITS/HOUR
INSULIN ORDER, INSORD: 16.1 UNITS/HOUR
INSULIN ORDER, INSORD: 4.6 UNITS/HOUR
INSULIN ORDER, INSORD: 4.6 UNITS/HOUR
INSULIN ORDER, INSORD: 5.5 UNITS/HOUR
INSULIN ORDER, INSORD: 6.6 UNITS/HOUR
INSULIN ORDER, INSORD: 9.1 UNITS/HOUR
INSULIN ORDER, INSORD: 9.3 UNITS/HOUR
ISOPROPANOL,ISOPX: NEGATIVE MG/L
KETONES SERPL QL: POSITIVE
KETONES UR QL STRIP.AUTO: 80 MG/DL
LA VOL DISK BP: 35.43 ML (ref 22–52)
LACTATE SERPL-SCNC: 2.4 MMOL/L (ref 0.4–2)
LACTATE SERPL-SCNC: 2.4 MMOL/L (ref 0.4–2)
LACTATE SERPL-SCNC: 3.6 MMOL/L (ref 0.4–2)
LDH SERPL L TO P-CCNC: 145 U/L (ref 81–246)
LEUKOCYTE ESTERASE UR QL STRIP.AUTO: NEGATIVE
LIPASE SERPL-CCNC: 88 U/L (ref 73–393)
LOW TARGET, LOT: 150 MG/DL
LYMPHOCYTES # BLD: 0.5 K/UL (ref 0.8–3.5)
LYMPHOCYTES NFR BLD: 3 % (ref 12–49)
M PNEUMO DNA SPEC QL NAA+PROBE: NOT DETECTED
MAGNESIUM SERPL-MCNC: 2.2 MG/DL (ref 1.6–2.4)
MAGNESIUM SERPL-MCNC: 2.4 MG/DL (ref 1.6–2.4)
MAGNESIUM SERPL-MCNC: 2.7 MG/DL (ref 1.6–2.4)
MCH RBC QN AUTO: 28.4 PG (ref 26–34)
MCHC RBC AUTO-ENTMCNC: 33.6 G/DL (ref 30–36.5)
MCV RBC AUTO: 84.7 FL (ref 80–99)
METHADONE UR QL: NEGATIVE
METHANOL,METHX: NEGATIVE MG/L
MINUTES UNTIL NEXT BG, NBG: 60 MIN
MONOCYTES # BLD: 1 K/UL (ref 0–1)
MONOCYTES NFR BLD: 6 % (ref 5–13)
MULTIPLIER, MUL: 0.02
MULTIPLIER, MUL: 0.03
MULTIPLIER, MUL: 0.04
NEUTS SEG # BLD: 14.2 K/UL (ref 1.8–8)
NEUTS SEG NFR BLD: 90 % (ref 32–75)
NITRITE UR QL STRIP.AUTO: NEGATIVE
NRBC # BLD: 0 K/UL (ref 0–0.01)
NRBC BLD-RTO: 0 PER 100 WBC
OPIATES UR QL: NEGATIVE
ORDER INITIALS, ORDINIT: NORMAL
PCO2 BLDV: 18.2 MMHG (ref 41–51)
PCP UR QL: NEGATIVE
PH BLDV: 7.19 [PH] (ref 7.32–7.42)
PH UR STRIP: 6 [PH] (ref 5–8)
PHOSPHATE SERPL-MCNC: 0.8 MG/DL (ref 2.6–4.7)
PHOSPHATE SERPL-MCNC: 1.3 MG/DL (ref 2.6–4.7)
PHOSPHATE SERPL-MCNC: 4.4 MG/DL (ref 2.6–4.7)
PLATELET # BLD AUTO: 612 K/UL (ref 150–400)
PMV BLD AUTO: 11.4 FL (ref 8.9–12.9)
PO2 BLDV: 43 MMHG (ref 25–40)
POTASSIUM SERPL-SCNC: 2.9 MMOL/L (ref 3.5–5.1)
POTASSIUM SERPL-SCNC: 2.9 MMOL/L (ref 3.5–5.1)
POTASSIUM SERPL-SCNC: 3.8 MMOL/L (ref 3.5–5.1)
POTASSIUM SERPL-SCNC: 3.8 MMOL/L (ref 3.5–5.1)
PROCALCITONIN SERPL-MCNC: <0.05 NG/ML
PROT SERPL-MCNC: 9.2 G/DL (ref 6.4–8.2)
PROT UR STRIP-MCNC: ABNORMAL MG/DL
PROTHROMBIN TIME: 14.7 SEC (ref 9–11.1)
Q-T INTERVAL, ECG07: 452 MS
QRS DURATION, ECG06: 84 MS
QTC CALCULATION (BEZET), ECG08: 543 MS
RBC # BLD AUTO: 6.61 M/UL (ref 3.8–5.2)
RBC #/AREA URNS HPF: ABNORMAL /HPF (ref 0–5)
RBC MORPH BLD: ABNORMAL
REPORT STATUS,RSTSX: ABNORMAL
RSV RNA SPEC QL NAA+PROBE: NOT DETECTED
RV+EV RNA SPEC QL NAA+PROBE: NOT DETECTED
SAMPLES BEING HELD,HOLD: NORMAL
SAMPLES BEING HELD,HOLD: NORMAL
SAO2 % BLDV: 69 % (ref 65–88)
SAO2% DEVICE SAO2% SENSOR NAME: ABNORMAL
SARS-COV-2 PCR, COVPCR: NOT DETECTED
SARS-COV-2, COV2: NORMAL
SERVICE CMNT-IMP: ABNORMAL
SODIUM SERPL-SCNC: 139 MMOL/L (ref 136–145)
SODIUM SERPL-SCNC: 146 MMOL/L (ref 136–145)
SODIUM SERPL-SCNC: 149 MMOL/L (ref 136–145)
SODIUM SERPL-SCNC: 150 MMOL/L (ref 136–145)
SP GR UR REFRACTOMETRY: 1.02 (ref 1–1.03)
SPECIMEN SITE: ABNORMAL
SPECIMEN SOURCE: ABNORMAL
THERAPEUTIC RANGE,PTTT: NORMAL SECS (ref 58–77)
TROPONIN I SERPL-MCNC: <0.05 NG/ML
TSH SERPL DL<=0.05 MIU/L-ACNC: 0.54 UIU/ML (ref 0.36–3.74)
UA: UC IF INDICATED,UAUC: ABNORMAL
UROBILINOGEN UR QL STRIP.AUTO: 1 EU/DL (ref 0.2–1)
VENTRICULAR RATE, ECG03: 87 BPM
WBC # BLD AUTO: 15.9 K/UL (ref 3.6–11)
WBC URNS QL MICRO: ABNORMAL /HPF (ref 0–4)

## 2021-02-16 PROCEDURE — 74011250636 HC RX REV CODE- 250/636: Performed by: STUDENT IN AN ORGANIZED HEALTH CARE EDUCATION/TRAINING PROGRAM

## 2021-02-16 PROCEDURE — 80048 BASIC METABOLIC PNL TOTAL CA: CPT

## 2021-02-16 PROCEDURE — 70450 CT HEAD/BRAIN W/O DYE: CPT

## 2021-02-16 PROCEDURE — 74011000258 HC RX REV CODE- 258: Performed by: EMERGENCY MEDICINE

## 2021-02-16 PROCEDURE — 83735 ASSAY OF MAGNESIUM: CPT

## 2021-02-16 PROCEDURE — 83605 ASSAY OF LACTIC ACID: CPT

## 2021-02-16 PROCEDURE — 82962 GLUCOSE BLOOD TEST: CPT

## 2021-02-16 PROCEDURE — 96375 TX/PRO/DX INJ NEW DRUG ADDON: CPT

## 2021-02-16 PROCEDURE — 83615 LACTATE (LD) (LDH) ENZYME: CPT

## 2021-02-16 PROCEDURE — 83036 HEMOGLOBIN GLYCOSYLATED A1C: CPT

## 2021-02-16 PROCEDURE — 86140 C-REACTIVE PROTEIN: CPT

## 2021-02-16 PROCEDURE — 85025 COMPLETE CBC W/AUTO DIFF WBC: CPT

## 2021-02-16 PROCEDURE — 87086 URINE CULTURE/COLONY COUNT: CPT

## 2021-02-16 PROCEDURE — 99232 SBSQ HOSP IP/OBS MODERATE 35: CPT | Performed by: CLINICAL NURSE SPECIALIST

## 2021-02-16 PROCEDURE — 80053 COMPREHEN METABOLIC PANEL: CPT

## 2021-02-16 PROCEDURE — 93306 TTE W/DOPPLER COMPLETE: CPT | Performed by: INTERNAL MEDICINE

## 2021-02-16 PROCEDURE — U0005 INFEC AGEN DETEC AMPLI PROBE: HCPCS

## 2021-02-16 PROCEDURE — 82077 ASSAY SPEC XCP UR&BREATH IA: CPT

## 2021-02-16 PROCEDURE — 51701 INSERT BLADDER CATHETER: CPT

## 2021-02-16 PROCEDURE — 82150 ASSAY OF AMYLASE: CPT

## 2021-02-16 PROCEDURE — 74011000250 HC RX REV CODE- 250: Performed by: EMERGENCY MEDICINE

## 2021-02-16 PROCEDURE — 36415 COLL VENOUS BLD VENIPUNCTURE: CPT

## 2021-02-16 PROCEDURE — 84100 ASSAY OF PHOSPHORUS: CPT

## 2021-02-16 PROCEDURE — 96361 HYDRATE IV INFUSION ADD-ON: CPT

## 2021-02-16 PROCEDURE — 65660000000 HC RM CCU STEPDOWN

## 2021-02-16 PROCEDURE — 72125 CT NECK SPINE W/O DYE: CPT

## 2021-02-16 PROCEDURE — 74011636637 HC RX REV CODE- 636/637: Performed by: EMERGENCY MEDICINE

## 2021-02-16 PROCEDURE — 87426 SARSCOV CORONAVIRUS AG IA: CPT

## 2021-02-16 PROCEDURE — 83880 ASSAY OF NATRIURETIC PEPTIDE: CPT

## 2021-02-16 PROCEDURE — 85730 THROMBOPLASTIN TIME PARTIAL: CPT

## 2021-02-16 PROCEDURE — 99222 1ST HOSP IP/OBS MODERATE 55: CPT | Performed by: INTERNAL MEDICINE

## 2021-02-16 PROCEDURE — 96374 THER/PROPH/DIAG INJ IV PUSH: CPT

## 2021-02-16 PROCEDURE — 99285 EMERGENCY DEPT VISIT HI MDM: CPT

## 2021-02-16 PROCEDURE — 85610 PROTHROMBIN TIME: CPT

## 2021-02-16 PROCEDURE — 80320 DRUG SCREEN QUANTALCOHOLS: CPT

## 2021-02-16 PROCEDURE — 87040 BLOOD CULTURE FOR BACTERIA: CPT

## 2021-02-16 PROCEDURE — 93005 ELECTROCARDIOGRAM TRACING: CPT

## 2021-02-16 PROCEDURE — 82140 ASSAY OF AMMONIA: CPT

## 2021-02-16 PROCEDURE — 87186 SC STD MICRODIL/AGAR DIL: CPT

## 2021-02-16 PROCEDURE — 74176 CT ABD & PELVIS W/O CONTRAST: CPT

## 2021-02-16 PROCEDURE — 81001 URINALYSIS AUTO W/SCOPE: CPT

## 2021-02-16 PROCEDURE — 82009 KETONE BODYS QUAL: CPT

## 2021-02-16 PROCEDURE — 93306 TTE W/DOPPLER COMPLETE: CPT

## 2021-02-16 PROCEDURE — 84484 ASSAY OF TROPONIN QUANT: CPT

## 2021-02-16 PROCEDURE — 74011250636 HC RX REV CODE- 250/636: Performed by: EMERGENCY MEDICINE

## 2021-02-16 PROCEDURE — 87077 CULTURE AEROBIC IDENTIFY: CPT

## 2021-02-16 PROCEDURE — 82550 ASSAY OF CK (CPK): CPT

## 2021-02-16 PROCEDURE — 85379 FIBRIN DEGRADATION QUANT: CPT

## 2021-02-16 PROCEDURE — 77030038269 HC DRN EXT URIN PURWCK BARD -A

## 2021-02-16 PROCEDURE — 77030019905 HC CATH URETH INTMIT MDII -A

## 2021-02-16 PROCEDURE — 82010 KETONE BODYS QUAN: CPT

## 2021-02-16 PROCEDURE — 84145 PROCALCITONIN (PCT): CPT

## 2021-02-16 PROCEDURE — 99223 1ST HOSP IP/OBS HIGH 75: CPT | Performed by: FAMILY MEDICINE

## 2021-02-16 PROCEDURE — 71045 X-RAY EXAM CHEST 1 VIEW: CPT

## 2021-02-16 PROCEDURE — 80307 DRUG TEST PRSMV CHEM ANLYZR: CPT

## 2021-02-16 PROCEDURE — 82803 BLOOD GASES ANY COMBINATION: CPT

## 2021-02-16 PROCEDURE — 83690 ASSAY OF LIPASE: CPT

## 2021-02-16 PROCEDURE — 84443 ASSAY THYROID STIM HORMONE: CPT

## 2021-02-16 PROCEDURE — 82728 ASSAY OF FERRITIN: CPT

## 2021-02-16 RX ORDER — POTASSIUM CHLORIDE 7.45 MG/ML
10 INJECTION INTRAVENOUS
Status: COMPLETED | OUTPATIENT
Start: 2021-02-16 | End: 2021-02-17

## 2021-02-16 RX ORDER — HEPARIN SODIUM 5000 [USP'U]/ML
5000 INJECTION, SOLUTION INTRAVENOUS; SUBCUTANEOUS EVERY 8 HOURS
Status: DISCONTINUED | OUTPATIENT
Start: 2021-02-16 | End: 2021-02-23

## 2021-02-16 RX ORDER — POTASSIUM CHLORIDE AND SODIUM CHLORIDE 450; 150 MG/100ML; MG/100ML
INJECTION, SOLUTION INTRAVENOUS CONTINUOUS
Status: DISCONTINUED | OUTPATIENT
Start: 2021-02-16 | End: 2021-02-16

## 2021-02-16 RX ORDER — DEXTROSE, SODIUM CHLORIDE, AND POTASSIUM CHLORIDE 5; .45; .15 G/100ML; G/100ML; G/100ML
150 INJECTION INTRAVENOUS CONTINUOUS
Status: DISCONTINUED | OUTPATIENT
Start: 2021-02-16 | End: 2021-02-17

## 2021-02-16 RX ORDER — MAGNESIUM SULFATE 100 %
4 CRYSTALS MISCELLANEOUS AS NEEDED
Status: DISCONTINUED | OUTPATIENT
Start: 2021-02-16 | End: 2021-02-17

## 2021-02-16 RX ORDER — SODIUM CHLORIDE 0.9 % (FLUSH) 0.9 %
5-10 SYRINGE (ML) INJECTION AS NEEDED
Status: DISCONTINUED | OUTPATIENT
Start: 2021-02-16 | End: 2021-02-26 | Stop reason: HOSPADM

## 2021-02-16 RX ORDER — SODIUM CHLORIDE 0.9 % (FLUSH) 0.9 %
5-40 SYRINGE (ML) INJECTION EVERY 8 HOURS
Status: DISCONTINUED | OUTPATIENT
Start: 2021-02-16 | End: 2021-02-26 | Stop reason: HOSPADM

## 2021-02-16 RX ORDER — INSULIN LISPRO 100 [IU]/ML
INJECTION, SOLUTION INTRAVENOUS; SUBCUTANEOUS
Status: DISCONTINUED | OUTPATIENT
Start: 2021-02-16 | End: 2021-02-17

## 2021-02-16 RX ORDER — SODIUM CHLORIDE 0.9 % (FLUSH) 0.9 %
5-40 SYRINGE (ML) INJECTION AS NEEDED
Status: DISCONTINUED | OUTPATIENT
Start: 2021-02-16 | End: 2021-02-26 | Stop reason: HOSPADM

## 2021-02-16 RX ORDER — DEXTROSE 50 % IN WATER (D50W) INTRAVENOUS SYRINGE
25-50 AS NEEDED
Status: DISCONTINUED | OUTPATIENT
Start: 2021-02-16 | End: 2021-02-17 | Stop reason: SDUPTHER

## 2021-02-16 RX ORDER — METRONIDAZOLE 500 MG/100ML
500 INJECTION, SOLUTION INTRAVENOUS EVERY 8 HOURS
Status: DISCONTINUED | OUTPATIENT
Start: 2021-02-16 | End: 2021-02-18

## 2021-02-16 RX ORDER — AMLODIPINE BESYLATE 5 MG/1
5 TABLET ORAL
Status: DISCONTINUED | OUTPATIENT
Start: 2021-02-16 | End: 2021-02-18

## 2021-02-16 RX ORDER — POTASSIUM CHLORIDE 750 MG/1
40 TABLET, FILM COATED, EXTENDED RELEASE ORAL
Status: DISCONTINUED | OUTPATIENT
Start: 2021-02-16 | End: 2021-02-16

## 2021-02-16 RX ADMIN — SODIUM CHLORIDE 5.5 UNITS/HR: 9 INJECTION, SOLUTION INTRAVENOUS at 21:31

## 2021-02-16 RX ADMIN — Medication 10 ML: at 15:10

## 2021-02-16 RX ADMIN — SODIUM CHLORIDE 4.6 UNITS/HR: 9 INJECTION, SOLUTION INTRAVENOUS at 22:36

## 2021-02-16 RX ADMIN — METRONIDAZOLE 500 MG: 500 INJECTION, SOLUTION INTRAVENOUS at 16:15

## 2021-02-16 RX ADMIN — Medication 10 ML: at 23:38

## 2021-02-16 RX ADMIN — CEFEPIME HYDROCHLORIDE 2 G: 2 INJECTION, POWDER, FOR SOLUTION INTRAVENOUS at 12:52

## 2021-02-16 RX ADMIN — SODIUM CHLORIDE AND POTASSIUM CHLORIDE: 4.5; 1.49 INJECTION, SOLUTION INTRAVENOUS at 16:56

## 2021-02-16 RX ADMIN — HEPARIN SODIUM 5000 UNITS: 5000 INJECTION INTRAVENOUS; SUBCUTANEOUS at 15:09

## 2021-02-16 RX ADMIN — FAMOTIDINE 20 MG: 10 INJECTION, SOLUTION INTRAVENOUS at 18:21

## 2021-02-16 RX ADMIN — SODIUM CHLORIDE 9.1 UNITS/HR: 9 INJECTION, SOLUTION INTRAVENOUS at 14:49

## 2021-02-16 RX ADMIN — POTASSIUM CHLORIDE, DEXTROSE MONOHYDRATE AND SODIUM CHLORIDE 150 ML/HR: 150; 5; 450 INJECTION, SOLUTION INTRAVENOUS at 21:28

## 2021-02-16 RX ADMIN — HEPARIN SODIUM 5000 UNITS: 5000 INJECTION INTRAVENOUS; SUBCUTANEOUS at 21:34

## 2021-02-16 RX ADMIN — SODIUM CHLORIDE 2073 ML: 9 INJECTION, SOLUTION INTRAVENOUS at 12:54

## 2021-02-16 RX ADMIN — POTASSIUM CHLORIDE 10 MEQ: 10 INJECTION, SOLUTION INTRAVENOUS at 23:34

## 2021-02-16 RX ADMIN — SODIUM CHLORIDE 9.3 UNITS/HR: 9 INJECTION, SOLUTION INTRAVENOUS at 18:19

## 2021-02-16 RX ADMIN — METRONIDAZOLE 500 MG: 500 INJECTION, SOLUTION INTRAVENOUS at 23:40

## 2021-02-16 RX ADMIN — VANCOMYCIN HYDROCHLORIDE 1000 MG: 1 INJECTION, POWDER, LYOPHILIZED, FOR SOLUTION INTRAVENOUS at 13:02

## 2021-02-16 NOTE — PROGRESS NOTES
[facility]: MED RECONCILIATION    Comments/Recommendations:   Pharmacist unable to review complete medication history at this time. Patient with documented AMS and COVID contact precautions- unable to reach over the phone in [address]. Contacted patient's daughter Caleb Peres ([phone]) who is a fair historian and provides information on most medications with the exception of famotidine and insulin doses. Caleb Peres states patient stopped taking metformin and pravastatin (see below). Pharmacist reviewed Rx fill history, primary care physician notes, and [facility] Patient had a PCP note 2/10/21 (Dr. Yaquelin Kelley) which states she is recovering from cellulitis and that MD questions compliance with medications given uncontrolled HTN. Patient's family does report her blood glucose has been well controlled prior to event today. PCP medication list is largely consistent with Rx[facility] and she has current fills of oral maintenance medications listed below. Unclear last doses- Patient found down at home and family is unsure of when she last took her medications  Pharmacy to follow-up with patient to confirm home insulin dose when mental status improves. She fills Relion insulin at [facility] (OTC) and Novolin N U-100 last filled 9/30/20, Novolin R U-100 last filled 9/3/20 per [facility]. Medications removed:    Metformin 500 mg daily- Family reports patient's BG has been well controlled and she stopped taking metformin, no recent Rx fill history for this medication in the past 6 months  Pravastatin 80 mg at bedtime- Family reports patient has muscle pain and stopped taking this medication, no recent Rx fill history for this medication in the past 6 months.  Noted patient has a history of multiple statin intolerance on her allergy list.     Medications adjusted:    Naproxen daily PRN (uses sparingly per family)  Melatonin 5 mg nightly PRN (if seroquel ineffective)  Loratadine 10 mg daily PRN (family states does not use every day)    Information obtained from: Patient's family, PCP notes, RxQuery, Chart review    Allergies: Advicor [niacin-lovastatin], Conjugated estrogens, Deflazacort, Erythromycin, Erythromycin base, Niacin, Pitavastatin, Simvastatin, Unable to obtain, Zolpidem, Atorvastatin, and Triamcinolone    Prior to Admission Medications:     Medication Documentation Review Audit       Reviewed by LAUREANO ValenzuelaD (Pharmacist) on 02/16/21 at 1635      Medication Sig Documenting Provider Last Dose Status Taking? amLODIPine (NORVASC) 5 mg tablet Take 1 Tab by mouth daily. Amy Jorge MD  Active Yes   ascorbic acid, vitamin C, (VITAMIN C) 500 mg tablet Take 1,000 mg by mouth daily. Provider, Historical  Active Yes   augmented betamethasone dipropionate (DIPROLENE-AF) 0.05 % topical cream Apply  to affected area two (2) times a day. Amy Jorge MD  Active Yes   butalbital-aspirin-caffeine [facility]) capsule Take 1 Cap by mouth daily as needed for Headache (Takes rarely). Provider, Historical  Active Yes   famotidine (PEPCID) 20 mg tablet Take 1 Tab by mouth two (2) times a day. Amy Jorge MD  Active Yes   hydroCHLOROthiazide (HYDRODIURIL) 25 mg tablet Take 1 Tab by mouth daily. Amy Jorge MD  Active Yes   insulin NPH (NovoLIN N NPH U-100 Insulin) 100 unit/mL injection 30 Units by SubCUTAneous route two (2) times a day. Amy Jorge MD  Active Yes   insulin regular (NovoLIN R Regular U-100 Insuln) 100 unit/mL injection INJECT 25 UNITS SUBCUTANEOUSLY 5-10 MINUES BEFORE MEALS AS DIRECTED Amy Jorge MD  Active Yes   loratadine 10 mg cap Take 10 mg by mouth daily as needed. Indications: inflammation of the nose due to an allergy Provider, Historical  Active Yes   losartan (COZAAR) 100 mg tablet Take 1 Tab by mouth daily. Amy Jorge MD  Active Yes   melatonin 5 mg cap capsule Take 5 mg by mouth nightly as needed.  Provider, Historical  Active Yes   naproxen sodium 220 mg Cap Take 1 Tab by mouth daily as needed. Provider, Historical  Active Yes   QUEtiapine (SEROquel) 50 mg tablet Take 1 Tab by mouth nightly.  Rodney Russo MD  Active Yes                  Thanks,  SHORTY Mix   Contact: [phone]

## 2021-02-16 NOTE — PROGRESS NOTES
[facility] Dosing Services: 2/16/21      The following medication: Cefepime was automatically dose-adjusted per [facility] P&T Committee Protocol, with respect to renal function. Consult provided for this   76 y.o. , female , for the indication of sepsis. Dosage changed to:  Cefepime 2gm IV every 24 hrs (CrCl 10-29 ml/min)    Pt Weight:   Wt Readings from Last 1 Encounters:   02/16/21 69.1 kg (152 lb 4.8 oz)         Previous Regimen Cefepime 2gm IV every 8 hrs   Serum Creatinine Lab Results   Component Value Date/Time    Creatinine 1.83 (H) 02/16/2021 11:17 AM       Creatinine Clearance Estimated Creatinine Clearance: 25.4 mL/min (A) (based on SCr of 1.83 mg/dL (H)). BUN Lab Results   Component Value Date/Time    BUN 49 (H) 02/16/2021 11:17 AM           Additional notes:      Pharmacy to continue to monitor patient daily. Will make dosage adjustments based upon changing renal function. Signed Lauryn MARQUEZ  [address] [place] [address] information:  [phone]

## 2021-02-16 NOTE — ED NOTES
Patient arrived to the ED soiled in urine- her skin was moist and obvious skin breakdown from moisture noted to the abdominal fold, groin, B/L knees and buttocks. Patients soiled clothing removed, cleansed and placed on clean, dry bed linen and gown. Patient covered with blankets.  Patient is more oriented then on initial arrival.

## 2021-02-16 NOTE — PROGRESS NOTES
[facility] Dosing Services: Antimicrobial Stewardship Progress Note    Consult for antibiotic dosing of Vancomycin by Dr. Gordon Heads  Pharmacist reviewed antibiotic appropriateness for 76year old , female  for indication of sepsis  Day of Therapy 1    Plan:  Vancomycin therapy:  LD: 1000mg x1  MD: dose per level  Dose calculated to approximate a therapeutic trough of 15-20 mcg/mL. Last trough level  Date Dose & Interval Measured (mcg/mL) Extrapolated (mcg/mL)   ? ? ? ?   ? ? ? ?   ? ? ? ? Plan for level:   Trough level ordered for 9am on 2/17/21 (~20 hrs post-dose)    Pharmacy to follow daily and will make changes to dose and/or frequency based on clinical status. Other Antimicrobial  (not dosed by pharmacist)   Cefepime 2gm IV every 24 hrs   Cultures     2/16: Blood- pending   Serum Creatinine     Lab Results   Component Value Date/Time    Creatinine 1.83 (H) 02/16/2021 11:17 AM       Creatinine Clearance Estimated Creatinine Clearance: 25.4 mL/min (A) (based on SCr of 1.83 mg/dL (H)). Procalcitonin    Lab Results   Component Value Date/Time    Procalcitonin <0.05 02/16/2021 11:17 AM        Temp   97.8 °F (36.6 °C)    WBC   Lab Results   Component Value Date/Time    WBC 15.9 (H) 02/16/2021 11:17 AM       H/H   Lab Results   Component Value Date/Time    HGB 18.8 (H) 02/16/2021 11:17 AM      Platelets Lab Results   Component Value Date/Time    PLATELET 190 (H) 82/14/9434 11:17 AM            Pharmacist: Alyse Obrien

## 2021-02-16 NOTE — ED PROVIDER NOTES
The patient is a 70-year-old female with a past medical history significant for diabetes, coronary disease, GERD, seasonal allergies, hypertension, insomnia, peptic ulcer disease, small bowel obstruction, vitamin deficiency, status post appendectomy, partial hysterectomy, right rotator cuff repair who presents to the ED by EMS for evaluation after she was found on the floor by neighbors who called 911 for help. The patient states that she has been on the floor for several days after she tripped and fell and unable to pick herself back up. She is complaining of general weakness, headache denies any chest pain or shortness of breath, nausea, vomiting, abdominal pain, diarrhea, constipation, dysuria, vaginal discharge or bleeding, extremity weakness or numbness.            Past Medical History:   Diagnosis Date    CAD (coronary artery disease)     Diabetes ([facility]     Diabetes ([facility]     type 2    GERD (gastroesophageal reflux disease)     H/O seasonal allergies     Hyperlipidemia     Hypertension     Insomnia     Nausea & vomiting     PUD (peptic ulcer disease)     Small bowel obstruction (HCC)     Vitamin D deficiency        Past Surgical History:   Procedure Laterality Date    HX APPENDECTOMY      HX BREAST BIOPSY      HX GYN  1971    partial hysterectomy    HX GYN  1991    complete hysterectomy    HX HYSTERECTOMY      HX ORTHOPAEDIC      right rotator cuff repair    HX ORTHOPAEDIC      bilaterral wrist surgery    HX ORTHOPAEDIC      right knee surgery    HX ORTHOPAEDIC      bilateral bone spur removal from big toe    HX TONSIL AND ADENOIDECTOMY      HX TONSILLECTOMY      [place] BREAST SURGERY PROCEDURE UNLISTED      breast reduction    [place] CARDIAC SURG PROCEDURE UNLIST      heart stent         Family History:   Problem Relation Age of Onset    Cancer Maternal Aunt         pancreatic cancer    Hypertension Mother     COPD Mother     Diabetes Father     Alcohol abuse Brother         murdered Social History     Socioeconomic History    Marital status:      Spouse name: Not on file    Number of children: Not on file    Years of education: Not on file    Highest education level: Not on file   Occupational History    Not on file   Social Needs    Financial resource strain: Not on file    Food insecurity     Worry: Not on file     Inability: Not on file    Transportation needs     Medical: Not on file     Non-medical: Not on file   Tobacco Use    Smoking status: Never Smoker    Smokeless tobacco: Never Used   Substance and Sexual Activity    Alcohol use: No    Drug use: No    Sexual activity: Not Currently   Lifestyle    Physical activity     Days per week: Not on file     Minutes per session: Not on file    Stress: Not on file   Relationships    Social connections     Talks on phone: Not on file     Gets together: Not on file     Attends Yarsani service: Not on file     Active member of club or organization: Not on file     Attends meetings of clubs or organizations: Not on file     Relationship status: Not on file    Intimate partner violence     Fear of current or ex partner: Not on file     Emotionally abused: Not on file     Physically abused: Not on file     Forced sexual activity: Not on file   Other Topics Concern    Not on file   Social History Narrative    Not on file         ALLERGIES: Advicor [niacin-lovastatin], Conjugated estrogens, Deflazacort, Erythromycin, Erythromycin base, Niacin, Pitavastatin, Simvastatin, Unable to obtain, Zolpidem, Atorvastatin, and Triamcinolone    Review of Systems   All other systems reviewed and are negative. Vitals:    02/16/21 1120   BP: (!) 158/46   Pulse: 96   Resp: 18   Temp: 97.8 °F (36.6 °C)   SpO2: 95%   Weight: 69.1 kg (152 lb 4.8 oz)   Height: 5' 4\" (1.626 m)            Physical Exam  Vitals signs and nursing note reviewed.         CONSTITUTIONAL: Well-appearing; well-nourished; inmilddistress  HEAD: Normocephalic; atraumatic  EYES: PERRL; EOM intact; conjunctiva and sclera are clear bilaterally. ENT: No rhinorrhea; normal pharynx with no tonsillar hypertrophy; mucous membranes pink/moist, no erythema, no exudate. NECK: Supple; non-tender; no cervical lymphadenopathy  CARD: Normal S1, S2; no murmurs, rubs, or gallops. Regular rate and rhythm. RESP: Normal respiratory effort; breath sounds clear and equal bilaterally; no wheezes, rhonchi, or rales. ABD: Normal bowel sounds; non-distended; non-tender; no palpable organomegaly, no masses, no bruits. Back Exam: Normal inspection; no vertebral point tenderness, no CVA tenderness. Normal range of motion. EXT: Normal ROM in all four extremities; non-tender to palpation; no swelling or deformity; distal pulses are normal, no edema. SKIN: Warm; dry; unkept with several superficial bruising and ecchymosis to the knee, elbow groin area. NEURO:Alert and oriented x 3, coherent, MIKO-XII grossly intact, sensory and motor are non-focal.        MDM  Number of Diagnoses or Management Options  EVAN (acute kidney injury) ([address]  Altered mental status, unspecified altered mental status type  Diabetic ketoacidosis without coma associated with diabetes mellitus due to underlying condition ([address]  Essential hypertension  Hypercholesteremia  Pancreatic cyst  Positive blood culture  Severe sepsis ([address]  Suspected COVID-19 virus infection  Diagnosis management comments: Assessment: 22-year-old female who presents to the ED with altered mental status status post fall with prolonged stay on the floor. The patient evaluation for sepsis with occult bacteremia, electrolyte abnormality, ICH, DKA, rhabdomyolysis and dehydration    Plan: Lab/IV fluid/EKG/chest x-ray/CT scan of the head/neck spectrum antibiotics/insulin bolus and drip/serial exam/ Monitor and Reevaluate.          Amount and/or Complexity of Data Reviewed  Clinical lab tests: ordered and reviewed  Tests in the radiology section of CPT®: ordered and reviewed  Tests in the medicine section of CPT®: reviewed and ordered  Discussion of test results with the performing providers: yes  Decide to obtain previous medical records or to obtain history from someone other than the patient: yes  Obtain history from someone other than the patient: yes  Review and summarize past medical records: yes  Discuss the patient with other providers: yes  Independent visualization of images, tracings, or specimens: yes    Risk of Complications, Morbidity, and/or Mortality  Presenting problems: moderate  Diagnostic procedures: moderate  Management options: moderate    Patient Progress  Patient progress: stable    ED Course as of Feb 18 0426 Wed Feb 17, 2021   1638 FP made aware of +BC called by lab    [GG]      ED Course User Index  [GG] Elijah Apt, DO       Procedures    ED EKG interpretation:  Rhythm: Second-degree AV block-most B type II; and regular . Rate (approx.): 87; Axis: normal; ; QRS interval: normal ; ST/T wave: non-specific changes; in  Lead: Diffusely; Other findings: abnormal ekg. This EKG was interpreted by Ishaan Butler MD,ED Provider. XRAY INTERPRETATION (ED MD)  Chest Xray  No acute process seen. Normal heart size. No bony abnormalities. No infiltrate. Dillan Hidalgo MD 12:38 PM    PROGRESS NOTE:  Pt has been reexamined by Dillan Hidalgo MD all available results have been reviewed with pt and any available family. Pt understands sx, dx, and tx in ED. Care plan has been outlined and questions have been answered. Pt and any available family understands and agrees to need for admission to hospital for further tx not available in ED. Pt is ready for admission. Written by Ishaan Butler MD,  1:17 PM    CONSULT NOTE:  Dillan Hidalgo MD spoke with  of the practice team  team. Discussed patient's presentation, history, physical assessment, and available diagnostic results.  He will evaluate, write orders and admit the patient to the hospital. 1:17 PM    IMPRESSION:  1. Altered mental status, unspecified altered mental status type    2. Diabetic ketoacidosis without coma associated with diabetes mellitus due to underlying condition ([facility] [address]    3. Suspected COVID-19 virus infection        - Sepsis order set entered: YES  - Broad Spectrum Antibiotics given: Cefepime and Vancomycin  - Repeat lactic acid ordered for time 3:00PM  - Re-assessment performed at time 13:50 and clinical condition improving.    - Actions taken: None.   - Hypotension or Lactic Acidosis present (SBP<90, MAP<65, Lactate >4): NO IVF:  30cc/kg actual Body Weight  - Septic Shock present (Hypotension despite IVF resuscitation): NO  Vasopressors: none    Plan: Admit to the hospital  Admit to ICU    Total critical care time spent exclusive of procedures:  45 minutes    Dona Briones MD

## 2021-02-16 NOTE — PROGRESS NOTES
[id number] [place] [address]   Office [phone]  Fax [phone]          Assessment and Plan     Marychuy Holland is a 75 y.o. female with a PMH of HTN, CAD s/p stent, T2DM on insulin, HLD, GERD, sciatica, insomnia, allergies admitted for AMS, Severe sepsis of unknown source, and DKA.    24 Hour Events: Insulin drip and protocol for DKA completed and patient transitioned to basal insulin.     Severe Sepsis of unknown source: POA LA 3.6 >> 1.2, WBC 15.9, . CRP 7.08. CXR negative. UA neg for mendez/LE/nitrites. RVP neg. S/p 30cc/kg bolus. CT abd/pelvis no acute process.   - Broad spectrum abx: Cefepime, Vanc, Flagyl  - F/u UCx, BCxNGTD  - CBC, CMP daily     DKA in the setting of T2DM: POA AGMA, + blood ketones, and hyperglycemia (). A1C 8.8. Home NPH 30U BID & Regular insulin 25U before meals. S/p insulin gtt.   - IVF  - Lantus 27U    - SSI + POC glu checks q6H  - Hypoglycemia protocol    AMS: likely multifactorial (infectious vs metabolic vs cardiac). CT head/cervical spine negative. Ammonia, TSH, Etoh, UDS wnl. Volatiles - acetone.   - CBC, BMP, Mag, Phosphorus  - NPO until bed side swallow passed     Mobitz Type II 2nd degree AV block: EKG with mobitz type 2 AV block. BNP nml. Trop neg x1. ECHO LVEF 60-65%.   - Cardiac monitoring  - Cardiology consulted, appreciate recs     COVID negative: POA CRP 7.08, Ddimer 0.83, ferritin 124, . COVID PCR negative.       EVAN: POA Cr 1.83 (BL 0.7)  - IVF  - Monitor on daily CMP     Cellulitis of left buttock:  - Wound care      QTC prolonged: POA QTc 543.  - Avoid QTc prolonging agents     HTN: POA /46. Home Norvasc 5mg daily, HCTZ 25mg daily, Cozaar 100mg daily.  - Continue home Norvasc 5mg daily (hold while NPO)   - Continue to monitor and adjust with BP trend     HLD: Tchol 295, .6, HDL 36,  (3/2020). No home meds d/t statin intolerance (muscle pain).      GERD: Home Pepcid 20mg BID.  - Pepcid     Allergies: Home  Loratadine 10mg daily prn.  - Hold home meds     Insomnia: Home Melatonin 10mg daily prn, Seroquel 50mg qHS. - Hold home meds     Bilateral low back pain w/Sciatica & Knee OA: F/w Dr. Torrance Landau (Ortho) and Dr. Dolly Erazo (pain management).     Pancreatic cyst: Seen on CT A/P.   - F/u MRI outpatient     FEN/GI: NPO. 1/2 NS at 110 mL/hr. Activity: Ambulate with assistance. Fall precautions  DVT prophylaxis: SQH  GI prophylaxis:  Pepcid  Disposition: Plan to d/c to TBD. PT/OT consulted. Family prefers SNF. POC: Analisa Weeks [phone] (daughter). Yue Gould [phone] (daughter).      CODE STATUS:  Full, discussed with daughters         Raghav Llamas MD  Family Medicine Resident         Subjective / Objective     Subjective: Pt was seen and examined at bedside. Afebrile and hemodynamically stable. Patient not answering questions this AM, however nurse reports she spoke some to her previously. Objective:  Temp (24hrs), Av.8 °F (36.6 °C), Min:97.8 °F (36.6 °C), Max:97.8 °F (36.6 °C)     Visit Vitals  BP (!) 128/57   Pulse (!) 124   Temp 97.8 °F (36.6 °C)   Resp 17   Ht 5' 4\" (1.626 m)   Wt 152 lb (68.9 kg)   SpO2 94%   BMI 26.09 kg/m²     Physical Exam:   General: No acute distress. Alert. Cooperative. Dressing covering previous port site. Respiratory: CTAB. No w/r/r/c.   Cardiovascular: Normal S1,S2. No m/r/g.   GI: Nondistended.+ bowel sounds. Nontender. No rebound tenderness or guarding. Extremities: No LE edema. Skin: Warm, dry. No rashes.    Neuro: Alert and oriented    Respiratory:   O2 Device: Room air       Inpatient Medications  Current Facility-Administered Medications   Medication Dose Route Frequency    sodium chloride (NS) flush 5-10 mL  5-10 mL IntraVENous PRN    insulin regular (NOVOLIN R, HUMULIN R) 100 Units in 0.9% sodium chloride 100 mL infusion  0-50 Units/hr IntraVENous TITRATE    insulin lispro (HUMALOG) injection   SubCUTAneous TIDAC    glucose chewable tablet 16 g  4 Tab Oral PRN    dextrose (D50W) injection syrg 12.5-25 g  25-50 mL IntraVENous PRN    glucagon (GLUCAGEN) injection 1 mg  1 mg IntraMUSCular PRN    sodium chloride (NS) flush 5-40 mL  5-40 mL IntraVENous Q8H    sodium chloride (NS) flush 5-40 mL  5-40 mL IntraVENous PRN    metroNIDAZOLE (FLAGYL) IVPB premix 500 mg  500 mg IntraVENous Q8H    0.45% sodium chloride with KCl 20 mEq/L infusion   IntraVENous CONTINUOUS    heparin (porcine) injection 5,000 Units  5,000 Units SubCUTAneous Q8H    [START ON 2/17/2021] cefepime (MAXIPIME) 2 g in sterile water (preservative free) 10 mL IV syringe  2 g IntraVENous Q24H    Vancomycin- Pharmacy Dosing per Level   Other Rx Dosing/Monitoring    [START ON 2/17/2021] Vancomycin- Level at 9am on 2/17/21   Other ONCE    amLODIPine (NORVASC) tablet 5 mg  5 mg Oral QHS     Current Outpatient Medications   Medication Sig    losartan (COZAAR) 100 mg tablet Take 1 Tab by mouth daily.  famotidine (PEPCID) 20 mg tablet Take 1 Tab by mouth two (2) times a day.  hydroCHLOROthiazide (HYDRODIURIL) 25 mg tablet Take 1 Tab by mouth daily.  amLODIPine (NORVASC) 5 mg tablet Take 1 Tab by mouth daily.  QUEtiapine (SEROquel) 50 mg tablet Take 1 Tab by mouth nightly.  insulin regular (NovoLIN R Regular U-100 Insuln) 100 unit/mL injection INJECT 25 UNITS SUBCUTANEOUSLY 5-10 MINUES BEFORE MEALS AS DIRECTED    augmented betamethasone dipropionate (DIPROLENE-AF) 0.05 % topical cream Apply  to affected area two (2) times a day.  insulin NPH (NovoLIN N NPH U-100 Insulin) 100 unit/mL injection 30 Units by SubCUTAneous route two (2) times a day.  butalbital-aspirin-caffeine (FIORINAL) capsule Take 1 Cap by mouth daily as needed for Headache (Takes rarely).  loratadine 10 mg cap Take 10 mg by mouth daily as needed. Indications: inflammation of the nose due to an allergy    melatonin 5 mg cap capsule Take 5 mg by mouth nightly as needed.     ascorbic acid, vitamin C, (VITAMIN C) 500 mg tablet Take 1,000 mg by mouth daily.  naproxen sodium 220 mg Cap Take 1 Tab by mouth daily as needed. Allergies  Allergies   Allergen Reactions    Advicor [Niacin-Lovastatin] Swelling    Conjugated Estrogens Other (comments)     Headaches      Other reaction(s): Other (comments)  Headaches    Deflazacort Unable to Obtain     Other reaction(s): Unable to Obtain    Erythromycin Nausea Only    Erythromycin Base Nausea and Vomiting    Niacin Swelling    Pitavastatin Other (comments)     Abdominal pain, nausea  Other reaction(s): Other (comments)  Abdominal pain, nausea    Simvastatin Other (comments)     Muscle cramps  Other reaction(s): Other (comments)  Muscle cramps    Unable To Obtain Swelling    Zolpidem Other (comments)     Other reaction(s): Other (comments)    Atorvastatin Other (comments)     Muscle cramps  Other reaction(s): Other (comments)  Muscle cramps    Triamcinolone Unable to Obtain     Other reaction(s): Unable to Obtain         CBC:  Recent Labs     02/16/21  1117   WBC 15.9*   HGB 18.8*   HCT 56.0*   *       Metabolic Panel:  Recent Labs     02/16/21  1436 02/16/21  1127 02/16/21  1117   *  --  139   K 3.8  --  3.8   *  --  104   CO2 17*  --  18*   BUN 49*  --  49*   CREA 1.46*  --  1.83*   *  --  607*   CA 9.6  --  11.3*   MG  --   --  2.7*   PHOS  --   --  4.4   ALB  --   --  4.0   ALT  --   --  22   INR  --  1.4*  --            Procedures: None. Imaging/Diagnostic Studies:  Ct Head Wo Cont 2/16/2021   No acute intracranial process is identified.       Ct Spine Cerv Wo Cont 2/16/2021   1. No fracture or subluxation is identified. There are changes of spondylosis as described above. Results from Hospital Encounter encounter on 02/16/21   XR CHEST PORT    Narrative Portable chest single view dated 2/16/2021    History is chest pain    A single portable AP semierect view of the chest was obtained. The cardiac  silhouette is normal in size.  There is no evidence of active lung disease. The  costophrenic angles are sharp in appearance. Impression Negative radiographic examination of the chest.             Results from Hospital Encounter encounter on 02/16/21   CT ABD PELV WO CONT    Narrative EXAM:  CT ABD PELV WO CONT    INDICATION: Generalized abd tenderness    COMPARISON: None. CONTRAST:  None. TECHNIQUE:   Thin axial images were obtained through the abdomen and pelvis. Coronal and  sagittal reconstructions were generated. Oral contrast was not administered. CT  dose reduction was achieved through use of a standardized protocol tailored for  this examination and automatic exposure control for dose modulation. FINDINGS:   Lower Thorax:  Lung Bases: Clear. Heart: The heart is normal in size. No pericardial effusion. Coronary artery  calcifications/stents. Prominent calcific atherosclerosis of the descending  thoracic aorta. Abdomen/Pelvis:  Evaluation of the solid organs is markedly limited without the use of IV  contrast.    Liver:  No focal liver lesions. Biliary system: Gallbladder is unremarkable. Spleen: Normal.    Pancreas: In the head of the pancreas, there is a circumscribed cystic lesion  measuring 4.3 x 3.8 x 4.7 cm (series 2 image 29 and series 601 image 50). There  is no pancreatic duct dilation or pancreatic atrophy. Kidneys/Ureters/Bladder: No renal masses. No renal or ureteral calculi. No  hydronephrosis or hydroureter. The bladder is normal.    Adrenals: Normal.    Stomach/bowel: No dilation or abnormal wall thickening is present. No free  intraperitoneal air noted. Sigmoid diverticulosis. Small hiatal hernia. Reproductive Organs: Status post hysterectomy. No suspicious adnexal masses. In  the right ischiorectal fossa, there is a chronic, cystic calcified lesion  measuring 4.0 x 2.4 cm. Vasculature: Normal caliber arteries. Severe calcific atherosclerosis of the  abdominal aorta.     Nodes: No pathologically enlarged lymph nodes. Fluid: No free fluid. Bones/Soft Tissue: No acute fractures or aggressive osseous lesions are seen. Grade 1 anterolisthesis of L3 on L4 and L4 on L5 with severe facet arthropathy  throughout the lumbar spine and multilevel degenerative disc disease most  advanced at L4-L5. There is degenerative disc disease scattered throughout the  thoracic spine. Impression 1. No evidence of acute process in the abdomen or pelvis. 2. Cystic lesion in the head of the pancreas measuring 4.3 x 3.8 x 4.7 cm, with  differential considerations including mucinous cystic neoplasm (favored), serous  cystadenoma, or large side branch IPMN among others. MRI abdomen on a  nonemergent basis is recommended for further evaluation. 3. Nonspecific chronic calcified cystic lesion in the right ischiorectal fossa  measuring 4.0 x 2.4 cm. 4. Severe calcific atherosclerosis of the abdominal aorta. 5. Additional findings as above.                      For Billing    Chief Complaint   Patient presents with    Altered mental status   [facility] Problems  Date Reviewed: 2/10/2021          Codes Class Noted POA    DKA (diabetic ketoacidoses) ([facility] ICD-10-CM: E11.10  ICD-9-CM: 250.12  2/16/2021 Unknown        Severe sepsis ([facility] [address] ICD-10-CM: A41.9, R65.20  ICD-9-CM: 038.9, 995.92  2/16/2021 Unknown        AMS (altered mental status) ICD-10-CM: R41.82  ICD-9-CM: 780.97  2/16/2021 Unknown        Insomnia (Chronic) ICD-10-CM: G47.00  ICD-9-CM: 780.52  2/16/2021 Unknown        Essential hypertension ICD-10-CM: I10  ICD-9-CM: 401.9  9/23/2019 Yes        Hypercholesteremia ICD-10-CM: E78.00  ICD-9-CM: 272.0  9/23/2019 Yes        Controlled type 2 diabetes mellitus without complication, with long-term current use of insulin ([facility] [address] ICD-10-CM: E11.9, Z79.4  ICD-9-CM: 250.00, V58.67  9/23/2019 Yes

## 2021-02-16 NOTE — DIABETES MGMT
[facility] SPECIALIST CONSULT NOTE    Presentation   Gera Ray is a 76 y.o. female presented to the ED 2/16/21 by EMS after being found down by neighbors. Per chart review, patient advised she had fallen several days ago and had been unable to get up. Patient found to be in DKA on arrival with BG of 607 and AG of 17 by labs. Patient on isolation precautions due to COVID-19 rule out. HX:   Past Medical History:   Diagnosis Date    CAD (coronary artery disease)     [facility]     Diabetes ([facility] [facility]     type 2    GERD (gastroesophageal reflux disease)     H/O seasonal allergies     Hyperlipidemia     Hypertension     Insomnia     Nausea & vomiting     PUD (peptic ulcer disease)     Small bowel obstruction (HCC)     Vitamin D deficiency        Current clinical course has been uncomplicated. Diabetes: Patient has known Type 2 diabetes. Home diabetes medication regimen is NPH 30 units twice daily, regular insulin 25 units before meals, and Metformin 500 mg daily. Admission  and A1c 7.2 (9/30/20) indicate poor diabetes control. Consulted by Provider for advanced diabetes nursing assessment and care, specifically related to      [x] Inpatient management strategy      Diabetes-related medical history  Acute complications  DKA  Neurological complications  Unable to assess at this time  Microvascular disease  Nephropathy  Macrovascular disease  CAD  Other associated conditions     Unable to assess at this time    Diabetes medication history  Drug class Currently in use Discontinued Never used   Biguanide Metformin 500 mg daily     DDP-4 inhibitor       Sulfonylurea      Thiazolidinedione      GLP-1 RA      SGLT-2 inhibitors      Basal insulin NPH 30 units BID     Bolus insulin Regular Insulin 25 units with meals. Fixed Dose  Combinations        Subjective   Patient on isolation precautions due to COVID-19 rule out.   Attempted to reach patient by call to ER room with no answer, assessment completed by chart review. Objective   Physical exam    Vital Signs   Visit Vitals  /66   Pulse (!) 118   Temp 97.8 °F (36.6 °C)   Resp 18   Ht 5' 4\" (1.626 m)   Wt 69.1 kg (152 lb 4.8 oz)   SpO2 95%   BMI 26.14 kg/m²     Full PE deferred due to patient on isolation precautions due to COVID-19 rule out. Laboratory  Lab Results   Component Value Date/Time    Hemoglobin A1c 7.2 (H) 09/30/2020 02:46 PM     Lab Results   Component Value Date/Time    LDL, calculated 197.6 (H) 03/02/2020 02:48 PM     Lab Results   Component Value Date/Time    Creatinine 1.83 (H) 02/16/2021 11:17 AM     Lab Results   Component Value Date/Time    Sodium 139 02/16/2021 11:17 AM    Potassium 3.8 02/16/2021 11:17 AM    Chloride 104 02/16/2021 11:17 AM    CO2 18 (L) 02/16/2021 11:17 AM    Anion gap 17 (H) 02/16/2021 11:17 AM    Glucose 607 (HH) 02/16/2021 11:17 AM    BUN 49 (H) 02/16/2021 11:17 AM    Creatinine 1.83 (H) 02/16/2021 11:17 AM    BUN/Creatinine ratio 27 (H) 02/16/2021 11:17 AM    GFR est AA 33 (L) 02/16/2021 11:17 AM    GFR est non-AA 27 (L) 02/16/2021 11:17 AM    Calcium 11.3 (H) 02/16/2021 11:17 AM    Bilirubin, total 0.5 02/16/2021 11:17 AM    Alk.  phosphatase 125 (H) 02/16/2021 11:17 AM    Protein, total 9.2 (H) 02/16/2021 11:17 AM    Albumin 4.0 02/16/2021 11:17 AM    Globulin 5.2 (H) 02/16/2021 11:17 AM    A-G Ratio 0.8 (L) 02/16/2021 11:17 AM    ALT (SGPT) 22 02/16/2021 11:17 AM     Lab Results   Component Value Date/Time    ALT (SGPT) 22 02/16/2021 11:17 AM         Factors affecting BG management  Factor Dose Comments   Nutrition:  NPO   NPO   Diabetic consistent CHO when diet advances   Drugs:  Vasopressor load  Steroids  HIV   Epogen  Blood transfusion(s)  Other: n/a   n/a  n/a  n/a  n/a  n/a         A1cs inaccurate  A1cs inaccurate   Pain 5/10    Infection Cefepime, Flagyl, Vanc Sepsis of unknown etiology   Other: n/a n/a      Blood glucose pattern          Evaluation   This 76year old female, with Type 2 diabetes, did not achieve diabetes control prior to admission (PTA), as evidenced by admission BG of 607 and A1c of 7.2%. Based on assessment of diabetes self-care practices, interventions that warrant action while hospitalized include: Unable to assess at this time. The patient would also benefit from diabetes self-management education and support BRISEYDA LUNDY [facility]) after discharge. During this hospitalization, the patient has not achieved inpatient blood glucose target of 100-180mg/dl. BG's have ranged 517-607 over the past 24 hours. Factors that have played a role include:  [x] Critical nature of illness state  [x]  Meal/tube feeding disruption  [x] Compromised insulin absorption or delivery  [x]  Kidney dysfunction    Basal insulin isn't in use. Bolus insulin isn't in use. Patient isn't eating meals or on tube feedings. Corrective insulin is in use. Insulin drip with [place] started at 8504 today (2/16/21). Drip continues to infuse at this time. To optimize BG control and support a positive health outcome, would utilize the Subcutaneous Insulin Order set (2031). Impression:      Assessment and Plan   Nursing Diagnosis Risk for unstable blood glucose pattern   Nursing Intervention Domain 2338 Decision-making Support   Nursing Interventions Examined current inpatient diabetes control   Explored factors facilitating and impeding inpatient management  Identified self-management practices impeding diabetes control  Explored corrective strategies with patient and responsible inpatient provider   Informed patient of rational for insulin strategy while hospitalized       Recommendations   Recommend:       1. Repeat A1c   2. Continue insulin drip with glucostabilizer until anion gap has closed by BMP x 2. Then transition to basal/bolus/corrective regimen below.         [x] Use of Subcutaneous Insulin Order set (9657)  Insulin Use Recommendation   Basal                                      (Based on weight, BMI & GFR) Addresses basic metabolic needs Lantus 28 units daily. Nutritional                                      (Based on CHO load) Addresses nutrition interventions Humalog 5 units with meals, hold if patient eats < 50% CHO on meal tray. Corrective                                       (Offset gaps in dosing) Useful in adjusting insulin dosing Correctional Scale for Normal Sensitivity    200-249- 2units Humalog  [phone] Humalog  [phone] Humalog  [phone] Humalog  Over 400- 10units Humalog    Do NOT hold for NPO; give in addition to meal time insulin dose. If patient does not eat, -give correction dose only. [x] Referral to    [x] Diabetes Self-Management Training through [facility] (Phone [phone] to schedule appointment)    Billing Code(s)   Total time spent with patient: 25 Minutes. I personally reviewed medical record, including notes, data and current medications, and examined the patient at bedside before making care recommendations.        [x] 66838 IP subsequent hospital care - 25 minutes    IRINA Almendarez  Diabetes Clinical Nurse Specialist  [facility]  Access via [address] [place] [address]

## 2021-02-16 NOTE — H&P
[address] [facility]   Admission H&P    Date of admission: 2/16/2021    Patient name: Hosey Lesches  MRN: [id number]  YOB: 1945  Age: 76 y.o. Primary care provider:  Gee Curtis MD     Source of Information: patient, medical records    Chief complaint: AMS    History of Present Illness  Hosey Lesches is a 76 y.o. female with a PMH of  HTN, CAD s/p stent, T2DM on insulin, HLD, GERD, sciatica, insomnia, allergies who presents to the ED via EMS after found on the ground by son in law. Patient is AAOx2 (Unable to say year). Patient reports she fell and was unable to get up, but has difficulty remembering what happened and is unclear on how long she was down. Patient shakes head \"no\" to complete review of systems, including CP, Abd pain, cough, diarrhea. History was provided by patient's daughters. Daughter spoke with patient on 2/14. Son in law visited patient's residence when patient was not answering phone this AM and found patient lying on the ground. Patient had fallen and unable to get up or call for help. Daughter reports patient is fully alert & oriented at baseline. Daughter expressed concern regarding patient's hygiene and reports hoarding in the house. Patient is combative towards any family interventions or assistance. Daughters would like patient to go to SNF Per chart review, PCP concerned for medication noncompliance. COVID Questions:   Experiencing any of the following symptoms: fever, chills, cough, SOB, diarrhea, URI symptoms. NO  Any Sick contacts fever, cough, diarrhea, SOB, URI symptoms. NO  Traveled out of state or out of country. NO    In the ED:   Vitals: 97.8F, HR 84 (now 118), /46, RR 18, 97% RA  Labs: WBC 15.9, Hgb 18.8, Plt 612, CO2 18, glucose 607, AG 17.  LA 3.6. CRP 7.08. Trop <0.05, Procal <0.05. Ca 11.3. Mg 2.7. BUN 49. Cr 1.83 (BL 0.7). Lipase 88, Amylase 23. Ck 150. PT 14.7. INR 1.4. UA glu>1000, ketone 80.  NH3 14.   Imaging:  - CXR: Negative radiographic examination of the chest.  - CT head: No acute intracranial process is identified.  - CT CERVICAL SPINE: No fracture or subluxation is identified. There are changes of spondylosis as  described above. EKG:   Treatment: 30 cc/kg NS bolus. Cefepime, vanc, insulin gtt. Review of Systems  Review of Systems   Constitutional: Negative for chills and fever. HENT: Negative for congestion and sore throat. Eyes: Negative for blurred vision. Respiratory: Negative for shortness of breath. Cardiovascular: Negative for chest pain. Gastrointestinal: Negative for abdominal pain, nausea and vomiting. Genitourinary: Negative for dysuria. Musculoskeletal: Positive for falls. Negative for back pain, myalgias and neck pain. Neurological: Negative for dizziness and headaches. Home Medications   Prior to Admission medications    Medication Sig Start Date End Date Taking? Authorizing Provider   losartan (COZAAR) 100 mg tablet Take 1 Tab by mouth daily. 2/10/21   Iris Noble MD   famotidine (PEPCID) 20 mg tablet Take 1 Tab by mouth two (2) times a day. 2/10/21   Iris Noble MD   hydroCHLOROthiazide (HYDRODIURIL) 25 mg tablet Take 1 Tab by mouth daily. 2/10/21   Iris Noble MD   amLODIPine (NORVASC) 5 mg tablet Take 1 Tab by mouth daily. 2/10/21   Iris Noble MD   povidone-iodine (BETADINE) 10 % ointment Apply  to affected area as needed for Decubitis. 1/13/21   Iris Noble MD   QUEtiapine (SEROquel) 50 mg tablet Take 1 Tab by mouth nightly. 1/13/21   Iris Noble MD   insulin regular (NovoLIN R Regular U-100 Insuln) 100 unit/mL injection INJECT 25 UNITS SUBCUTANEOUSLY 5-10 MINUES BEFORE MEALS AS DIRECTED 12/21/20   Iris Noble MD   augmented betamethasone dipropionate (DIPROLENE-AF) 0.05 % topical cream Apply  to affected area two (2) times a day.  11/2/20   Iris Noble MD   pravastatin (PRAVACHOL) 40 mg tablet Take 2 Tabs by mouth nightly. Indications: 2 tablets at bedtime 9/30/20   Erica Stark MD   metFORMIN (GLUCOPHAGE) 500 mg tablet Take 1 Tab by mouth daily. 9/30/20   Erica Stark MD   insulin NPH (NovoLIN N NPH U-100 Insulin) 100 unit/mL injection 30 Units by SubCUTAneous route two (2) times a day. 9/30/20   Erica Stark MD   butalbital-aspirin-caffeine [facility]) capsule Take 1 Cap by mouth every four (4) hours as needed for Pain. Provider, Historical   loratadine 10 mg cap Take  by mouth. Provider, Historical   melatonin 5 mg cap capsule Take 5 mg by mouth two (2) times a day. Provider, Historical   ascorbic acid, vitamin C, (VITAMIN C) 500 mg tablet Take 1,000 mg by mouth daily. Provider, Historical   naproxen sodium 220 mg Cap Take 1 Tab by mouth two (2) times daily as needed. Provider, Historical       Allergies   Allergies   Allergen Reactions    Advicor [Niacin-Lovastatin] Swelling    Conjugated Estrogens Other (comments)     Headaches      Other reaction(s): Other (comments)  Headaches    Deflazacort Unable to Obtain     Other reaction(s): Unable to Obtain    Erythromycin Nausea Only    Erythromycin Base Nausea and Vomiting    Niacin Swelling    Pitavastatin Other (comments)     Abdominal pain, nausea  Other reaction(s): Other (comments)  Abdominal pain, nausea    Simvastatin Other (comments)     Muscle cramps  Other reaction(s): Other (comments)  Muscle cramps    Unable To Obtain Swelling    Zolpidem Other (comments)     Other reaction(s): Other (comments)    Atorvastatin Other (comments)     Muscle cramps  Other reaction(s):  Other (comments)  Muscle cramps    Triamcinolone Unable to Obtain     Other reaction(s): Unable to Obtain       Past Medical History:   Diagnosis Date    CAD (coronary artery disease)     [facility] [address]     Diabetes ([facility] [address]     type 2    GERD (gastroesophageal reflux disease)     H/O seasonal allergies     Hyperlipidemia     Hypertension     Insomnia     Nausea & vomiting     PUD (peptic ulcer disease)     Small bowel obstruction (HCC)     Vitamin D deficiency        Past Surgical History:   Procedure Laterality Date    HX APPENDECTOMY      HX BREAST BIOPSY      HX GYN  1971    partial hysterectomy    HX GYN  1991    complete hysterectomy    HX HYSTERECTOMY      HX ORTHOPAEDIC      right rotator cuff repair    HX ORTHOPAEDIC      bilaterral wrist surgery    HX ORTHOPAEDIC      right knee surgery    HX ORTHOPAEDIC      bilateral bone spur removal from big toe    HX TONSIL AND ADENOIDECTOMY      HX TONSILLECTOMY      MS BREAST SURGERY PROCEDURE UNLISTED      breast reduction    MS CARDIAC SURG PROCEDURE UNLIST      heart stent       Family History   Problem Relation Age of Onset    Cancer Maternal Aunt         pancreatic cancer    Hypertension Mother     COPD Mother     Diabetes Father     Alcohol abuse Brother         murdered        Social History   Patient resides  X  Independently      With family care      Assisted living      SNF      Ambulates    Independently    X  With cane       Assisted walker      Alcohol history   X  None     Social     Chronic     Smoking history  X  None     Former smoker     Current smoker     Social History     Tobacco Use   Smoking Status Never Smoker   Smokeless Tobacco Never Used       Drug history  X  None     Former drug user     Current drug user     Code status  X  Full code     DNR/DNI     Partial    Code status discussed with the patient/caregivers. Physical Exam  Visit Vitals  /66   Pulse (!) 118   Temp 97.8 °F (36.6 °C)   Resp 18   Ht 5' 4\" (1.626 m)   Wt 152 lb 4.8 oz (69.1 kg)   SpO2 95%   BMI 26.14 kg/m²        Physical Exam  Constitutional:       Comments: Disheveled, shivering. Poor hygiene. HENT:      Head: Normocephalic and atraumatic. Mouth/Throat:      Mouth: Mucous membranes are dry.    Eyes:      Pupils: Pupils are equal, round, and reactive to light. Cardiovascular:      Rate and Rhythm: Normal rate. Rhythm irregular. Pulses: Normal pulses. Heart sounds: No murmur. Pulmonary:      Effort: Pulmonary effort is normal. No respiratory distress. Breath sounds: Normal breath sounds. No wheezing. Abdominal:      General: Bowel sounds are normal. There is no distension. Palpations: Abdomen is soft. Tenderness: There is abdominal tenderness (Generalized abdominal tenderness, mostly in LLQ). There is no guarding or rebound. Skin:     General: Skin is warm and dry. Capillary Refill: Capillary refill takes 2 to 3 seconds. Comments: Bruising on knees and shins b/l   Neurological:      Motor: No weakness. Comments: AAO x2. Does not fully participate in neuro exam.          Laboratory Data  Recent Results (from the past 24 hour(s))   CBC WITH AUTOMATED DIFF    Collection Time: 02/16/21 11:17 AM   Result Value Ref Range    WBC 15.9 (H) 3.6 - 11.0 K/uL    RBC 6.61 (H) 3.80 - 5.20 M/uL    HGB 18.8 (H) 11.5 - 16.0 g/dL    HCT 56.0 (H) 35.0 - 47.0 %    MCV 84.7 80.0 - 99.0 FL    MCH 28.4 26.0 - 34.0 PG    MCHC 33.6 30.0 - 36.5 g/dL    RDW 16.8 (H) 11.5 - 14.5 %    PLATELET 911 (H) 746 - 400 K/uL    MPV 11.4 8.9 - 12.9 FL    NRBC 0.0 0  WBC    ABSOLUTE NRBC 0.00 0.00 - 0.01 K/uL    NEUTROPHILS 90 (H) 32 - 75 %    LYMPHOCYTES 3 (L) 12 - 49 %    MONOCYTES 6 5 - 13 %    EOSINOPHILS 0 0 - 7 %    BASOPHILS 0 0 - 1 %    IMMATURE GRANULOCYTES 1 (H) 0.0 - 0.5 %    ABS. NEUTROPHILS 14.2 (H) 1.8 - 8.0 K/UL    ABS. LYMPHOCYTES 0.5 (L) 0.8 - 3.5 K/UL    ABS. MONOCYTES 1.0 0.0 - 1.0 K/UL    ABS. EOSINOPHILS 0.0 0.0 - 0.4 K/UL    ABS. BASOPHILS 0.0 0.0 - 0.1 K/UL    ABS. IMM.  GRANS. 0.2 (H) 0.00 - 0.04 K/UL    DF SMEAR SCANNED      RBC COMMENTS ANISOCYTOSIS  1+       CK W/ REFLX CKMB    Collection Time: 02/16/21 11:17 AM   Result Value Ref Range     26 - 373 U/L   METABOLIC PANEL, COMPREHENSIVE    Collection Time: 02/16/21 11:17 AM   Result Value Ref Range    Sodium 139 136 - 145 mmol/L    Potassium 3.8 3.5 - 5.1 mmol/L    Chloride 104 97 - 108 mmol/L    CO2 18 (L) 21 - 32 mmol/L    Anion gap 17 (H) 5 - 15 mmol/L    Glucose 607 (HH) 65 - 100 mg/dL    BUN 49 (H) 6 - 20 MG/DL    Creatinine 1.83 (H) 0.55 - 1.02 MG/DL    BUN/Creatinine ratio 27 (H) 12 - 20      GFR est AA 33 (L) >60 ml/min/1.73m2    GFR est non-AA 27 (L) >60 ml/min/1.73m2    Calcium 11.3 (H) 8.5 - 10.1 MG/DL    Bilirubin, total 0.5 0.2 - 1.0 MG/DL    ALT (SGPT) 22 12 - 78 U/L    AST (SGOT) 10 (L) 15 - 37 U/L    Alk. phosphatase 125 (H) 45 - 117 U/L    Protein, total 9.2 (H) 6.4 - 8.2 g/dL    Albumin 4.0 3.5 - 5.0 g/dL    Globulin 5.2 (H) 2.0 - 4.0 g/dL    A-G Ratio 0.8 (L) 1.1 - 2.2     C REACTIVE PROTEIN, QT    Collection Time: 02/16/21 11:17 AM   Result Value Ref Range    C-Reactive protein 7.08 (H) 0.00 - 0.60 mg/dL   AMYLASE    Collection Time: 02/16/21 11:17 AM   Result Value Ref Range    Amylase 23 (L) 25 - 115 U/L   LIPASE    Collection Time: 02/16/21 11:17 AM   Result Value Ref Range    Lipase 88 73 - 393 U/L   MAGNESIUM    Collection Time: 02/16/21 11:17 AM   Result Value Ref Range    Magnesium 2.7 (H) 1.6 - 2.4 mg/dL   PHOSPHORUS    Collection Time: 02/16/21 11:17 AM   Result Value Ref Range    Phosphorus 4.4 2.6 - 4.7 MG/DL   NT-PRO BNP    Collection Time: 02/16/21 11:17 AM   Result Value Ref Range    NT pro- <450 PG/ML   TROPONIN I    Collection Time: 02/16/21 11:17 AM   Result Value Ref Range    Troponin-I, Qt. <0.05 <0.05 ng/mL   PROCALCITONIN    Collection Time: 02/16/21 11:17 AM   Result Value Ref Range    Procalcitonin <0.05 ng/mL   SAMPLES BEING HELD    Collection Time: 02/16/21 11:17 AM   Result Value Ref Range    SAMPLES BEING HELD 1PST,1SST     COMMENT        Add-on orders for these samples will be processed based on acceptable specimen integrity and analyte stability, which may vary by analyte.    LACTIC ACID    Collection Time: 02/16/21 11:27 AM Result Value Ref Range    Lactic acid 3.6 (HH) 0.4 - 2.0 MMOL/L   PROTHROMBIN TIME + INR    Collection Time: 02/16/21 11:27 AM   Result Value Ref Range    INR 1.4 (H) 0.9 - 1.1      Prothrombin time 14.7 (H) 9.0 - 11.1 sec   PTT    Collection Time: 02/16/21 11:27 AM   Result Value Ref Range    aPTT 29.5 22.1 - 31.0 sec    aPTT, therapeutic range     58.0 - 77.0 SECS   AMMONIA    Collection Time: 02/16/21 11:27 AM   Result Value Ref Range    Ammonia 14 <32 UMOL/L   EKG, 12 LEAD, INITIAL    Collection Time: 02/16/21 11:41 AM   Result Value Ref Range    Ventricular Rate 87 BPM    Atrial Rate 136 BPM    QRS Duration 84 ms    Q-T Interval 452 ms    QTC Calculation (Bezet) 543 ms    Calculated R Axis 14 degrees    Calculated T Axis 46 degrees    Diagnosis       Undetermined rhythm  Nonspecific ST abnormality  Prolonged QT  Abnormal ECG  No previous ECGs available     URINALYSIS W/ REFLEX CULTURE    Collection Time: 02/16/21 11:57 AM    Specimen: Urine    Urine specimen   Result Value Ref Range    Color YELLOW/STRAW      Appearance CLEAR CLEAR      Specific gravity 1.020 1.003 - 1.030      pH (UA) 6.0 5.0 - 8.0      Protein TRACE (A) NEG mg/dL    Glucose >1,000 (A) NEG mg/dL    Ketone 80 (A) NEG mg/dL    Blood Negative NEG      Urobilinogen 1.0 0.2 - 1.0 EU/dL    Nitrites Negative NEG      Leukocyte Esterase Negative NEG      WBC 0-4 0 - 4 /hpf    RBC 0-5 0 - 5 /hpf    Epithelial cells FEW FEW /lpf    Bacteria Negative NEG /hpf    UA:UC IF INDICATED CULTURE NOT INDICATED BY UA RESULT CNI     BILIRUBIN, CONFIRM    Collection Time: 02/16/21 11:57 AM   Result Value Ref Range    Bilirubin UA, confirm Negative NEG     BLOOD GAS, VENOUS    Collection Time: 02/16/21  1:04 PM   Result Value Ref Range    VENOUS PH 7.19 (LL) 7.32 - 7.42      VENOUS PCO2 18.2 (L) 41 - 51 mmHg    VENOUS PO2 43 (H) 25 - 40 mmHg    VENOUS BICARBONATE 7 (L) 23 - 28 mmol/L    VENOUS BASE DEFICIT 19.1 mmol/L    VENOUS O2 SATURATION 69 65 - 88 %    O2 METHOD ROOM AIR      Sample source VENOUS BLOOD      SITE OTHER      Critical value read back Called to ELLY Bourgeois MD on 02/16/2021 at 13:07        Imaging  Ct Head Wo Cont    Result Date: 2/16/2021  No acute intracranial process is identified. Ct Spine Cerv Wo Cont    Result Date: 2/16/2021  1. No fracture or subluxation is identified. There are changes of spondylosis as described above. Xr Chest Port    Result Date: 2/16/2021  Negative radiographic examination of the chest.         Assessment and Plan     Lopez Linn is a 76 y.o. female with a PMH of HTN, CAD s/p stent, T2DM on insulin, HLD, GERD, sciatica, insomnia, allergies who is admitted for AMS, Severe sepsis of unknown source, and DKA. AMS: likely multifactorial (infectious vs metabolic vs cardiac). CT head/cervical spine negative. Ammonia wnl.    - Admit to stepdown  - Vital signs per unit protocol  - TSH   - UDS, EtOH, volatiles screen level  - CBC, BMP, Mag, Phosphorus     Severe Sepsis of unknown source: LA 3.6, WBC 15.9, . CRP 7.08. CXR negative. UA neg for mendez/LE/nitrites. S/p 30cc/kg bolus.   - Broad spectrum abx: Cefepime, vanc, flagyl  - urine culture, blood culture pending  - Rapid COVID and RVP  - Trend lactic acid q4H  - CBC, CMP daily  - CT A/P ordered for abdominal tenderness    DKA in the setting of T2DM: VBG pH 7.19, PCO2 18.2, PO2 43, bicarb 7. AG 17. Na 139 (corrected 147), K 3.8, CO2 18, AG 17, glucose 607. Home NPH 30U BID & Regular insulin 25U before meals.   - Continue insulin gtt and glucomander per protocol  - 1/2 NS at 110 cc/hr w/ 20meq KCl  - Once glucose reaches 250 switch to D5W 1/2 NS w/ 20 KCl at 125mL/hr  - Once AG closes x2 will start lantus weight based and SSI and continue insulin gtt for 2 more hours  - POC Glucose every hour    - BMP, Mg and Phos q4hrs    - BHB, volatiles screen   - HbA1c pending    Mobitz Type II 2nd degree AV block: EKG with mobitz type 2 AV block. BNP nml.  Trop neg x1.   - ECHO pending  - Place pacer pads  - Cardiology consult, appreciate recs    COVID PUI: CRP 7.08.  - CRP, Didimer, ferritin, LD ordered  - COVID swab    EVAN: POA Cr 1.83 (BL 0.7)  - MIVF  - Monitor CMP    Cellulitis of left buttock:  - Wound care     QTC prolonged: POA QTc 543. - Avoid QTc prolonging agents    HTN: POA /46. Home Norvasc 5mg daily, HCTZ 25mg daily, Cozaar 100mg daily.  - Hold home meds  - Continue to monitor and adjust with BP trend    HLD: Tchol 295, .6, HDL 36,  (3/2020). No home meds d/t statin intolerance (muscle pain). GERD: Home Pepcid 20mg BID.  - Hold home meds    Allergies: Home Loratadine 10mg daily prn.  - Hold home meds    Insomnia: Home Melatonin 10mg daily prn, Seroquel 50mg qHS. - Hold home meds    Bilateral low back pain w/Sciatica & Knee OA: F/w Dr. Alma Lutz (Ortho) and Dr. Vernon Camarillo (pain management). FEN/GI: NPO. 1/2 NS with KCl 20 meq at 110 mL/hr. Activity: Ambulate with assistance. Fall precautions  DVT prophylaxis: SQH  GI prophylaxis:  Pepcid  Disposition: Plan to d/c to TBD. PT/OT consulted. Family would like patient to be discharged to SNF. POC: Trupti Gomez [phone] (daughter). Layton Ferguson [phone] (daughter).      CODE STATUS:  Full, discussed with daughters       Patient discussed with Dr. Michaela Chavarria (Family Medicine Attending)       Leodan Velázquez MD  [address] [facility] [employer]  Date Reviewed: 2/10/2021    None

## 2021-02-16 NOTE — PROGRESS NOTES
2/16/2021   4:14 PM  CM attempted to complete assessment w/ pt, nursing at bedside. Per nursing notes pt living alone in \"hoarder type\" situation, CM called [facility] spoke w/ Marvin [phone], she was not aware of any open APS case, reported concerns of pt safety living alone. All information will be sent to their APS , Marvin could not provide me w/ a report # at this time  RAYO Mcpherson         3:57 PM  Pt admitted for AMS per nursing note pt in hoarder type situation and clothing urine soaked on admission. CM placed TC to pt's Dtr Rima Rodarte [phone], LVM.   RAYO Mcpherson

## 2021-02-16 NOTE — PROGRESS NOTES
Patient presenting with AMS/DKA/ lactic acidosis/sepsis?/EVAN  Cardiology consulted for 2nd deg AV block Mobitz type 2. I reviewed and interpreted  ECG on admission myself- shows -2nd AV mobitz type II. Could also be  NSR w/ blocked PACs  Telemetry reviewed NSR- sinus tachycardia - no AV block observed. K and Mag okay  Calcium elevated   TSh WNL  Neg troponin/chest x ray w out acute processes  Echo pending   No indication for pacing/atropine at this time  Will continue to monitor for AV block/bradycardia   No AV brittany agents prior to admission   BP elevated - losartan/norvasc/HCTZ pta - holding arb/thiazide d/t EVAN. Will resume norvasc   IVF/insulin gtt  PE: patient lying in bed, NAD, somnolent. No use of accessory muscles. No edema.        Reviewed above plan w nursing and Dr Marie Pulido   Full consult note to follow

## 2021-02-16 NOTE — PROGRESS NOTES
[facility] Dosing Services: 02/16/21  Pepcid dose change per renal P&T protocol  Physician: Dr Frances Good    Previous Regimen Pepcid 20 mg IV q12hr   Serum Creatinine Lab Results   Component Value Date/Time    Creatinine 1.47 (H) 02/16/2021 04:40 PM      Creatinine Clearance Estimated Creatinine Clearance: 31.5 mL/min (A) (based on SCr of 1.47 mg/dL (H)).    BUN Lab Results   Component Value Date/Time    BUN 48 (H) 02/16/2021 04:40 PM       Plan: Changed Pepcid to 20 mg IV daily per renal P&T protocol     Thank you  Rubi Huffman, PharmD  [phone]

## 2021-02-16 NOTE — ED TRIAGE NOTES
Patient arrived to the ED via EMS from home where the patient resides in a [facility] type setting. Per EMS report there is multiple accounts of when the patient was last seen well- Patient reports being on the ground for 1 week but family and a neighbor report the patient was last spoken to 3-4 days ago. Patient was found face down, covered in a large pile or urine. Patient was altered but answered to verbal stimuli. EMS started a #20 in the right FA, glucose was 426 and they found the patient to be in A-Fib on the monitor.

## 2021-02-17 ENCOUNTER — APPOINTMENT (OUTPATIENT)
Dept: MRI IMAGING | Age: 76
DRG: 853 | End: 2021-02-17
Attending: STUDENT IN AN ORGANIZED HEALTH CARE EDUCATION/TRAINING PROGRAM
Payer: MEDICARE

## 2021-02-17 LAB
ADMINISTERED INITIALS, ADMINIT: NORMAL
ANION GAP SERPL CALC-SCNC: 11 MMOL/L (ref 5–15)
BASOPHILS # BLD: 0 K/UL (ref 0–0.1)
BASOPHILS NFR BLD: 0 % (ref 0–1)
BUN SERPL-MCNC: 47 MG/DL (ref 6–20)
BUN/CREAT SERPL: 37 (ref 12–20)
CALCIUM SERPL-MCNC: 10.3 MG/DL (ref 8.5–10.1)
CHLORIDE SERPL-SCNC: 117 MMOL/L (ref 97–108)
CO2 SERPL-SCNC: 20 MMOL/L (ref 21–32)
CREAT SERPL-MCNC: 1.28 MG/DL (ref 0.55–1.02)
D50 ADMINISTERED, D50ADM: 0 ML
D50 ORDER, D50ORD: 0 ML
DIFFERENTIAL METHOD BLD: ABNORMAL
EOSINOPHIL # BLD: 0 K/UL (ref 0–0.4)
EOSINOPHIL NFR BLD: 0 % (ref 0–7)
ERYTHROCYTE [DISTWIDTH] IN BLOOD BY AUTOMATED COUNT: 14.9 % (ref 11.5–14.5)
GLSCOM COMMENTS: NORMAL
GLUCOSE BLD STRIP.AUTO-MCNC: 199 MG/DL (ref 65–100)
GLUCOSE BLD STRIP.AUTO-MCNC: 204 MG/DL (ref 65–100)
GLUCOSE BLD STRIP.AUTO-MCNC: 216 MG/DL (ref 65–100)
GLUCOSE BLD STRIP.AUTO-MCNC: 216 MG/DL (ref 65–100)
GLUCOSE BLD STRIP.AUTO-MCNC: 242 MG/DL (ref 65–100)
GLUCOSE BLD STRIP.AUTO-MCNC: 266 MG/DL (ref 65–100)
GLUCOSE BLD STRIP.AUTO-MCNC: 281 MG/DL (ref 65–100)
GLUCOSE BLD STRIP.AUTO-MCNC: 305 MG/DL (ref 65–100)
GLUCOSE BLD STRIP.AUTO-MCNC: 347 MG/DL (ref 65–100)
GLUCOSE SERPL-MCNC: 275 MG/DL (ref 65–100)
GLUCOSE, GLC: 242 MG/DL
GLUCOSE, GLC: 266 MG/DL
GLUCOSE, GLC: 305 MG/DL
GLUCOSE, GLC: 347 MG/DL
HCT VFR BLD AUTO: 45.4 % (ref 35–47)
HGB BLD-MCNC: 15.1 G/DL (ref 11.5–16)
HIGH TARGET, HITG: 250 MG/DL
IMM GRANULOCYTES # BLD AUTO: 0 K/UL
IMM GRANULOCYTES NFR BLD AUTO: 0 %
INR PPP: 1.6 (ref 0.9–1.1)
INSULIN ADMINSTERED, INSADM: 10.9 UNITS/HOUR
INSULIN ADMINSTERED, INSADM: 12.4 UNITS/HOUR
INSULIN ADMINSTERED, INSADM: 14.4 UNITS/HOUR
INSULIN ADMINSTERED, INSADM: 9.8 UNITS/HOUR
INSULIN ORDER, INSORD: 10.9 UNITS/HOUR
INSULIN ORDER, INSORD: 12.4 UNITS/HOUR
INSULIN ORDER, INSORD: 14.4 UNITS/HOUR
INSULIN ORDER, INSORD: 9.8 UNITS/HOUR
LACTATE SERPL-SCNC: 1.2 MMOL/L (ref 0.4–2)
LOW TARGET, LOT: 150 MG/DL
LYMPHOCYTES # BLD: 1.2 K/UL (ref 0.8–3.5)
LYMPHOCYTES NFR BLD: 7 % (ref 12–49)
MAGNESIUM SERPL-MCNC: 2.3 MG/DL (ref 1.6–2.4)
MCH RBC QN AUTO: 28.2 PG (ref 26–34)
MCHC RBC AUTO-ENTMCNC: 33.3 G/DL (ref 30–36.5)
MCV RBC AUTO: 84.7 FL (ref 80–99)
MINUTES UNTIL NEXT BG, NBG: 60 MIN
MONOCYTES # BLD: 1.3 K/UL (ref 0–1)
MONOCYTES NFR BLD: 8 % (ref 5–13)
MULTIPLIER, MUL: 0.04
MULTIPLIER, MUL: 0.05
MULTIPLIER, MUL: 0.06
MULTIPLIER, MUL: 0.06
NEUTS SEG # BLD: 14.1 K/UL (ref 1.8–8)
NEUTS SEG NFR BLD: 85 % (ref 32–75)
NRBC # BLD: 0 K/UL (ref 0–0.01)
NRBC BLD-RTO: 0 PER 100 WBC
ORDER INITIALS, ORDINIT: NORMAL
OSMOLALITY SERPL: 344 MOSM/KG H2O
PHOSPHATE SERPL-MCNC: 1.4 MG/DL (ref 2.6–4.7)
PLATELET # BLD AUTO: 451 K/UL (ref 150–400)
PMV BLD AUTO: 11.1 FL (ref 8.9–12.9)
POTASSIUM SERPL-SCNC: 4.1 MMOL/L (ref 3.5–5.1)
PROTHROMBIN TIME: 16.1 SEC (ref 9–11.1)
RBC # BLD AUTO: 5.36 M/UL (ref 3.8–5.2)
RBC MORPH BLD: ABNORMAL
SARS-COV-2, XPLCVT: NOT DETECTED
SERVICE CMNT-IMP: ABNORMAL
SODIUM SERPL-SCNC: 148 MMOL/L (ref 136–145)
SOURCE, COVRS: NORMAL
VANCOMYCIN SERPL-MCNC: 5.2 UG/ML
WBC # BLD AUTO: 16.6 K/UL (ref 3.6–11)

## 2021-02-17 PROCEDURE — 99233 SBSQ HOSP IP/OBS HIGH 50: CPT | Performed by: FAMILY MEDICINE

## 2021-02-17 PROCEDURE — 83735 ASSAY OF MAGNESIUM: CPT

## 2021-02-17 PROCEDURE — 80202 ASSAY OF VANCOMYCIN: CPT

## 2021-02-17 PROCEDURE — 65660000000 HC RM CCU STEPDOWN

## 2021-02-17 PROCEDURE — 83605 ASSAY OF LACTIC ACID: CPT

## 2021-02-17 PROCEDURE — 74011636637 HC RX REV CODE- 636/637: Performed by: STUDENT IN AN ORGANIZED HEALTH CARE EDUCATION/TRAINING PROGRAM

## 2021-02-17 PROCEDURE — 85610 PROTHROMBIN TIME: CPT

## 2021-02-17 PROCEDURE — 36415 COLL VENOUS BLD VENIPUNCTURE: CPT

## 2021-02-17 PROCEDURE — 74011250636 HC RX REV CODE- 250/636: Performed by: STUDENT IN AN ORGANIZED HEALTH CARE EDUCATION/TRAINING PROGRAM

## 2021-02-17 PROCEDURE — 74011250636 HC RX REV CODE- 250/636: Performed by: RADIOLOGY

## 2021-02-17 PROCEDURE — 74183 MRI ABD W/O CNTR FLWD CNTR: CPT

## 2021-02-17 PROCEDURE — 74011000250 HC RX REV CODE- 250: Performed by: STUDENT IN AN ORGANIZED HEALTH CARE EDUCATION/TRAINING PROGRAM

## 2021-02-17 PROCEDURE — 74011250636 HC RX REV CODE- 250/636: Performed by: FAMILY MEDICINE

## 2021-02-17 PROCEDURE — 84100 ASSAY OF PHOSPHORUS: CPT

## 2021-02-17 PROCEDURE — 87040 BLOOD CULTURE FOR BACTERIA: CPT

## 2021-02-17 PROCEDURE — A9585 GADOBUTROL INJECTION: HCPCS | Performed by: RADIOLOGY

## 2021-02-17 PROCEDURE — 74011000258 HC RX REV CODE- 258: Performed by: STUDENT IN AN ORGANIZED HEALTH CARE EDUCATION/TRAINING PROGRAM

## 2021-02-17 PROCEDURE — 97530 THERAPEUTIC ACTIVITIES: CPT

## 2021-02-17 PROCEDURE — 97161 PT EVAL LOW COMPLEX 20 MIN: CPT

## 2021-02-17 PROCEDURE — 85025 COMPLETE CBC W/AUTO DIFF WBC: CPT

## 2021-02-17 PROCEDURE — 74011636637 HC RX REV CODE- 636/637: Performed by: EMERGENCY MEDICINE

## 2021-02-17 PROCEDURE — 99232 SBSQ HOSP IP/OBS MODERATE 35: CPT | Performed by: CLINICAL NURSE SPECIALIST

## 2021-02-17 PROCEDURE — 74011000258 HC RX REV CODE- 258: Performed by: EMERGENCY MEDICINE

## 2021-02-17 PROCEDURE — 70553 MRI BRAIN STEM W/O & W/DYE: CPT

## 2021-02-17 PROCEDURE — 82962 GLUCOSE BLOOD TEST: CPT

## 2021-02-17 PROCEDURE — 83930 ASSAY OF BLOOD OSMOLALITY: CPT

## 2021-02-17 PROCEDURE — 97165 OT EVAL LOW COMPLEX 30 MIN: CPT

## 2021-02-17 PROCEDURE — 80048 BASIC METABOLIC PNL TOTAL CA: CPT

## 2021-02-17 RX ORDER — INSULIN GLARGINE 100 [IU]/ML
14 INJECTION, SOLUTION SUBCUTANEOUS
Status: COMPLETED | OUTPATIENT
Start: 2021-02-17 | End: 2021-02-17

## 2021-02-17 RX ORDER — INSULIN GLARGINE 100 [IU]/ML
30 INJECTION, SOLUTION SUBCUTANEOUS DAILY
Status: DISCONTINUED | OUTPATIENT
Start: 2021-02-18 | End: 2021-02-18

## 2021-02-17 RX ORDER — DEXTROSE 50 % IN WATER (D50W) INTRAVENOUS SYRINGE
25-50 AS NEEDED
Status: DISCONTINUED | OUTPATIENT
Start: 2021-02-17 | End: 2021-02-19

## 2021-02-17 RX ORDER — SODIUM CHLORIDE 450 MG/100ML
110 INJECTION, SOLUTION INTRAVENOUS CONTINUOUS
Status: DISCONTINUED | OUTPATIENT
Start: 2021-02-17 | End: 2021-02-18

## 2021-02-17 RX ORDER — INSULIN LISPRO 100 [IU]/ML
INJECTION, SOLUTION INTRAVENOUS; SUBCUTANEOUS EVERY 6 HOURS
Status: DISCONTINUED | OUTPATIENT
Start: 2021-02-17 | End: 2021-02-20

## 2021-02-17 RX ORDER — HYDRALAZINE HYDROCHLORIDE 20 MG/ML
10 INJECTION INTRAMUSCULAR; INTRAVENOUS
Status: DISCONTINUED | OUTPATIENT
Start: 2021-02-17 | End: 2021-02-17

## 2021-02-17 RX ORDER — HYDRALAZINE HYDROCHLORIDE 20 MG/ML
10 INJECTION INTRAMUSCULAR; INTRAVENOUS
Status: DISCONTINUED | OUTPATIENT
Start: 2021-02-17 | End: 2021-02-20

## 2021-02-17 RX ORDER — INSULIN LISPRO 100 [IU]/ML
INJECTION, SOLUTION INTRAVENOUS; SUBCUTANEOUS
Status: DISCONTINUED | OUTPATIENT
Start: 2021-02-17 | End: 2021-02-17

## 2021-02-17 RX ORDER — INSULIN GLARGINE 100 [IU]/ML
13 INJECTION, SOLUTION SUBCUTANEOUS
Status: COMPLETED | OUTPATIENT
Start: 2021-02-17 | End: 2021-02-17

## 2021-02-17 RX ADMIN — GADOBUTROL 7 ML: 604.72 INJECTION INTRAVENOUS at 20:28

## 2021-02-17 RX ADMIN — INSULIN LISPRO 3 UNITS: 100 INJECTION, SOLUTION INTRAVENOUS; SUBCUTANEOUS at 23:19

## 2021-02-17 RX ADMIN — METRONIDAZOLE 500 MG: 500 INJECTION, SOLUTION INTRAVENOUS at 23:11

## 2021-02-17 RX ADMIN — POTASSIUM CHLORIDE, DEXTROSE MONOHYDRATE AND SODIUM CHLORIDE 150 ML/HR: 150; 5; 450 INJECTION, SOLUTION INTRAVENOUS at 06:10

## 2021-02-17 RX ADMIN — Medication 10 ML: at 21:09

## 2021-02-17 RX ADMIN — HEPARIN SODIUM 5000 UNITS: 5000 INJECTION INTRAVENOUS; SUBCUTANEOUS at 14:56

## 2021-02-17 RX ADMIN — INSULIN LISPRO 3 UNITS: 100 INJECTION, SOLUTION INTRAVENOUS; SUBCUTANEOUS at 12:37

## 2021-02-17 RX ADMIN — POTASSIUM PHOSPHATE, MONOBASIC AND POTASSIUM PHOSPHATE, DIBASIC: 224; 236 INJECTION, SOLUTION, CONCENTRATE INTRAVENOUS at 10:00

## 2021-02-17 RX ADMIN — INSULIN GLARGINE 14 UNITS: 100 INJECTION, SOLUTION SUBCUTANEOUS at 04:55

## 2021-02-17 RX ADMIN — VANCOMYCIN HYDROCHLORIDE 1000 MG: 1 INJECTION, POWDER, LYOPHILIZED, FOR SOLUTION INTRAVENOUS at 14:55

## 2021-02-17 RX ADMIN — FAMOTIDINE 20 MG: 10 INJECTION, SOLUTION INTRAVENOUS at 17:45

## 2021-02-17 RX ADMIN — HEPARIN SODIUM 5000 UNITS: 5000 INJECTION INTRAVENOUS; SUBCUTANEOUS at 06:10

## 2021-02-17 RX ADMIN — POTASSIUM CHLORIDE 10 MEQ: 10 INJECTION, SOLUTION INTRAVENOUS at 00:49

## 2021-02-17 RX ADMIN — SODIUM CHLORIDE 110 ML/HR: 4.5 INJECTION, SOLUTION INTRAVENOUS at 23:11

## 2021-02-17 RX ADMIN — POTASSIUM CHLORIDE 10 MEQ: 10 INJECTION, SOLUTION INTRAVENOUS at 04:02

## 2021-02-17 RX ADMIN — INSULIN GLARGINE 13 UNITS: 100 INJECTION, SOLUTION SUBCUTANEOUS at 09:39

## 2021-02-17 RX ADMIN — SODIUM CHLORIDE 14.4 UNITS/HR: 9 INJECTION, SOLUTION INTRAVENOUS at 05:14

## 2021-02-17 RX ADMIN — CEFEPIME HYDROCHLORIDE 2 G: 2 INJECTION, POWDER, FOR SOLUTION INTRAVENOUS at 21:17

## 2021-02-17 RX ADMIN — HEPARIN SODIUM 5000 UNITS: 5000 INJECTION INTRAVENOUS; SUBCUTANEOUS at 21:17

## 2021-02-17 RX ADMIN — METRONIDAZOLE 500 MG: 500 INJECTION, SOLUTION INTRAVENOUS at 17:45

## 2021-02-17 RX ADMIN — METRONIDAZOLE 500 MG: 500 INJECTION, SOLUTION INTRAVENOUS at 09:40

## 2021-02-17 RX ADMIN — POTASSIUM CHLORIDE 10 MEQ: 10 INJECTION, SOLUTION INTRAVENOUS at 02:36

## 2021-02-17 RX ADMIN — SODIUM CHLORIDE 110 ML/HR: 4.5 INJECTION, SOLUTION INTRAVENOUS at 09:39

## 2021-02-17 RX ADMIN — INSULIN LISPRO 216 UNITS: 100 INJECTION, SOLUTION INTRAVENOUS; SUBCUTANEOUS at 17:50

## 2021-02-17 RX ADMIN — CEFEPIME HYDROCHLORIDE 2 G: 2 INJECTION, POWDER, FOR SOLUTION INTRAVENOUS at 09:40

## 2021-02-17 NOTE — PROGRESS NOTES
[facility] Dosing Services: Antimicrobial Stewardship Progress Note    Consult for antibiotic dosing of Vancomycin by Dr. Prema Kemp  Pharmacist reviewed antibiotic appropriateness for 76year old , female  for indication of sepsis  Day of Therapy 2    Plan:  Vancomycin therapy:  LD: 1000mg x1  MD: dose per level  Dose calculated to approximate a therapeutic trough of 15-20 mcg/mL. Last trough level  Date Dose & Interval Measured (mcg/mL) Extrapolated (mcg/mL)   ? 2/17 ?1000mg x1 ?5.2 ?   ? ? ? ?   ? ? ? ? Plan for level:   SCr trending down, WBC elevated and blood culture is positive for growth. Pt is subtherapeutic and will start 1000mg every 12 hr.  Will check a trough level prior to 4th dose (not ordered yet)    Pharmacy to follow daily and will make changes to dose and/or frequency based on clinical status. Other Antimicrobial  (not dosed by pharmacist)   Cefepime 2gm IV every 12 hrs   Cultures     2/17: Blood- pending  2/16: Blood- 1/4 bottles + for growth- pending  2/16: Urine- pending   Serum Creatinine     Lab Results   Component Value Date/Time    Creatinine 1.28 (H) 02/17/2021 02:29 AM       Creatinine Clearance Estimated Creatinine Clearance: 36.2 mL/min (A) (based on SCr of 1.28 mg/dL (H)). Procalcitonin    Lab Results   Component Value Date/Time    Procalcitonin <0.05 02/16/2021 11:17 AM        Temp   98 °F (36.7 °C)    WBC   Lab Results   Component Value Date/Time    WBC 16.6 (H) 02/17/2021 02:29 AM       H/H   Lab Results   Component Value Date/Time    HGB 15.1 02/17/2021 02:29 AM      Platelets Lab Results   Component Value Date/Time    PLATELET 550 (H) 88/93/8578 02:29 AM            Pharmacist: Vanessa Meredith

## 2021-02-17 NOTE — PROGRESS NOTES
2/17/2021   3:52 PM  CM received christian from [address] [place] [address] (C) [phone], updated on pt's status and conversation w/ family this AM, advised DC plan  Is for SNF. She will follow, no case # assigned yet. Darryle Paterson, BS       12:28 PM  Per Malia's request additional referral sent to [facility] in [facility] for SNF placement  Darryle Paterson, BS       11:18 AM  CM completed assessment w/ pt's Dtr Geovanni Banerjee (C) [phone], pt not responding to questions at this time. Charted demographics verified, pt lives alone in a 1 story home there is a ramp in,  at baseline she is reported to be ambulatory w/ RW, iADLs, does cooking/homemaking, pt drives. Pt's Dtr Candis Armenta lives next door and she checks on pt, she describes her as a hoarder and never lets people in her home. Candis Armenta  also told me she was living fully independently until the last 2 weeks when she hurt her knee after a fall, pt's son in law went to check on her yesterday 2/16 and found her fallen on floor apparently there for 2 days. Reason for Admission:  Sepsis, AMS,DKA   Pt is a 75 yo  female who presents to [facility] ED from home for AMS  PMHx: HYTN, GERD, T2DM, diabetic retinopathy, hypercholesterolemia                  RUR Score:     13 % Moderate Risk d/t home enviornment             PCP: First and Last name:  Hemal Valenzuela MD   Name of Practice:    Are you a current patient: Yes/No: Yes   Approximate date of last visit:  2/   Can you participate in a virtual visit if needed:  NO    Do you (patient/family) have any concerns for transition/discharge?     Concerns for safety at home as pt is hoarder, does not allow people into her home              Plan for utilizing home health:   No history, PT,OT evals pending    Current Advanced Directive/Advance Care Plan:    Pt does not have an ACP  FULL Code  NOK  Daughter Geovanni Banerjee (C) [phone]             Daughter Jane [place] (C) [phone]  Healthcare Decision Maker: Shelly Russo (cell) [phone]  Click here to complete [place] including selection of the Healthcare Decision Maker Relationship (ie \"Primary\")            Transition of Care Plan:    RUR 13 % Moderate Risk  LOS 1 Day    1. Hospital admission for medical management   2. Cardiology consult  3. PT, OT, SLP evals  4. Diabetes management consult  5. CM to follow through for treatment/response  6. Report has been made to [facility] DSS/APS, see note 2/16 as pt was found down soaked in urine, pt is known hoarder  7. ACP planning, pt unable to complete AMD d/t AMS  8. Dtr Erin Lehman gave verbal consent for referrals for SNF, sent to [facility] Gael Marquez Jr. of [facility] and [facility] in [facility] , also to Julieta at the [address] [place] [address] in [place], pt has [employer]; COVID PCR ordered 2/16  9. DC when stable to SNF  10 outpatient f/u PCP, specialists  11.  Transport TBD pending progress    Care Management Interventions  PCP Verified by CM: Liz Kuhn MD)  Last Visit to PCP: 02/09/21  Palliative Care Criteria Met (RRAT>21 & CHF Dx)?: No  Mode of Transport at Discharge: Self(Family )  Transition of Care Consult (CM Consult): SNF  Partner SNF: Yes  Physical Therapy Consult: Yes  Occupational Therapy Consult: Yes  Speech Therapy Consult: Yes  Current Support Network: Family Lives [place], Own Home, Lives Alone(pt lives alone in pvt residence, at baseline pt ia ambulatory w/ RW, iADLs, drives)  Confirm Follow Up Transport: Family  Discharge Location  Discharge Placement: Skilled nursing facility  Karla Cockayne, [facility]

## 2021-02-17 NOTE — PROGRESS NOTES
Shift Change:    Bedside and Verbal shift change report given to Wily RN (oncoming nurse) by Luly Grigsby RN (offgoing nurse). Report included the following information SBAR, Kardex, Intake/Output, MAR, Recent Results and Cardiac Rhythm Sinus Tach. 1718: Update given to family     1908: MRI checklist completed with patient's daughter, Javed Ram     Shift Change:    Bedside and Verbal shift change report given to , RN (oncoming nurse) by Penny Judd RN (offgoing nurse). Report included the following information SBAR, Kardex, Intake/Output, MAR, Recent Results and Cardiac Rhythm Sinus Tach. TRANSFER - OUT REPORT:       Verbal report given to [address], RN(name) on Majo Burk  being transferred to [facility](unit) for routine progression of care       Report consisted of patients Situation, Background, Assessment and   Recommendations(SBAR). Information from the following report(s) SBAR, Kardex, Intake/Output, MAR, Recent Results and Cardiac Rhythm Sinus Tach  was reviewed with the receiving nurse. Lines:   Peripheral IV 02/16/21 Right Antecubital (Active)   Site Assessment Clean, dry, & intact 02/16/21 1129   Phlebitis Assessment 0 02/16/21 1129   Infiltration Assessment 0 02/16/21 1129   Dressing Status Clean, dry, & intact 02/16/21 1129   Dressing Type Transparent 02/16/21 1129   Hub Color/Line Status Pink;Flushed;Patent 02/16/21 1129   Action Taken Blood drawn 02/16/21 1129       Peripheral IV 02/16/21 Right Forearm (Active)   Site Assessment Clean, dry, & intact 02/16/21 1158   Phlebitis Assessment 0 02/16/21 1158   Infiltration Assessment 0 02/16/21 1158   Dressing Status Clean, dry, & intact 02/16/21 1158   Dressing Type Transparent 02/16/21 1158   Hub Color/Line Status Pink;Flushed;Patent 02/16/21 1158   Action Taken Blood drawn 02/16/21 1158        Opportunity for questions and clarification was provided.       Patient transported with:   [employer]

## 2021-02-17 NOTE — PROGRESS NOTES
Problem: Mobility Impaired (Adult and Pediatric)  Goal: *Acute Goals and Plan of Care (Insert Text)  Description: FUNCTIONAL STATUS PRIOR TO ADMISSION: Patient was independent and active without use of DME.    HOME SUPPORT PRIOR TO ADMISSION: The patient lived alone with no one to provide assistance. Found in hoarder situation. APS has been contacted. Physical Therapy Goals  Initiated 2/17/2021  1. Patient will move from supine to sit and sit to supine , scoot up and down, and roll side to side in bed with moderate assistance  within 7 day(s). 2.  Patient will transfer from bed to chair and chair to bed with moderate assistance  using the least restrictive device within 7 day(s). 3.  Patient will perform sit to stand with moderate assistance  within 7 day(s). 4.  Patient will ambulate with moderate assistance  for 10 feet with the least restrictive device within 7 day(s). Outcome: Progressing Towards Goal   PHYSICAL THERAPY EVALUATION  Patient: Brock Balbuena [de-identified]76 y.o. female)  Date: 2/17/2021  Primary Diagnosis: AMS (altered mental status) [R41.82]  Severe sepsis ([address] [A41.9, R65.20]  DKA (diabetic ketoacidoses) ([facility] [E11.10]        Precautions:   Fall    ASSESSMENT  Based on the objective data described below, the patient presents with lethargy, confusion, impaired balance, poor activity tolerance, global weakness, and decreased command following and initiation of tasks following admission for AMS, severe sepsis, and DKA. Pt found down at home, unclear how long, lives in hoarder type situation and does not allow visitors into home. Pt moves all extremities against gravity but not to command. Minimal time with eyes open and minimal verbal response. Total A to EOB. At EOB, pt requires constant support for fall prevention and demonstrates no righting/protective reactions. Pt is far below her baseline and not safe to return home. Will follow 3x/week and increase if pt begins following commands. Current Level of Function Impacting Discharge (mobility/balance): Total A    Functional Outcome Measure: The patient scored 0/100 on the Barthel outcome measure. Other factors to consider for discharge: lives alone, unable to care for herself     Patient will benefit from skilled therapy intervention to address the above noted impairments. PLAN :  Recommendations and Planned Interventions: bed mobility training, transfer training, gait training, therapeutic exercises, neuromuscular re-education, patient and family training/education, and therapeutic activities      Frequency/Duration: Patient will be followed by physical therapy:  3 times a week to address goals.     Recommendation for discharge: (in order for the patient to meet his/her long term goals)  Therapy up to 5 days/week in SNF setting    This discharge recommendation:  Has been made in collaboration with the attending provider and/or case management    IF patient discharges home will need the following DME: to be determined (TBD)         SUBJECTIVE:   Patient stated non-verbal.    OBJECTIVE DATA SUMMARY:   HISTORY:    Past Medical History:   Diagnosis Date    CAD (coronary artery disease)     Diabetes ([facility]     Diabetes ([facility] [address] [facility]     type 2    GERD (gastroesophageal reflux disease)     H/O seasonal allergies     Hyperlipidemia     Hypertension     Insomnia     Nausea & vomiting     PUD (peptic ulcer disease)     Small bowel obstruction ([address]     Vitamin D deficiency      Past Surgical History:   Procedure Laterality Date    HX APPENDECTOMY      HX BREAST BIOPSY      HX GYN  1971    partial hysterectomy    HX GYN  1991    complete hysterectomy    HX HYSTERECTOMY      HX ORTHOPAEDIC      right rotator cuff repair    HX ORTHOPAEDIC      bilaterral wrist surgery    HX ORTHOPAEDIC      right knee surgery    HX ORTHOPAEDIC      bilateral bone spur removal from big toe    HX TONSIL AND ADENOIDECTOMY      HX TONSILLECTOMY      WA BREAST SURGERY PROCEDURE UNLISTED      breast reduction    WA CARDIAC SURG PROCEDURE UNLIST      heart stent       Personal factors and/or comorbidities impacting plan of care: lives alone, diabetes, CAD    Home Situation  Home Environment: Private residence  Living Alone: Yes  Support Systems: Family member(s), Friends \ neighbors  Patient Expects to be Discharged to[de-identified] Skilled nursing facility    EXAMINATION/PRESENTATION/DECISION MAKING:   Critical Behavior:  Neurologic State: Drowsy, Confused  Orientation Level: Unable to verbalize  Cognition: Decreased attention/concentration, Decreased command following  Safety/Judgement: Decreased awareness of environment, Decreased awareness of need for assistance, Decreased awareness of need for safety, Decreased insight into deficits  Hearing: Auditory  Auditory Impairment: None  Skin:    Edema:   Range Of Motion:  AROM: Generally decreased, functional           PROM: Generally decreased, functional           Strength:    Strength: Grossly decreased, non-functional                    Tone & Sensation:                                  Coordination:  Coordination: Grossly decreased, non-functional  Vision:      Functional Mobility:  Bed Mobility:     Supine to Sit: Total assistance;Assist x2  Sit to Supine: Total assistance;Assist x2     Transfers:                             Balance:   Sitting: Impaired  Sitting - Static: Poor (constant support)  Sitting - Dynamic: Poor (constant support)  Ambulation/Gait Training:                                                         Stairs: Therapeutic Exercises:       Functional Measure:  Barthel Index:    Bathin  Bladder: 0  Bowels: 0  Groomin  Dressin  Feedin  Mobility: 0  Stairs: 0  Toilet Use: 0  Transfer (Bed to Chair and Back): 0  Total: 0/100       The Barthel ADL Index: Guidelines  1. The index should be used as a record of what a patient does, not as a record of what a patient could do.   2. The main aim is to establish degree of independence from any help, physical or verbal, however minor and for whatever reason. 3. The need for supervision renders the patient not independent. 4. A patient's performance should be established using the best available evidence. Asking the patient, friends/relatives and nurses are the usual sources, but direct observation and common sense are also important. However direct testing is not needed. 5. Usually the patient's performance over the preceding 24-48 hours is important, but occasionally longer periods will be relevant. 6. Middle categories imply that the patient supplies over 50 per cent of the effort. 7. Use of aids to be independent is allowed. Ayo Thacker., Barthel, DVickiW. (4467). Functional evaluation: the Barthel Index. [facility] (14)2. MAXWELL Marroquin, Juve Yuen., [address] [place], [address] (1999). Measuring the change indisability after inpatient rehabilitation; comparison of the responsiveness of the Barthel Index and Functional [place] Measure. Journal of Neurology, Neurosurgery, and Psychiatry, 66(4), 710-382. Oral John, N.[place].A, MARITZA Sim, & Mona Hinds, M.A. (2004.) Assessment of post-stroke quality of life in cost-effectiveness studies: The usefulness of the Barthel Index and the EuroQoL-5D.  Quality of Life Research, 15, 464-40            Physical Therapy Evaluation Charge Determination   History Examination Presentation Decision-Making   MEDIUM  Complexity : 1-2 comorbidities / personal factors will impact the outcome/ POC  MEDIUM Complexity : 3 Standardized tests and measures addressing body structure, function, activity limitation and / or participation in recreation  LOW Complexity : Stable, uncomplicated  Other outcome measures Barthel  LOW       Based on the above components, the patient evaluation is determined to be of the following complexity level: LOW     Pain Rating:  none    Activity Tolerance:   Poor    After treatment patient left in no apparent distress:   Supine in bed and Call bell within reach    COMMUNICATION/EDUCATION:   The patients plan of care was discussed with: Occupational therapist and Registered nurse. Patient is unable to participate in goal setting and plan of care.     Thank you for this referral.  Pato Hathaway, PT   Time Calculation: 21 mins

## 2021-02-17 NOTE — PROGRESS NOTES
At . [facility] 134, SLP attempted to see patient. She was not responding to voice. Will f/u later.

## 2021-02-17 NOTE — PROGRESS NOTES
TRANSFER - IN REPORT:    Verbal report received from [facility], RN(name) on [facility] 7715  being received from ED 11(unit) for routine progression of care      Report consisted of patients Situation, Background, Assessment and   Recommendations(SBAR). Information from the following report(s) SBAR, Kardex, Intake/Output, MAR, Accordion and Recent Results was reviewed with the receiving nurse. Opportunity for questions and clarification was provided. Assessment completed upon patients arrival to unit and care assumed. Bedside and Verbal shift change report given to Hemalatha Oconnor RN (oncoming nurse) by Catherine Marie RN (offgoing nurse). Report included the following information SBAR, Kardex, Intake/Output, MAR, Accordion and Recent Results.

## 2021-02-17 NOTE — CONSULTS
Beba Perez MD., [facility]    [address] [place], [address] [facility]    Office [phone],YAE [phone]           2/16/2021     Admit Date: 2/16/2021      Michael Samayoa is a 76 y.o. female admitted for AMS (altered mental status) [R41.82]  Severe sepsis ([facility] [address] [A41.9, R65.20]  DKA (diabetic ketoacidoses) ([facility] [address] [E11.10]. Consult requested by Coni Essex, MD       Assessment/Plan:    AMS  DKA /DM  Sepsis  Arrhythmia - EKG - SR/Possible 2nd deg type 2 AV block ( transient - not noted on tele) vs blocked PAC  HTN  HLD  COVID PUI    Plan:  Correct electrolytes  Telemetry  ECHO  On cozaar, HCTZ, Norvasc PTA. Meds on hold            Please do not hesitate to contact us with questions or concerns. See note below for details. Beba Perez MD      Cardiac Testing/Procedures: A. Cardiac Cath/PCI:    B.ECHO/OVI:    C.StressNuclear/Stress ECHO/Stress test:    D.Vascular:    E. EP:    F. Miscellaneous:    History:     Patient  is a 76 y.o. female admitted for AMS. PMH of  HTN, CAD s/p stent, T2DM on insulin, HLD, GERD, sciatica, insomnia, allergies who presents to the ED via EMS after found on the ground by son in law. Hx limited sec to AMS. No CP/abd pain,  Found to be in DKA. Being treated for sepsis  EKG - SR/Possible 2nd deg type 2 AV block ( transient - not noted on tele) vs blocked PAC    K 2.9; Cr 1.4, Lactic acid 2.4.  WBC 15.9, Ca - 11.3     PMH/PSH/FH/Soc Hx:     Past Medical History:   Diagnosis Date    CAD (coronary artery disease)     Diabetes ([facility]     Diabetes ([facility] [address] [facility]     type 2    GERD (gastroesophageal reflux disease)     H/O seasonal allergies     Hyperlipidemia     Hypertension     Insomnia     Nausea & vomiting     PUD (peptic ulcer disease)     Small bowel obstruction (HCC)     Vitamin D deficiency       Past Surgical History:   Procedure Laterality Date    HX APPENDECTOMY      HX BREAST BIOPSY      HX GYN  1971    partial hysterectomy    HX GYN  1991    complete hysterectomy    HX HYSTERECTOMY      HX ORTHOPAEDIC      right rotator cuff repair    HX ORTHOPAEDIC      bilaterral wrist surgery    HX ORTHOPAEDIC      right knee surgery    HX ORTHOPAEDIC      bilateral bone spur removal from big toe    HX TONSIL AND ADENOIDECTOMY      HX TONSILLECTOMY      SC BREAST SURGERY PROCEDURE UNLISTED      breast reduction    SC CARDIAC SURG PROCEDURE UNLIST      heart stent     Allergies   Allergen Reactions    Advicor [Niacin-Lovastatin] Swelling    Conjugated Estrogens Other (comments)     Headaches      Other reaction(s): Other (comments)  Headaches    Deflazacort Unable to Obtain     Other reaction(s): Unable to Obtain    Erythromycin Nausea Only    Erythromycin Base Nausea and Vomiting    Niacin Swelling    Pitavastatin Other (comments)     Abdominal pain, nausea  Other reaction(s): Other (comments)  Abdominal pain, nausea    Simvastatin Other (comments)     Muscle cramps  Other reaction(s): Other (comments)  Muscle cramps    Unable To Obtain Swelling    Zolpidem Other (comments)     Other reaction(s): Other (comments)    Atorvastatin Other (comments)     Muscle cramps  Other reaction(s):  Other (comments)  Muscle cramps    Triamcinolone Unable to Obtain     Other reaction(s): Unable to Obtain     Family History   Problem Relation Age of Onset    Cancer Maternal Aunt         pancreatic cancer    Hypertension Mother     COPD Mother     Diabetes Father     Alcohol abuse Brother         murdered       Social History     Tobacco Use    Smoking status: Never Smoker    Smokeless tobacco: Never Used   Substance Use Topics    Alcohol use: No    Drug use: No           Medications:       Current Facility-Administered Medications   Medication Dose Route Frequency    sodium chloride (NS) flush 5-10 mL  5-10 mL IntraVENous PRN    insulin regular (NOVOLIN R, HUMULIN R) 100 Units in 0.9% sodium chloride 100 mL infusion  0-50 Units/hr IntraVENous TITRATE    insulin lispro (HUMALOG) injection   SubCUTAneous TIDAC    glucose chewable tablet 16 g  4 Tab Oral PRN    dextrose (D50W) injection syrg 12.5-25 g  25-50 mL IntraVENous PRN    glucagon (GLUCAGEN) injection 1 mg  1 mg IntraMUSCular PRN    sodium chloride (NS) flush 5-40 mL  5-40 mL IntraVENous Q8H    sodium chloride (NS) flush 5-40 mL  5-40 mL IntraVENous PRN    metroNIDAZOLE (FLAGYL) IVPB premix 500 mg  500 mg IntraVENous Q8H    heparin (porcine) injection 5,000 Units  5,000 Units SubCUTAneous Q8H    [START ON 2/17/2021] cefepime (MAXIPIME) 2 g in sterile water (preservative free) 10 mL IV syringe  2 g IntraVENous Q24H    Vancomycin- Pharmacy Dosing per Level   Other Rx Dosing/Monitoring    [START ON 2/17/2021] Vancomycin- Level at 9am on 2/17/21   Other ONCE    amLODIPine (NORVASC) tablet 5 mg  5 mg Oral QHS    famotidine (PF) (PEPCID) 20 mg in 0.9% sodium chloride 10 mL injection  20 mg IntraVENous QPM    dextrose 5% - 0.45% NaCl with KCl 20 mEq/L infusion  150 mL/hr IntraVENous CONTINUOUS     Current Outpatient Medications   Medication Sig    losartan (COZAAR) 100 mg tablet Take 1 Tab by mouth daily.  famotidine (PEPCID) 20 mg tablet Take 1 Tab by mouth two (2) times a day.  hydroCHLOROthiazide (HYDRODIURIL) 25 mg tablet Take 1 Tab by mouth daily.  amLODIPine (NORVASC) 5 mg tablet Take 1 Tab by mouth daily.  QUEtiapine (SEROquel) 50 mg tablet Take 1 Tab by mouth nightly.  insulin regular (NovoLIN R Regular U-100 Insuln) 100 unit/mL injection INJECT 25 UNITS SUBCUTANEOUSLY 5-10 MINUES BEFORE MEALS AS DIRECTED    augmented betamethasone dipropionate (DIPROLENE-AF) 0.05 % topical cream Apply  to affected area two (2) times a day.  insulin NPH (NovoLIN N NPH U-100 Insulin) 100 unit/mL injection 30 Units by SubCUTAneous route two (2) times a day.  butalbital-aspirin-caffeine (FIORINAL) capsule Take 1 Cap by mouth daily as needed for Headache (Takes rarely).  loratadine 10 mg cap Take 10 mg by mouth daily as needed. Indications: inflammation of the nose due to an allergy    melatonin 5 mg cap capsule Take 5 mg by mouth nightly as needed.  ascorbic acid, vitamin C, (VITAMIN C) 500 mg tablet Take 1,000 mg by mouth daily.  naproxen sodium 220 mg Cap Take 1 Tab by mouth daily as needed.        Review of Systems:         (bold if positive, if negative)  Limited sec to AMS  Physical Exam:       Visit Vitals  BP (!) 127/55   Pulse (!) 124   Temp 97.8 °F (36.6 °C)   Resp 17   Ht 5' 4\" (1.626 m)   Wt 152 lb (68.9 kg)   SpO2 94%   BMI 26.09 kg/m²         Telemetry: SR    Limited sec to COVID PUI    Gen: Elderly, well-nourished,NAD  Neck:   Resp:   Card:   Abd:    MSK: No cyanosis  Skin: No rashes    Neuro: not moving all four extremities   Psych:    LE: No edema    EKG:        Cxray: Reviewed     LABS: Reviewed      Care Plan discussed with: Nursing     Total time:      mins     Janis Jones MD

## 2021-02-17 NOTE — PROGRESS NOTES
[facility] Dosing Services: 2/17/21    The following medication: Cefepime was automatically dose-adjusted per [facility] P&T Committee Protocol, with respect to renal function. Consult provided for this   76 y.o. , female , for the indication of sepsis. Dosage changed to:  Cefepime 2gm IV every 12 hrs    Pt Weight:   Wt Readings from Last 1 Encounters:   02/16/21 68.9 kg (152 lb)         Previous Regimen Cefepime 2gm IV every 24 hrs   Serum Creatinine Lab Results   Component Value Date/Time    Creatinine 1.28 (H) 02/17/2021 02:29 AM       Creatinine Clearance Estimated Creatinine Clearance: 36.2 mL/min (A) (based on SCr of 1.28 mg/dL (H)). BUN Lab Results   Component Value Date/Time    BUN 47 (H) 02/17/2021 02:29 AM           Additional notes:      Pharmacy to continue to monitor patient daily. Will make dosage adjustments based upon changing renal function. Signed Jayda MARQUEZ  [address] [place] [address] information:  [phone]

## 2021-02-17 NOTE — PROGRESS NOTES
Problem: Self Care Deficits Care Plan (Adult)  Goal: *Acute Goals and Plan of Care (Insert Text)  Description:   FUNCTIONAL STATUS PRIOR TO ADMISSION: Pt is unable to provide PLOF. Per chart, she lives alone in a \"hoarder-type\" situation. Infer she was able to manage ADLs with mod I.      HOME SUPPORT: The patient lived alone. Occupational Therapy Goals  Initiated 2/17/2021  1. Patient will perform grooming with supervision/set-up within 7 day(s). 2.  Patient will perform upper body dressing, seated EOB, with supervision/set-upwithin 7 day(s). 3.  Patient will perform anterior bathing, seated EOB, with supervision/set-up within 7 day(s). 4.  Patient will perform toilet transfers with minimal assistance/contact guard assist within 7 day(s). 5.  Patient will perform all aspects of toileting with minimal assistance/contact guard assist within 7 day(s). 6.  Patient will participate in upper extremity therapeutic exercise/activities with minimal assistance/contact guard assist for 10 minutes within 7 day(s). 7.  Patient will utilize energy conservation techniques during functional activities with verbal cues within 7 day(s). 8.  Patient will follow 100% of commands during ADLs within 7 days. Outcome: Progressing Towards Goal     OCCUPATIONAL THERAPY EVALUATION  Patient: Manjit Felix [de-identified]76 y.o. female)  Date: 2/17/2021  Primary Diagnosis: AMS (altered mental status) [R41.82]  Severe sepsis ([facility] [address] [A41.9, R65.20]  DKA (diabetic ketoacidoses) ([facility] [address] [E11.10]        Precautions: Fall       ASSESSMENT  Based on the objective data described below, the patient presents with lethargy, disorientation/confusion, impaired balance, poor activity tolerance, global weakness, and decreased command following and initiation of tasks following admission for AMS, severe sepsis, and DKA. Pt was found down at home - unclear how long she had been there.  Today, she is received in ED, lethargic and requiring increased time and stimulation to minimally arouse. She is observed to move all extremities but not on command. She brings her hand to her face to scratch her nose, but when provided a washcloth and prompted to wipe her face, no initiation of movement and requires hand over hand total A for ADL tasks. At EOB, pt requires constant support for fall prevention and demonstrates no righting/protective reactions. At this time, pt is far below her baseline and will require continued skilled OT services both in acute setting and likely at discharge pending progress as she is not safe to return home alone at this time. Current Level of Function Impacting Discharge (ADLs/self-care): total A    Functional Outcome Measure: The patient scored Total: 0/100 on the Barthel Index outcome measure which is indicative of 100% impaired ability to care for basic self needs/dependency on others; inferred 100% dependency on others for instrumental ADLs. Other factors to consider for discharge: high fall risk; poor arousal and initiation; poor command following; minimal verbalizations/interaction; confusion/disorientation; unclear if pt is able to care for herself at home (per chart she is in a \"hoarder-type\" situation); unclear PLOF     Patient will benefit from skilled therapy intervention to address the above noted impairments. PLAN :  Recommendations and Planned Interventions: self care training, functional mobility training, therapeutic exercise, balance training, therapeutic activities, cognitive retraining, endurance activities, neuromuscular re-education, patient education, home safety training and family training/education    Frequency/Duration: Patient will be followed by occupational therapy 5 times a week to address goals.     Recommendation for discharge: (in order for the patient to meet his/her long term goals)  Therapy up to 5 days/week in SNF setting    This discharge recommendation:  Has been made in collaboration with the attending provider and/or case management    IF patient discharges home will need the following DME: TBD - unclear what pt has available at home       SUBJECTIVE:   No verbalizations during session. OBJECTIVE DATA SUMMARY:   HISTORY:   Past Medical History:   Diagnosis Date    CAD (coronary artery disease)     Diabetes ([facility] [address]     Diabetes ([facility] [address]     type 2    GERD (gastroesophageal reflux disease)     H/O seasonal allergies     Hyperlipidemia     Hypertension     Insomnia     Nausea & vomiting     PUD (peptic ulcer disease)     Small bowel obstruction (HCC)     Vitamin D deficiency      Past Surgical History:   Procedure Laterality Date    HX APPENDECTOMY      HX BREAST BIOPSY      HX GYN  1971    partial hysterectomy    HX GYN  1991    complete hysterectomy    HX HYSTERECTOMY      HX ORTHOPAEDIC      right rotator cuff repair    HX ORTHOPAEDIC      bilaterral wrist surgery    HX ORTHOPAEDIC      right knee surgery    HX ORTHOPAEDIC      bilateral bone spur removal from big toe    HX TONSIL AND ADENOIDECTOMY      HX TONSILLECTOMY      WI BREAST SURGERY PROCEDURE UNLISTED      breast reduction    WI CARDIAC SURG PROCEDURE UNLIST      heart stent       Expanded or extensive additional review of patient history:     Home Situation  Home Environment: Private residence  Living Alone: Yes  Support Systems: Family member(s), Friends \ neighbors  Patient Expects to be Discharged to[de-identified] Skilled nursing facility    Hand dominance: Right    EXAMINATION OF PERFORMANCE DEFICITS:  Cognitive/Behavioral Status:  Neurologic State: Drowsy; Confused  Orientation Level: Unable to verbalize  Cognition: Decreased attention/concentration;Decreased command following  Perception: Cues to maintain midline in sitting; Tactile;Verbal;Visual(cues to maintain eyes open)  Perseveration: No perseveration noted  Safety/Judgement: Decreased awareness of environment;Decreased awareness of need for assistance;Decreased awareness of need for safety;Decreased insight into deficits    Hearing: Auditory  Auditory Impairment: None    Range of Motion:  AROM: Generally decreased, functional  PROM: Generally decreased, functional    Strength:  Strength: Grossly decreased, non-functional    Coordination:  Coordination: Grossly decreased, non-functional  Fine Motor Skills-Upper: Left Impaired;Right Impaired    Gross Motor Skills-Upper: Left Impaired;Right Impaired    Balance:  Sitting: Impaired  Sitting - Static: Poor (constant support)  Sitting - Dynamic: Poor (constant support)    Functional Mobility and Transfers for ADLs:  Bed Mobility:  Supine to Sit: Total assistance;Assist x2  Sit to Supine: Total assistance;Assist x2    Transfers:   Unable to safely assess due to pt's arousal and command following    ADL Assessment:  Feeding: Maximum assistance; Total assistance    Oral Facial Hygiene/Grooming: Maximum assistance; Total assistance    Bathing: Total assistance    Upper Body Dressing: Total assistance    Lower Body Dressing: Total assistance    Toileting: Total assistance    ADL Intervention and task modifications:  Grooming  Grooming Assistance: Maximum assistance; Total assistance(dependent)  Position Performed: Seated edge of bed; Other (comment)(semi-supine)  Washing Face: Total assistance (dependent)(hand over hand)  Brushing Teeth: Total assistance (dependent)(OT attempted to remove dentures to clean them)  Cues: Physical assistance; Tactile cues provided;Verbal cues provided;Visual cues provided    Lower Body Dressing Assistance  Socks:  Total assistance (dependent)  Position Performed: Supine  Cues: Don;Tactile cues provided;Verbal cues provided    Cognitive Retraining  Safety/Judgement: Decreased awareness of environment;Decreased awareness of need for assistance;Decreased awareness of need for safety;Decreased insight into deficits     Functional Measure:  Barthel Index:    Bathin  Bladder: 0  Bowels: 0  Grooming: 0  Dressin  Feedin  Mobility: 0  Stairs: 0  Toilet Use: 0  Transfer (Bed to Chair and Back): 0  Total: 0/100        The Barthel ADL Index: Guidelines  1. The index should be used as a record of what a patient does, not as a record of what a patient could do. 2. The main aim is to establish degree of independence from any help, physical or verbal, however minor and for whatever reason. 3. The need for supervision renders the patient not independent. 4. A patient's performance should be established using the best available evidence. Asking the patient, friends/relatives and nurses are the usual sources, but direct observation and common sense are also important. However direct testing is not needed. 5. Usually the patient's performance over the preceding 24-48 hours is important, but occasionally longer periods will be relevant. 6. Middle categories imply that the patient supplies over 50 per cent of the effort. 7. Use of aids to be independent is allowed. Lissette Triplett., BarthelDAYANA. (8267). Functional evaluation: the Barthel Index. [facility] (14)2. Julian Warner anisha MAXWELL Peace, Shira Sanderson., Elia [facility] [place], [address] [place] [address] (). Measuring the change indisability after inpatient rehabilitation; comparison of the responsiveness of the Barthel Index and Functional [place] Measure. Journal of Neurology, Neurosurgery, and Psychiatry, 66(4), 654-574. Lisa Carrasco, N.J.A, Hiren Sweet,  W.J.M, & Irvin Bellamy MVickiA. (2004.) Assessment of post-stroke quality of life in cost-effectiveness studies: The usefulness of the Barthel Index and the EuroQoL-5D.  Quality of Life Research, 15, 378-77     Occupational Therapy Evaluation Charge Determination   History Examination Decision-Making   LOW Complexity : Brief history review  HIGH Complexity : 5 or more performance deficits relating to physical, cognitive , or psychosocial skils that result in activity limitations and / or participation restrictions HIGH Complexity : Patient presents with comorbidities that affect occupational performance. Signifigant modification of tasks or assistance (eg, physical or verbal) with assessment (s) is necessary to enable patient to complete evaluation       Based on the above components, the patient evaluation is determined to be of the following complexity level: LOW   Pain Rating:  Pt does not vocalize pain during session. No response to painful stimulus at LEs    Activity Tolerance:   Poor    After treatment patient left in no apparent distress:    Supine in bed, Call bell within reach and Side rails x 3    COMMUNICATION/EDUCATION:   The patients plan of care was discussed with: Physical therapist and Registered nurse. Home safety education was provided and the patient/caregiver indicated understanding., Patient/family have participated as able in goal setting and plan of care. and Patient/family agree to work toward stated goals and plan of care. This patients plan of care is appropriate for delegation to hospitals.     Thank you for this referral.  Luz Pandya OT  Time Calculation: 17 mins

## 2021-02-17 NOTE — DIABETES MGMT
[facility]  CLINICAL NURSE SPECIALIST CONSULT  PROGRAM [facility] DIABETES HEALTH    FOLLOW-UP NOTE    Presentation   Jasmeet Bates is a 76 y.o. female presented to the ED 2/16/21 by EMS after being found down by neighbors. Per chart review, patient advised she had fallen several days ago and had been unable to get up. Patient found to be in DKA on arrival with BG of 607 and AG of 17 by labs. Isolation precautions discontinued due to patient negative for COVID-19.      HX:   Past Medical History (click to expand or collapse)        Past Medical History:   Diagnosis Date    CAD (coronary artery disease)      [facility] [address] [address]      Diabetes ([facility] [address] [facility]       type 2    GERD (gastroesophageal reflux disease)      H/O seasonal allergies      Hyperlipidemia      Hypertension      Insomnia      Nausea & vomiting      PUD (peptic ulcer disease)      Small bowel obstruction (HCC)      Vitamin D deficiency              Current clinical course has been uncomplicated.      Diabetes: Patient has known Type 2 diabetes. Home diabetes medication regimen is NPH 30 units twice daily, regular insulin 25 units before meals, and Metformin 500 mg daily. Admission  and A1c 7.2 (9/30/20) indicate poor diabetes control. Subjective   Patient sleeping, opens eyes to voice. Patient alert but doesn't answer questions. Primary RN, Mary Malone, indicated that this is how patient has been today. Objective   Physical exam  General Alert, oriented and ill-appearing. Conversant and cooperative. Vital Signs   Visit Vitals  BP (!) 173/83 (BP 1 Location: Left upper arm, BP Patient Position: At rest)   Pulse (!) 111   Temp 98 °F (36.7 °C)   Resp 18   Ht 5' 4\" (1.626 m)   Wt 68.9 kg (152 lb)   SpO2 99%   BMI 26.09 kg/m²     Skin  Warm and dry  Heart   Regular rate and rhythm.  No murmurs, rubs or gallops  Lungs  Clear to auscultation without rales or rhonchi  Extremities No foot wounds    Laboratory  Lab Results   Component Value Date/Time    Hemoglobin A1c 8.8 (H) 02/16/2021 11:17 AM     Lab Results   Component Value Date/Time    LDL, calculated 197.6 (H) 03/02/2020 02:48 PM     Lab Results   Component Value Date/Time    Creatinine 1.28 (H) 02/17/2021 02:29 AM     Lab Results   Component Value Date/Time    Sodium 148 (H) 02/17/2021 02:29 AM    Potassium 4.1 02/17/2021 02:29 AM    Chloride 117 (H) 02/17/2021 02:29 AM    CO2 20 (L) 02/17/2021 02:29 AM    Anion gap 11 02/17/2021 02:29 AM    Glucose 275 (H) 02/17/2021 02:29 AM    BUN 47 (H) 02/17/2021 02:29 AM    Creatinine 1.28 (H) 02/17/2021 02:29 AM    BUN/Creatinine ratio 37 (H) 02/17/2021 02:29 AM    GFR est AA 49 (L) 02/17/2021 02:29 AM    GFR est non-AA 41 (L) 02/17/2021 02:29 AM    Calcium 10.3 (H) 02/17/2021 02:29 AM    Bilirubin, total 0.5 02/16/2021 11:17 AM    Alk. phosphatase 125 (H) 02/16/2021 11:17 AM    Protein, total 9.2 (H) 02/16/2021 11:17 AM    Albumin 4.0 02/16/2021 11:17 AM    Globulin 5.2 (H) 02/16/2021 11:17 AM    A-G Ratio 0.8 (L) 02/16/2021 11:17 AM    ALT (SGPT) 22 02/16/2021 11:17 AM     Lab Results   Component Value Date/Time    ALT (SGPT) 22 02/16/2021 11:17 AM       Tests 2/16/21  1117 2/16/21  1436 2/16/21  1640 2/16/21 2032 2/17/21  0229   A1c 8.8 - - - -    529 372 188 275   Anion gap 17 18 16 9 11   Serum creatinine 1.83 1.46 1.47 1.40 1.28   GFR 27 35 35 37 41       Factors affecting BG pattern  Factor Dose Comments   Nutrition:  NPO   NPO    Drugs:  Vasopressor load  Steroids  HIV   Epogen  Blood transfusion(s)  Other: n/a   n/a  n/a  n/a  n/a  n/a         A1cs inaccurate  A1cs inaccurate   Pain 0/10    Infection Cefepime, Flagyl, Vanc Sepsis of unknown etiology   Other: n/a n/a      Blood glucose pattern        Evaluation   This 76year old female, with Type 2 diabetes, did not achieve diabetes control prior to admission (PTA), as evidenced by admission BG of 607 and A1c of 7.2%.  Based on assessment of diabetes self-care practices, interventions that warrant action while hospitalized include: Unable to assess at this time. The patient would also benefit from diabetes self-management education and support [facility]) after discharge.     During this hospitalization, the patient has not achieved inpatient blood glucose target of 100-180mg/dl. BG's have ranged 517-607 over the past 24 hours. Factors that have played a role include:  [x]? Critical nature of illness state  [x]? Meal/tube feeding disruption  [x]? Compromised insulin absorption or delivery  [x]? Kidney dysfunction     Basal insulin is in use. Lantus 14 units given at 0455 today, another 13 units Lantus given at [phone] [id number] today. Both ordered as one time doses, no basal ordered for tomorrow forward.       Bolus insulin isn't in use. Patient isn't eating meals or on tube feedings.     Corrective insulin is in use. Patient has not required any corrective insulin since drip discontinued this morning.      Insulin drip with glucostabilizer started at 1449 2/16/21. Insulin drip with glucostabilizer discontinued at 0728 today (2/17/21)      To optimize BG control and support a positive health outcome, would utilize the Subcutaneous Insulin Order set (3990).    Impression: It is suspected that patient will require basal and corrective insulin while NPO and will require the addition of bolus insulin once diet advances. Assessment and Plan   Nursing Diagnosis Risk for unstable blood glucose pattern   Nursing Intervention Domain 5196 Decision-making Support   Nursing Interventions Examined current inpatient diabetes control   Explored factors facilitating and impeding inpatient management       Recommendations   Recommend:     [x]? Use of Subcutaneous Insulin Order set (3406)  Insulin Use Recommendation   Basal                                      (Based on weight, BMI & GFR) Addresses basic metabolic needs Lantus 28 units daily.    Nutritional (Based on CHO load) Addresses nutrition interventions Humalog 5 units with meals, hold if patient eats < 50% CHO on meal tray. Corrective                                       (Offset gaps in dosing) Useful in adjusting insulin dosing Correctional Scale for Normal Sensitivity     200-249- 2units Humalog  [id number] Humalog  [id number] Humalog  [id number] Humalog  Over 400- 10units Humalog     Do NOT hold for NPO; give in addition to meal time insulin dose.     If patient does not eat, -give correction dose only.         [x]? Referral to     [x]? Diabetes Self-Management Training through [facility] (Phone [phone] to schedule appointment)    Time Spent/ Billing Codes     Total time spent with patient: 25 Minutes   I personally reviewed chart, notes, data and current medications in the medical record. I have personally examined and treated the patient at bedside during this period.      [x] 75559 IP subsequent hospital care - 25 minutes     IRINA Kowalski  Diabetes Clinical Nurse Specialist  [facility] Diabetes [facility]  Access via [address] [place] [address]

## 2021-02-17 NOTE — PROGRESS NOTES
Visit Vitals  /68   Pulse (!) 113   Temp 98 °F (36.7 °C)   Resp 18   Ht 5' 4\" (1.626 m)   Wt 68.9 kg (152 lb)   SpO2 99%   BMI 26.09 kg/m²     Tele reviewed: ST no heart block. K 4.1  Mg++ 2.3  Phos 1.4   HR likely driven by sepsis. Would resume norvasc. When able to take po.     02/16/21   ECHO ADULT COMPLETE 02/16/2021 2/16/2021    Narrative · LV: Estimated LVEF is 60 - 65%. Normal cavity size and wall thickness. Hyperdynamic systolic function. Wall motion: normal. Inconclusive left   ventricular diastolic function. · MV: Mitral valve non-specific thickening. Mitral valve leaflet   calcification. Mild mitral annular calcification. · TV: Mild tricuspid valve regurgitation is present. · Echo study was technically difficulty.         Signed by: Oscar Ramirez MD

## 2021-02-17 NOTE — PROGRESS NOTES
1930 Assumed care of pt. Pt. Resting in stretcher with call bell at bedside. Identified self as primary nurse. Answered questions and discussed plan of care at this time. Will continue to monitor. 2005 Notified family practice of critical lab acetone level at this time. 2030 glucostabilizer updated at this time. Repeat BMP and Lactic drawn at this time. 2040 Notified family practice of blood sugar less than 250 for the fluid change. Pt. Fail swallow screen due to alertness. 18 Family practice called stated to hold insulin drip at this time and to give runs of potassium. 0230 repeat BMP sent with AM labs, repeat lactic at this time. Will continue to monitor. External catheter in place. 0400 CHG bath and new gown given. Insulin drip restarted. 523 [facility] at bedside. Bedside and Verbal shift change report given to Wily RN (oncoming nurse) by Eduardo Hagan RN (offgoing nurse). Report included the following information SBAR, ED Summary, MAR and Cardiac Rhythm sinus tach.

## 2021-02-17 NOTE — WOUND CARE
New Consult for skin evaluation Chart reviewed Patient laying in ER stretcher bed. Unresponsive to questions, has movement side to side. Purewick in place. All skin folds and bony prominences assessed Bilateral heels no redness, boggy skin intact. Heels offloaded with pillow Lower abdomen beneath Panus , dry red  blanchable skin. Areas of abrasion most likely due to patient scratching. Moisturized with purple tube Repositioned onto left side. Left buttocks 1x1 area of  resurfaced fragile skin. Blanchable redness on sacrum and buttocks, skin intact. Foam applied to buttocks for pressure redistribution until a floor bed available Palpable DP pulses bilaterally Left lateral and superior knee area of irregular red/purplish discoloration. Non blanchable. Skin intact. Recommendations: 
Moisturize dry skin with purple tube.maintain foam dressing to buttocks until transferred to floor bed Turn reposition approximately every 2 hours and offload heels with pillows at all times in bed. Z-guard cream to buttocks and sacrum daily and as needed with incontinence care Minimize layers of linen/-pads under patient to optimize support surface. Discussed with  KIRILL Qureshi Re-consult as needed

## 2021-02-18 ENCOUNTER — APPOINTMENT (OUTPATIENT)
Dept: CT IMAGING | Age: 76
DRG: 853 | End: 2021-02-18
Attending: STUDENT IN AN ORGANIZED HEALTH CARE EDUCATION/TRAINING PROGRAM
Payer: MEDICARE

## 2021-02-18 LAB
ANION GAP SERPL CALC-SCNC: 6 MMOL/L (ref 5–15)
BACTERIA SPEC CULT: NORMAL
BASOPHILS # BLD: 0 K/UL (ref 0–0.1)
BASOPHILS NFR BLD: 0 % (ref 0–1)
BUN SERPL-MCNC: 25 MG/DL (ref 6–20)
BUN/CREAT SERPL: 30 (ref 12–20)
CALCIUM SERPL-MCNC: 9.4 MG/DL (ref 8.5–10.1)
CHLORIDE SERPL-SCNC: 113 MMOL/L (ref 97–108)
CHOLEST SERPL-MCNC: 224 MG/DL
CO2 SERPL-SCNC: 27 MMOL/L (ref 21–32)
CREAT SERPL-MCNC: 0.83 MG/DL (ref 0.55–1.02)
DIFFERENTIAL METHOD BLD: ABNORMAL
EOSINOPHIL # BLD: 0 K/UL (ref 0–0.4)
EOSINOPHIL NFR BLD: 0 % (ref 0–7)
ERYTHROCYTE [DISTWIDTH] IN BLOOD BY AUTOMATED COUNT: 14.7 % (ref 11.5–14.5)
GLUCOSE BLD STRIP.AUTO-MCNC: 143 MG/DL (ref 65–100)
GLUCOSE BLD STRIP.AUTO-MCNC: 177 MG/DL (ref 65–100)
GLUCOSE BLD STRIP.AUTO-MCNC: 179 MG/DL (ref 65–100)
GLUCOSE BLD STRIP.AUTO-MCNC: 185 MG/DL (ref 65–100)
GLUCOSE BLD STRIP.AUTO-MCNC: 219 MG/DL (ref 65–100)
GLUCOSE SERPL-MCNC: 189 MG/DL (ref 65–100)
HCT VFR BLD AUTO: 44.1 % (ref 35–47)
HDLC SERPL-MCNC: 47 MG/DL
HDLC SERPL: 4.8 {RATIO} (ref 0–5)
HGB BLD-MCNC: 14.3 G/DL (ref 11.5–16)
IMM GRANULOCYTES # BLD AUTO: 0 K/UL (ref 0–0.04)
IMM GRANULOCYTES NFR BLD AUTO: 0 % (ref 0–0.5)
LDLC SERPL CALC-MCNC: 141.8 MG/DL (ref 0–100)
LIPID PROFILE,FLP: ABNORMAL
LYMPHOCYTES # BLD: 1.3 K/UL (ref 0.8–3.5)
LYMPHOCYTES NFR BLD: 16 % (ref 12–49)
MAGNESIUM SERPL-MCNC: 2 MG/DL (ref 1.6–2.4)
MCH RBC QN AUTO: 27.8 PG (ref 26–34)
MCHC RBC AUTO-ENTMCNC: 32.4 G/DL (ref 30–36.5)
MCV RBC AUTO: 85.6 FL (ref 80–99)
MONOCYTES # BLD: 0.8 K/UL (ref 0–1)
MONOCYTES NFR BLD: 9 % (ref 5–13)
NEUTS SEG # BLD: 6.1 K/UL (ref 1.8–8)
NEUTS SEG NFR BLD: 75 % (ref 32–75)
NRBC # BLD: 0 K/UL (ref 0–0.01)
NRBC BLD-RTO: 0 PER 100 WBC
PHOSPHATE SERPL-MCNC: 1.9 MG/DL (ref 2.6–4.7)
PLATELET # BLD AUTO: 318 K/UL (ref 150–400)
PMV BLD AUTO: 11.4 FL (ref 8.9–12.9)
POTASSIUM SERPL-SCNC: 3.6 MMOL/L (ref 3.5–5.1)
RBC # BLD AUTO: 5.15 M/UL (ref 3.8–5.2)
SERVICE CMNT-IMP: ABNORMAL
SERVICE CMNT-IMP: NORMAL
SODIUM SERPL-SCNC: 146 MMOL/L (ref 136–145)
TRIGL SERPL-MCNC: 176 MG/DL (ref ?–150)
VLDLC SERPL CALC-MCNC: 35.2 MG/DL
WBC # BLD AUTO: 8.2 K/UL (ref 3.6–11)

## 2021-02-18 PROCEDURE — 74011250636 HC RX REV CODE- 250/636: Performed by: FAMILY MEDICINE

## 2021-02-18 PROCEDURE — 80048 BASIC METABOLIC PNL TOTAL CA: CPT

## 2021-02-18 PROCEDURE — 65660000000 HC RM CCU STEPDOWN

## 2021-02-18 PROCEDURE — 74011000250 HC RX REV CODE- 250: Performed by: STUDENT IN AN ORGANIZED HEALTH CARE EDUCATION/TRAINING PROGRAM

## 2021-02-18 PROCEDURE — 74011636637 HC RX REV CODE- 636/637: Performed by: STUDENT IN AN ORGANIZED HEALTH CARE EDUCATION/TRAINING PROGRAM

## 2021-02-18 PROCEDURE — 83735 ASSAY OF MAGNESIUM: CPT

## 2021-02-18 PROCEDURE — 77010033678 HC OXYGEN DAILY

## 2021-02-18 PROCEDURE — 36415 COLL VENOUS BLD VENIPUNCTURE: CPT

## 2021-02-18 PROCEDURE — 74011250637 HC RX REV CODE- 250/637: Performed by: STUDENT IN AN ORGANIZED HEALTH CARE EDUCATION/TRAINING PROGRAM

## 2021-02-18 PROCEDURE — 94760 N-INVAS EAR/PLS OXIMETRY 1: CPT

## 2021-02-18 PROCEDURE — 74011250636 HC RX REV CODE- 250/636: Performed by: STUDENT IN AN ORGANIZED HEALTH CARE EDUCATION/TRAINING PROGRAM

## 2021-02-18 PROCEDURE — 70496 CT ANGIOGRAPHY HEAD: CPT

## 2021-02-18 PROCEDURE — 99231 SBSQ HOSP IP/OBS SF/LOW 25: CPT | Performed by: CLINICAL NURSE SPECIALIST

## 2021-02-18 PROCEDURE — 82962 GLUCOSE BLOOD TEST: CPT

## 2021-02-18 PROCEDURE — 97535 SELF CARE MNGMENT TRAINING: CPT

## 2021-02-18 PROCEDURE — 74011000636 HC RX REV CODE- 636: Performed by: RADIOLOGY

## 2021-02-18 PROCEDURE — 92610 EVALUATE SWALLOWING FUNCTION: CPT

## 2021-02-18 PROCEDURE — 85025 COMPLETE CBC W/AUTO DIFF WBC: CPT

## 2021-02-18 PROCEDURE — 99232 SBSQ HOSP IP/OBS MODERATE 35: CPT | Performed by: FAMILY MEDICINE

## 2021-02-18 PROCEDURE — 80061 LIPID PANEL: CPT

## 2021-02-18 PROCEDURE — 84100 ASSAY OF PHOSPHORUS: CPT

## 2021-02-18 RX ORDER — INSULIN GLARGINE 100 [IU]/ML
25 INJECTION, SOLUTION SUBCUTANEOUS DAILY
Status: DISCONTINUED | OUTPATIENT
Start: 2021-02-18 | End: 2021-02-18

## 2021-02-18 RX ORDER — INSULIN GLARGINE 100 [IU]/ML
25 INJECTION, SOLUTION SUBCUTANEOUS DAILY
Status: DISCONTINUED | OUTPATIENT
Start: 2021-02-18 | End: 2021-02-19

## 2021-02-18 RX ORDER — ASPIRIN 600 MG/1
300 SUPPOSITORY RECTAL DAILY
Status: DISCONTINUED | OUTPATIENT
Start: 2021-02-18 | End: 2021-02-18

## 2021-02-18 RX ORDER — ASPIRIN 600 MG/1
300 SUPPOSITORY RECTAL DAILY
Status: DISCONTINUED | OUTPATIENT
Start: 2021-02-18 | End: 2021-02-19

## 2021-02-18 RX ORDER — METRONIDAZOLE 500 MG/100ML
500 INJECTION, SOLUTION INTRAVENOUS EVERY 12 HOURS
Status: DISCONTINUED | OUTPATIENT
Start: 2021-02-18 | End: 2021-02-19

## 2021-02-18 RX ORDER — GUAIFENESIN 100 MG/5ML
81 LIQUID (ML) ORAL DAILY
Status: DISCONTINUED | OUTPATIENT
Start: 2021-02-19 | End: 2021-02-18

## 2021-02-18 RX ORDER — ASPIRIN 325 MG
325 TABLET ORAL
Status: DISCONTINUED | OUTPATIENT
Start: 2021-02-18 | End: 2021-02-18

## 2021-02-18 RX ADMIN — Medication 10 ML: at 21:45

## 2021-02-18 RX ADMIN — METRONIDAZOLE 500 MG: 500 INJECTION, SOLUTION INTRAVENOUS at 08:17

## 2021-02-18 RX ADMIN — CEFEPIME HYDROCHLORIDE 2 G: 2 INJECTION, POWDER, FOR SOLUTION INTRAVENOUS at 21:37

## 2021-02-18 RX ADMIN — FAMOTIDINE 20 MG: 10 INJECTION, SOLUTION INTRAVENOUS at 18:43

## 2021-02-18 RX ADMIN — ASPIRIN 300 MG: 600 SUPPOSITORY RECTAL at 12:19

## 2021-02-18 RX ADMIN — Medication 10 ML: at 13:17

## 2021-02-18 RX ADMIN — IOPAMIDOL 100 ML: 755 INJECTION, SOLUTION INTRAVENOUS at 09:07

## 2021-02-18 RX ADMIN — HYDRALAZINE HYDROCHLORIDE 10 MG: 20 INJECTION, SOLUTION INTRAMUSCULAR; INTRAVENOUS at 15:59

## 2021-02-18 RX ADMIN — HEPARIN SODIUM 5000 UNITS: 5000 INJECTION INTRAVENOUS; SUBCUTANEOUS at 14:46

## 2021-02-18 RX ADMIN — INSULIN LISPRO 2 UNITS: 100 INJECTION, SOLUTION INTRAVENOUS; SUBCUTANEOUS at 05:13

## 2021-02-18 RX ADMIN — HYDRALAZINE HYDROCHLORIDE 10 MG: 20 INJECTION, SOLUTION INTRAMUSCULAR; INTRAVENOUS at 05:07

## 2021-02-18 RX ADMIN — METRONIDAZOLE 500 MG: 500 INJECTION, SOLUTION INTRAVENOUS at 21:42

## 2021-02-18 RX ADMIN — VANCOMYCIN HYDROCHLORIDE 1000 MG: 1 INJECTION, POWDER, LYOPHILIZED, FOR SOLUTION INTRAVENOUS at 00:07

## 2021-02-18 RX ADMIN — INSULIN LISPRO 2 UNITS: 100 INJECTION, SOLUTION INTRAVENOUS; SUBCUTANEOUS at 18:42

## 2021-02-18 RX ADMIN — INSULIN GLARGINE 25 UNITS: 100 INJECTION, SOLUTION SUBCUTANEOUS at 10:58

## 2021-02-18 RX ADMIN — SODIUM CHLORIDE: 900 INJECTION, SOLUTION INTRAVENOUS at 10:56

## 2021-02-18 RX ADMIN — INSULIN LISPRO 3 UNITS: 100 INJECTION, SOLUTION INTRAVENOUS; SUBCUTANEOUS at 12:30

## 2021-02-18 RX ADMIN — Medication 10 ML: at 05:07

## 2021-02-18 RX ADMIN — HEPARIN SODIUM 5000 UNITS: 5000 INJECTION INTRAVENOUS; SUBCUTANEOUS at 05:07

## 2021-02-18 RX ADMIN — VANCOMYCIN HYDROCHLORIDE 1000 MG: 1 INJECTION, POWDER, LYOPHILIZED, FOR SOLUTION INTRAVENOUS at 13:18

## 2021-02-18 RX ADMIN — CEFEPIME HYDROCHLORIDE 2 G: 2 INJECTION, POWDER, FOR SOLUTION INTRAVENOUS at 08:19

## 2021-02-18 NOTE — DIABETES MGMT
[facility]  CLINICAL NURSE SPECIALIST CONSULT  PROGRAM FOR DIABETES HEALTH    FOLLOW-UP NOTE    Presentation   Naa Ledesma is a 76 y.o. female presented to the ED 2/16/21 by EMS after being found down by neighbors. Per chart review, patient advised she had fallen several days ago and had been unable to get up. Patient found to be in DKA on arrival with BG of 607 and AG of 17 by labs.       HX:   Past Medical History (click to expand or collapse)        Past Medical History:   Diagnosis Date    CAD (coronary artery disease)      Diabetes ([facility] [address]      Diabetes ([facility] [address]       type 2    GERD (gastroesophageal reflux disease)      H/O seasonal allergies      Hyperlipidemia      Hypertension      Insomnia      Nausea & vomiting      PUD (peptic ulcer disease)      Small bowel obstruction (HCC)      Vitamin D deficiency              Current clinical course has been uncomplicated.      Diabetes: Patient has known Type 2 diabetes. Home diabetes medication regimen is NPH 30 units twice daily, regular insulin 25 units before meals, and Metformin 500 mg daily. Admission  and A1c 7.2 (9/30/20) indicate poor diabetes control. Subjective   Patient sitting up in bed, alert and oriented. Patient slow to answer questions, but more responsive than yesterday. Patient reports feeling \"really tired still. \"  Patient has breakfast tray on bedside table but reports \"too tired to eat. \"    Objective   Physical exam  General Alert, oriented and ill-appearing. Conversant and cooperative. Vital Signs   Visit Vitals  BP (!) 177/90   Pulse 84   Temp (!) 96.7 °F (35.9 °C)   Resp 18   Ht 5' 4\" (1.626 m)   Wt 65.6 kg (144 lb 10 oz)   SpO2 98%   BMI 24.82 kg/m²     Skin  Warm and dry  Heart   Regular rate and rhythm.  No murmurs, rubs or gallops  Lungs  Clear to auscultation without rales or rhonchi  Extremities No foot wounds    Laboratory  Lab Results   Component Value Date/Time    Hemoglobin A1c 8.8 (H) 02/16/2021 11:17 AM     Lab Results   Component Value Date/Time    LDL, calculated 197.6 (H) 03/02/2020 02:48 PM     Lab Results   Component Value Date/Time    Creatinine 0.83 02/18/2021 03:42 AM     Lab Results   Component Value Date/Time    Sodium 146 (H) 02/18/2021 03:42 AM    Potassium 3.6 02/18/2021 03:42 AM    Chloride 113 (H) 02/18/2021 03:42 AM    CO2 27 02/18/2021 03:42 AM    Anion gap 6 02/18/2021 03:42 AM    Glucose 189 (H) 02/18/2021 03:42 AM    BUN 25 (H) 02/18/2021 03:42 AM    Creatinine 0.83 02/18/2021 03:42 AM    BUN/Creatinine ratio 30 (H) 02/18/2021 03:42 AM    GFR est AA >60 02/18/2021 03:42 AM    GFR est non-AA >60 02/18/2021 03:42 AM    Calcium 9.4 02/18/2021 03:42 AM    Bilirubin, total 0.5 02/16/2021 11:17 AM    Alk. phosphatase 125 (H) 02/16/2021 11:17 AM    Protein, total 9.2 (H) 02/16/2021 11:17 AM    Albumin 4.0 02/16/2021 11:17 AM    Globulin 5.2 (H) 02/16/2021 11:17 AM    A-G Ratio 0.8 (L) 02/16/2021 11:17 AM    ALT (SGPT) 22 02/16/2021 11:17 AM     Lab Results   Component Value Date/Time    ALT (SGPT) 22 02/16/2021 11:17 AM       Tests 2/16/21  1117 2/16/21  1436 2/16/21  1640 2/16/21  2032 2/17/21  0229   A1c 8.8 - - - -    529 372 188 275   Anion gap 17 18 16 9 11   Serum creatinine 1.83 1.46 1.47 1.40 1.28   GFR 27 35 35 37 41       Factors affecting BG pattern  Factor Dose Comments   Nutrition:  Full liquid   Full liquid   Diabetic consistent CHO when diet advances   Drugs:  Vasopressor load  Steroids  HIV   Epogen  Blood transfusion(s)  Other: n/a   n/a  n/a  n/a  n/a  n/a         A1cs inaccurate  A1cs inaccurate   Pain 0/10    Infection Cefepime, Flagyl, Vanc Sepsis of unknown etiology   Other: n/a n/a      Blood glucose pattern        Evaluation   This 76year old female, with Type 2 diabetes, did not achieve diabetes control prior to admission (PTA), as evidenced by admission BG of 607 and A1c of 7.2%.  Based on assessment of diabetes self-care practices, interventions that warrant action while hospitalized include: Unable to assess at this time. The patient would also benefit from diabetes self-management education and support BRISEYDA LUNDY [facility]) after discharge.     During this hospitalization, the patient has not achieved inpatient blood glucose target of 100-180mg/dl. BG's have ranged 177-216 over the past 24 hours. Factors that have played a role include:  [x]? Critical nature of illness state  [x]? Meal/tube feeding disruption  [x]? Compromised insulin absorption or delivery  [x]? Kidney dysfunction     Basal insulin is in use. Lantus 25 units currently ordered to start tomorrow 2/19/21. Notified family practice that there is no basal scheduled for today, family practice MD indicated would update.      Bolus insulin isn't in use. Patient isn't eating meals or on tube feedings.     Corrective insulin is in use. Patient has required 8 units of corrective insulin over the past 24 hours. Patient has one documented corrective insulin dose of 216 units at 1750, BG at that time was 216. Suspected patient got the appropriate correction of 3 units at that time.      Insulin drip with glucostabilizer started at 1449 2/16/21. Insulin drip with glucostabilizer discontinued at 0728 today (2/17/21)      To optimize BG control and support a positive health outcome, would utilize the Subcutaneous Insulin Order set (8649).    Impression: It is suspected that patient will require basal and corrective insulin while NPO and will require the addition of bolus insulin once diet advances. Assessment and Plan   Nursing Diagnosis Risk for unstable blood glucose pattern   Nursing Intervention Domain 8454 Decision-making Support   Nursing Interventions Examined current inpatient diabetes control   Explored factors facilitating and impeding inpatient management       Recommendations   Recommend:     [x]?         Use of Subcutaneous Insulin Order set (7910)  Insulin Use Recommendation   Basal                                      (Based on weight, BMI & GFR) Addresses basic metabolic needs Continue Lantus 25 units daily, first dose today. Nutritional                                      (Based on CHO load) Addresses nutrition interventions Humalog 5 units with meals, hold if patient eats < 50% CHO on meal tray. Corrective                                       (Offset gaps in dosing) Useful in adjusting insulin dosing Correctional Scale for Normal Sensitivity     200-249- 2units Humalog  [id number] Humalog  [phone] Humalog  [id number] Humalog  Over 400- 10units Humalog     Do NOT hold for NPO; give in addition to meal time insulin dose.     If patient does not eat, -give correction dose only.         [x]? Referral to     [x]? Diabetes Self-Management Training through [facility] (Phone [phone] to schedule appointment)    Time Spent/ Billing Codes     Total time spent with patient: 15 Minutes   I personally reviewed chart, notes, data and current medications in the medical record. I have personally examined and treated the patient at bedside during this period.      [x] 11162 IP subsequent hospital care - 15 minutes     IRINA Stuart  Diabetes Clinical Nurse Specialist  [facility]  Access via [facility]

## 2021-02-18 NOTE — PROGRESS NOTES
Bedside and Verbal shift change report given to Apollo Guan RN (oncoming nurse) by Payam Swanson RN (offgoing nurse). Report included the following information SBAR, Kardex, Intake/Output, MAR, Accordion and Recent Results. 0830 - PT off floor, unable to check vital signs, will obtain when pt returns    1 - Called pt daughter to update       Bedside and Verbal shift change report given to KIRILL Buckner (oncoming nurse) by Apollo Guan RN (offgoing nurse). Report included the following information SBAR, Kardex, Intake/Output, MAR, Accordion and Recent Results.

## 2021-02-18 NOTE — PROGRESS NOTES
Problem: Self Care Deficits Care Plan (Adult)  Goal: *Acute Goals and Plan of Care (Insert Text)  Description:   FUNCTIONAL STATUS PRIOR TO ADMISSION: Pt is unable to provide PLOF. Per chart, she lives alone in a \"hoarder-type\" situation. Infer she was able to manage ADLs with mod I.      HOME SUPPORT: The patient lived alone. Occupational Therapy Goals  Initiated 2/17/2021  1. Patient will perform grooming with supervision/set-up within 7 day(s). 2.  Patient will perform upper body dressing, seated EOB, with supervision/set-upwithin 7 day(s). 3.  Patient will perform anterior bathing, seated EOB, with supervision/set-up within 7 day(s). 4.  Patient will perform toilet transfers with minimal assistance/contact guard assist within 7 day(s). 5.  Patient will perform all aspects of toileting with minimal assistance/contact guard assist within 7 day(s). 6.  Patient will participate in upper extremity therapeutic exercise/activities with minimal assistance/contact guard assist for 10 minutes within 7 day(s). 7.  Patient will utilize energy conservation techniques during functional activities with verbal cues within 7 day(s). 8.  Patient will follow 100% of commands during ADLs within 7 days. Outcome: Progressing Towards Goal   OCCUPATIONAL THERAPY TREATMENT  Patient: Alissa Mei [de-identified]76 y.o. female)  Date: 2/18/2021  Diagnosis: AMS (altered mental status) [R41.82]  Severe sepsis ([facility] [address] [A41.9, R65.20]  DKA (diabetic ketoacidoses) ([address] [E11.10] Severe sepsis ([facility] [address]       Precautions: Fall  Chart, occupational therapy assessment, plan of care, and goals were reviewed. ASSESSMENT  Patient continues with skilled OT services and is slowly progressing towards goals. Pts eyes open to voice, oriented to person and place, disoriented to time. Decreased command following for ADl tasks. Pt wiped her mouth, verbal cues to wash entire face. Pt fatigued following task.     Current Level of Function Impacting Discharge (ADLs): Dep to ADL's    Other factors to consider for discharge:          PLAN :  Patient continues to benefit from skilled intervention to address the above impairments. Continue treatment per established plan of care. to address goals. Recommend with staff: Bed in chair position for ADl's, there ex, there act    Recommend next OT session: cont towards goals    Recommendation for discharge: (in order for the patient to meet his/her long term goals)  Therapy up to 5 days/week in SNF setting    This discharge recommendation:  Has not yet been discussed the attending provider and/or case management    IF patient discharges home will need the following DME:        SUBJECTIVE:   Patient stated [place]    OBJECTIVE DATA SUMMARY:   Cognitive/Behavioral Status:  Neurologic State: Drowsy; Eyes open to voice  Orientation Level: Disoriented to time;Oriented to person;Oriented to place  Cognition: Decreased attention/concentration;Decreased command following             Functional Mobility and Transfers for ADLs:  Bed Mobility:   Total assist    Transfers: Total assist       Balance: Total assist    ADL Intervention:       Grooming  Grooming Assistance: Maximum assistance  Washing Face: Maximum assistance  Cues: Verbal cues provided         Activity Tolerance:   Poor    After treatment patient left in no apparent distress:   Supine in bed    COMMUNICATION/COLLABORATION:   The patients plan of care was discussed with: Occupational therapist and Registered nurse.      REGGIE Ashton/L  Time Calculation: 20 mins

## 2021-02-18 NOTE — PROGRESS NOTES
[address] Dosing Services: 2/18/2021    [facility]   Automatic Dosing Conversion for Metronidazole (Flagyl©)   Metronidazole dosed at 500mg every 12 hours obtains peak serum levels of approximately 23ug/ml (more than 10 times the SIMON for B. fragilis [1-2ug/ml]) and trough levels of about 7 ug/ml. Based on these pharmacokinetic and pharmacodynamic properties, dosing of metronidazole every 12 or every 24 hours is appropriate for the treatment of anaerobic infections.       Pharmacy will automatically change the dose of metronidazole to 500 mg every 12 hours for all indications except for the following:   C. difficile infection   CNS infections   Non-anaerobic infections (giardiasis, trichomonas, H pylori, etc)      Metronidazole 500 mg q8h changed to 500 mg IVPB q12h     Thank you,    Pharmacist Amanda [phone]

## 2021-02-18 NOTE — PROGRESS NOTES
Telemetry reviewed no AV block  Defer BP management to primary team  Please call if there are any new developments that require cardiology input.

## 2021-02-18 NOTE — PROGRESS NOTES
Problem: Dysphagia (Adult)  Goal: *Acute Goals and Plan of Care (Insert Text)  Description: Swallowing goals initiated 2-18-21:  1)  tolerate full liquids without s/s aspiration by 2-19-21:  Outcome: Progressing Towards Goal   SPEECH LANGUAGE PATHOLOGY BEDSIDE SWALLOW EVALUATION  Patient: Edilma Solano [de-identified]76 y.o. female)  Date: 2/18/2021  Primary Diagnosis: AMS (altered mental status) [R41.82]  Severe sepsis ([facility] [A41.9, R65.20]  DKA (diabetic ketoacidoses) ([facility] [E11.10]        Precautions: aspiration  Fall    ASSESSMENT :  Based on the objective data described below, the patient presents with lethargy that required moderate cues to eat and drink. When alert enough and cued, she swallow without issues. Some oral holding. A-P propulsion of pills may  be problematic until she is more alert. PMH of HTN, CAD s/p stent, T2DM on insulin, HLD, GERD, sciatica, insomnia, allergies,  who is admitted for AMS, Severe sepsis of unknown source, and DKA. Patient will benefit from skilled intervention to address the above impairments. Patients rehabilitation potential is considered to be Fair     PLAN :  Recommendations and Planned Interventions:  Ok to start full liquid diet. Feed only when awake and alert. Watch for pill holding. May need to wait until more alert. Frequency/Duration: Patient will be followed by speech-language pathology 2 times a week to address goals. Discharge Recommendations: To Be Determined     SUBJECTIVE:   Patient stated I like unsweet. .. Phill Perkins .     OBJECTIVE:     Past Medical History:   Diagnosis Date    CAD (coronary artery disease)     Diabetes ([facility]     Diabetes ([facility]     type 2    GERD (gastroesophageal reflux disease)     H/O seasonal allergies     Hyperlipidemia     Hypertension     Insomnia     Nausea & vomiting     PUD (peptic ulcer disease)     Small bowel obstruction (HCC)     Vitamin D deficiency      Past Surgical History:   Procedure Laterality Date    HX APPENDECTOMY      HX BREAST BIOPSY      HX GYN  1971    partial hysterectomy    HX GYN  1991    complete hysterectomy    HX HYSTERECTOMY      HX ORTHOPAEDIC      right rotator cuff repair    HX ORTHOPAEDIC      bilaterral wrist surgery    HX ORTHOPAEDIC      right knee surgery    HX ORTHOPAEDIC      bilateral bone spur removal from big toe    HX TONSIL AND ADENOIDECTOMY      HX TONSILLECTOMY      VA BREAST SURGERY PROCEDURE UNLISTED      breast reduction    VA CARDIAC SURG PROCEDURE UNLIST      heart stent     Prior Level of Function/Home Situation:   Home Situation  Home Environment: Private residence  Living Alone: Yes  Support Systems: Family member(s), Friends \ neighbors  Patient Expects to be Discharged to[de-identified] Skilled nursing facility  Diet prior to admission: regular, thins  Current Diet:  NPO   Cognitive and Communication Status:  Neurologic State: (lethargic. will wake up with stimulation)  Orientation Level: Oriented to person  Cognition: Decreased attention/concentration, Decreased command following  Perception: Cues to maintain midline in sitting, Tactile, Verbal, Visual(cues to maintain eyes open)  Perseveration: No perseveration noted  Safety/Judgement: Decreased awareness of environment, Decreased awareness of need for assistance, Decreased awareness of need for safety, Decreased insight into deficits  Oral Assessment:  Oral Assessment  Labial: No impairment  Dentition: Upper dentures  Oral Hygiene: WFL  Lingual: No impairment  Velum: No impairment  Mandible: No impairment  P.O. Trials:  Patient Position: upright in bed  Vocal quality prior to P.O.: No impairment  Consistency Presented: Puree; Thin liquid  How Presented: SLP-fed/presented(max cues to drink, eat)   ORAL PHASE:   Bolus Acceptance: No impairment  Bolus Formation/Control: No impairment     Propulsion: No impairment  Oral Residue: None  PHARYNGEAL PHASE:   Initiation of Swallow: No impairment  Laryngeal Elevation: Functional  Aspiration Signs/Symptoms: None SPEECH: No dysarthria. NOMS:   The NOMS functional outcome measure was used to quantify this patient's level of swallowing impairment. Based on the NOMS, the patient was determined to be at level 5 for swallow function       NOMS Swallowing Levels:  Level 1 (CN): NPO  Level 2 (CM): NPO but takes consistency in therapy  Level 3 (CL): Takes less than 50% of nutrition p.o. and continues with nonoral feedings; and/or safe with mod cues; and/or max diet restriction  Level 4 (CK): Safe swallow but needs mod cues; and/or mod diet restriction; and/or still requires some nonoral feeding/supplements  Level 5 (CJ): Safe swallow with min diet restriction; and/or needs min cues  Level 6 (CI): Independent with p.o.; rare cues; usually self cues; may need to avoid some foods or needs extra time  Level 7 ([address] [place] [address]): Independent for all p.o.  SANCHEZ. (2003). National Outcomes Measurement System (NOMS): Adult Speech-Language Pathology User's Guide. Pain:             After treatment:   Patient left in no apparent distress in bed and Nursing notified    COMMUNICATION/EDUCATION:   Patient was educated regarding her deficit(s) of potential dysphagia  as this relates to her diagnosis of AMS. She demonstrated Good understanding as evidenced by discussion. .    The patient's plan of care including recommendations, planned interventions, and recommended diet changes were discussed with: Registered nurse. Patient/family have participated as able in goal setting and plan of care. Patient/family agree to work toward stated goals and plan of care.     Thank you for this referral.  CORY Vieira  Time Calculation: 15 mins

## 2021-02-18 NOTE — PROGRESS NOTES
[address] [place] [address]   Office [phone]  Fax [phone]          Assessment and Plan     Wayne Lyles is a 76 y.o. female with a PMH of HTN, CAD s/p stent, T2DM on insulin, HLD, GERD, sciatica, insomnia, allergies admitted for AMS, Severe sepsis of unknown source, and DKA. 24 Hour Events: No acute events      Severe Sepsis of unknown source: POA LA 3.6 >> 1.2, WBC 15.9, . CRP 7.08. CXR negative. UA neg for mendez/LE/nitrites. RVP neg. S/p 30cc/kg bolus. CT abd/pelvis no acute process. - Broad spectrum abx: Cefepime, Vanc, Flagyl  - F/u UCx  - F/u BCx 1/4 G+cocci in clusters   - CBC, CMP daily    AMS: likely multifactorial (infectious vs metabolic vs cardiac). CT head/cervical spine negative. Ammonia, TSH, Etoh, UDS wnl. Volatiles - acetone. - CBC, BMP, Mag, Phosphorus    CVA: MRI infarct in thalamus.  - F/u CTA head & neck  - F/u Lipid  - Neuro consulted, appreciate recs  - Speech following    DKA: RESOLVED. POA AGMA, + blood ketones, and hyperglycemia (). S/p insulin gtt.      T2DM: A1C 8.8. Home NPH 30U BID & Regular insulin 25U before meals.   - Lantus 20U   - SSI + POC glu checks q6H  - Hypoglycemia protocol    Mobitz Type II 2nd degree AV block: EKG with mobitz type 2 AV block. BNP nml. Trop neg x1. ECHO LVEF 60-65%. - Cardiac monitoring  - Cardiology consulted, appreciate recs     COVID negative: POA CRP 7.08, Ddimer 0.83, ferritin 124, . COVID PCR negative.       EVAN: POA Cr 1.83 (BL 0.7)  - IVF  - Monitor on daily CMP     Cellulitis of left buttock:  - Wound care      QTC prolonged: POA QTc 543. - Avoid QTc prolonging agents     HTN: POA /46. Home Norvasc 5mg daily, HCTZ 25mg daily, Cozaar 100mg daily. - Continue home Norvasc 5mg qHS  - Hydralazine 10mg q6H for BP >160/100  - Continue to monitor and adjust with BP trend     HLD: Tchol 295, .6, HDL 36,  (3/2020).  No home meds d/t statin intolerance (muscle pain).      GERD: Home Pepcid 20mg BID. - Pepcid     Allergies: Home Loratadine 10mg daily prn.  - Hold home meds     Insomnia: Home Melatonin 10mg daily prn, Seroquel 50mg qHS. - Hold home meds     Bilateral low back pain w/Sciatica & Knee OA: F/w Dr. Mikayla Fierro (Ortho) and Dr. Vickie Betts (pain management).     Pancreatic cyst: Seen on CT A/P and MRI abd     FEN/GI: NPO. 1/2 NS at 110 mL/hr. Activity: Ambulate with assistance. Fall precautions  DVT prophylaxis: SQH  GI prophylaxis:  Pepcid  Disposition: Plan to d/c to TBD. PT/OT consulted. Family prefers SNF. POC: Rosalind Chand [phone] (daughter). Fito Vaughn [phone] (daughter).      CODE STATUS:  Full, discussed with daughters         Nargis Cifuentes MD  Family Medicine Resident         Subjective / Objective     Subjective: Pt was seen and examined at bedside. Afebrile and hemodynamically stable. .     Objective:  Temp (24hrs), Av.9 °F (36.6 °C), Min:96.7 °F (35.9 °C), Max:98.2 °F (36.8 °C)     Visit Vitals  BP (!) 177/90   Pulse 84   Temp (!) 96.7 °F (35.9 °C)   Resp 18   Ht 5' 4\" (1.626 m)   Wt 144 lb 10 oz (65.6 kg)   SpO2 98%   BMI 24.82 kg/m²     Physical Exam:   General: No acute distress. Alert. Cooperative. Respiratory: CTAB. No w/r/r/c.   Cardiovascular: Normal S1,S2. No m/r/g.   GI: Nondistended.+ bowel sounds. Nontender. No rebound tenderness or guarding. Extremities: No LE edema. Skin: Warm, dry. No rashes. Neuro: Alert     Respiratory: O2 Flow Rate (L/min): 2 l/min O2 Device: Nasal cannula   Date 21 07 - 21 0659 21 07 - 21 0659   Shift 1862-1542 [id number] 24 Hour Total 1529-6325 0457-9125 24 Hour Total   INTAKE   P.O. 0 0 0        P. O. 0 0 0      Shift Total(mL/kg) 0(0) 0(0) 0(0)      OUTPUT   Urine(mL/kg/hr) 400(0.5) 250(0.3) 650(0.4)        Urine Voided 400  400        Urine Occurrence(s)  1 x 1 x        Urine Output (mL) (External Female Catheter 21)  250 250      Shift Total(mL/kg) 400(5.8) 250(3.8) 650(9.9)      NET -400 -250 -650      Weight (kg) 68.9 65.6 65.6 65.6 65.6 65.6       Inpatient Medications  Current Facility-Administered Medications   Medication Dose Route Frequency    potassium phosphate 20 mmol in 0.9% sodium chloride 250 mL infusion   IntraVENous ONCE    iopamidoL (ISOVUE-370) 76 % injection 100 mL  100 mL IntraVENous RAD ONCE    insulin glargine (LANTUS) injection 25 Units  25 Units SubCUTAneous DAILY    aspirin (ASA) suppository 300 mg  300 mg Rectal DAILY    dextrose (D50W) injection syrg 12.5-25 g  25-50 mL IntraVENous PRN    0.45% sodium chloride infusion  110 mL/hr IntraVENous CONTINUOUS    insulin lispro (HUMALOG) injection   SubCUTAneous Q6H    vancomycin (VANCOCIN) 1,000 mg in 0.9% sodium chloride 250 mL (VIAL-MATE)  1,000 mg IntraVENous Q12H    cefepime (MAXIPIME) 2 g in sterile water (preservative free) 10 mL IV syringe  2 g IntraVENous Q12H    hydrALAZINE (APRESOLINE) 20 mg/mL injection 10 mg  10 mg IntraVENous Q6H PRN    sodium chloride (NS) flush 5-10 mL  5-10 mL IntraVENous PRN    sodium chloride (NS) flush 5-40 mL  5-40 mL IntraVENous Q8H    sodium chloride (NS) flush 5-40 mL  5-40 mL IntraVENous PRN    metroNIDAZOLE (FLAGYL) IVPB premix 500 mg  500 mg IntraVENous Q8H    heparin (porcine) injection 5,000 Units  5,000 Units SubCUTAneous Q8H    Vancomycin- Pharmacy Dosing per Level   Other Rx Dosing/Monitoring    amLODIPine (NORVASC) tablet 5 mg  5 mg Oral QHS    famotidine (PF) (PEPCID) 20 mg in 0.9% sodium chloride 10 mL injection  20 mg IntraVENous QPM         Allergies  Allergies   Allergen Reactions    Advicor [Niacin-Lovastatin] Swelling    Conjugated Estrogens Other (comments)     Headaches      Other reaction(s):  Other (comments)  Headaches    Deflazacort Unable to Obtain     Other reaction(s): Unable to Obtain    Erythromycin Nausea Only    Erythromycin Base Nausea and Vomiting    Niacin Swelling    Pitavastatin Other (comments)     Abdominal pain, nausea  Other reaction(s): Other (comments)  Abdominal pain, nausea    Simvastatin Other (comments)     Muscle cramps  Other reaction(s): Other (comments)  Muscle cramps    Unable To Obtain Swelling    Zolpidem Other (comments)     Other reaction(s): Other (comments)    Atorvastatin Other (comments)     Muscle cramps  Other reaction(s): Other (comments)  Muscle cramps    Triamcinolone Unable to Obtain     Other reaction(s): Unable to Obtain         CBC:  Recent Labs     02/18/21 0342 02/17/21 0229 02/16/21  1117   WBC 8.2 16.6* 15.9*   HGB 14.3 15.1 18.8*   HCT 44.1 45.4 56.0*    451* 174*       Metabolic Panel:  Recent Labs     02/18/21 0342 02/17/21 0229 02/16/21 2032 02/16/21  1127 02/16/21  1127 02/16/21  1117   * 148* 150*   < >  --  139   K 3.6 4.1 2.9*   < >  --  3.8   * 117* 118*   < >  --  104   CO2 27 20* 23   < >  --  18*   BUN 25* 47* 48*   < >  --  49*   CREA 0.83 1.28* 1.40*   < >  --  1.83*   * 275* 188*   < >  --  607*   CA 9.4 10.3* 10.3*   < >  --  11.3*   MG 2.0 2.3 2.2   < >  --  2.7*   PHOS 1.9* 1.4* 0.8*   < >  --  4.4   ALB  --   --   --   --   --  4.0   ALT  --   --   --   --   --  22   INR  --  1.6*  --   --  1.4*  --     < > = values in this interval not displayed. Procedures: None. Imaging/Diagnostic Studies:  Ct Head Wo Cont 2/16/2021   No acute intracranial process is identified.       Ct Spine Cerv Wo Cont 2/16/2021   1. No fracture or subluxation is identified. There are changes of spondylosis as described above. Results from Hospital Encounter encounter on 02/16/21   XR CHEST PORT    Narrative Portable chest single view dated 2/16/2021    History is chest pain    A single portable AP semierect view of the chest was obtained. The cardiac  silhouette is normal in size. There is no evidence of active lung disease. The  costophrenic angles are sharp in appearance.       Impression Negative radiographic examination of the chest.             Results from Hospital Encounter encounter on 02/16/21   CT ABD PELV WO CONT    Narrative EXAM:  CT ABD PELV WO CONT    INDICATION: Generalized abd tenderness    COMPARISON: None. CONTRAST:  None. TECHNIQUE:   Thin axial images were obtained through the abdomen and pelvis. Coronal and  sagittal reconstructions were generated. Oral contrast was not administered. CT  dose reduction was achieved through use of a standardized protocol tailored for  this examination and automatic exposure control for dose modulation. FINDINGS:   Lower Thorax:  Lung Bases: Clear. Heart: The heart is normal in size. No pericardial effusion. Coronary artery  calcifications/stents. Prominent calcific atherosclerosis of the descending  thoracic aorta. Abdomen/Pelvis:  Evaluation of the solid organs is markedly limited without the use of IV  contrast.    Liver:  No focal liver lesions. Biliary system: Gallbladder is unremarkable. Spleen: Normal.    Pancreas: In the head of the pancreas, there is a circumscribed cystic lesion  measuring 4.3 x 3.8 x 4.7 cm (series 2 image 29 and series 601 image 50). There  is no pancreatic duct dilation or pancreatic atrophy. Kidneys/Ureters/Bladder: No renal masses. No renal or ureteral calculi. No  hydronephrosis or hydroureter. The bladder is normal.    Adrenals: Normal.    Stomach/bowel: No dilation or abnormal wall thickening is present. No free  intraperitoneal air noted. Sigmoid diverticulosis. Small hiatal hernia. Reproductive Organs: Status post hysterectomy. No suspicious adnexal masses. In  the right ischiorectal fossa, there is a chronic, cystic calcified lesion  measuring 4.0 x 2.4 cm. Vasculature: Normal caliber arteries. Severe calcific atherosclerosis of the  abdominal aorta. Nodes: No pathologically enlarged lymph nodes. Fluid: No free fluid. Bones/Soft Tissue: No acute fractures or aggressive osseous lesions are seen.   Grade 1 anterolisthesis of L3 on L4 and L4 on L5 with severe facet arthropathy  throughout the lumbar spine and multilevel degenerative disc disease most  advanced at L4-L5. There is degenerative disc disease scattered throughout the  thoracic spine. Impression 1. No evidence of acute process in the abdomen or pelvis. 2. Cystic lesion in the head of the pancreas measuring 4.3 x 3.8 x 4.7 cm, with  differential considerations including mucinous cystic neoplasm (favored), serous  cystadenoma, or large side branch IPMN among others. MRI abdomen on a  nonemergent basis is recommended for further evaluation. 3. Nonspecific chronic calcified cystic lesion in the right ischiorectal fossa  measuring 4.0 x 2.4 cm. 4. Severe calcific atherosclerosis of the abdominal aorta. 5. Additional findings as above.                      For Billing    Chief Complaint   Patient presents with    Altered mental status   [facility] Problems  Date Reviewed: 2/10/2021          Codes Class Noted POA    DKA (diabetic ketoacidoses) ([facility] ICD-10-CM: E11.10  ICD-9-CM: 250.12  2/16/2021 Unknown        * (Principal) Severe sepsis ([facility] ICD-10-CM: A41.9, R65.20  ICD-9-CM: 038.9, 995.92  2/16/2021 Unknown        AMS (altered mental status) ICD-10-CM: R41.82  ICD-9-CM: 780.97  2/16/2021 Unknown        Insomnia (Chronic) ICD-10-CM: G47.00  ICD-9-CM: 780.52  2/16/2021 Unknown        Pancreatic cyst ICD-10-CM: K86.2  ICD-9-CM: 577.2  2/16/2021 Unknown    Overview Signed 2/16/2021  9:12 PM by Oxana Mcintyre MD     F/u MRI Outpatient             GERD (gastroesophageal reflux disease) (Chronic) ICD-10-CM: K21.9  ICD-9-CM: 530.81  2/16/2021 Unknown        Prolonged Q-T interval on ECG ICD-10-CM: R94.31  ICD-9-CM: 794.31  2/16/2021 Unknown        EVAN (acute kidney injury) ([facility] ICD-10-CM: N17.9  ICD-9-CM: 584.9  2/16/2021 Unknown        Mobitz type 2 second degree AV block ICD-10-CM: I44.1  ICD-9-CM: 426.12  2/16/2021 Unknown        Essential hypertension ICD-10-CM: I10  ICD-9-CM: 401.9  9/23/2019 Yes        Hypercholesteremia ICD-10-CM: E78.00  ICD-9-CM: 272.0  9/23/2019 Yes        Controlled type 2 diabetes mellitus without complication, with long-term current use of insulin (HCC) ICD-10-CM: E11.9, Z79.4  ICD-9-CM: 250.00, V58.67  9/23/2019 Yes

## 2021-02-18 NOTE — PROGRESS NOTES
[address] [facility] [address] [address]   Office [phone]  Fax [phone] [address] [facility] Attending Addendum:  I saw and evaluated the patient on the day of the encounter with Dr. Christine Gatica, performing the key elements of the service. I discussed the findings, assessment and plan with the resident and agree with the resident's findings and plan as documented in the resident's note. Potassium repleted. Blood cultures showing potential oral los but will consult ID for assistance recommendations. May be a contaminant. Can follow-up with GI as outpatient. Plan to discharge to SNF. History of statin intolerance but open to retrying. Will monitor on a lower dose and if she is symptomatic consider every other day dosing as well prior to saying she has failed therapy. Fernandez Abdi MD, FAAFP, CAQSM, RMSK           Assessment and Plan     Ernestina Andrade is a 76 y.o. female with a PMH of HTN, CAD s/p stent, T2DM on insulin, HLD, GERD, sciatica, insomnia, allergies admitted for AMS, Severe sepsis of unknown source, and DKA. 24 Hour Events: No acute events   Tele: Sinus 88     Severe Sepsis of unknown source: POA LA 3.6 >> 1.2, WBC 15.9, . CRP 7.08. CXR negative. UCx/RVP neg. S/p 30cc/kg bolus. CT abd/pelvis no acute process. - Broad spectrum abx: Cefepime, Vanc, Flagyl  - F/u 2/16 BCx 1/4 bottles aerococcus viridan - likely contaminant   - F/u 2/17 BCxNGTD   - CBC, CMP daily    AMS: CT head/cervical spine negative. Ammonia, TSH, Etoh, UDS wnl. Volatiles - acetone. - CBC, BMP, Mag, Phosphorus    CVA: MRI infarct in thalamus. CVA head & neck neg.   -  mg pr until able to tolerate po, then ASA 81 mg  - Start stain once patient able to take po, will start low dose d/t previous intolerance and titrate up if tolerated  - Neuro consulted, appreciate recs  - Speech following    DKA: RESOLVED. POA AGMA, + blood ketones, and hyperglycemia (). S/p insulin gtt.    T2DM: A1C 8.8. Home NPH 30U BID & Regular insulin 25U before meals.   - Lantus 20U   - SSI + POC glu checks q6H  - Hypoglycemia protocol    Mobitz Type II 2nd degree AV block: EKG with mobitz type 2 AV block. BNP nml. Trop neg x1. ECHO LVEF 60-65%. - Cardiac monitoring  - Cardiology consulted, appreciate recs     COVID negative: POA CRP 7.08, Ddimer 0.83, ferritin 124, . COVID PCR negative.       EVAN: POA Cr 1.83 (BL 0.7)  - IVF  - Monitor on daily CMP     L buttock wound:  - Wound care      QTC prolonged: POA QTc 543. - Avoid QTc prolonging agents     HTN: POA /46. Home Norvasc 5mg daily, HCTZ 25mg daily, Cozaar 100mg daily. - Continue home Norvasc 5mg qHS  - Hydralazine 10mg q6H for BP >160/100  - Continue to monitor and adjust with BP trend     HLD: No home meds d/t statin intolerance (muscle pain). - Discussing risk/benefits of statin with patient, patient agrees to try low dose statin and titrate up if tolerated      GERD: Stable. Home Pepcid 20mg BID.  - Continue Pepcid 20mg BID    Allergies: Home Loratadine 10mg daily prn.  - Hold home meds     Insomnia: Home Melatonin 10mg daily prn, Seroquel 50mg qHS. - Hold home meds     Bilateral low back pain w/Sciatica & Knee OA: F/w Dr. Karl Son (Ortho) and Dr. Hanane Almanza (pain management).     Pancreatic cyst: Seen on CT A/P and MRI abd  - F/u outpatient GI     FEN/GI: NPO. 1/2 NS at 110 mL/hr. Activity: Ambulate with assistance. Fall precautions  DVT prophylaxis: SQH  GI prophylaxis:  Pepcid  Disposition: Plan to d/c to TBD. PT/OT consulted. Family prefers SNF. POC: Katherina Kocher [phone] (daughter). Yg Sun [phone] (daughter).      CODE STATUS:  Full, discussed with daughters         Meghan Negron MD  Family Medicine Resident         Subjective / Objective     Subjective: Pt was seen and examined at bedside. Afebrile and hemodynamically stable. . No complaints.       Objective:  Temp (24hrs), Av.8 °F (36.6 °C), Min:96.7 °F (35.9 °C), Max:98.2 °F (36.8 °C)     Visit Vitals  BP (!) 164/97 (BP 1 Location: Right arm, BP Patient Position: At rest)   Pulse 88   Temp 97.9 °F (36.6 °C)   Resp 20   Ht 5' 4\" (1.626 m)   Wt 144 lb 10 oz (65.6 kg)   SpO2 97%   BMI 24.82 kg/m²     Physical Exam:   General: No acute distress. Alert. Cooperative. Respiratory: CTAB. No w/r/r/c.   Cardiovascular: Normal S1,S2. No m/r/g.   GI: Nondistended.+ bowel sounds. Nontender. No rebound tenderness or guarding. Extremities: No LE edema. Skin: Warm, dry. No rashes. Neuro: Alert. Sensation intact b/l. Strength 5/5 in all extremities    Respiratory: O2 Flow Rate (L/min): 1 l/min O2 Device: Room air   Date 02/17/21 0700 - 02/18/21 0659 02/18/21 0700 - 02/19/21 0659   Shift [id number] 4588-0626 24 Hour Total 7313-3903 3175-6592 24 Hour Total   INTAKE   P.O. 0 0 0        P. O. 0 0 0      Shift Total(mL/kg) 0(0) 0(0) 0(0)      OUTPUT   Urine(mL/kg/hr) 400(0.5) 250(0.3) 650(0.4)        Urine Voided 400  400        Urine Occurrence(s)  1 x 1 x        Urine Output (mL) (External Female Catheter 02/17/21)  250 250      Shift Total(mL/kg) 400(5.8) 250(3.8) 650(9.9)      NET -400 -250 -650      Weight (kg) 68.9 65.6 65.6 65.6 65.6 65.6       Inpatient Medications  Current Facility-Administered Medications   Medication Dose Route Frequency    insulin glargine (LANTUS) injection 25 Units  25 Units SubCUTAneous DAILY    metroNIDAZOLE (FLAGYL) IVPB premix 500 mg  500 mg IntraVENous Q12H    [START ON 2/19/2021] Vancomycin Trough 00:30  1 Each Other ONCE    aspirin (ASA) suppository 300 mg  300 mg Rectal DAILY    dextrose (D50W) injection syrg 12.5-25 g  25-50 mL IntraVENous PRN    insulin lispro (HUMALOG) injection   SubCUTAneous Q6H    vancomycin (VANCOCIN) 1,000 mg in 0.9% sodium chloride 250 mL (VIAL-MATE)  1,000 mg IntraVENous Q12H    cefepime (MAXIPIME) 2 g in sterile water (preservative free) 10 mL IV syringe  2 g IntraVENous Q12H    hydrALAZINE (APRESOLINE) 20 mg/mL injection 10 mg  10 mg IntraVENous Q6H PRN    sodium chloride (NS) flush 5-10 mL  5-10 mL IntraVENous PRN    sodium chloride (NS) flush 5-40 mL  5-40 mL IntraVENous Q8H    sodium chloride (NS) flush 5-40 mL  5-40 mL IntraVENous PRN    heparin (porcine) injection 5,000 Units  5,000 Units SubCUTAneous Q8H    famotidine (PF) (PEPCID) 20 mg in 0.9% sodium chloride 10 mL injection  20 mg IntraVENous QPM         Allergies  Allergies   Allergen Reactions    Advicor [Niacin-Lovastatin] Swelling    Conjugated Estrogens Other (comments)     Headaches      Other reaction(s): Other (comments)  Headaches    Deflazacort Unable to Obtain     Other reaction(s): Unable to Obtain    Erythromycin Nausea Only    Erythromycin Base Nausea and Vomiting    Niacin Swelling    Pitavastatin Other (comments)     Abdominal pain, nausea  Other reaction(s): Other (comments)  Abdominal pain, nausea    Simvastatin Other (comments)     Muscle cramps  Other reaction(s): Other (comments)  Muscle cramps    Unable To Obtain Swelling    Zolpidem Other (comments)     Other reaction(s): Other (comments)    Atorvastatin Other (comments)     Muscle cramps  Other reaction(s):  Other (comments)  Muscle cramps    Triamcinolone Unable to Obtain     Other reaction(s): Unable to Obtain         CBC:  Recent Labs     02/18/21 0342 02/17/21 0229 02/16/21  1117   WBC 8.2 16.6* 15.9*   HGB 14.3 15.1 18.8*   HCT 44.1 45.4 56.0*    451* 792*       Metabolic Panel:  Recent Labs     02/18/21 0342 02/17/21 0229 02/16/21 2032 02/16/21  1127 02/16/21  1127 02/16/21  1117   * 148* 150*   < >  --  139   K 3.6 4.1 2.9*   < >  --  3.8   * 117* 118*   < >  --  104   CO2 27 20* 23   < >  --  18*   BUN 25* 47* 48*   < >  --  49*   CREA 0.83 1.28* 1.40*   < >  --  1.83*   * 275* 188*   < >  --  607*   CA 9.4 10.3* 10.3*   < >  --  11.3*   MG 2.0 2.3 2.2   < >  --  2.7*   PHOS 1.9* 1.4* 0.8*   < >  --  4.4   ALB  --   --   --   -- --  4.0   ALT  --   --   --   --   --  22   INR  --  1.6*  --   --  1.4*  --     < > = values in this interval not displayed. Procedures: None. Imaging/Diagnostic Studies:  Ct Head Wo Cont 2/16/2021   No acute intracranial process is identified.       Ct Spine Cerv Wo Cont 2/16/2021   1. No fracture or subluxation is identified. There are changes of spondylosis as described above. Results from Hospital Encounter encounter on 02/16/21   XR CHEST PORT    Narrative Portable chest single view dated 2/16/2021    History is chest pain    A single portable AP semierect view of the chest was obtained. The cardiac  silhouette is normal in size. There is no evidence of active lung disease. The  costophrenic angles are sharp in appearance. Impression Negative radiographic examination of the chest.             Results from Hospital Encounter encounter on 02/16/21   CTA HEAD NECK W CONT    Narrative EXAM: CTA HEAD NECK W CONT    INDICATION: Cerebrovascular accident. COMPARISON: MRI brain 2/17/2021. CONTRAST: 100 mL of Isovue-370. TECHNIQUE:  Unenhanced  images were obtained to localize the volume for  acquisition. Multislice helical axial CT angiography was performed from the  aortic arch to the top of the head during uneventful rapid bolus intravenous  contrast administration. Coronal and sagittal reformations and 3D post  processing was performed. CT dose reduction was achieved through use of a  standardized protocol tailored for this examination and automatic exposure  control for dose modulation. FINDINGS:    CTA NECK  Patent great vessels. Common carotid arteries are normal in caliber. Right  internal carotid artery demonstrates dense calcified plaque along the medial  wall without significant stenosis. Left internal carotid artery demonstrates  mild calcified plaque along the medial wall with 50% luminal stenosis. Distal  cervical internal carotid arteries are normal in caliber. Vertebral arteries are  codominant and patent. % of right carotid artery stenosis: 0-25  % of left carotid artery stenosis: 25-50    NASCET method was utilized for calculating stenosis. Imaged lung apices are clear aside from atelectasis. Thyroid gland is normal. No  cervical lymphadenopathy. CTA HEAD  Cavernous carotid arteries are patent and normal in caliber. Anterior cerebral  and anterior communicating arteries are patent. Middle cerebral arteries and  distal branches are patent. Distal vertebral arteries are patent. Basilar artery and posterior cerebral  arteries are patent. Right posterior cerebral artery has a fetal origin. No evidence of intracranial aneurysm. Impression 1. No large vessel occlusion or intracranial aneurysm. 2.  Calcified plaque in the proximal internal carotid arteries bilaterally  without flow-limiting stenosis.                    For Billing    Chief Complaint   Patient presents with    Altered mental status   [facility] Problems  Date Reviewed: 2/10/2021          Codes Class Noted POA    DKA (diabetic ketoacidoses) ([facility] [address] ICD-10-CM: E11.10  ICD-9-CM: 250.12  2/16/2021 Unknown        * (Principal) Severe sepsis ([facility] [address] ICD-10-CM: A41.9, R65.20  ICD-9-CM: 038.9, 995.92  2/16/2021 Unknown        AMS (altered mental status) ICD-10-CM: R41.82  ICD-9-CM: 780.97  2/16/2021 Unknown        Insomnia (Chronic) ICD-10-CM: G47.00  ICD-9-CM: 780.52  2/16/2021 Unknown        Pancreatic cyst ICD-10-CM: K86.2  ICD-9-CM: 577.2  2/16/2021 Unknown    Overview Signed 2/16/2021  9:12 PM by Tereza Ortiz MD     F/u MRI Outpatient             GERD (gastroesophageal reflux disease) (Chronic) ICD-10-CM: K21.9  ICD-9-CM: 530.81  2/16/2021 Unknown        Prolonged Q-T interval on ECG ICD-10-CM: R94.31  ICD-9-CM: 794.31  2/16/2021 Unknown        EVAN (acute kidney injury) ([facility] [address] ICD-10-CM: N17.9  ICD-9-CM: 584.9  2/16/2021 Unknown        Mobitz type 2 second degree AV block ICD-10-CM: I44.1  ICD-9-CM: 426.12  2/16/2021 Unknown        Essential hypertension ICD-10-CM: I10  ICD-9-CM: 401.9  9/23/2019 Yes        Hypercholesteremia ICD-10-CM: E78.00  ICD-9-CM: 272.0  9/23/2019 Yes        Controlled type 2 diabetes mellitus without complication, with long-term current use of insulin (HCC) ICD-10-CM: E11.9, Z79.4  ICD-9-CM: 250.00, V58.67  9/23/2019 Yes

## 2021-02-18 NOTE — PROGRESS NOTES
2/18/2021  4:08 PM  TRANSFER - OUT REPORT:    Verbal report given to Celina Newman on [facility]  being transferred to [facility] Rm 319 for Transfer of Care, LV  Report consisted of patients Situation, Background, Assessment and   Recommendations(SBAR).    RAYO Polk

## 2021-02-18 NOTE — PROGRESS NOTES
Physician Progress Note      PATIENTLexx Carrillo  [facility] #:                  [id number]  :                       1945  ADMIT DATE:       2021 10:50 AM  [address] [place] DATE:  RESPONDING  PROVIDER #:        Genna Potter MD          QUERY TEXT:    Pt admitted with Severe sepsis, DKA and cellulitis . Pt noted to have AMS, Daughter reports patient is fully alert & oriented at baseline. If possible, please document in the progress notes and discharge summary if you are evaluating and / or treating any of the following: The medical record reflects the following:  Risk Factors: 75 yo F admitted with Severe sepsis, DKA, EVAN, cellulitis, Mobitz Type II 2nd degree AV block and AMS  Clinical Indicators: pt found face down on floor in a pike a urine, Altered. H&P states \"Daughter reports patient is fully alert & oriented at baseline. \" \"AMS: likely multifactorial (infectious vs metabolic vs cardiac\"  CT head/cervical spine negative. Ammonia wnl. Treatment:  IV  Cefepime, vanc, flagyl  Options provided:  -- Metabolic encephalopathy  -- Septic encephalopathy  -- Encephalopathy due to medications or drugs, Please specify  -- Other - I will add my own diagnosis  -- Disagree - Not applicable / Not valid  -- Disagree - Clinically unable to determine / Unknown  -- Refer to Clinical Documentation Reviewer    PROVIDER RESPONSE TEXT:    This patient has septic encephalopathy.     Query created by: Margarita Robins on 2021 9:18 AM      Electronically signed by:  Genna Potter MD 2021 7:53 PM

## 2021-02-19 LAB
ANION GAP SERPL CALC-SCNC: 4 MMOL/L (ref 5–15)
ANION GAP SERPL CALC-SCNC: 5 MMOL/L (ref 5–15)
BUN SERPL-MCNC: 15 MG/DL (ref 6–20)
BUN SERPL-MCNC: 18 MG/DL (ref 6–20)
BUN/CREAT SERPL: 29 (ref 12–20)
BUN/CREAT SERPL: 30 (ref 12–20)
CALCIUM SERPL-MCNC: 8.5 MG/DL (ref 8.5–10.1)
CALCIUM SERPL-MCNC: 9 MG/DL (ref 8.5–10.1)
CHLORIDE SERPL-SCNC: 101 MMOL/L (ref 97–108)
CHLORIDE SERPL-SCNC: 106 MMOL/L (ref 97–108)
CO2 SERPL-SCNC: 29 MMOL/L (ref 21–32)
CO2 SERPL-SCNC: 31 MMOL/L (ref 21–32)
CREAT SERPL-MCNC: 0.52 MG/DL (ref 0.55–1.02)
CREAT SERPL-MCNC: 0.6 MG/DL (ref 0.55–1.02)
DATE LAST DOSE: ABNORMAL
ERYTHROCYTE [DISTWIDTH] IN BLOOD BY AUTOMATED COUNT: 14.6 % (ref 11.5–14.5)
GLUCOSE BLD STRIP.AUTO-MCNC: 147 MG/DL (ref 65–100)
GLUCOSE BLD STRIP.AUTO-MCNC: 165 MG/DL (ref 65–100)
GLUCOSE BLD STRIP.AUTO-MCNC: 265 MG/DL (ref 65–100)
GLUCOSE BLD STRIP.AUTO-MCNC: 316 MG/DL (ref 65–100)
GLUCOSE SERPL-MCNC: 142 MG/DL (ref 65–100)
GLUCOSE SERPL-MCNC: 213 MG/DL (ref 65–100)
HCT VFR BLD AUTO: 39.7 % (ref 35–47)
HGB BLD-MCNC: 13.3 G/DL (ref 11.5–16)
MAGNESIUM SERPL-MCNC: 1.8 MG/DL (ref 1.6–2.4)
MCH RBC QN AUTO: 28.1 PG (ref 26–34)
MCHC RBC AUTO-ENTMCNC: 33.5 G/DL (ref 30–36.5)
MCV RBC AUTO: 83.8 FL (ref 80–99)
NRBC # BLD: 0 K/UL (ref 0–0.01)
NRBC BLD-RTO: 0 PER 100 WBC
PHOSPHATE SERPL-MCNC: 2.5 MG/DL (ref 2.6–4.7)
PLATELET # BLD AUTO: 265 K/UL (ref 150–400)
PMV BLD AUTO: 11 FL (ref 8.9–12.9)
POTASSIUM SERPL-SCNC: 2.7 MMOL/L (ref 3.5–5.1)
POTASSIUM SERPL-SCNC: 3.4 MMOL/L (ref 3.5–5.1)
RBC # BLD AUTO: 4.74 M/UL (ref 3.8–5.2)
REPORTED DOSE,DOSE: ABNORMAL UNITS
REPORTED DOSE/TIME,TMG: 1300
SERVICE CMNT-IMP: ABNORMAL
SODIUM SERPL-SCNC: 136 MMOL/L (ref 136–145)
SODIUM SERPL-SCNC: 140 MMOL/L (ref 136–145)
VANCOMYCIN TROUGH SERPL-MCNC: 13.1 UG/ML (ref 5–10)
WBC # BLD AUTO: 6.4 K/UL (ref 3.6–11)

## 2021-02-19 PROCEDURE — 74011000250 HC RX REV CODE- 250: Performed by: STUDENT IN AN ORGANIZED HEALTH CARE EDUCATION/TRAINING PROGRAM

## 2021-02-19 PROCEDURE — 74011250637 HC RX REV CODE- 250/637: Performed by: STUDENT IN AN ORGANIZED HEALTH CARE EDUCATION/TRAINING PROGRAM

## 2021-02-19 PROCEDURE — 92526 ORAL FUNCTION THERAPY: CPT

## 2021-02-19 PROCEDURE — 65270000029 HC RM PRIVATE

## 2021-02-19 PROCEDURE — 99231 SBSQ HOSP IP/OBS SF/LOW 25: CPT | Performed by: CLINICAL NURSE SPECIALIST

## 2021-02-19 PROCEDURE — 85027 COMPLETE CBC AUTOMATED: CPT

## 2021-02-19 PROCEDURE — 65660000000 HC RM CCU STEPDOWN

## 2021-02-19 PROCEDURE — 74011250636 HC RX REV CODE- 250/636: Performed by: STUDENT IN AN ORGANIZED HEALTH CARE EDUCATION/TRAINING PROGRAM

## 2021-02-19 PROCEDURE — 74011636637 HC RX REV CODE- 636/637: Performed by: STUDENT IN AN ORGANIZED HEALTH CARE EDUCATION/TRAINING PROGRAM

## 2021-02-19 PROCEDURE — 80048 BASIC METABOLIC PNL TOTAL CA: CPT

## 2021-02-19 PROCEDURE — 99232 SBSQ HOSP IP/OBS MODERATE 35: CPT | Performed by: FAMILY MEDICINE

## 2021-02-19 PROCEDURE — 94760 N-INVAS EAR/PLS OXIMETRY 1: CPT

## 2021-02-19 PROCEDURE — 83735 ASSAY OF MAGNESIUM: CPT

## 2021-02-19 PROCEDURE — 99223 1ST HOSP IP/OBS HIGH 75: CPT | Performed by: PSYCHIATRY & NEUROLOGY

## 2021-02-19 PROCEDURE — 74011250637 HC RX REV CODE- 250/637: Performed by: NURSE PRACTITIONER

## 2021-02-19 PROCEDURE — 36415 COLL VENOUS BLD VENIPUNCTURE: CPT

## 2021-02-19 PROCEDURE — 82962 GLUCOSE BLOOD TEST: CPT

## 2021-02-19 PROCEDURE — 74011250636 HC RX REV CODE- 250/636: Performed by: FAMILY MEDICINE

## 2021-02-19 PROCEDURE — 84100 ASSAY OF PHOSPHORUS: CPT

## 2021-02-19 PROCEDURE — 93042 RHYTHM ECG REPORT: CPT | Performed by: FAMILY MEDICINE

## 2021-02-19 PROCEDURE — 80202 ASSAY OF VANCOMYCIN: CPT

## 2021-02-19 PROCEDURE — 97530 THERAPEUTIC ACTIVITIES: CPT

## 2021-02-19 PROCEDURE — 97535 SELF CARE MNGMENT TRAINING: CPT

## 2021-02-19 RX ORDER — AMOXICILLIN AND CLAVULANATE POTASSIUM 875; 125 MG/1; MG/1
1 TABLET, FILM COATED ORAL EVERY 12 HOURS
Status: COMPLETED | OUTPATIENT
Start: 2021-02-19 | End: 2021-02-22

## 2021-02-19 RX ORDER — LOSARTAN POTASSIUM 50 MG/1
100 TABLET ORAL EVERY EVENING
Status: DISCONTINUED | OUTPATIENT
Start: 2021-02-19 | End: 2021-02-26 | Stop reason: HOSPADM

## 2021-02-19 RX ORDER — CALCIUM CARB/MAGNESIUM CARB 311-232MG
5 TABLET ORAL
Status: DISCONTINUED | OUTPATIENT
Start: 2021-02-19 | End: 2021-02-26 | Stop reason: HOSPADM

## 2021-02-19 RX ORDER — AMLODIPINE BESYLATE 5 MG/1
5 TABLET ORAL DAILY
Status: DISCONTINUED | OUTPATIENT
Start: 2021-02-19 | End: 2021-02-26 | Stop reason: HOSPADM

## 2021-02-19 RX ORDER — ASPIRIN 325 MG
325 TABLET ORAL DAILY
Status: DISCONTINUED | OUTPATIENT
Start: 2021-02-19 | End: 2021-02-19

## 2021-02-19 RX ORDER — INSULIN GLARGINE 100 [IU]/ML
28 INJECTION, SOLUTION SUBCUTANEOUS DAILY
Status: DISCONTINUED | OUTPATIENT
Start: 2021-02-20 | End: 2021-02-20

## 2021-02-19 RX ORDER — SODIUM,POTASSIUM PHOSPHATES 280-250MG
2 POWDER IN PACKET (EA) ORAL ONCE
Status: COMPLETED | OUTPATIENT
Start: 2021-02-19 | End: 2021-02-19

## 2021-02-19 RX ORDER — ATORVASTATIN CALCIUM 20 MG/1
40 TABLET, FILM COATED ORAL
Status: DISCONTINUED | OUTPATIENT
Start: 2021-02-19 | End: 2021-02-26 | Stop reason: HOSPADM

## 2021-02-19 RX ORDER — GUAIFENESIN 100 MG/5ML
81 LIQUID (ML) ORAL DAILY
Status: DISCONTINUED | OUTPATIENT
Start: 2021-02-19 | End: 2021-02-26 | Stop reason: HOSPADM

## 2021-02-19 RX ORDER — LOSARTAN POTASSIUM 50 MG/1
100 TABLET ORAL DAILY
Status: DISCONTINUED | OUTPATIENT
Start: 2021-02-20 | End: 2021-02-19

## 2021-02-19 RX ORDER — AMLODIPINE BESYLATE 5 MG/1
5 TABLET ORAL DAILY
Status: DISCONTINUED | OUTPATIENT
Start: 2021-02-20 | End: 2021-02-19

## 2021-02-19 RX ADMIN — HEPARIN SODIUM 5000 UNITS: 5000 INJECTION INTRAVENOUS; SUBCUTANEOUS at 00:48

## 2021-02-19 RX ADMIN — CEFEPIME HYDROCHLORIDE 2 G: 2 INJECTION, POWDER, FOR SOLUTION INTRAVENOUS at 09:01

## 2021-02-19 RX ADMIN — ASPIRIN 81 MG: 81 TABLET, CHEWABLE ORAL at 13:14

## 2021-02-19 RX ADMIN — HEPARIN SODIUM 5000 UNITS: 5000 INJECTION INTRAVENOUS; SUBCUTANEOUS at 16:20

## 2021-02-19 RX ADMIN — Medication 10 ML: at 21:05

## 2021-02-19 RX ADMIN — INSULIN LISPRO 4 UNITS: 100 INJECTION, SOLUTION INTRAVENOUS; SUBCUTANEOUS at 16:29

## 2021-02-19 RX ADMIN — AMLODIPINE BESYLATE 5 MG: 5 TABLET ORAL at 16:20

## 2021-02-19 RX ADMIN — HEPARIN SODIUM 5000 UNITS: 5000 INJECTION INTRAVENOUS; SUBCUTANEOUS at 06:09

## 2021-02-19 RX ADMIN — HEPARIN SODIUM 5000 UNITS: 5000 INJECTION INTRAVENOUS; SUBCUTANEOUS at 08:54

## 2021-02-19 RX ADMIN — FAMOTIDINE 20 MG: 10 INJECTION, SOLUTION INTRAVENOUS at 17:20

## 2021-02-19 RX ADMIN — Medication 10 ML: at 06:11

## 2021-02-19 RX ADMIN — VANCOMYCIN HYDROCHLORIDE 1000 MG: 1 INJECTION, POWDER, LYOPHILIZED, FOR SOLUTION INTRAVENOUS at 00:48

## 2021-02-19 RX ADMIN — Medication 10 ML: at 09:55

## 2021-02-19 RX ADMIN — INSULIN LISPRO 2 UNITS: 100 INJECTION, SOLUTION INTRAVENOUS; SUBCUTANEOUS at 12:00

## 2021-02-19 RX ADMIN — INSULIN LISPRO 2 UNITS: 100 INJECTION, SOLUTION INTRAVENOUS; SUBCUTANEOUS at 06:09

## 2021-02-19 RX ADMIN — ATORVASTATIN CALCIUM 40 MG: 20 TABLET, FILM COATED ORAL at 21:04

## 2021-02-19 RX ADMIN — AMOXICILLIN AND CLAVULANATE POTASSIUM 1 TABLET: 875; 125 TABLET, FILM COATED ORAL at 21:04

## 2021-02-19 RX ADMIN — INSULIN GLARGINE 25 UNITS: 100 INJECTION, SOLUTION SUBCUTANEOUS at 08:53

## 2021-02-19 RX ADMIN — LOSARTAN POTASSIUM 100 MG: 50 TABLET, FILM COATED ORAL at 22:20

## 2021-02-19 RX ADMIN — AMOXICILLIN AND CLAVULANATE POTASSIUM 1 TABLET: 875; 125 TABLET, FILM COATED ORAL at 16:20

## 2021-02-19 RX ADMIN — SODIUM CHLORIDE: 900 INJECTION, SOLUTION INTRAVENOUS at 08:30

## 2021-02-19 RX ADMIN — POTASSIUM BICARBONATE 40 MEQ: 782 TABLET, EFFERVESCENT ORAL at 03:23

## 2021-02-19 RX ADMIN — METRONIDAZOLE 500 MG: 500 INJECTION, SOLUTION INTRAVENOUS at 08:55

## 2021-02-19 RX ADMIN — POTASSIUM & SODIUM PHOSPHATES POWDER PACK 280-160-250 MG 2 PACKET: 280-160-250 PACK at 16:21

## 2021-02-19 RX ADMIN — POTASSIUM BICARBONATE 40 MEQ: 782 TABLET, EFFERVESCENT ORAL at 06:09

## 2021-02-19 NOTE — PROGRESS NOTES
Physician Progress Note      Stanislav Watkins  CSN #:                  [id number]  :                       1945  ADMIT DATE:       2021 10:50 AM  [facility] DATE:  RESPONDING  PROVIDER #:        Monica Serna MD          QUERY TEXT:    Patient admitted with Sepsis, AMS and cellulitis of  left buttock:. Noted documentation of \"Severe Sepsis of unknown source\"  Please document in your progress notes and DC summary the cause of Sepsis if known: The medical record reflects the following:  Risk Factors: 75 yo F admitted with Sepsis, left buttock cellulitis  Clinical Indicators:   PN states \"Severe Sepsis of unknown source\" \" Cellulitis of left buttock\"  Treatment: IV Maxipime, IV Flagyl  Options provided:  -- Sepsis POA 2/2 left buttock cellulitis :Sepsis POA 2/2 left buttock cellulitis . -- Confirmation of Sepsis POA of unknown source  -- Other - I will add my own diagnosis  -- Disagree - Not applicable / Not valid  -- Disagree - Clinically unable to determine / Unknown  -- Refer to Clinical Documentation Reviewer    PROVIDER RESPONSE TEXT:    Confirmation of Sepsis POA of unknown source .     Query created by: Malik Lan on 2021 9:30 AM      Electronically signed by:  Monica Serna MD 2021 11:15 AM

## 2021-02-19 NOTE — PROGRESS NOTES
Problem: Dysphagia (Adult)  Goal: *Acute Goals and Plan of Care (Insert Text)  Description: Swallowing goals initiated 2-18-21:  1)  tolerate full liquids without s/s aspiration by 2-19-21:-met  2) tolerate mech soft, thins without s/s aspiration or oral holding by 2-22-21  Outcome: Progressing Towards Goal   SPEECH LANGUAGE PATHOLOGY DYSPHAGIA TREATMENT  Patient: Deirdre Gandhi (82 y.o. female)  Date: 2/19/2021  Diagnosis: AMS (altered mental status) [R41.82]  Severe sepsis ([address] [A41.9, R65.20]  DKA (diabetic ketoacidoses) ([address] [E11.10] Severe sepsis ([facility] [address]       Precautions: ASPIRATION Fall    ASSESSMENT:  Patient alert consistently. Not very talkative, but able to answer some ?'s. Ready for diet upgrade. Mildly slow clear and oral clearance noted. PLAN:  Recommendations and Planned Interventions:  Advance diet to mech soft, thins. Watch for pocketing of pills. Patient continues to benefit from skilled intervention to address the above impairments. Continue treatment per established plan of care. Discharge Recommendations: To Be Determined     SUBJECTIVE:   Patient stated He's like a son to [facility]. OBJECTIVE:   Cognitive and Communication Status:  Neurologic State: (she answered a few personal ?s, (30%), but did not initiated)  Orientation Level: Disoriented to place, Disoriented to situation, Disoriented to time, Oriented to person  Cognition: Follows commands  Perception: Cues to maintain midline in sitting, Tactile, Verbal, Visual(cues to maintain eyes open)  Perseveration: No perseveration noted  Safety/Judgement: Decreased awareness of environment, Decreased awareness of need for assistance, Decreased awareness of need for safety, Decreased insight into deficits  Dysphagia Treatment:  Oral Assessment:     P.O. Trials:  Patient Position: upright in bed  Vocal quality prior to P.O.: No impairment  Consistency Presented: Solid; Thin liquid;Puree  How Presented: SLP-fed/presented;Spoon;Straw;Successive swallows   ORAL PHASE:   Bolus Acceptance: No impairment  Bolus Formation/Control: Impaired(mildly slow chew. mild L oral residue with solids. slow oral clearance, but functiona)     Propulsion: No impairment  Oral Residue: None  PHARYNGEAL PHASE:   Laryngeal Elevation: Functional  Aspiration Signs/Symptoms: None                      Exercises:  Laryngeal Exercises:                                                                                                                                   Pain:             After treatment:   Call bell within reach and Nursing notified    COMMUNICATION/EDUCATION:   Patient was educated regarding her deficit(s) of dysphagia  as this relates to her diagnosis of AMS. She demonstrated Guarded understanding as evidenced by lack of responses, poor insight. .    The patient's plan of care including recommendations, planned interventions, and recommended diet changes were discussed with: Registered nurse.      CORY Serrano  Time Calculation: 15 mins

## 2021-02-19 NOTE — PROGRESS NOTES
Occupational Therapy:  02/19/21    Chart reviewed in prep for OT tx. Transport present to take pt QUE for EEG. Will defer and follow up later today as able.      Thank you,  Von Lopez, OTR/L

## 2021-02-19 NOTE — PROGRESS NOTES
Comprehensive Nutrition Assessment    Type and Reason for Visit: Initial, RD nutrition re-screen/LOS    Nutrition Recommendations/Plan:   1. Advance diet per SLP. 2. Added Ensure Enlive BID to promote adequate intake. Will change to Glucerna if BG trends >180mg/dL. Nutrition Assessment:      2/19: 75 y/o female admitted with AMS. PMH includes HTN, DM, GERD. Pt sleeping soundly at time of visit, did not arouse to name. No family in room. Breakfast tray in front of pt, few bites of yogurt eaten. Currently on full liquid diet, but per SLP note today it is OK to advanced to [facility] soft, thin liquids. Per chart, pt fell several days PTA and was unable to get up. Likely not eating during this time. Weight loss noted- 11.5% x3.5 months which is considered significant for time frame. Will add Ensure Enlive to promote intake and monitor acceptance. Labs- K 2.7, phos 2.5, A1c 8.8, -[phone]. Meds- insulin. Intakes:  Patient Vitals for the past 168 hrs:   % Diet Eaten   02/17/21 1505 0 %   02/17/21 0900 0 %     Weight hx:   Wt Readings from Last 10 Encounters:   02/19/21 71.2 kg (156 lb 15.5 oz)   02/10/21 70.8 kg (156 lb)   01/27/21 73.9 kg (163 lb)   01/13/21 74.9 kg (165 lb 3.2 oz)   11/02/20 80.1 kg (176 lb 9.6 oz)   09/30/20 79.7 kg (175 lb 9.6 oz)   03/02/20 78.5 kg (173 lb)   10/16/19 79.4 kg (175 lb)   09/23/19 79.4 kg (175 lb)   09/10/19 80.3 kg (177 lb)     Malnutrition Assessment:  Malnutrition Status:  Severe malnutrition    Context:  Acute illness     Findings of the 6 clinical characteristics of malnutrition:   Energy Intake:  7 - 50% or less of est energy requirements for 5 or more days  Weight Loss:  7.00 - Greater than 7.5% over 3 months     Body Fat Loss:  1 - Mild body fat loss, Triceps   Muscle Mass Loss:  1 - Mild muscle mass loss, Hand (interosseous), Temples (temporalis)  Fluid Accumulation:  No significant fluid accumulation,     Strength:  Not performed     Estimated Daily Nutrient Needs:  Energy (kcal): 9475(5693 x 1.3 AF); Weight Used for Energy Requirements: Current  Protein (g): 85(1-1.2 g/kg); Weight Used for Protein Requirements: Current  Fluid (ml/day): 1549; Method Used for Fluid Requirements: 1 ml/kcal      Nutrition Related Findings:  last BM PTA per flowsheets      Wounds:    Pressure injury(L buttock)       Current Nutrition Therapies:  DIET FULL LIQUID  DIET NUTRITIONAL SUPPLEMENTS Breakfast, Lunch; Ensure Enlive    Anthropometric Measures:  · Height:  5' 4\" (162.6 cm)  · Current Body Wt:  71.2 kg (156 lb 15.5 oz)   · Admission Body Wt:       · Usual Body Wt:        · Ideal Body Wt:  120 lbs:  130.8 %   · Adjusted Body Weight:   ; Weight Adjustment for: No adjustment   · Adjusted BMI:       · BMI Category: Overweight (BMI 25.0-29. 9)       Nutrition Diagnosis:   · Inadequate oral intake related to inadequate protein-energy intake as evidenced by wounds, weight loss, poor intake prior to admission, intake 0-25%      Nutrition Interventions:   Food and/or Nutrient Delivery: Modify current diet, Start oral nutrition supplement  Nutrition Education and Counseling: No recommendations at this time  Coordination of Nutrition Care: Continue to monitor while inpatient    Goals:  PO intake >50% meals + ONS next 1-3 days       Nutrition Monitoring and Evaluation:   Behavioral-Environmental Outcomes: None identified  Food/Nutrient Intake Outcomes: Food and nutrient intake, Supplement intake, Diet advancement/tolerance  Physical Signs/Symptoms Outcomes: Biochemical data, Chewing or swallowing, Weight, GI status    Discharge Planning:     Too soon to determine     Electronically signed by Deep Brock RDN on 2/19/2021 at 11:23 AM    Contact: [phone]

## 2021-02-19 NOTE — PROGRESS NOTES
Problem: Mobility Impaired (Adult and Pediatric)  Goal: *Acute Goals and Plan of Care (Insert Text)  Description: FUNCTIONAL STATUS PRIOR TO ADMISSION: Patient was independent and active without use of DME.    HOME SUPPORT PRIOR TO ADMISSION: The patient lived alone with no one to provide assistance. Found in hoarder situation. APS has been contacted. Physical Therapy Goals  Initiated 2/17/2021  1. Patient will move from supine to sit and sit to supine , scoot up and down, and roll side to side in bed with moderate assistance  within 7 day(s). 2.  Patient will transfer from bed to chair and chair to bed with moderate assistance  using the least restrictive device within 7 day(s). 3.  Patient will perform sit to stand with moderate assistance  within 7 day(s). 4.  Patient will ambulate with moderate assistance  for 10 feet with the least restrictive device within 7 day(s). Outcome: Progressing Towards Goal  Note:   PHYSICAL THERAPY TREATMENT  Patient: Eusebio Melton [de-identified]76 y.o. female)  Date: 2/19/2021  Diagnosis: AMS (altered mental status) [R41.82]  Severe sepsis ([address] [A41.9, R65.20]  DKA (diabetic ketoacidoses) ([address] [E11.10] Severe sepsis ([address]       Precautions: Fall  Chart, physical therapy assessment, plan of care and goals were reviewed. ASSESSMENT  Patient continues with skilled PT services and is progressing towards goals. Patient is still only oriented to self, but able to follow all commands, just increased time for processing and completion. Patient demonstrated improved sitting balance able to sit at edge of bed with SBA and occasional MIN A for dynamic activity (reaching for food). Patient attempted standing with MAX A x2 but unable to achieve upright standing and returned to supine. Recommend rehab at discharge due to below normal baseline for mobility and new thalamic infarct.  .     Current Level of Function Impacting Discharge (mobility/balance): MAX A x2    Other factors to consider for discharge:          PLAN :  Patient continues to benefit from skilled intervention to address the above impairments. Continue treatment per established plan of care. to address goals. Recommendation for discharge: (in order for the patient to meet his/her long term goals)  Therapy up to 5 days/week in rehab setting    This discharge recommendation:  A follow-up discussion with the attending provider and/or case management is planned    IF patient discharges home will need the following DME: to be determined (TBD)       SUBJECTIVE:   Patient stated i am sick.  when asking orientation questions    OBJECTIVE DATA SUMMARY:   Critical Behavior:  Neurologic State: (she answered a few personal ?s, (30%), but did not initiated)  Orientation Level: Disoriented to place, Disoriented to situation, Disoriented to time, Oriented to person  Cognition: Follows commands  Safety/Judgement: Decreased awareness of environment, Decreased awareness of need for assistance, Decreased awareness of need for safety, Decreased insight into deficits  Functional Mobility Training:  Bed Mobility:  Rolling: Moderate assistance;Assist x2  Supine to Sit: Moderate assistance;Assist x2  Sit to Supine: Maximum assistance;Assist x2  Scooting: Total assistance;Assist x2        Transfers:  Sit to Stand: Maximum assistance;Assist x2  Stand to Sit: Maximum assistance;Assist x2                             Pain Rating:  None reported    Activity Tolerance:   Fair    After treatment patient left in no apparent distress:   Supine in bed, Call bell within reach, and Bed / chair alarm activated    COMMUNICATION/COLLABORATION:   The patients plan of care was discussed with: Occupational therapist and Registered nurse.      Silverio Paula PT, DPT   Time Calculation: 24 mins

## 2021-02-19 NOTE — DIABETES MGMT
[facility]  CLINICAL NURSE SPECIALIST CONSULT  [facility]    FOLLOW-UP NOTE    Presentation   Hosey Lesches is a 76 y.o. female presented to the ED 2/16/21 by EMS after being found down by neighbors. Per chart review, patient advised she had fallen several days ago and had been unable to get up. Patient found to be in DKA on arrival with BG of 607 and AG of 17 by labs.       HX:   Past Medical History (click to expand or collapse)        Past Medical History:   Diagnosis Date    CAD (coronary artery disease)      Diabetes [facility]      Diabetes ([facility]       type 2    GERD (gastroesophageal reflux disease)      H/O seasonal allergies      Hyperlipidemia      Hypertension      Insomnia      Nausea & vomiting      PUD (peptic ulcer disease)      Small bowel obstruction (HCC)      Vitamin D deficiency              Current clinical course has been uncomplicated.      Diabetes: Patient has known Type 2 diabetes. Home diabetes medication regimen is NPH 30 units twice daily, regular insulin 25 units before meals, and Metformin 500 mg daily. Admission  and A1c 7.2 (9/30/20) indicate poor diabetes control. Subjective   Patient sitting up in bed, alert and oriented. Patient slow to answer questions, but faster than yesterday. Continues to improve with responsiveness each day. Daughter at bedside, other daughter on FaceTime, patient responsive to family and grandson on [de-identified]. Patient likely to be discharged to SNF.    2/19/21- Neuro consult for stroke    Objective   Physical exam  General Alert, oriented and ill-appearing. Conversant and cooperative. Vital Signs   Visit Vitals  BP (!) 155/77 (BP 1 Location: Right arm, BP Patient Position: At rest)   Pulse 78   Temp 97.6 °F (36.4 °C)   Resp 15   Ht 5' 4\" (1.626 m)   Wt 71.2 kg (156 lb 15.5 oz)   SpO2 93%   BMI 26.94 kg/m²     Skin  Warm and dry  Heart   Regular rate and rhythm.  No murmurs, rubs or gallops  Lungs  Clear to auscultation without rales or rhonchi  Extremities No foot wounds    Laboratory  Lab Results   Component Value Date/Time    Hemoglobin A1c 8.8 (H) 02/16/2021 11:17 AM     Lab Results   Component Value Date/Time    LDL, calculated 141.8 (H) 02/18/2021 03:42 AM     Lab Results   Component Value Date/Time    Creatinine 0.60 02/19/2021 12:41 AM     Lab Results   Component Value Date/Time    Sodium 140 02/19/2021 12:41 AM    Potassium 2.7 (LL) 02/19/2021 12:41 AM    Chloride 106 02/19/2021 12:41 AM    CO2 29 02/19/2021 12:41 AM    Anion gap 5 02/19/2021 12:41 AM    Glucose 142 (H) 02/19/2021 12:41 AM    BUN 18 02/19/2021 12:41 AM    Creatinine 0.60 02/19/2021 12:41 AM    BUN/Creatinine ratio 30 (H) 02/19/2021 12:41 AM    GFR est AA >60 02/19/2021 12:41 AM    GFR est non-AA >60 02/19/2021 12:41 AM    Calcium 9.0 02/19/2021 12:41 AM    Bilirubin, total 0.5 02/16/2021 11:17 AM    Alk.  phosphatase 125 (H) 02/16/2021 11:17 AM    Protein, total 9.2 (H) 02/16/2021 11:17 AM    Albumin 4.0 02/16/2021 11:17 AM    Globulin 5.2 (H) 02/16/2021 11:17 AM    A-G Ratio 0.8 (L) 02/16/2021 11:17 AM    ALT (SGPT) 22 02/16/2021 11:17 AM     Lab Results   Component Value Date/Time    ALT (SGPT) 22 02/16/2021 11:17 AM       Tests 2/16/21  1117 2/16/21  1436 2/16/21  1640 2/16/21 2032 2/17/21  0229   A1c 8.8 - - - -    529 372 188 275   Anion gap 17 18 16 9 11   Serum creatinine 1.83 1.46 1.47 1.40 1.28   GFR 27 35 35 37 41       Factors affecting BG pattern  Factor Dose Comments   Nutrition:  Full liquid   Full liquid   Diabetic consistent CHO when diet advances   Drugs:  Vasopressor load  Steroids  HIV   Epogen  Blood transfusion(s)  Other: n/a   n/a  n/a  n/a  n/a  n/a         A1cs inaccurate  A1cs inaccurate   Pain 0/10    Infection Cefepime, Flagyl Sepsis of unknown etiology   Other: n/a n/a      Blood glucose pattern        Evaluation   This 76year old female, with Type 2 diabetes, did not achieve diabetes control prior to admission (PTA), as evidenced by admission BG of 607 and A1c of 7.2%. Based on assessment of diabetes self-care practices, interventions that warrant action while hospitalized include: Unable to assess at this time. The patient would also benefit from diabetes self-management education and support [facility]) after discharge.     During this hospitalization, the patient has not achieved inpatient blood glucose target of 100-180mg/dl. BG's have ranged 142-219 over the past 24 hours. Factors that have played a role include:  [x]? Critical nature of illness state  [x]? Meal/tube feeding disruption  [x]? Compromised insulin absorption or delivery  [x]? Kidney dysfunction     Basal insulin is in use. Lantus 25 units currently ordered, first dose 2/18/21      Bolus insulin isn't in use. Patient is eating meals - full liquid diet.     Corrective insulin is in use. Patient has required 7 units of corrective insulin over the past 24 hours.        Insulin drip with glucostabilizer started at 1449 2/16/21. Insulin drip with glucostabilizer discontinued at 0728 today (2/17/21)      To optimize BG control and support a positive health outcome, would utilize the Subcutaneous Insulin Order set (3177).    Impression:  Glycemic control has been obtained with the current basal/corrective insulin regimen. Patient remains on a full liquid diet at this time, it is likely that patient will require bolus insulin as well as diet continues to advance. Assessment and Plan   Nursing Diagnosis Risk for unstable blood glucose pattern   Nursing Intervention Domain 9778 Decision-making Support   Nursing Interventions Examined current inpatient diabetes control   Explored factors facilitating and impeding inpatient management       Recommendations   Recommend:     [x]?         Use of Subcutaneous Insulin Order set (9545)  Insulin Use Recommendation   Basal (Based on weight, BMI & GFR) Addresses basic metabolic needs Continue Lantus 25 units daily, first dose today. Nutritional                                      (Based on CHO load) Addresses nutrition interventions Humalog 5 units with meals, hold if patient eats < 50% CHO on meal tray. Corrective                                       (Offset gaps in dosing) Useful in adjusting insulin dosing Correctional Scale for Normal Sensitivity     200-249- 2units Humalog  [id number] Humalog  [phone] Humalog  [id number] Humalog  Over 400- 10units Humalog     Do NOT hold for NPO; give in addition to meal time insulin dose.     If patient does not eat, -give correction dose only.         [x]? Referral to     [x]? Diabetes Self-Management Training through [facility] (Phone [phone] to schedule appointment)    Time Spent/ Billing Codes     Total time spent with patient: 15 Minutes   I personally reviewed chart, notes, data and current medications in the medical record. I have personally examined and treated the patient at bedside during this period.      [x] 59666 IP subsequent hospital care - 15 minutes     IRINA Mckeon  Diabetes Clinical Nurse Specialist  [facility]  Access via [facility] [place], [place]

## 2021-02-19 NOTE — PROGRESS NOTES
Bedside and Verbal shift change report given to GM RN  (oncoming nurse) by RR RN (offgoing nurse). Report included the following information SBAR, Kardex and Recent Results. .. This patient was assisted with Intentional Toileting every 2 hours during this shift. Documentation of ambulation and output reflected on Flowsheet. ..      1700-  TRANSFER - OUT REPORT:    Verbal report given to [facility]  RN (name) on [facility]15  being transferred to [facility](unit) for ordered procedure       Report consisted of patients Situation, Background, Assessment and   Recommendations(SBAR). Information from the following report(s) SBAR, Kardex and Recent Results was reviewed with the receiving nurse. Lines:   Peripheral IV 02/16/21 Right Antecubital (Active)   Site Assessment Clean, dry, & intact 02/19/21 0030   Phlebitis Assessment 0 02/19/21 0030   Infiltration Assessment 0 02/19/21 0030   Dressing Status Clean, dry, & intact 02/19/21 0030   Dressing Type Transparent 02/19/21 0030   Hub Color/Line Status Capped 02/19/21 0030   Action Taken Open ports on tubing capped 02/19/21 0030   Alcohol Cap Used Yes 02/19/21 0030       Peripheral IV 02/16/21 Right Forearm (Active)   Site Assessment Clean, dry, & intact 02/19/21 0030   Phlebitis Assessment 0 02/19/21 0030   Infiltration Assessment 0 02/19/21 0030   Dressing Status Clean, dry, & intact 02/19/21 0030   Dressing Type Transparent 02/19/21 0030   Hub Color/Line Status Capped 02/19/21 0030   Action Taken Open ports on tubing capped 02/19/21 0030   Alcohol Cap Used Yes 02/19/21 0030        Opportunity for questions and clarification was provided. Patient transported with:bed. .   Tech/ RN

## 2021-02-19 NOTE — PROGRESS NOTES
CM follow up:    Talked with Eugenia Ruffin 187. She is evaluating patient's home situation and will be here today to evaluate patient. SNF referrals placed, patient ACCEPTED by [facility], will facilitate when patient medically ready for DC. Call placed to [facility], message left requesting return call re:  Bed availability. Spoke with patient's daughters Robbie Ibrahim [phone] and daughter Mary Willams. Discussed that patient has been accepted by [facility], they are agreeable to plan for patient to go to the facility when medically ready.     Eliseo Hunter, RN, MSN/Care manager  [phone]

## 2021-02-19 NOTE — PROGRESS NOTES
Problem: Self Care Deficits Care Plan (Adult)  Goal: *Acute Goals and Plan of Care (Insert Text)  Description:   FUNCTIONAL STATUS PRIOR TO ADMISSION: Pt is unable to provide PLOF. Per chart, she lives alone in a \"hoarder-type\" situation. Infer she was able to manage ADLs with mod I.      HOME SUPPORT: The patient lived alone. Occupational Therapy Goals  Initiated 2/17/2021  1. Patient will perform grooming with supervision/set-up within 7 day(s). 2.  Patient will perform upper body dressing, seated EOB, with supervision/set-upwithin 7 day(s). 3.  Patient will perform anterior bathing, seated EOB, with supervision/set-up within 7 day(s). 4.  Patient will perform toilet transfers with minimal assistance/contact guard assist within 7 day(s). 5.  Patient will perform all aspects of toileting with minimal assistance/contact guard assist within 7 day(s). 6.  Patient will participate in upper extremity therapeutic exercise/activities with minimal assistance/contact guard assist for 10 minutes within 7 day(s). 7.  Patient will utilize energy conservation techniques during functional activities with verbal cues within 7 day(s). 8.  Patient will follow 100% of commands during ADLs within 7 days. Outcome: Progressing Towards Goal     OCCUPATIONAL THERAPY TREATMENT  Patient: Jacob Lombardo (79 y.o. female)  Date: 2/19/2021  Diagnosis: AMS (altered mental status) [R41.82]  Severe sepsis ([facility] [address] [A41.9, R65.20]  DKA (diabetic ketoacidoses) ([address] [E11.10] Severe sepsis ([facility] [address]       Precautions: Fall  Chart, occupational therapy assessment, plan of care, and goals were reviewed. ASSESSMENT  Patient continues with skilled OT services and is progressing towards goals. Per chart, MRI revealed acute thalamic infarct. Pt continues to present with disorientation, impaired balance, weakness, and confusion. Pt with improved alertness today, oriented to self and able to answer questions and interact.  She requires increased time for processing and initiation of tasks. Noted improved use and strength in L UE (non-dominant) during self feeding tasks at EOB and pt requires near constant support to maintain balance due to R lateral lean. Proximal support required for R UE during reaching for finger foods. During standing trials, pt requires max A x 2 and is unable to achieve full upright standing posture despite multi-modal cueing and manual facilitation. Recommend rehab at discharge. Current Level of Function Impacting Discharge (ADLs): max A x 2 for mobility; up to min/mod A for self feeding; max/total A for LB ADLs; min to max A for UB ADLs    Other factors to consider for discharge: high fall risk; lives alone         PLAN :  Patient continues to benefit from skilled intervention to address the above impairments. Continue treatment per established plan of care. to address goals. Recommend with staff: modified chair position for meals; meal assist; encourage participation with UB ADLs    Recommend next OT session: continue with EOB ADLs; Fugl-Lomas for R UE    Recommendation for discharge: (in order for the patient to meet his/her long term goals)  Therapy 3 hours per day 5-7 days per week    This discharge recommendation:  Has not yet been discussed the attending provider and/or case management    IF patient discharges home will need the following DME: TBD       SUBJECTIVE:   Patient stated I ate my luis crackers.     OBJECTIVE DATA SUMMARY:   Cognitive/Behavioral Status:  Neurologic State: Alert  Orientation Level: Oriented to person;Disoriented to place; Disoriented to situation;Disoriented to time  Cognition: Follows commands(with increased time)  Perception: Cues to maintain midline in sitting; Tactile;Verbal  Perseveration: No perseveration noted  Safety/Judgement: Decreased awareness of environment;Decreased awareness of need for assistance;Decreased awareness of need for safety;Decreased insight into deficits    Functional Mobility and Transfers for ADLs:  Bed Mobility:  Rolling: Moderate assistance;Assist x2  Supine to Sit: Moderate assistance;Assist x2  Sit to Supine: Maximum assistance;Assist x2  Scooting: Total assistance;Assist x2    Transfers:  Sit to Stand: Maximum assistance;Assist x2    Balance:  Sitting: Impaired  Sitting - Static: Fair (occasional)  Sitting - Dynamic: Fair (occasional); Poor (constant support)  Standing: Impaired  Standing - Static: Poor  Standing - Dynamic : Poor    ADL Intervention:  Feeding  Food to Mouth: Set-up; Minimum assistance;Proximal stability(to finger feed with R UE; proximal stability at R elbow)  Drink to Mouth: Set-up(able to bring water jug to mouth with L UE)  Cues: Physical assistance; Tactile cues provided;Verbal cues provided;Visual cues provided    Lower Body Dressing Assistance  Socks: Total assistance (dependent)  Leg Crossed Method Used: No  Position Performed: Seated edge of bed  Cues: Don;Physical assistance; Tactile cues provided;Verbal cues provided    Cognitive Retraining  Safety/Judgement: Decreased awareness of environment;Decreased awareness of need for assistance;Decreased awareness of need for safety;Decreased insight into deficits    Pain:  Pt reporting minimal pain in R UE (near IV sites)    Activity Tolerance:   Fair, Poor and SpO2 stable on RA    After treatment patient left in no apparent distress:   Supine in bed, Heels elevated for pressure relief, Call bell within reach, Bed / chair alarm activated and Side rails x 3    COMMUNICATION/COLLABORATION:   The patients plan of care was discussed with: Physical therapist and Registered nurse.      Mona Hernandez OT  Time Calculation: 20 mins

## 2021-02-19 NOTE — DISCHARGE SUMMARY
[address] [facility] [address] [place] [address]   Office [phone]  Fax [phone]       Discharge / Transfer / Off-Service Note     Name: Levar Harley MRN: [id number]  Sex: Female   YOB: 1945  Age: 76 y.o. PCP: Malcolm Mckeon MD     Date of admission: 2/16/2021  Date of discharge/transfer: 2/26/2021    Attending physician at admission: Nakul Ni MD     Attending physician at discharge/transfer: Casey Mcgovern     Resident physician at discharge/transfer: Sean Black MD     Consultants during hospitalization  IP CONSULT TO PRIMARY CARE PROVIDER  IP CONSULT TO PRIMARY CARE PROVIDER  [facility]     Admission diagnoses   AMS (altered mental status) [R41.82]  Severe sepsis ([address] [A41.9, R65.20]  DKA (diabetic ketoacidoses) ([address] [E11.10]    Recommended follow-up after discharge  1. PCP  2. Neurology  3. Cardiology  4. GI    Things to follow up on with PCP:  Final blood culture & urine culture  Monitor for signs of statin intolerance  Referral to GI for likely pancreatic pseudocyst  Consider neuropsychiatric testing   Hiatal hernia - consider     Medication Changes:  1. New Medications: ASA 81mg daily, Keflex 500mg QID, Vancomycin 125mg q6H po, Lipitor 40mg qHS  2. Modified Medications: Regular insulin 9U before meals (decreased to 25U)   3. Discontinued Medications: None      HOSPITAL COURSE    76 y.o. female with a PMH of HTN, CAD s/p stent, T2DM on insulin, HLD, GERD, sciatica, insomnia, allergies presented with altered mental status. Admitted for DKA and severe sepsis. Treated with insulin drip, fluids, and broad spec antibtiotics. Blood cultures grew aerococcus viridans in 1/4 bottles, a likely contaminant. Repeat blood cultures were negative. AMS work up revealed infarct in thalamus. Neurology following during admission. Patient started on a statin and tolerating medication at discharge.  AMS resolved, patient's mentation likely at baseline on discharge. Intermittent 3rd degree AVB and persistence of 2:1 AVB despite electrolyte correction noted on telemetry. Cardiology placed pacemaker. Patient tested positive for C diff colitis and started on po Vanc. Case management contacted [facility] due to concern for social/living situation. Patient discharged to SNF. Severe Sepsis: CXR negative. UCx/RVP neg. CT abd/pelvis no acute process. 2/16 BCx 1/4 bottles aerococcus viridans - likely contaminant. Repeat BCx negative. Completed antibiotic course (Cefepime, Vanc, Flagyl > Augmentin). Repeat UA pos 1+bac, lg LE. C diff positive. - Continue  Vancomycin 125mg q6H po for C. Diff infection  - Continue Keflex 500mg QID for UTI  - PCP: follow up final blood cultures (negative to date) & urine culture    Mobitz Type II 2nd & 3rd degree AV block: S/p pacemaker. ECHO LVEF 60-65%. - Continue Keflex 500mg QID  - F/u Cardiology    AMS: RESOLVED. CT head/cervical spine negative. MRI infarct in thalamus. CVA head & neck neg. Ammonia, TSH, Etoh, UDS wnl. Volatiles - acetone. Patient likely at baseline mentation on discharge. - PCP: Consider neuropsychiatric testing     DKA: RESOLVED. POA AGMA, + blood ketones, and hyperglycemia (). S/p insulin gtt.     CVA: MRI infarct in thalamus. CVA head & neck neg.   - Continue ASA 81 mg daily  - Continue Lipitor 40mg daily  - F/u Neurology     Dysphagia: Tolerating mechanical soft diet. -Recommend speech evaluation & continued speech therapy      T2DM: A1C 8.8. Home NPH 30U BID & Regular insulin 25U before meals.   - Continue NPH 30U BID  - Insulin Regular 9U before meals   - Discontinue Regular insulin 25U before meals   - Adjust SSI and mealtime insulin as needed  - F/u PCP    Pancreatic cyst: Noted on CT A/P and MRI abd.  Repeat CT A/P during admission reported enlarging cystic pancreatic mass, which likely represents pseudocyst.   - F/u PCP  - F/u outpatient GI     Hiatal hernia: noted on CT A/P. - PCP: consider referral to Surgery      COVID negative: COVID PCR negative x2 during admission.       EVAN: RESOLVED. POA Cr 1.83 (BL 0.7)     Buttock wound/rash: Followed by Wound care. - Continue repositioning and Nystatin powder BID      QTC prolonged: POA QTc 543. - Avoid QTc prolonging agents     HTN: POA /46.  - Continue home Norvasc 5mg daily, HCTZ 25mg daily, Cozaar 100mg daily     HLD: No home meds previously d/t statin intolerance (muscle pain). - Continue Lipitor 40mg qHS  - F/u PCP     GERD: Stable. - Continue Pepcid 20mg BID     Allergies: Stable. - Continue home Loratadine 10mg daily prn.     Insomnia: Stable. - Continue home Melatonin 10mg daily prn, Seroquel 50mg qHS.     Bilateral low back pain w/Sciatica & Knee OA: F/w Dr. Janel Nazario (Ortho) and Dr. Willie Jennings (pain management).         Physical exam at discharge:    Vitals Reviewed. Visit Vitals  /66 (BP 1 Location: Right upper arm, BP Patient Position: Supine)   Pulse (!) 103   Temp 98 °F (36.7 °C)   Resp 18   Ht 5' 4\" (1.626 m)   Wt 145 lb 3.2 oz (65.9 kg)   SpO2 90%   BMI 24.92 kg/m²        Physical Exam:     General Oriented to person, place, and time and well-developed. Appears well-nourished, no distress. Not diaphoretic. HENT Head Normocephalic and atraumatic. Eyes Conjunctivae are normal, no discharge. No scleral icterus. Neck No thyromegaly present. No cervical adenopathy. Cardio Normal rate. Exam reveals no gallop and no friction rub. No murmur heard. Pulmonary Effort normal and breath sounds normal. No respiratory distress. No wheezes, no rales. Abdominal Soft. Bowel sounds normal. No distension. No tenderness. Extremities No edema of lower extremities. No tenderness. Distal pulses intact. Neurological Sensation intact b/l. Strength 5/5 in all extremities   Dermatology Skin is warm and dry. No rash noted. No erythema or pallor.     Psychiatric Affect and judgment normal.        Condition at discharge: [facility]     02/26/21  0512 02/25/21  0604 02/24/21  1518   WBC 20.3* 22.1* 23.9*   HGB 12.9 13.1 14.7   HCT 39.2 39.0 43.5    317 364     Recent Labs     02/26/21  0512 02/25/21  0604 02/24/21  0607    135* 133*   K 2.6* 3.4* 4.0    99 97   CO2 26 27 27   BUN 14 16 11   CREA 0.61 0.74 0.72   GLU 78 204* 213*   CA 8.5 8.6 9.2   MG 1.8 1.8 1.7   PHOS 2.8 3.3 2.7     No results for input(s): ALT, AP, TBIL, TBILI, TP, ALB, GLOB, GGT, AML, LPSE in the last 72 hours. No lab exists for component: SGOT, GPT, AMYP, HLPSE  Recent Labs     02/26/21  0719 02/25/21  2125 02/25/21  1615 02/25/21  1133 02/25/21  0658   GLUCPOC 110* 179* 259* 265* 207*       Microbiology  Results     Procedure Component Value Units Date/Time    CULTURE, URINE [[id number]] Collected: 02/25/21 1444    Order Status: Completed Specimen: Urine from Clean catch Updated: 02/25/21 1840    C.  DIFFICILE AG & TOXIN A/B [[id number]]  (Abnormal) Collected: 02/24/21 1518    Order Status: Completed Specimen: Stool Updated: 02/25/21 1655     [address] ANTIGEN Positive        C. difficile toxin Positive        INTERPRETATION       POSITIVE FOR TOXIGENIC C. DIFFICILE          CULTURE, STOOL- (Enteric Bacteria Panel, DNA) [[id number]] Collected: 02/24/21 1518    Order Status: Completed Specimen: Stool Updated: 02/25/21 1901     Shigella species, DNA Negative        Campylobacter species, DNA Negative        Vibrio species, DNA Negative        Enterotoxigen E Coli, DNA Negative        Shiga toxin producing, DNA Negative        Salmonella species, DNA Negative        P. shigelloides, DNA Negative        Y. enterocolitica, DNA Negative       CULTURE, BLOOD, PAIRED [[id number]] Collected: 02/24/21 1518    Order Status: Completed Specimen: Blood Updated: 02/26/21 0645     Special Requests: NO SPECIAL REQUESTS        Culture result: NO GROWTH 2 DAYS       CULTURE, BLOOD [[id number]]     Order Status: Canceled Specimen: Blood COVID-19 RAPID TEST [[id number]] Collected: 02/23/21 1743    Order Status: Completed Specimen: Nasopharyngeal Updated: 02/23/21 1810     Specimen source Nasopharyngeal        COVID-19 rapid test Not detected        Comment: Rapid Abbott ID Now       Rapid NAAT:  The specimen is NEGATIVE for SARS-CoV-2, the novel coronavirus associated with COVID-19. Negative results should be treated as presumptive and, if inconsistent with clinical signs and symptoms or necessary for patient management, should be tested with an alternative molecular assay. Negative results do not preclude SARS-CoV-2 infection and should not be used as the sole basis for patient management decisions. This test has been authorized by the FDA under an Emergency Use Authorization (EUA) for use by authorized laboratories.    Fact sheet for Healthcare Providers: ConventionUpdate.co.nz  Fact sheet for Patients: ConventionUpdate.co.nz       Methodology: Isothermal Nucleic Acid Amplification         COVID-19 RAPID TEST [[id number]]     Order Status: Canceled     COVID-19 RAPID TEST [[id number]]     Order Status: Canceled     CULTURE, BLOOD, PAIRED [[id number]] Collected: 02/17/21 1125    Order Status: Completed Specimen: Blood Updated: 02/22/21 0529     Special Requests: NO SPECIAL REQUESTS        Culture result: NO GROWTH 5 DAYS       RESPIRATORY VIRUS PANEL W/COVID-19, PCR [[id number]] Collected: 02/16/21 1645    Order Status: Completed Specimen: Nasopharyngeal Updated: 02/16/21 2355     Adenovirus Not detected        Coronavirus 229E Not detected        Coronavirus HKU1 Not detected        Coronavirus CVNL63 Not detected        Coronavirus OC43 Not detected        Metapneumovirus Not detected        Rhinovirus and Enterovirus Not detected        Influenza A Not detected        Influenza A, subtype H1 Not detected        Influenza A, subtype H3 Not detected        INFLUENZA A H1N1 PCR Not detected Influenza B Not detected        Parainfluenza 1 Not detected        Parainfluenza 2 Not detected        Parainfluenza 3 Not detected        Parainfluenza virus 4 Not detected        RSV by PCR Not detected        B. parapertussis, PCR Not detected        Bordetella pertussis - PCR Not detected        Chlamydophila pneumoniae DNA, QL, PCR Not detected        Mycoplasma pneumoniae DNA, QL, PCR Not detected        SARS-CoV-2, PCR Not detected       RESPIRATORY VIRUS PANEL W/COVID-19, PCR [[id number]]     Order Status: Canceled Specimen: NASOPHARYNGEAL SWAB     CULTURE, URINE [[id number]] Collected: 02/16/21 1157    Order Status: Completed Specimen: Cath Urine Updated: 02/18/21 0805     Special Requests: NO SPECIAL REQUESTS        Culture result: No growth (<1,000 CFU/ML)       CULTURE, BLOOD, PAIRED [[id number]]  (Abnormal) Collected: 02/16/21 1126    Order Status: Completed Specimen: Blood Updated: 02/24/21 0809     Special Requests: NO SPECIAL REQUESTS        Culture result:       AEROCOCCUS VIRIDANS GROWING IN 1 OF 4 BOTTLES DRAWN (SITE = RFA) SENDING TO REFERENCE LAB FOR SENSITIVITIES                  REMAINING BOTTLE(S) HAS/HAVE NO GROWTH IN 5 DAYS            . Criselda Erazo REFER TO [id number] FOR  AEROCOCCUS VIRIDANS SENSITIVITIES    MICRO Levkj Evelia [[id number]] Collected: 02/16/21 1126    Order Status: Completed Updated: 02/17/21 1017    SUSCEPTIBILITY, AER + ANAEROB [[id number]]  (Abnormal) Collected: 02/16/21 1126    Order Status: Completed Specimen: MICROBIAL ISOLATE Updated: 02/24/21 1637     Source BLOOD        Susceptibility, Aer + Anaerob Final Report Below        Comment: (NOTE)  Performed At: [address] [id number]  Jayshree Santos MD UK:[id number]  CORRECTED ON 02/24 AT 1636: PREVIOUSLY REPORTED AS Preliminary report         Jed SCHMIDT [[id number]]  (Abnormal) Collected: 02/16/21 1126    Order Status: Completed Specimen: Miscellaneous sample Updated: 02/24/21 1637 Source BLOOD        Aer+Anaer Suscept Result 1 Aerococcus viridans        Comment: (NOTE)  Identification performed by account, not confirmed by this  laboratory. Testing performed by broth microdilution. Antimicrobial suscept. Comment        Comment: (NOTE)       ** S = Susceptible; I = Intermediate; R = Resistant **                    P = Positive; N = Negative             MICS are expressed in micrograms per mL    Antibiotic                 RSLT#1    RSLT#2    RSLT#3    RSLT#4  Cefepime                       S  Cefotaxime                     S  Ceftriaxone                    S  Chloramphenicol                R  Clindamycin                    S  Erythromycin                   R  Levofloxacin                   R  Penicillin                     S  Vancomycin                     S  Performed At: [address] [id number]  Apollo Alvares MD G                  Procedures / Diagnostic Studies  Pacemaker placement 2021 by Dr. Brissa Alcaraz    Result Date: 2021  Small acute infarct posterior lateral to the left thalamus Volume loss and moderate White matter changes which may be due to small vessel ischemic change    Mri Abd W Wo Cont    Result Date: 2021  1. 4.7 x 3.7 x 4.7 cm cystic lesion between the CBD and the pancreatic head has no enhancing component and has a nonaggressive appearance. Differential diagnosis includes CBD choledochocyst, simple pancreatic cyst, chronic pseudocyst, and less likely intraductal papillary mucinous neoplasm. Although there is mass effect on the CBD, there is no biliary obstruction or dilatation. 2. No acute process on MRI. Recommendation: Outpatient follow-up with gastroenterology to determine need for follow-up imaging versus ERCP. Ct Head Wo Cont    Result Date: 2021  No acute intracranial process. No significant change from the prior study.      Ct Head Wo Cont    Result Date: 2/16/2021  No acute intracranial process is identified. Ct Spine Cerv Wo Cont    Result Date: 2/16/2021  1. No fracture or subluxation is identified. There are changes of spondylosis as described above. Ct Abd Pelv Wo Cont    Result Date: 2/24/2021  1. There are no acute findings in abdomen or pelvis. 2. The cystic pancreatic mass is larger suggesting this likely represents a pseudocyst. 3. Hiatal hernia. Ct Abd Pelv Wo Cont    Result Date: 2/16/2021  1. No evidence of acute process in the abdomen or pelvis. 2. Cystic lesion in the head of the pancreas measuring 4.3 x 3.8 x 4.7 cm, with differential considerations including mucinous cystic neoplasm (favored), serous cystadenoma, or large side branch IPMN among others. MRI abdomen on a nonemergent basis is recommended for further evaluation. 3. Nonspecific chronic calcified cystic lesion in the right ischiorectal fossa measuring 4.0 x 2.4 cm. 4. Severe calcific atherosclerosis of the abdominal aorta. 5. Additional findings as above. Xr Chest Port    Result Date: 2/24/2021  1. No pneumothorax    Xr Chest Port    Result Date: 2/16/2021  Negative radiographic examination of the chest.    Cta Head Neck W Cont    Result Date: 2/18/2021  1. No large vessel occlusion or intracranial aneurysm. 2.  Calcified plaque in the proximal internal carotid arteries bilaterally without flow-limiting stenosis.        Chronic diagnoses   Problem List as of 2/26/2021 Date Reviewed: 2/10/2021          Codes Class Noted - Resolved    C. difficile colitis ICD-10-CM: A04.72  ICD-9-CM: 008.45  2/26/2021 - Present        DKA (diabetic ketoacidoses) ([facility] ICD-10-CM: E11.10  ICD-9-CM: 250.12  2/16/2021 - Present        * (Principal) Severe sepsis ([facility] ICD-10-CM: A41.9, R65.20  ICD-9-CM: 038.9, 995.92  2/16/2021 - Present        AMS (altered mental status) ICD-10-CM: R41.82  ICD-9-CM: 780.97  2/16/2021 - Present        Insomnia (Chronic) ICD-10-CM: G47.00  ICD-9-CM: 780.52 2/16/2021 - Present        Pancreatic cyst ICD-10-CM: K86.2  ICD-9-CM: 577.2  2/16/2021 - Present    Overview Signed 2/16/2021  9:12 PM by Kelsie Solano MD     F/u MRI Outpatient             GERD (gastroesophageal reflux disease) (Chronic) ICD-10-CM: K21.9  ICD-9-CM: 530.81  2/16/2021 - Present        Prolonged Q-T interval on ECG ICD-10-CM: R94.31  ICD-9-CM: 794.31  2/16/2021 - Present        EVAN (acute kidney injury) ([facility] ICD-10-CM: N17.9  ICD-9-CM: 584.9  2/16/2021 - Present        Mobitz type 2 second degree AV block ICD-10-CM: I44.1  ICD-9-CM: 426.12  2/16/2021 - Present        Essential hypertension ICD-10-CM: I10  ICD-9-CM: 401.9  9/23/2019 - Present        Hypercholesteremia ICD-10-CM: E78.00  ICD-9-CM: 272.0  9/23/2019 - Present        Controlled type 2 diabetes mellitus without complication, with long-term current use of insulin (HCC) ICD-10-CM: E11.9, Z79.4  ICD-9-CM: 250.00, V58.67  9/23/2019 - Present        Diabetic retinopathy ([facility] ICD-10-CM: E11.319  ICD-9-CM: 250.50, 362.01  9/10/2019 - Present              Discharge/Transfer Medications  Current Discharge Medication List      START taking these medications    Details   nystatin (MYCOSTATIN) powder Apply  to affected area two (2) times a day. Qty: 1 Bottle, Refills: 0      atorvastatin (LIPITOR) 40 mg tablet Take 1 Tab by mouth nightly. Qty: 30 Tab, Refills: 0      amoxicillin-clavulanate (AUGMENTIN) 875-125 mg per tablet Take 1 Tab by mouth every twelve (12) hours for 2 doses. Qty: 2 Tab, Refills: 0         CONTINUE these medications which have NOT CHANGED    Details   insulin NPH (NOVOLIN N, HUMULIN N) 100 unit/mL injection 30-40 Units by SubCUTAneous route Before breakfast and dinner. losartan (COZAAR) 100 mg tablet Take 1 Tab by mouth daily. Qty: 90 Tab, Refills: 1    Associated Diagnoses: Essential hypertension      famotidine (PEPCID) 20 mg tablet Take 1 Tab by mouth two (2) times a day.   Qty: 180 Tab, Refills: 1    Associated Diagnoses: Gastroesophageal reflux disease without esophagitis      hydroCHLOROthiazide (HYDRODIURIL) 25 mg tablet Take 1 Tab by mouth daily. Qty: 90 Tab, Refills: 1    Associated Diagnoses: Essential hypertension      amLODIPine (NORVASC) 5 mg tablet Take 1 Tab by mouth daily. Qty: 90 Tab, Refills: 1    Associated Diagnoses: Essential hypertension      QUEtiapine (SEROquel) 50 mg tablet Take 1 Tab by mouth nightly. Qty: 90 Tab, Refills: 1    Associated Diagnoses: Insomnia, unspecified type      augmented betamethasone dipropionate (DIPROLENE-AF) 0.05 % topical cream Apply  to affected area two (2) times a day. Qty: 30 g, Refills: 1    Associated Diagnoses: Psoriasis      butalbital-aspirin-caffeine (FIORINAL) capsule Take 1 Cap by mouth daily as needed for Headache (Takes rarely). loratadine 10 mg cap Take 10 mg by mouth daily as needed. Indications: inflammation of the nose due to an allergy      melatonin 5 mg cap capsule Take 5 mg by mouth nightly as needed. ascorbic acid, vitamin C, (VITAMIN C) 500 mg tablet Take 1,000 mg by mouth daily. naproxen sodium 220 mg Cap Take 1 Tab by mouth daily as needed. STOP taking these medications       insulin regular (NOVOLIN R, HUMULIN R) 100 unit/mL injection Comments:   Reason for Stopping:                Diet: Mechanical soft diet per speech eval     Activity:  As tolerated    Disposition: SNF. PT/OT evaluated patient and recommended SNF care during hospital stay.      Discharge instructions to patient/family  Please seek medical attention for any new or worsening symptoms particularly fever, chest pain, shortness of breath, abdominal pain, nausea, vomiting    Follow up plans/appointments  Follow-up Information     Follow up With Specialties Details Why Contact Info    Dago Javier NP Nurse Practitioner In 2 weeks Hospital Follow up [address]  [facility] [address] [facility] 170      Bianca Roberto MD Gastroenterology Call To make an appointment to see the GI doctors about your pancreatic cyst [address]       Joseph Figueroa MD Cardiology In 2 weeks office will call with date and time of appt  [address] [facility]  [address]  [id number] [place] [address]      Malcolm Mckeon MD Family Medicine Call Follow up with PCP after hospital stay [address]             Sean Black MD  Family Medicine Resident       For Billing    Chief Complaint   Patient presents with    Altered mental status   [facility] Problems  Date Reviewed: 2/10/2021          Codes Class Noted POA    C. difficile colitis ICD-10-CM: A04.72  ICD-9-CM: 008.45  2/26/2021 Unknown        DKA (diabetic ketoacidoses) ([facility] ICD-10-CM: E11.10  ICD-9-CM: 250.12  2/16/2021 Unknown        * (Principal) Severe sepsis ([facility] ICD-10-CM: A41.9, R65.20  ICD-9-CM: 038.9, 995.92  2/16/2021 Unknown        AMS (altered mental status) ICD-10-CM: R41.82  ICD-9-CM: 780.97  2/16/2021 Unknown        Insomnia (Chronic) ICD-10-CM: G47.00  ICD-9-CM: 780.52  2/16/2021 Unknown        Pancreatic cyst ICD-10-CM: K86.2  ICD-9-CM: 577.2  2/16/2021 Unknown    Overview Signed 2/16/2021  9:12 PM by Aleksandar Hand MD     F/u MRI Outpatient             GERD (gastroesophageal reflux disease) (Chronic) ICD-10-CM: K21.9  ICD-9-CM: 530.81  2/16/2021 Unknown        Prolonged Q-T interval on ECG ICD-10-CM: R94.31  ICD-9-CM: 794.31  2/16/2021 Unknown        EVAN (acute kidney injury) ([facility] ICD-10-CM: N17.9  ICD-9-CM: 584.9  2/16/2021 Unknown        Mobitz type 2 second degree AV block ICD-10-CM: I44.1  ICD-9-CM: 426.12  2/16/2021 Unknown        Essential hypertension ICD-10-CM: I10  ICD-9-CM: 401.9  9/23/2019 Yes        Hypercholesteremia ICD-10-CM: E78.00  ICD-9-CM: 272.0  9/23/2019 Yes        Controlled type 2 diabetes mellitus without complication, with long-term current use of insulin ([facility] ICD-10-CM: E11.9, Z79.4  ICD-9-CM: 250.00, V58.67  9/23/2019 Yes

## 2021-02-20 LAB
ANION GAP SERPL CALC-SCNC: 5 MMOL/L (ref 5–15)
ANION GAP SERPL CALC-SCNC: 6 MMOL/L (ref 5–15)
ATRIAL RATE: 43 BPM
BUN SERPL-MCNC: 6 MG/DL (ref 6–20)
BUN SERPL-MCNC: 8 MG/DL (ref 6–20)
BUN/CREAT SERPL: 15 (ref 12–20)
BUN/CREAT SERPL: 8 (ref 12–20)
CALCIUM SERPL-MCNC: 8.6 MG/DL (ref 8.5–10.1)
CALCIUM SERPL-MCNC: 8.8 MG/DL (ref 8.5–10.1)
CALCULATED P AXIS, ECG09: -2 DEGREES
CALCULATED R AXIS, ECG10: 1 DEGREES
CALCULATED T AXIS, ECG11: -125 DEGREES
CHLORIDE SERPL-SCNC: 100 MMOL/L (ref 97–108)
CHLORIDE SERPL-SCNC: 97 MMOL/L (ref 97–108)
CO2 SERPL-SCNC: 32 MMOL/L (ref 21–32)
CO2 SERPL-SCNC: 32 MMOL/L (ref 21–32)
CREAT SERPL-MCNC: 0.52 MG/DL (ref 0.55–1.02)
CREAT SERPL-MCNC: 0.79 MG/DL (ref 0.55–1.02)
DIAGNOSIS, 93000: NORMAL
ERYTHROCYTE [DISTWIDTH] IN BLOOD BY AUTOMATED COUNT: 14 % (ref 11.5–14.5)
GLUCOSE BLD STRIP.AUTO-MCNC: 185 MG/DL (ref 65–100)
GLUCOSE BLD STRIP.AUTO-MCNC: 320 MG/DL (ref 65–100)
GLUCOSE BLD STRIP.AUTO-MCNC: 329 MG/DL (ref 65–100)
GLUCOSE BLD STRIP.AUTO-MCNC: 401 MG/DL (ref 65–100)
GLUCOSE SERPL-MCNC: 270 MG/DL (ref 65–100)
GLUCOSE SERPL-MCNC: 383 MG/DL (ref 65–100)
HCT VFR BLD AUTO: 40.4 % (ref 35–47)
HGB BLD-MCNC: 13.8 G/DL (ref 11.5–16)
MAGNESIUM SERPL-MCNC: 1.7 MG/DL (ref 1.6–2.4)
MCH RBC QN AUTO: 28.2 PG (ref 26–34)
MCHC RBC AUTO-ENTMCNC: 34.2 G/DL (ref 30–36.5)
MCV RBC AUTO: 82.4 FL (ref 80–99)
NRBC # BLD: 0 K/UL (ref 0–0.01)
NRBC BLD-RTO: 0 PER 100 WBC
P-R INTERVAL, ECG05: 184 MS
PHOSPHATE SERPL-MCNC: 2 MG/DL (ref 2.6–4.7)
PLATELET # BLD AUTO: 236 K/UL (ref 150–400)
PMV BLD AUTO: 11 FL (ref 8.9–12.9)
POTASSIUM SERPL-SCNC: 2.8 MMOL/L (ref 3.5–5.1)
POTASSIUM SERPL-SCNC: 3 MMOL/L (ref 3.5–5.1)
Q-T INTERVAL, ECG07: 720 MS
QRS DURATION, ECG06: 152 MS
QTC CALCULATION (BEZET), ECG08: 810 MS
RBC # BLD AUTO: 4.9 M/UL (ref 3.8–5.2)
SERVICE CMNT-IMP: ABNORMAL
SODIUM SERPL-SCNC: 135 MMOL/L (ref 136–145)
SODIUM SERPL-SCNC: 137 MMOL/L (ref 136–145)
VENTRICULAR RATE, ECG03: 76 BPM
WBC # BLD AUTO: 6.2 K/UL (ref 3.6–11)

## 2021-02-20 PROCEDURE — 82962 GLUCOSE BLOOD TEST: CPT

## 2021-02-20 PROCEDURE — 74011636637 HC RX REV CODE- 636/637: Performed by: STUDENT IN AN ORGANIZED HEALTH CARE EDUCATION/TRAINING PROGRAM

## 2021-02-20 PROCEDURE — 77030038269 HC DRN EXT URIN PURWCK BARD -A

## 2021-02-20 PROCEDURE — 85027 COMPLETE CBC AUTOMATED: CPT

## 2021-02-20 PROCEDURE — 93005 ELECTROCARDIOGRAM TRACING: CPT

## 2021-02-20 PROCEDURE — 80048 BASIC METABOLIC PNL TOTAL CA: CPT

## 2021-02-20 PROCEDURE — 74011250637 HC RX REV CODE- 250/637: Performed by: STUDENT IN AN ORGANIZED HEALTH CARE EDUCATION/TRAINING PROGRAM

## 2021-02-20 PROCEDURE — 74011250637 HC RX REV CODE- 250/637: Performed by: NURSE PRACTITIONER

## 2021-02-20 PROCEDURE — 99233 SBSQ HOSP IP/OBS HIGH 50: CPT | Performed by: FAMILY MEDICINE

## 2021-02-20 PROCEDURE — 74011250636 HC RX REV CODE- 250/636: Performed by: STUDENT IN AN ORGANIZED HEALTH CARE EDUCATION/TRAINING PROGRAM

## 2021-02-20 PROCEDURE — 77010033678 HC OXYGEN DAILY

## 2021-02-20 PROCEDURE — 77030040393 HC DRSG OPTIFOAM GENT MDII -B

## 2021-02-20 PROCEDURE — 36415 COLL VENOUS BLD VENIPUNCTURE: CPT

## 2021-02-20 PROCEDURE — 2709999900 HC NON-CHARGEABLE SUPPLY

## 2021-02-20 PROCEDURE — 65660000000 HC RM CCU STEPDOWN

## 2021-02-20 PROCEDURE — 99233 SBSQ HOSP IP/OBS HIGH 50: CPT | Performed by: SPECIALIST

## 2021-02-20 PROCEDURE — 83735 ASSAY OF MAGNESIUM: CPT

## 2021-02-20 PROCEDURE — 94760 N-INVAS EAR/PLS OXIMETRY 1: CPT

## 2021-02-20 PROCEDURE — 84100 ASSAY OF PHOSPHORUS: CPT

## 2021-02-20 RX ORDER — INSULIN GLARGINE 100 [IU]/ML
30 INJECTION, SOLUTION SUBCUTANEOUS DAILY
Status: DISCONTINUED | OUTPATIENT
Start: 2021-02-20 | End: 2021-02-21

## 2021-02-20 RX ORDER — INSULIN LISPRO 100 [IU]/ML
INJECTION, SOLUTION INTRAVENOUS; SUBCUTANEOUS
Status: DISCONTINUED | OUTPATIENT
Start: 2021-02-20 | End: 2021-02-26 | Stop reason: HOSPADM

## 2021-02-20 RX ORDER — SODIUM,POTASSIUM PHOSPHATES 280-250MG
2 POWDER IN PACKET (EA) ORAL 2 TIMES DAILY
Status: DISCONTINUED | OUTPATIENT
Start: 2021-02-20 | End: 2021-02-20

## 2021-02-20 RX ORDER — POTASSIUM CHLORIDE 7.45 MG/ML
10 INJECTION INTRAVENOUS
Status: DISPENSED | OUTPATIENT
Start: 2021-02-20 | End: 2021-02-20

## 2021-02-20 RX ORDER — SODIUM,POTASSIUM PHOSPHATES 280-250MG
2 POWDER IN PACKET (EA) ORAL 4 TIMES DAILY
Status: COMPLETED | OUTPATIENT
Start: 2021-02-20 | End: 2021-02-21

## 2021-02-20 RX ORDER — POTASSIUM CHLORIDE 750 MG/1
40 TABLET, FILM COATED, EXTENDED RELEASE ORAL
Status: COMPLETED | OUTPATIENT
Start: 2021-02-20 | End: 2021-02-20

## 2021-02-20 RX ORDER — MAGNESIUM SULFATE 1 G/100ML
1 INJECTION INTRAVENOUS ONCE
Status: COMPLETED | OUTPATIENT
Start: 2021-02-20 | End: 2021-02-20

## 2021-02-20 RX ORDER — ATORVASTATIN CALCIUM 40 MG/1
40 TABLET, FILM COATED ORAL
Qty: 30 TAB | Refills: 0 | Status: SHIPPED
Start: 2021-02-20 | End: 2021-02-22

## 2021-02-20 RX ADMIN — INSULIN LISPRO 4 UNITS: 100 INJECTION, SOLUTION INTRAVENOUS; SUBCUTANEOUS at 21:36

## 2021-02-20 RX ADMIN — HEPARIN SODIUM 5000 UNITS: 5000 INJECTION INTRAVENOUS; SUBCUTANEOUS at 00:04

## 2021-02-20 RX ADMIN — HEPARIN SODIUM 5000 UNITS: 5000 INJECTION INTRAVENOUS; SUBCUTANEOUS at 17:50

## 2021-02-20 RX ADMIN — MAGNESIUM SULFATE HEPTAHYDRATE 1 G: 1 INJECTION, SOLUTION INTRAVENOUS at 09:47

## 2021-02-20 RX ADMIN — AMLODIPINE BESYLATE 5 MG: 5 TABLET ORAL at 09:46

## 2021-02-20 RX ADMIN — FAMOTIDINE 20 MG: 10 INJECTION, SOLUTION INTRAVENOUS at 17:50

## 2021-02-20 RX ADMIN — INSULIN GLARGINE 30 UNITS: 100 INJECTION, SOLUTION SUBCUTANEOUS at 09:46

## 2021-02-20 RX ADMIN — INSULIN LISPRO 7 UNITS: 100 INJECTION, SOLUTION INTRAVENOUS; SUBCUTANEOUS at 17:51

## 2021-02-20 RX ADMIN — HEPARIN SODIUM 5000 UNITS: 5000 INJECTION INTRAVENOUS; SUBCUTANEOUS at 23:40

## 2021-02-20 RX ADMIN — Medication 10 ML: at 22:08

## 2021-02-20 RX ADMIN — INSULIN LISPRO 5 UNITS: 100 INJECTION, SOLUTION INTRAVENOUS; SUBCUTANEOUS at 00:04

## 2021-02-20 RX ADMIN — HEPARIN SODIUM 5000 UNITS: 5000 INJECTION INTRAVENOUS; SUBCUTANEOUS at 09:46

## 2021-02-20 RX ADMIN — INSULIN LISPRO 7 UNITS: 100 INJECTION, SOLUTION INTRAVENOUS; SUBCUTANEOUS at 12:43

## 2021-02-20 RX ADMIN — ATORVASTATIN CALCIUM 40 MG: 20 TABLET, FILM COATED ORAL at 21:37

## 2021-02-20 RX ADMIN — INSULIN LISPRO 2 UNITS: 100 INJECTION, SOLUTION INTRAVENOUS; SUBCUTANEOUS at 05:49

## 2021-02-20 RX ADMIN — AMOXICILLIN AND CLAVULANATE POTASSIUM 1 TABLET: 875; 125 TABLET, FILM COATED ORAL at 21:37

## 2021-02-20 RX ADMIN — POTASSIUM & SODIUM PHOSPHATES POWDER PACK 280-160-250 MG 2 PACKET: 280-160-250 PACK at 21:36

## 2021-02-20 RX ADMIN — POTASSIUM & SODIUM PHOSPHATES POWDER PACK 280-160-250 MG 2 PACKET: 280-160-250 PACK at 09:46

## 2021-02-20 RX ADMIN — POTASSIUM CHLORIDE 10 MEQ: 10 INJECTION, SOLUTION INTRAVENOUS at 09:47

## 2021-02-20 RX ADMIN — ASPIRIN 81 MG: 81 TABLET, CHEWABLE ORAL at 09:45

## 2021-02-20 RX ADMIN — AMOXICILLIN AND CLAVULANATE POTASSIUM 1 TABLET: 875; 125 TABLET, FILM COATED ORAL at 09:46

## 2021-02-20 RX ADMIN — Medication 10 ML: at 05:50

## 2021-02-20 RX ADMIN — LOSARTAN POTASSIUM 100 MG: 50 TABLET, FILM COATED ORAL at 17:50

## 2021-02-20 RX ADMIN — POTASSIUM CHLORIDE 40 MEQ: 750 TABLET, FILM COATED, EXTENDED RELEASE ORAL at 17:50

## 2021-02-20 RX ADMIN — POTASSIUM & SODIUM PHOSPHATES POWDER PACK 280-160-250 MG 2 PACKET: 280-160-250 PACK at 17:50

## 2021-02-20 NOTE — PROGRESS NOTES
[facility] [address] [place] [address]   Office [phone]  Fax [phone] Residency Attending Addendum:  I saw and evaluated the patient on the day of the encounter with Dr. Shirley Mcgee, performing the key elements of the service. I discussed the findings, assessment and plan with the resident and agree with the resident's findings and plan as documented in the resident's note. Reviewed telemetry and case with Dr. Wan Vargas and appreciate his assistance and expert opinion. There is concern for complete heart block. Mag and potassium repleted. TSH within good range. Glucose elevated in the 400s. Will give 7 units of insulin now and increase Lantus this evening. Barry Grimaldo MD, FAAFP, CAQSM, RMSK           Assessment and Plan     Luke Jimenez is a 76 y.o. female with a PMH of HTN, CAD s/p stent, T2DM on insulin, HLD, GERD, sciatica, insomnia, allergies admitted for AMS, Severe sepsis of unknown source, and DKA. 24 Hour Events: No acute events   Tele: Rate 70s. EKG 2:1 type 2 2nd degree AV block, Ventricular rate 76.     Severe Sepsis of unknown source: POA LA 3.6 (resolved), WBC 15.9, . CRP 7.08. CXR negative. UCx/RVP neg. S/p 30cc/kg bolus. CT abd/pelvis no acute process. - Augmentin 875 - 125mg BID  - F/u 2/16 BCx 1/4 bottles aerococcus viridan - likely contaminant   - F/u 2/17 BCxNGTD   - CBC, CMP daily    AMS: CT head/cervical spine negative. Ammonia, TSH, Etoh, UDS wnl. Volatiles - acetone. - CBC, BMP, Mag, Phosphorus    CVA: MRI infarct in thalamus. CVA head & neck neg.  - ASA 81 mg daily  - Lipitor 40mg qHS  - Neuro consulted, appreciate recs   - EEG   - Speech following    DKA: RESOLVED. POA AGMA, + blood ketones, and hyperglycemia (). S/p insulin gtt.      T2DM: A1C 8.8.  Home NPH 30U BID & Regular insulin 25U before meals.   - Lantus 30U  - SSI + POC glu checks q6H  - Hypoglycemia protocol    Mobitz Type II 2nd degree AV block: EKG with mobitz type 2 AV block. BNP nml. Trop neg x1. ECHO LVEF 60-65%.   - Cardiac monitoring  - Cardiology consulted, appreciate recs     COVID negative: POA CRP 7.08, Ddimer 0.83, ferritin 124, . COVID PCR negative.       EVAN: RESOLVED. POA Cr 1.83 (BL 0.7)  - Monitor on daily CMP     L buttock wound:  - Wound care      QTC prolonged: POA QTc 543.  - Avoid QTc prolonging agents     HTN: POA /46. Home Norvasc 5mg daily, HCTZ 25mg daily, Cozaar 100mg daily.  - Continue home Norvasc 5mg daily, Cozaar 100mg daily  - Hydralazine 10mg q6H for BP >160/100  - Continue to monitor and adjust with BP trend     HLD: No home meds d/t statin intolerance (muscle pain).   - Discussing risk/benefits of statin with patient, patient agrees to try low dose statin and titrate up if tolerated      GERD: Stable. Home Pepcid 20mg BID.  - Continue Pepcid 20mg BID    Allergies: Home Loratadine 10mg daily prn.  - Hold home meds     Insomnia: Home Melatonin 5mg daily prn, Seroquel 50mg qHS.   - Hold home meds  - Melatonin 5mg prn      Bilateral low back pain w/Sciatica & Knee OA: F/w Dr. Juarez (Ortho) and Dr. Fernandez (pain management).     Pancreatic cyst: Seen on CT A/P and MRI abd  - F/u outpatient GI     FEN/GI: Mechanical soft.   Activity: Ambulate with assistance. Fall precautions  DVT prophylaxis: SQH  GI prophylaxis:  Pepcid  Disposition: Plan to d/c to SNF. PT/OT/CM consulted.   POC: Malia Wayne [phone] (daughter). Leslie Saenz [phone] (daughter).      CODE STATUS:  Full, discussed with daughters         Ailyn Peterson MD  Family Medicine Resident         Subjective / Objective     Subjective: Pt was seen and examined at bedside. Afebrile and hemodynamically stable. Reports ankles b/l feel achy. No SOB, CP.     Objective:  Temp (24hrs), Av.8 °F (36.6 °C), Min:97.4 °F (36.3 °C), Max:98 °F (36.7 °C)     Visit Vitals  /73 (BP 1 Location: Right upper arm, BP Patient Position: At rest)   Pulse (!) 132   Temp  97.7 °F (36.5 °C)   Resp 18   Ht 5' 4\" (1.626 m)   Wt 156 lb 15.5 oz (71.2 kg)   SpO2 93%   BMI 26.94 kg/m²     Physical Exam:   General: No acute distress. Alert. Cooperative.    Respiratory: CTAB. No w/r/r/c.   Cardiovascular: Normal S1,S2. No m/r/g.   GI: Nondistended.+ bowel sounds. Nontender. No rebound tenderness or guarding.   Extremities: 2+ pitting edema BLE edema up to ankles   Skin: Warm, dry. No rashes.   Neuro: Alert. Sensation intact b/l. Strength 5/5 in all extremities    Respiratory: O2 Flow Rate (L/min): 1 l/min O2 Device: Room air   Date 02/19/21 0700 - 02/20/21 0659 02/20/21 0700 - 02/21/21 0659   Shift 2812-4330 9808-4213 24 Hour Total 0700-1859 1900-0659 24 Hour Total   INTAKE   P.O. 850  850        P.O. 850  850      I.V.(mL/kg/hr) 870(1)  870(0.5)        Insulin Volume 120  120        Volume (potassium phosphate 15 mmol in 0.9% sodium chloride 250 mL infusion) 510  510        Volume (cefepime (MAXIPIME) 2 g in sterile water (preservative free) 10 mL IV syringe) 40  40        Volume (metroNIDAZOLE (FLAGYL) IVPB premix 500 mg) 200  200      Shift Total(mL/kg) 1720(24.2)  1720(24.2)      OUTPUT   Urine(mL/kg/hr) 700(0.8) 2050(2.4) 2750(1.6)        Urine Voided 700  700        Urine Occurrence(s) 1 x  1 x        Urine Output (mL) (External Female Catheter 02/17/21)  2050 2050      Shift Total(mL/kg) 700(9.8) 2050(28.8) 2750(38.6)      NET 1020 -2050 -1030      Weight (kg) 71.2 71.2 71.2 71.2 71.2 71.2       Inpatient Medications  Current Facility-Administered Medications   Medication Dose Route Frequency   • insulin glargine (LANTUS) injection 30 Units  30 Units SubCUTAneous DAILY   • atorvastatin (LIPITOR) tablet 40 mg  40 mg Oral QHS   • aspirin chewable tablet 81 mg  81 mg Oral DAILY   • amoxicillin-clavulanate (AUGMENTIN) 875-125 mg per tablet 1 Tab  1 Tab Oral Q12H   • amLODIPine (NORVASC) tablet 5 mg  5 mg Oral DAILY   • losartan (COZAAR) tablet 100 mg  100 mg Oral QPM   • melatonin (rapid  dissolve) tablet 5 mg  5 mg Oral QHS PRN    insulin lispro (HUMALOG) injection   SubCUTAneous Q6H    sodium chloride (NS) flush 5-10 mL  5-10 mL IntraVENous PRN    sodium chloride (NS) flush 5-40 mL  5-40 mL IntraVENous Q8H    sodium chloride (NS) flush 5-40 mL  5-40 mL IntraVENous PRN    heparin (porcine) injection 5,000 Units  5,000 Units SubCUTAneous Q8H    famotidine (PF) (PEPCID) 20 mg in 0.9% sodium chloride 10 mL injection  20 mg IntraVENous QPM         Allergies  Allergies   Allergen Reactions    Advicor [Niacin-Lovastatin] Swelling    Conjugated Estrogens Other (comments)     Headaches      Other reaction(s): Other (comments)  Headaches    Deflazacort Unable to Obtain     Other reaction(s): Unable to Obtain    Erythromycin Nausea Only    Erythromycin Base Nausea and Vomiting    Niacin Swelling    Pitavastatin Other (comments)     Abdominal pain, nausea  Other reaction(s): Other (comments)  Abdominal pain, nausea    Simvastatin Other (comments)     Muscle cramps  Other reaction(s): Other (comments)  Muscle cramps    Unable To Obtain Swelling    Zolpidem Other (comments)     Other reaction(s): Other (comments)    Atorvastatin Other (comments)     Muscle cramps  Other reaction(s): Other (comments)  Muscle cramps    Triamcinolone Unable to Obtain     Other reaction(s): Unable to Obtain         CBC:  Recent Labs     02/20/21  0008 02/19/21  0041 02/18/21  0342   WBC 6.2 6.4 8.2   HGB 13.8 13.3 14.3   HCT 40.4 39.7 44.1    265 129       Metabolic Panel:  Recent Labs     02/20/21  0008 02/19/21  1336 02/19/21  0041 02/18/21  0342    136 140 146*   K 2.8* 3.4* 2.7* 3.6    101 106 113*   CO2 32 31 29 27   BUN 8 15 18 25*   CREA 0.52* 0.52* 0.60 0.83   * 213* 142* 189*   CA 8.8 8.5 9.0 9.4   MG 1.7  --  1.8 2.0   PHOS 2.0*  --  2.5* 1.9*           Procedures: None.      Imaging/Diagnostic Studies:  Ct Head Wo Cont 2/16/2021   No acute intracranial process is identified.     Ct Spine Cerv Wo Cont 2/16/2021   1. No fracture or subluxation is identified. There are changes of spondylosis as described above. Results from Hospital Encounter encounter on 02/16/21   XR CHEST PORT    Narrative Portable chest single view dated 2/16/2021    History is chest pain    A single portable AP semierect view of the chest was obtained. The cardiac  silhouette is normal in size. There is no evidence of active lung disease. The  costophrenic angles are sharp in appearance. Impression Negative radiographic examination of the chest.             Results from Hospital Encounter encounter on 02/16/21   CTA HEAD NECK W CONT    Narrative EXAM: CTA HEAD NECK W CONT    INDICATION: Cerebrovascular accident. COMPARISON: MRI brain 2/17/2021. CONTRAST: 100 mL of Isovue-370. TECHNIQUE:  Unenhanced  images were obtained to localize the volume for  acquisition. Multislice helical axial CT angiography was performed from the  aortic arch to the top of the head during uneventful rapid bolus intravenous  contrast administration. Coronal and sagittal reformations and 3D post  processing was performed. CT dose reduction was achieved through use of a  standardized protocol tailored for this examination and automatic exposure  control for dose modulation. FINDINGS:    CTA NECK  Patent great vessels. Common carotid arteries are normal in caliber. Right  internal carotid artery demonstrates dense calcified plaque along the medial  wall without significant stenosis. Left internal carotid artery demonstrates  mild calcified plaque along the medial wall with 50% luminal stenosis. Distal  cervical internal carotid arteries are normal in caliber. Vertebral arteries are  codominant and patent. % of right carotid artery stenosis: 0-25  % of left carotid artery stenosis: 25-50    NASCET method was utilized for calculating stenosis. Imaged lung apices are clear aside from atelectasis.  Thyroid gland is normal. No  cervical lymphadenopathy.    CTA HEAD  Cavernous carotid arteries are patent and normal in caliber. Anterior cerebral  and anterior communicating arteries are patent. Middle cerebral arteries and  distal branches are patent.  Distal vertebral arteries are patent. Basilar artery and posterior cerebral  arteries are patent. Right posterior cerebral artery has a fetal origin.  No evidence of intracranial aneurysm.      Impression 1.  No large vessel occlusion or intracranial aneurysm.  2.  Calcified plaque in the proximal internal carotid arteries bilaterally  without flow-limiting stenosis.                   For Billing    Chief Complaint   Patient presents with   • Altered mental status   • Fall       Hospital Problems  Date Reviewed: 2/10/2021          Codes Class Noted POA    DKA (diabetic ketoacidoses) (Formerly [facility]) ICD-10-CM: E11.10  ICD-9-CM: 250.12  2/16/2021 Unknown        * (Principal) Severe sepsis (HCC) ICD-10-CM: A41.9, R65.20  ICD-9-CM: 038.9, 995.92  2/16/2021 Unknown        AMS (altered mental status) ICD-10-CM: R41.82  ICD-9-CM: 780.97  2/16/2021 Unknown        Insomnia (Chronic) ICD-10-CM: G47.00  ICD-9-CM: 780.52  2/16/2021 Unknown        Pancreatic cyst ICD-10-CM: K86.2  ICD-9-CM: 577.2  2/16/2021 Unknown    Overview Signed 2/16/2021  9:12 PM by Ailyn Peterson MD     F/u MRI Outpatient             GERD (gastroesophageal reflux disease) (Chronic) ICD-10-CM: K21.9  ICD-9-CM: 530.81  2/16/2021 Unknown        Prolonged Q-T interval on ECG ICD-10-CM: R94.31  ICD-9-CM: 794.31  2/16/2021 Unknown        EVAN (acute kidney injury) (HCC) ICD-10-CM: N17.9  ICD-9-CM: 584.9  2/16/2021 Unknown        Mobitz type 2 second degree AV block ICD-10-CM: I44.1  ICD-9-CM: 426.12  2/16/2021 Unknown        Essential hypertension ICD-10-CM: I10  ICD-9-CM: 401.9  9/23/2019 Yes        Hypercholesteremia ICD-10-CM: E78.00  ICD-9-CM: 272.0  9/23/2019 Yes        Controlled type 2 diabetes mellitus without complication, with  long-term current use of insulin (HCC) ICD-10-CM: E11.9, Z79.4  ICD-9-CM: 250.00, V58.67  9/23/2019 Yes

## 2021-02-20 NOTE — PROGRESS NOTES
Bedside shift change report given to Lavonne Lake RN (oncoming nurse) by Ortega Rodriguez (offgoing nurse). Report included the following information SBAR, Kardex, MAR, Accordion and Recent Results.

## 2021-02-20 NOTE — PROGRESS NOTES
Shelia Mckeon MD. [facility]              Patient: Jason Holden  : 1945      Today's Date: 2021          CARDIOLOGY PROGRESS NOTE  S: Asked back to see for heart block on tele. Patient says she feels OK. Is s/p CVA. No CP, SOB, syncope. O:  Physical Exam:  Visit Vitals  BP (!) 142/82 (BP 1 Location: Left upper arm, BP Patient Position: At rest)   Pulse 80   Temp 97.3 °F (36.3 °C)   Resp 18   Ht 5' 4\" (1.626 m)   Wt 156 lb 15.5 oz (71.2 kg)   SpO2 92%   BMI 26.94 kg/m²     Patient NAD; resting comfortably   HEENT:  Hearing intact, non-icteric, normocephalic, atraumatic. Neck Exam: Supple   Lung Exam: Clear to auscultation, even breath sounds. Cardiac Exam: Regular rate and rhythm with no murmur or rub  Abdomen: Soft, non-tender   Extremities: MAW, Mild lower extremity edema. MSKTL: Overall good ROM ext  Skin: No significant rashes  Psych: Appropriate affect  Neuro - Moves all ext         Review of Symptoms:  Constitutional: Negative for fever   HEENT: Negative for vision changes. Respiratory: Negative for productive cough  Cardiovascular: Negative for syncope    Gastrointestinal: Negative for abdominal pain, melena  Genitourinary: Negative for dysuria  Skin: Negative for rash  Heme: No problems bleeding.   Neuro - no speech changes or focal weaknesses        Intake/Output Summary (Last 24 hours) at 2021 1221  Last data filed at 2021 0602  Gross per 24 hour   Intake 1320 ml   Output 2750 ml   Net -1430 ml         LABS / OTHER STUDIES reviewed:     Recent Results (from the past 24 hour(s))   METABOLIC PANEL, BASIC    Collection Time: 21  1:36 PM   Result Value Ref Range    Sodium 136 136 - 145 mmol/L    Potassium 3.4 (L) 3.5 - 5.1 mmol/L    Chloride 101 97 - 108 mmol/L    CO2 31 21 - 32 mmol/L    Anion gap 4 (L) 5 - 15 mmol/L    Glucose 213 (H) 65 - 100 mg/dL    BUN 15 6 - 20 MG/DL    Creatinine 0.52 (L) 0.55 - 1.02 MG/DL    BUN/Creatinine ratio 29 (H) 12 - 20      GFR est AA >60 >60 ml/min/1.73m2    GFR est non-AA >60 >60 ml/min/1.73m2    Calcium 8.5 8.5 - 10.1 MG/DL   GLUCOSE, POC    Collection Time: 02/19/21  4:19 PM   Result Value Ref Range    Glucose (POC) 316 (H) 65 - 100 mg/dL    Performed by Anatoliy Nazario (PCT)    GLUCOSE, POC    Collection Time: 02/19/21 11:49 PM   Result Value Ref Range    Glucose (POC) 265 (H) 65 - 100 mg/dL    Performed by Stacey Alvares (PCT)    MAGNESIUM    Collection Time: 02/20/21 12:08 AM   Result Value Ref Range    Magnesium 1.7 1.6 - 2.4 mg/dL   PHOSPHORUS    Collection Time: 02/20/21 12:08 AM   Result Value Ref Range    Phosphorus 2.0 (L) 2.6 - 4.7 MG/DL   CBC W/O DIFF    Collection Time: 02/20/21 12:08 AM   Result Value Ref Range    WBC 6.2 3.6 - 11.0 K/uL    RBC 4.90 3.80 - 5.20 M/uL    HGB 13.8 11.5 - 16.0 g/dL    HCT 40.4 35.0 - 47.0 %    MCV 82.4 80.0 - 99.0 FL    MCH 28.2 26.0 - 34.0 PG    MCHC 34.2 30.0 - 36.5 g/dL    RDW 14.0 11.5 - 14.5 %    PLATELET 840 521 - 205 K/uL    MPV 11.0 8.9 - 12.9 FL    NRBC 0.0 0  WBC    ABSOLUTE NRBC 0.00 0.00 - 8.71 K/uL   METABOLIC PANEL, BASIC    Collection Time: 02/20/21 12:08 AM   Result Value Ref Range    Sodium 137 136 - 145 mmol/L    Potassium 2.8 (L) 3.5 - 5.1 mmol/L    Chloride 100 97 - 108 mmol/L    CO2 32 21 - 32 mmol/L    Anion gap 5 5 - 15 mmol/L    Glucose 270 (H) 65 - 100 mg/dL    BUN 8 6 - 20 MG/DL    Creatinine 0.52 (L) 0.55 - 1.02 MG/DL    BUN/Creatinine ratio 15 12 - 20      GFR est AA >60 >60 ml/min/1.73m2    GFR est non-AA >60 >60 ml/min/1.73m2    Calcium 8.8 8.5 - 10.1 MG/DL   GLUCOSE, POC    Collection Time: 02/20/21  5:39 AM   Result Value Ref Range    Glucose (POC) 185 (H) 65 - 100 mg/dL    Performed by Stacey Alvares (PCT)    EKG, 12 LEAD, INITIAL    Collection Time: 02/20/21  8:03 AM   Result Value Ref Range    Ventricular Rate 76 BPM    Atrial Rate 43 BPM    P-R Interval 184 ms    QRS Duration 152 ms    Q-T Interval 720 ms    QTC Calculation (Bezet) 810 ms    Calculated P Axis -2 degrees    Calculated R Axis 1 degrees    Calculated T Axis -125 degrees    Diagnosis                      CARDIAC DIAGNOSTICS:     Cardiac Evaluation Includes:  I reviewed the results below. EKG 2/16/21 - NSR, 2nd degree AV block Type 1 - I independently viewed and interpreted the test image(s) myself. EKG 2/20/21 - NSR, 2:1 AV block I independently viewed and interpreted the test image(s) myself. Echo 2/16/21 - TDS. LVEF 60-65%    Tele 2/20/21 reviewed --- no tachycardia present (HR was erroneously thought to be tachycardic on monitor - was counting P's). She does have long periods of complete heart block around 10 AM on 2/20/21 (narrow QRS escape rhythm). ASSESSMENT AND PLAN:     Assessment and Plan:    Patient  is a 76 y.o. female with PMH of  HTN, CAD s/p stent, T2DM on insulin, HLD, GERD, sciatica, who presented to the ED on 2/16/21 via EMS after found on the ground by son in law. Found to be in DKA and treated for sepsis. Also found to have acute CVA. Cardiology asked to see again for heart block. 1) AV block including 3rd degree AV block  - 2nd degree AV block Type on EKG 2/16/21  - 2:1 AV block on EKG 2/20/21  - She does have long periods of complete heart block around 10 AM on 2/20/21 (narrow QRS escape rhythm). - Patient symptomatic in bed and BP stable. - Reviewed tele images with Dr. Stephen Laurent and team and case with Dr. Alida Valle.    - Since K+ 2.8, will first replete her K+ and see when happens to her heart block. If heart block persists, then will need a pacer. If heart block resolves, then check an outpatient 30 day monitor. 2) CVA  - on ASA, statin     3) HTN   - Avoid AV brittany agents     4) Hypokalemia   - K+ being repleted       Merle Emmanuel MD, [facility] [address]  [address] [facility], [address]  [address]  [address] [facility], [address]. [address]oe.  [place], [place] [address]  Ph: [phone]    [phone]

## 2021-02-20 NOTE — PROGRESS NOTES
Bedside shift change report given to Steffanie Triana RN (oncoming nurse) by KIRILL RATLIFF (offgoing nurse). Report included the following information SBAR, Kardex, Intake/Output, MAR, Accordion and Recent Results.

## 2021-02-20 NOTE — PROGRESS NOTES
CMS Note  2/20/2021    Patient received and signed their 2nd IMM letter. Patient was provided with a copy for their record.   Abner Beauchamp, CMS

## 2021-02-20 NOTE — PROGRESS NOTES
2/20/2021  Case Management Note    11:27 AM  [facility] is able to accept patient over the weekend, however just learned she will need a pacemaker. Will be able to leave once that has been done. Will place her on will call for [facility]. RAYO Briones    10:25 AM  Left message with  at [facility] to have someone in admissions call me RE: bed availability.      RAYO Briones

## 2021-02-21 ENCOUNTER — APPOINTMENT (OUTPATIENT)
Dept: CT IMAGING | Age: 76
DRG: 853 | End: 2021-02-21
Attending: STUDENT IN AN ORGANIZED HEALTH CARE EDUCATION/TRAINING PROGRAM
Payer: MEDICARE

## 2021-02-21 LAB
ANION GAP SERPL CALC-SCNC: 3 MMOL/L (ref 5–15)
BUN SERPL-MCNC: 7 MG/DL (ref 6–20)
BUN/CREAT SERPL: 11 (ref 12–20)
CALCIUM SERPL-MCNC: 8.9 MG/DL (ref 8.5–10.1)
CHLORIDE SERPL-SCNC: 100 MMOL/L (ref 97–108)
CO2 SERPL-SCNC: 33 MMOL/L (ref 21–32)
COMMENT, HOLDF: NORMAL
CREAT SERPL-MCNC: 0.61 MG/DL (ref 0.55–1.02)
ERYTHROCYTE [DISTWIDTH] IN BLOOD BY AUTOMATED COUNT: 13.9 % (ref 11.5–14.5)
GLUCOSE BLD STRIP.AUTO-MCNC: 172 MG/DL (ref 65–100)
GLUCOSE BLD STRIP.AUTO-MCNC: 241 MG/DL (ref 65–100)
GLUCOSE BLD STRIP.AUTO-MCNC: 256 MG/DL (ref 65–100)
GLUCOSE BLD STRIP.AUTO-MCNC: 270 MG/DL (ref 65–100)
GLUCOSE BLD STRIP.AUTO-MCNC: 365 MG/DL (ref 65–100)
GLUCOSE SERPL-MCNC: 233 MG/DL (ref 65–100)
HCT VFR BLD AUTO: 40.9 % (ref 35–47)
HGB BLD-MCNC: 13.9 G/DL (ref 11.5–16)
MAGNESIUM SERPL-MCNC: 2 MG/DL (ref 1.6–2.4)
MCH RBC QN AUTO: 28.3 PG (ref 26–34)
MCHC RBC AUTO-ENTMCNC: 34 G/DL (ref 30–36.5)
MCV RBC AUTO: 83.1 FL (ref 80–99)
NRBC # BLD: 0 K/UL (ref 0–0.01)
NRBC BLD-RTO: 0 PER 100 WBC
PHOSPHATE SERPL-MCNC: 1.9 MG/DL (ref 2.6–4.7)
PLATELET # BLD AUTO: 245 K/UL (ref 150–400)
PMV BLD AUTO: 11.7 FL (ref 8.9–12.9)
POTASSIUM SERPL-SCNC: 3.4 MMOL/L (ref 3.5–5.1)
RBC # BLD AUTO: 4.92 M/UL (ref 3.8–5.2)
SAMPLES BEING HELD,HOLD: NORMAL
SERVICE CMNT-IMP: ABNORMAL
SODIUM SERPL-SCNC: 136 MMOL/L (ref 136–145)
WBC # BLD AUTO: 8.8 K/UL (ref 3.6–11)

## 2021-02-21 PROCEDURE — 83735 ASSAY OF MAGNESIUM: CPT

## 2021-02-21 PROCEDURE — 65660000000 HC RM CCU STEPDOWN

## 2021-02-21 PROCEDURE — 99232 SBSQ HOSP IP/OBS MODERATE 35: CPT | Performed by: FAMILY MEDICINE

## 2021-02-21 PROCEDURE — 99233 SBSQ HOSP IP/OBS HIGH 50: CPT | Performed by: SPECIALIST

## 2021-02-21 PROCEDURE — 74011250637 HC RX REV CODE- 250/637: Performed by: NURSE PRACTITIONER

## 2021-02-21 PROCEDURE — 74011636637 HC RX REV CODE- 636/637: Performed by: STUDENT IN AN ORGANIZED HEALTH CARE EDUCATION/TRAINING PROGRAM

## 2021-02-21 PROCEDURE — 74011250636 HC RX REV CODE- 250/636: Performed by: STUDENT IN AN ORGANIZED HEALTH CARE EDUCATION/TRAINING PROGRAM

## 2021-02-21 PROCEDURE — 74011250637 HC RX REV CODE- 250/637: Performed by: STUDENT IN AN ORGANIZED HEALTH CARE EDUCATION/TRAINING PROGRAM

## 2021-02-21 PROCEDURE — 85027 COMPLETE CBC AUTOMATED: CPT

## 2021-02-21 PROCEDURE — 84100 ASSAY OF PHOSPHORUS: CPT

## 2021-02-21 PROCEDURE — 77030038269 HC DRN EXT URIN PURWCK BARD -A

## 2021-02-21 PROCEDURE — 82962 GLUCOSE BLOOD TEST: CPT

## 2021-02-21 PROCEDURE — 94760 N-INVAS EAR/PLS OXIMETRY 1: CPT

## 2021-02-21 PROCEDURE — 74011000250 HC RX REV CODE- 250: Performed by: STUDENT IN AN ORGANIZED HEALTH CARE EDUCATION/TRAINING PROGRAM

## 2021-02-21 PROCEDURE — 77030040393 HC DRSG OPTIFOAM GENT MDII -B

## 2021-02-21 PROCEDURE — 80048 BASIC METABOLIC PNL TOTAL CA: CPT

## 2021-02-21 PROCEDURE — 70450 CT HEAD/BRAIN W/O DYE: CPT

## 2021-02-21 RX ORDER — INSULIN GLARGINE 100 [IU]/ML
40 INJECTION, SOLUTION SUBCUTANEOUS DAILY
Status: DISCONTINUED | OUTPATIENT
Start: 2021-02-21 | End: 2021-02-21

## 2021-02-21 RX ORDER — HYDROCHLOROTHIAZIDE 25 MG/1
25 TABLET ORAL DAILY
Status: DISCONTINUED | OUTPATIENT
Start: 2021-02-21 | End: 2021-02-26 | Stop reason: HOSPADM

## 2021-02-21 RX ORDER — FAMOTIDINE 20 MG/1
20 TABLET, FILM COATED ORAL EVERY 12 HOURS
Status: DISCONTINUED | OUTPATIENT
Start: 2021-02-21 | End: 2021-02-26 | Stop reason: HOSPADM

## 2021-02-21 RX ORDER — POTASSIUM CHLORIDE 750 MG/1
60 TABLET, FILM COATED, EXTENDED RELEASE ORAL
Status: COMPLETED | OUTPATIENT
Start: 2021-02-21 | End: 2021-02-21

## 2021-02-21 RX ADMIN — SODIUM PHOSPHATE, MONOBASIC, MONOHYDRATE: 276; 142 INJECTION, SOLUTION INTRAVENOUS at 08:33

## 2021-02-21 RX ADMIN — INSULIN LISPRO 2 UNITS: 100 INJECTION, SOLUTION INTRAVENOUS; SUBCUTANEOUS at 06:30

## 2021-02-21 RX ADMIN — AMOXICILLIN AND CLAVULANATE POTASSIUM 1 TABLET: 875; 125 TABLET, FILM COATED ORAL at 21:15

## 2021-02-21 RX ADMIN — POTASSIUM & SODIUM PHOSPHATES POWDER PACK 280-160-250 MG 2 PACKET: 280-160-250 PACK at 08:22

## 2021-02-21 RX ADMIN — INSULIN LISPRO 10 UNITS: 100 INJECTION, SOLUTION INTRAVENOUS; SUBCUTANEOUS at 12:51

## 2021-02-21 RX ADMIN — ATORVASTATIN CALCIUM 40 MG: 20 TABLET, FILM COATED ORAL at 21:15

## 2021-02-21 RX ADMIN — AMOXICILLIN AND CLAVULANATE POTASSIUM 1 TABLET: 875; 125 TABLET, FILM COATED ORAL at 08:22

## 2021-02-21 RX ADMIN — INSULIN LISPRO 2 UNITS: 100 INJECTION, SOLUTION INTRAVENOUS; SUBCUTANEOUS at 22:30

## 2021-02-21 RX ADMIN — INSULIN GLARGINE 40 UNITS: 100 INJECTION, SOLUTION SUBCUTANEOUS at 08:21

## 2021-02-21 RX ADMIN — INSULIN LISPRO 5 UNITS: 100 INJECTION, SOLUTION INTRAVENOUS; SUBCUTANEOUS at 18:04

## 2021-02-21 RX ADMIN — Medication 10 ML: at 21:15

## 2021-02-21 RX ADMIN — FAMOTIDINE 20 MG: 20 TABLET ORAL at 21:15

## 2021-02-21 RX ADMIN — LOSARTAN POTASSIUM 100 MG: 50 TABLET, FILM COATED ORAL at 18:04

## 2021-02-21 RX ADMIN — HEPARIN SODIUM 5000 UNITS: 5000 INJECTION INTRAVENOUS; SUBCUTANEOUS at 18:04

## 2021-02-21 RX ADMIN — HYDROCHLOROTHIAZIDE 25 MG: 25 TABLET ORAL at 08:23

## 2021-02-21 RX ADMIN — HEPARIN SODIUM 5000 UNITS: 5000 INJECTION INTRAVENOUS; SUBCUTANEOUS at 08:22

## 2021-02-21 RX ADMIN — Medication 10 ML: at 06:30

## 2021-02-21 RX ADMIN — ASPIRIN 81 MG: 81 TABLET, CHEWABLE ORAL at 08:22

## 2021-02-21 RX ADMIN — POTASSIUM CHLORIDE 60 MEQ: 750 TABLET, FILM COATED, EXTENDED RELEASE ORAL at 06:28

## 2021-02-21 RX ADMIN — AMLODIPINE BESYLATE 5 MG: 5 TABLET ORAL at 08:22

## 2021-02-21 RX ADMIN — POTASSIUM & SODIUM PHOSPHATES POWDER PACK 280-160-250 MG 2 PACKET: 280-160-250 PACK at 12:51

## 2021-02-21 NOTE — PROGRESS NOTES
Bedside and Verbal shift change report given to Pedro Moise (oncoming nurse) by Ti Guerra RN (offgoing nurse). Report included the following information SBAR, Kardex, Intake/Output, MAR, Accordion and Recent Results.

## 2021-02-21 NOTE — PROGRESS NOTES
[address] [facility] [address] [place] [address] [facility] [place] [facility]   Office [phone]  Fax [phone] [address] [facility] Residency Attending Addendum:  I saw and evaluated the patient on the day of the encounter with Dr. Viraj Salmeron, performing the eastman elements of the service. I discussed the findings, assessment and plan with the resident and agree with the resident's findings and plan as documented in the resident's note. Appreciate cardiology's assistance recommendations. Potassium repleted. Mag at goal.  In and out of second-degree type II 2:1 AV block with episodes of complete heart block. Potential pacemaker tomorrow and DC afterwards if ok with Cardiology. Prema Sims MD, FAAFP, CAQSM, RMSK           Assessment and Plan     Michael Samayoa is a 76 y.o. female with a PMH of HTN, CAD s/p stent, T2DM on insulin, HLD, GERD, sciatica, insomnia, allergies admitted for AMS, Severe sepsis of unknown source, and DKA. 24 Hour Events: No acute events  Tele: 2nd deg HB to CHB all night     Severe Sepsis of unknown source: POA LA 3.6 (resolved), WBC 15.9, . CRP 7.08. CXR negative. UCx/RVP neg. S/p 30cc/kg bolus. CT abd/pelvis no acute process. - Augmentin 875 - 125mg BID  - F/u 2/16 BCx 1/4 bottles aerococcus viridan - likely contaminant   - F/u 2/17 BCx NGTD  - CBC, CMP daily    AMS: CT head/cervical spine negative. Ammonia, TSH, Etoh, UDS wnl. Volatiles - acetone. - CBC, BMP, Mag, Phosphorus    CVA: MRI infarct in thalamus. CVA head & neck neg.  - ASA 81 mg daily  - Lipitor 40mg qHS  - Neuro consulted, appreciate recs   - EEG   - Speech following    DKA: RESOLVED. POA AGMA, + blood ketones, and hyperglycemia (). S/p insulin gtt.      T2DM: A1C 8.8. Home NPH 30U BID & Regular insulin 25U before meals.   - Lantus 40U  - SSI + POC glu checks q6H  - Hypoglycemia protocol    Mobitz Type II 2nd degree AV block: EKG with mobitz type 2 AV block. BNP nml. Trop neg x1. ECHO LVEF 60-65%.    - Cardiac monitoring  - Cardiology consulted, appreciate recs   - Complete heart block on tele continues    If persists - Pacemaker insertion     If resolves - cardiac monitoring for 30 days outpatient      COVID negative: POA CRP 7.08, Ddimer 0.83, ferritin 124, . COVID PCR negative.       EVAN: RESOLVED. POA Cr 1.83 (BL 0.7)  - Monitor on daily CMP     L buttock wound:  - Wound care      QTC prolonged: POA QTc 543. - Avoid QTc prolonging agents     HTN: POA /46. Home Norvasc 5mg daily, HCTZ 25mg daily, Cozaar 100mg daily. - Continue home Norvasc 5mg daily, Cozaar 100mg daily  - Hydralazine 10mg q6H for BP >160/100  - Continue to monitor and adjust with BP trend     HLD: No home meds d/t statin intolerance (muscle pain). - Discussing risk/benefits of statin with patient, patient agrees to try low dose statin and titrate up if tolerated      GERD: Stable. Home Pepcid 20mg BID.  - Continue Pepcid 20mg BID    Allergies: Home Loratadine 10mg daily prn.  - Hold home meds     Insomnia: Home Melatonin 5mg daily prn, Seroquel 50mg qHS. - Hold home meds  - Melatonin 5mg prn      Bilateral low back pain w/Sciatica & Knee OA: F/w Dr. Karley Small (Ortho) and Dr. Crystal Sanchez (pain management).     Pancreatic cyst: Seen on CT A/P and MRI abd  - F/u outpatient GI     FEN/GI: Mechanical soft. Activity: Ambulate with assistance. Fall precautions  DVT prophylaxis: SQH  GI prophylaxis:  Pepcid  Disposition: Plan to d/c to SNF. PT/OT/CM consulted. Accepted to SNF. POC: Justo Malcolm [phone] (daughter). Glen Ramirez [phone] (daughter).      CODE STATUS:  Full, discussed with daughters    Vishal Olivera MD  Family Medicine Resident         Subjective / Objective     Subjective: Pt was seen and examined at bedside. Afebrile and hemodynamically stable. No SOB, CP.      Objective:  Temp (24hrs), Av.6 °F (36.4 °C), Min:97.3 °F (36.3 °C), Max:98.1 °F (36.7 °C)     Visit Vitals  BP (!) 163/80 (BP 1 Location: Left arm, BP Patient Position: At rest)   Pulse (!) 59   Temp 97.3 °F (36.3 °C)   Resp 18   Ht 5' 4\" (1.626 m)   Wt 161 lb 13.1 oz (73.4 kg)   SpO2 93%   BMI 27.78 kg/m²     Physical Exam:   General: No acute distress. Alert. Cooperative. Respiratory: CTAB. No w/r/r/c.   Cardiovascular: Normal S1,S2. No m/r/g.   GI: Nondistended.+ bowel sounds. Nontender. No rebound tenderness or guarding. Extremities: 2+ pitting edema BLE edema up to ankles  Skin: Warm, dry. No rashes. Neuro: Alert. Sensation intact b/l. Strength 5/5 in all extremities    Respiratory: O2 Flow Rate (L/min): 1 l/min O2 Device: Room air   Date 02/20/21 0700 - 02/21/21 0659 02/21/21 0700 - 02/22/21 0659   Shift 0700-1859 1900-0659 24 Hour Total 0700-1859 1900-0659 24 Hour Total   INTAKE   P.O. 360  360        P. O. 360  360      I. V.(mL/kg/hr) 150(0.2)  150(0.1)        Volume (potassium chloride 10 mEq in 100 ml IVPB) 50  50        Volume (magnesium sulfate 1 g/100 ml IVPB (premix or compounded)) 100  100      Shift Total(mL/kg) 510(7.2)  510(6.9)      OUTPUT   Urine(mL/kg/hr) 1700(2) 400(0.5) 2100(1.2)        Urine Voided 300  300        Urine Occurrence(s) 1 x 1 x 2 x        Urine Output (mL) (External Female Catheter 02/17/21) [phone]      Emesis/NG output 0  0        Emesis 0  0      Stool 0  0        Stool Occurrence(s)  2 x 2 x        Stool 0  0      Shift Total(mL/kg) 1700(23.9) 400(5.4) 2100(28.6)      NET -1190 -400 -1590      Weight (kg) 71.2 73.4 73.4 73.4 73.4 73.4       Inpatient Medications  Current Facility-Administered Medications   Medication Dose Route Frequency    sodium phosphate 20 mmol in 0.9% sodium chloride 250 mL infusion   IntraVENous ONCE    hydroCHLOROthiazide (HYDRODIURIL) tablet 25 mg  25 mg Oral DAILY    insulin glargine (LANTUS) injection 40 Units  40 Units SubCUTAneous DAILY    insulin lispro (HUMALOG) injection   SubCUTAneous AC&HS    potassium, sodium phosphates (NEUTRA-PHOS) packet 2 Packet  2 Packet Oral QID    atorvastatin (LIPITOR) tablet 40 mg  40 mg Oral QHS    aspirin chewable tablet 81 mg  81 mg Oral DAILY    amoxicillin-clavulanate (AUGMENTIN) 875-125 mg per tablet 1 Tab  1 Tab Oral Q12H    amLODIPine (NORVASC) tablet 5 mg  5 mg Oral DAILY    losartan (COZAAR) tablet 100 mg  100 mg Oral QPM    melatonin (rapid dissolve) tablet 5 mg  5 mg Oral QHS PRN    sodium chloride (NS) flush 5-10 mL  5-10 mL IntraVENous PRN    sodium chloride (NS) flush 5-40 mL  5-40 mL IntraVENous Q8H    sodium chloride (NS) flush 5-40 mL  5-40 mL IntraVENous PRN    heparin (porcine) injection 5,000 Units  5,000 Units SubCUTAneous Q8H    famotidine (PF) (PEPCID) 20 mg in 0.9% sodium chloride 10 mL injection  20 mg IntraVENous QPM         Allergies  Allergies   Allergen Reactions    Advicor [Niacin-Lovastatin] Swelling    Conjugated Estrogens Other (comments)     Headaches      Other reaction(s): Other (comments)  Headaches    Deflazacort Unable to Obtain     Other reaction(s): Unable to Obtain    Erythromycin Nausea Only    Erythromycin Base Nausea and Vomiting    Niacin Swelling    Pitavastatin Other (comments)     Abdominal pain, nausea  Other reaction(s): Other (comments)  Abdominal pain, nausea    Simvastatin Other (comments)     Muscle cramps  Other reaction(s): Other (comments)  Muscle cramps    Unable To Obtain Swelling    Zolpidem Other (comments)     Other reaction(s): Other (comments)    Atorvastatin Other (comments)     Muscle cramps  Other reaction(s):  Other (comments)  Muscle cramps    Triamcinolone Unable to Obtain     Other reaction(s): Unable to Obtain         CBC:  Recent Labs     02/21/21  0012 02/20/21  0008 02/19/21  0041   WBC 8.8 6.2 6.4   HGB 13.9 13.8 13.3   HCT 40.9 40.4 39.7    236 219       Metabolic Panel:  Recent Labs     02/21/21  0012 02/20/21  1450 02/20/21  0008 02/19/21  0041 02/19/21  0041    135* 137   < > 140   K 3.4* 3.0* 2.8*   < > 2.7*    97 100   < > 106   CO2 33* 32 32   < > 29   BUN 7 6 8   < > 18   CREA 0.61 0.79 0.52*   < > 0.60   * 383* 270*   < > 142*   CA 8.9 8.6 8.8   < > 9.0   MG 2.0  --  1.7  --  1.8   PHOS 1.9*  --  2.0*  --  2.5*    < > = values in this interval not displayed. Procedures: None. Imaging/Diagnostic Studies:  Ct Head Wo Cont 2/16/2021   No acute intracranial process is identified.       Ct Spine Cerv Wo Cont 2/16/2021   1. No fracture or subluxation is identified. There are changes of spondylosis as described above. Results from Hospital Encounter encounter on 02/16/21   XR CHEST PORT    Narrative Portable chest single view dated 2/16/2021    History is chest pain    A single portable AP semierect view of the chest was obtained. The cardiac  silhouette is normal in size. There is no evidence of active lung disease. The  costophrenic angles are sharp in appearance. Impression Negative radiographic examination of the chest.             Results from Hospital Encounter encounter on 02/16/21   CTA HEAD NECK W CONT    Narrative EXAM: CTA HEAD NECK W CONT    INDICATION: Cerebrovascular accident. COMPARISON: MRI brain 2/17/2021. CONTRAST: 100 mL of Isovue-370. TECHNIQUE:  Unenhanced  images were obtained to localize the volume for  acquisition. Multislice helical axial CT angiography was performed from the  aortic arch to the top of the head during uneventful rapid bolus intravenous  contrast administration. Coronal and sagittal reformations and 3D post  processing was performed. CT dose reduction was achieved through use of a  standardized protocol tailored for this examination and automatic exposure  control for dose modulation. FINDINGS:    CTA NECK  Patent great vessels. Common carotid arteries are normal in caliber. Right  internal carotid artery demonstrates dense calcified plaque along the medial  wall without significant stenosis.  Left internal carotid artery demonstrates  mild calcified plaque along the medial wall with 50% luminal stenosis. Distal  cervical internal carotid arteries are normal in caliber. Vertebral arteries are  codominant and patent. % of right carotid artery stenosis: 0-25  % of left carotid artery stenosis: 25-50    NASCET method was utilized for calculating stenosis. Imaged lung apices are clear aside from atelectasis. Thyroid gland is normal. No  cervical lymphadenopathy. CTA HEAD  Cavernous carotid arteries are patent and normal in caliber. Anterior cerebral  and anterior communicating arteries are patent. Middle cerebral arteries and  distal branches are patent. Distal vertebral arteries are patent. Basilar artery and posterior cerebral  arteries are patent. Right posterior cerebral artery has a fetal origin. No evidence of intracranial aneurysm. Impression 1. No large vessel occlusion or intracranial aneurysm. 2.  Calcified plaque in the proximal internal carotid arteries bilaterally  without flow-limiting stenosis.                    For Billing    Chief Complaint   Patient presents with    Altered mental status   [facility] Problems  Date Reviewed: 2/10/2021          Codes Class Noted POA    DKA (diabetic ketoacidoses) ([facility] ICD-10-CM: E11.10  ICD-9-CM: 250.12  2/16/2021 Unknown        * (Principal) Severe sepsis ([facility] [address] ICD-10-CM: A41.9, R65.20  ICD-9-CM: 038.9, 995.92  2/16/2021 Unknown        AMS (altered mental status) ICD-10-CM: R41.82  ICD-9-CM: 780.97  2/16/2021 Unknown        Insomnia (Chronic) ICD-10-CM: G47.00  ICD-9-CM: 780.52  2/16/2021 Unknown        Pancreatic cyst ICD-10-CM: K86.2  ICD-9-CM: 577.2  2/16/2021 Unknown    Overview Signed 2/16/2021  9:12 PM by Isaias Phillips MD     F/u MRI Outpatient             GERD (gastroesophageal reflux disease) (Chronic) ICD-10-CM: K21.9  ICD-9-CM: 530.81  2/16/2021 Unknown        Prolonged Q-T interval on ECG ICD-10-CM: R94.31  ICD-9-CM: 794.31  2/16/2021 Unknown        EVAN (acute kidney injury) [facility]) ICD-10-CM: N17.9  ICD-9-CM: 584.9  2/16/2021 Unknown        Mobitz type 2 second degree AV block ICD-10-CM: I44.1  ICD-9-CM: 426.12  2/16/2021 Unknown        Essential hypertension ICD-10-CM: I10  ICD-9-CM: 401.9  9/23/2019 Yes        Hypercholesteremia ICD-10-CM: E78.00  ICD-9-CM: 272.0  9/23/2019 Yes        Controlled type 2 diabetes mellitus without complication, with long-term current use of insulin (HCC) ICD-10-CM: E11.9, Z79.4  ICD-9-CM: 250.00, V58.67  9/23/2019 Yes

## 2021-02-21 NOTE — PROGRESS NOTES
Bedside shift change report given to Max Martins RN (oncoming nurse) by KIRILL LAMB (offgoing nurse). Report included the following information SBAR, Kardex, Intake/Output, MAR, Accordion and Recent Results.

## 2021-02-21 NOTE — PROGRESS NOTES
Sharee Whitt MD. [facility]              Patient: Hannah Pardo  : 1945      Today's Date: 2021         CARDIOLOGY PROGRESS NOTE  S:Says she is more tired today. Looks more lethargic. Is s/p CVA. No CP, SOB, syncope.      O:  Visit Vitals  BP (!) 146/93 (BP 1 Location: Left arm, BP Patient Position: At rest)   Pulse (!) 59   Temp 97.5 °F (36.4 °C)   Resp 18   Ht 5' 4\" (1.626 m)   Wt 161 lb 13.1 oz (73.4 kg)   SpO2 91%   BMI 27.78 kg/m²       Patient NAD; resting comfortably   HEENT:  Hearing intact, non-icteric, normocephalic, atraumatic. Neck Exam: Supple   Lung Exam: Clear to auscultation, even breath sounds. Cardiac Exam: Regular rate and rhythm with no murmur or rub  Abdomen: Soft, non-tender   Extremities: MAW, Mild lower extremity edema. MSKTL: Overall good ROM ext  Skin: No significant rashes  Psych: Appropriate affect  Neuro - Moves all ext (s/p CVA) - generalized weakness            Review of Symptoms:  Constitutional: Negative for fever   HEENT: Negative for vision changes. Respiratory: Negative for productive cough  Cardiovascular: Negative for syncope    Gastrointestinal: Negative for abdominal pain, melena  Genitourinary: Negative for dysuria  Skin: Negative for rash  Heme: No problems bleeding.   Neuro - no speech changes or focal weaknesses          Intake/Output Summary (Last 24 hours) at 2021 1218  Last data filed at 2021 2303  Gross per 24 hour   Intake    Output 1500 ml   Net -1500 ml          LABS / OTHER STUDIES reviewed:      Recent Results (from the past 24 hour(s))   METABOLIC PANEL, BASIC    Collection Time: 21  2:50 PM   Result Value Ref Range    Sodium 135 (L) 136 - 145 mmol/L    Potassium 3.0 (L) 3.5 - 5.1 mmol/L    Chloride 97 97 - 108 mmol/L    CO2 32 21 - 32 mmol/L    Anion gap 6 5 - 15 mmol/L    Glucose 383 (H) 65 - 100 mg/dL    BUN 6 6 - 20 MG/DL    Creatinine 0.79 0.55 - 1.02 MG/DL    BUN/Creatinine ratio 8 (L) 12 - 20      GFR est AA >60 >60 ml/min/1.73m2    GFR est non-AA >60 >60 ml/min/1.73m2    Calcium 8.6 8.5 - 10.1 MG/DL   GLUCOSE, POC    Collection Time: 02/20/21  5:32 PM   Result Value Ref Range    Glucose (POC) 320 (H) 65 - 100 mg/dL    Performed by Maisha Toribio (PCT)    GLUCOSE, POC    Collection Time: 02/20/21  9:22 PM   Result Value Ref Range    Glucose (POC) 329 (H) 65 - 100 mg/dL    Performed by Luke 95, BASIC    Collection Time: 02/21/21 12:12 AM   Result Value Ref Range    Sodium 136 136 - 145 mmol/L    Potassium 3.4 (L) 3.5 - 5.1 mmol/L    Chloride 100 97 - 108 mmol/L    CO2 33 (H) 21 - 32 mmol/L    Anion gap 3 (L) 5 - 15 mmol/L    Glucose 233 (H) 65 - 100 mg/dL    BUN 7 6 - 20 MG/DL    Creatinine 0.61 0.55 - 1.02 MG/DL    BUN/Creatinine ratio 11 (L) 12 - 20      GFR est AA >60 >60 ml/min/1.73m2    GFR est non-AA >60 >60 ml/min/1.73m2    Calcium 8.9 8.5 - 10.1 MG/DL   CBC W/O DIFF    Collection Time: 02/21/21 12:12 AM   Result Value Ref Range    WBC 8.8 3.6 - 11.0 K/uL    RBC 4.92 3.80 - 5.20 M/uL    HGB 13.9 11.5 - 16.0 g/dL    HCT 40.9 35.0 - 47.0 %    MCV 83.1 80.0 - 99.0 FL    MCH 28.3 26.0 - 34.0 PG    MCHC 34.0 30.0 - 36.5 g/dL    RDW 13.9 11.5 - 14.5 %    PLATELET 377 436 - 349 K/uL    MPV 11.7 8.9 - 12.9 FL    NRBC 0.0 0  WBC    ABSOLUTE NRBC 0.00 0.00 - 0.01 K/uL   MAGNESIUM    Collection Time: 02/21/21 12:12 AM   Result Value Ref Range    Magnesium 2.0 1.6 - 2.4 mg/dL   PHOSPHORUS    Collection Time: 02/21/21 12:12 AM   Result Value Ref Range    Phosphorus 1.9 (L) 2.6 - 4.7 MG/DL   SAMPLES BEING HELD    Collection Time: 02/21/21 12:12 AM   Result Value Ref Range    SAMPLES BEING HELD 1PST     COMMENT        Add-on orders for these samples will be processed based on acceptable specimen integrity and analyte stability, which may vary by analyte.    GLUCOSE, POC    Collection Time: 02/21/21  6:20 AM   Result Value Ref Range    Glucose (POC) 172 (H) 65 - 100 mg/dL    Performed by Erna Huff Digna (PCT)    GLUCOSE, POC    Collection Time: 02/21/21 11:14 AM   Result Value Ref Range    Glucose (POC) 365 (H) 65 - 100 mg/dL    Performed by Baldomero Nunez (CON)               CARDIAC DIAGNOSTICS:      Cardiac Evaluation Includes:  I reviewed the results below.      EKG 2/16/21 - NSR, 2nd degree AV block Type 1 - I independently viewed and interpreted the test image(s) myself.     EKG 2/20/21 - NSR, 2:1 AV block I independently viewed and interpreted the test image(s) myself.        Echo 2/16/21 - TDS. LVEF 60-65%     Tele 2/20/21 reviewed --- no tachycardia present (HR was erroneously thought to be tachycardic on monitor - was counting P's). She does have long periods of complete heart block around 10 AM on 2/20/21 (narrow QRS escape rhythm). Tele 2/21/21 - 2:1 AV block for several hours today   -- reviewed tele with FP team         ASSESSMENT AND PLAN:      Assessment and Plan:     Patient  is a 76 y. o. female with PMH of  HTN, CAD s/p stent, T2DM on insulin, HLD, GERD, sciatica, who presented to the ED on 2/16/21 via EMS after found on the ground by son in law. Found to be in DKA and treated for sepsis. Also found to have acute CVA. Cardiology asked to see again for heart block.    1) AV block including 3rd degree AV block  - 2nd degree AV block Type on EKG 2/16/21  - 2:1 AV block on EKG 2/20/21  - She does have long periods of complete heart block around 10 AM on 2/20/21 (narrow QRS escape rhythm). - Unclear if associated symptoms - has just been in bed - does feel tired   - Even after repleting K+, she has been mostly in sinus with 2:1 AV block on 2/21/21 with Ventricular rate 45-50 mostly. - I spoke to patient about heart block and possible need for pacer. Dr. Avelina Ray says that he will assess tomorrow and then make a decision whether she needs a pacer this admission or not.   Will keep her NPO past midnight.      2) CVA  - on ASA, statin      3) HTN   - Avoid AV brittany agents   - Will need to chronically replete K+ if continue HCTZ     4) Hypokalemia   - K+ being repleted         Lopez Ng MD, [address], [address]      [address]  [address]  Ph: [phone]                               Ph [phone]

## 2021-02-21 NOTE — PROGRESS NOTES
[facility] [address] [place] [address] [place] [address]   Office [phone]  Fax [phone] [facility] Residency Attending Addendum:  I saw and evaluated the patient on the day of the encounter with Dr. Daniele Angel, performing the key elements of the service. I discussed the findings, assessment and plan with the resident and agree with the resident's findings and plan as documented in the resident's note. Insulin adjusted for better glycemic control. Repeat CR for disposition. Potassium and magnesium repleted. Mild leukocytosis but no source of infection. We will continue to monitor for now. If you have any further discharge in the next day or so if cardiology able to do pacemaker sooner. Hiram Bullard MD, FAAFP, CAQSM, RMSK\         Assessment and Plan     Edilma Solano is a 76 y.o. female with a PMH of HTN, CAD s/p stent, T2DM on insulin, HLD, GERD, sciatica, insomnia, allergies admitted for AMS, Severe sepsis of unknown source, and DKA. 24 Hour Events: Patient's daughter declined pacemaker placement as patient was being taken to procedure although previously consented. After discussion and addressing daughter's concerns, daughter and patient voice consent for pacemaker. Tele: Sinus 57    Mobitz Type II 2nd degree AV block: POA EKG with mobitz type 2 AV block. BNP nml. Trop neg x1. ECHO LVEF 60-65%. Episode of complete heart block on telemetry. 2:1 second degree heart naye persisted on telemetry and repeat EKG during admission.    - Cardiac monitoring  - Cardiology consulted, appreciate recs   - Complete heart bloc on tele on 2/20   - Possible pacemaker placement friday     Severe Sepsis of unknown source: POA LA 3.6 (resolved), WBC 15.9, . CRP 7.08. CXR negative. UCx/RVP neg. S/p 30cc/kg bolus. CT abd/pelvis no acute process. 2/16 BCx 1/4 bottles aerococcus viridans - likely contaminant. Repeat BCx negative.    - Augmentin 875 - 125mg BID  - CBC, CMP daily    AMS: CT head/cervical spine negative. Ammonia, TSH, Etoh, UDS wnl. Volatiles - acetone. - CBC, BMP, Mag, Phosphorus    CVA: MRI infarct in thalamus. CVA head & neck neg.  - ASA 81 mg daily  - Lipitor 40mg qHS  - Neuro consulted, appreciate recs   - Speech following    DKA: RESOLVED. POA AGMA, + blood ketones, and hyperglycemia (). S/p insulin gtt.      T2DM: A1C 8.8. Home NPH 30U BID & Regular insulin 25U before meals.   - NPH 30/30 + 3U TID before meals  - SSI + POC glu checks ACHS  - Hypoglycemia protocol     COVID negative: POA CRP 7.08, Ddimer 0.83, ferritin 124, . COVID PCR negative.       EVAN: RESOLVED. POA Cr 1.83 (BL 0.7)  - Monitor on daily CMP     L buttock wound:  - Wound care      QTC prolonged: POA QTc 543. - Avoid QTc prolonging agents     HTN: POA /46.   - Continue home Norvasc 5mg daily, Cozaar 100mg daily, HCTZ 25mg daily  - Hydralazine 10mg q6H for BP >160/100  - Continue to monitor and adjust with BP trend     HLD: No home meds d/t statin intolerance (muscle pain). - Discussing risk/benefits of statin with patient, patient agrees to try low dose statin and titrate up if tolerated      GERD: Stable. Home Pepcid 20mg BID.  - Continue Pepcid 20mg BID    Allergies: Home Loratadine 10mg daily prn.  - Hold home meds     Insomnia: Home Melatonin 5mg daily prn, Seroquel 50mg qHS. - Hold home meds  - Melatonin 5mg prn      Bilateral low back pain w/Sciatica & Knee OA: F/w Dr. Singh Thornton (Ortho) and Dr. Danielle Jenkins (pain management).     Pancreatic cyst: Seen on CT A/P and MRI abd  - F/u outpatient GI     FEN/GI: Mechanical soft   Activity: Ambulate with assistance. Fall precautions  DVT prophylaxis: Lovenox  GI prophylaxis:  Pepcid  Disposition: Plan to d/c to SNF. PT/OT/CM consulted. Accepted to SNF. POC: Татьяна Ly [phone] (daughter).  Farzaneh Vegaboy [phone] (daughter).      CODE STATUS:  Full, discussed with daughters    Rosa Sood MD  Family Medicine Resident         Subjective / Objective     Subjective: Pt was seen and examined at bedside. Afebrile and hemodynamically stable. No SOB, CP. Patient asking for crackers, explained that she is NPO for possible pacemaker placement today. Objective:  Temp (24hrs), Av.2 °F (36.8 °C), Min:97.5 °F (36.4 °C), Max:98.5 °F (36.9 °C)     Visit Vitals  BP (!) 151/66 (BP 1 Location: Right upper arm, BP Patient Position: At rest)   Pulse (!) 52   Temp 97.5 °F (36.4 °C)   Resp 16   Ht 5' 4\" (1.626 m)   Wt 150 lb (68 kg)   SpO2 90%   BMI 25.75 kg/m²     Physical Exam:   General: No acute distress. Alert. Cooperative. Respiratory: CTAB. No w/r/r/c.   Cardiovascular: Normal S1,S2. No m/r/g.   GI: Nondistended.+ bowel sounds. Nontender. No rebound tenderness or guarding. Extremities: No LE edema  Skin: Warm, dry. No rashes. Neuro: Alert. Sensation intact b/l. Strength 5/5 in all extremities    Respiratory: O2 Flow Rate (L/min): 1 l/min O2 Device: Room air   Date 21 - 21 0659 21 07 - 21 0659   Shift 1206-4298 6173-8955 24 Hour Total 6367-7378 2901-3262 24 Hour Total   INTAKE   P.O.  360 360        P. O.  360 360      Shift Total(mL/kg)  360(5.3) 360(5.3)      OUTPUT   Urine(mL/kg/hr) 100(0.1) 1000(1.2) 1100(0.7)        Urine Voided 100  100        Urine Occurrence(s) 1 x 1 x 2 x        Urine Output (mL) (External Female Catheter 21)  1000 1000      Shift Total(mL/kg) 100(1.5) 1000(14.7) 1100(16.2)      NET -100 -640 -740      Weight (kg) 68 68 68 68 68 68       Inpatient Medications  Current Facility-Administered Medications   Medication Dose Route Frequency    enoxaparin (LOVENOX) injection 40 mg  40 mg SubCUTAneous Q24H    nystatin (MYCOSTATIN) 100,000 unit/gram powder   Topical BID    hydroCHLOROthiazide (HYDRODIURIL) tablet 25 mg  25 mg Oral DAILY    famotidine (PEPCID) tablet 20 mg  20 mg Oral Q12H    insulin NPH (NOVOLIN N, HUMULIN N) injection 30 Units  30 Units SubCUTAneous ACB&D    insulin lispro (HUMALOG) injection   SubCUTAneous AC&HS    atorvastatin (LIPITOR) tablet 40 mg  40 mg Oral QHS    aspirin chewable tablet 81 mg  81 mg Oral DAILY    amLODIPine (NORVASC) tablet 5 mg  5 mg Oral DAILY    losartan (COZAAR) tablet 100 mg  100 mg Oral QPM    melatonin (rapid dissolve) tablet 5 mg  5 mg Oral QHS PRN    sodium chloride (NS) flush 5-10 mL  5-10 mL IntraVENous PRN    sodium chloride (NS) flush 5-40 mL  5-40 mL IntraVENous Q8H    sodium chloride (NS) flush 5-40 mL  5-40 mL IntraVENous PRN         Allergies  Allergies   Allergen Reactions    Advicor [Niacin-Lovastatin] Swelling    Conjugated Estrogens Other (comments)     Headaches      Other reaction(s): Other (comments)  Headaches    Deflazacort Unable to Obtain     Other reaction(s): Unable to Obtain    Erythromycin Nausea Only    Erythromycin Base Nausea and Vomiting    Niacin Swelling    Pitavastatin Other (comments)     Abdominal pain, nausea  Other reaction(s): Other (comments)  Abdominal pain, nausea    Simvastatin Other (comments)     Muscle cramps  Other reaction(s): Other (comments)  Muscle cramps    Unable To Obtain Swelling    Zolpidem Other (comments)     Other reaction(s): Other (comments)    Atorvastatin Other (comments)     Muscle cramps  Other reaction(s): Other (comments)  Muscle cramps    Triamcinolone Unable to Obtain     Other reaction(s): Unable to Obtain         CBC:  Recent Labs     02/23/21  0543 02/22/21  0647 02/21/21  0012   WBC 12.7* 10.8 8.8   HGB 14.5 15.0 13.9   HCT 44.1 44.4 40.9    230 931       Metabolic Panel:  Recent Labs     02/23/21  0543 02/22/21  0647 02/21/21  0012   * 135* 136   K 3.3* 3.3* 3.4*   CL 97 96* 100   CO2 31 33* 33*   BUN 5* 5* 7   CREA 0.54* 0.50* 0.61   * 211* 233*   CA 9.4 9.0 8.9   MG 1.9 1.9 2.0   PHOS 2.8 2.7 1.9*           Procedures: None.      Imaging/Diagnostic Studies:  Ct Head Wo Cont 2/16/2021   No acute intracranial process is identified.       Ct Spine Cerv Wo Cont 2/16/2021   1. No fracture or subluxation is identified. There are changes of spondylosis as described above. Results from Hospital Encounter encounter on 02/16/21   XR CHEST PORT    Narrative Portable chest single view dated 2/16/2021    History is chest pain    A single portable AP semierect view of the chest was obtained. The cardiac  silhouette is normal in size. There is no evidence of active lung disease. The  costophrenic angles are sharp in appearance. Impression Negative radiographic examination of the chest.             Results from Hospital Encounter encounter on 02/16/21   CT HEAD WO CONT    Narrative EXAM: CT HEAD WO CONT    INDICATION: unwitnessed fall    COMPARISON: 2/18/2021. CONTRAST: None. TECHNIQUE: Unenhanced CT of the head was performed using 5 mm images. Brain and  bone windows were generated. Coronal and sagittal reformats. CT dose reduction  was achieved through use of a standardized protocol tailored for this  examination and automatic exposure control for dose modulation. FINDINGS:  The ventricles and sulci are stable in size, shape and configuration. . There is  unchanged diffuse periventricular white matter disease. There is no intracranial  hemorrhage, extra-axial collection, or mass effect. The basilar cisterns are  open. No CT evidence of acute infarct. The bone windows demonstrate no abnormalities. The visualized portions of the  paranasal sinuses and mastoid air cells are clear. Impression No acute intracranial process. No significant change from the prior study.                        For Billing    Chief Complaint   Patient presents with    Altered mental status   [facility] Problems  Date Reviewed: 2/10/2021          Codes Class Noted POA    DKA (diabetic ketoacidoses) [facility]) ICD-10-CM: E11.10  ICD-9-CM: 250.12  2/16/2021 Unknown        * (Principal) Severe sepsis ([facility] [address] ICD-10-CM: A41.9, R65.20  ICD-9-CM: 038.9, 995.92 2/16/2021 Unknown        AMS (altered mental status) ICD-10-CM: R41.82  ICD-9-CM: 780.97  2/16/2021 Unknown        Insomnia (Chronic) ICD-10-CM: G47.00  ICD-9-CM: 780.52  2/16/2021 Unknown        Pancreatic cyst ICD-10-CM: K86.2  ICD-9-CM: 577.2  2/16/2021 Unknown    Overview Signed 2/16/2021  9:12 PM by Yoon Palomino MD     F/u MRI Outpatient             GERD (gastroesophageal reflux disease) (Chronic) ICD-10-CM: K21.9  ICD-9-CM: 530.81  2/16/2021 Unknown        Prolonged Q-T interval on ECG ICD-10-CM: R94.31  ICD-9-CM: 794.31  2/16/2021 Unknown        EVAN (acute kidney injury) ([facility] ICD-10-CM: N17.9  ICD-9-CM: 584.9  2/16/2021 Unknown        Mobitz type 2 second degree AV block ICD-10-CM: I44.1  ICD-9-CM: 426.12  2/16/2021 Unknown        Essential hypertension ICD-10-CM: I10  ICD-9-CM: 401.9  9/23/2019 Yes        Hypercholesteremia ICD-10-CM: E78.00  ICD-9-CM: 272.0  9/23/2019 Yes        Controlled type 2 diabetes mellitus without complication, with long-term current use of insulin ([facility] ICD-10-CM: E11.9, Z79.4  ICD-9-CM: 250.00, V58.67  9/23/2019 Yes

## 2021-02-22 LAB
ANION GAP SERPL CALC-SCNC: 6 MMOL/L (ref 5–15)
BACTERIA SPEC CULT: NORMAL
BUN SERPL-MCNC: 5 MG/DL (ref 6–20)
BUN/CREAT SERPL: 10 (ref 12–20)
CALCIUM SERPL-MCNC: 9 MG/DL (ref 8.5–10.1)
CHLORIDE SERPL-SCNC: 96 MMOL/L (ref 97–108)
CO2 SERPL-SCNC: 33 MMOL/L (ref 21–32)
CREAT SERPL-MCNC: 0.5 MG/DL (ref 0.55–1.02)
ERYTHROCYTE [DISTWIDTH] IN BLOOD BY AUTOMATED COUNT: 14.5 % (ref 11.5–14.5)
GLUCOSE BLD STRIP.AUTO-MCNC: 119 MG/DL (ref 65–100)
GLUCOSE BLD STRIP.AUTO-MCNC: 135 MG/DL (ref 65–100)
GLUCOSE BLD STRIP.AUTO-MCNC: 180 MG/DL (ref 65–100)
GLUCOSE BLD STRIP.AUTO-MCNC: 224 MG/DL (ref 65–100)
GLUCOSE BLD STRIP.AUTO-MCNC: 249 MG/DL (ref 65–100)
GLUCOSE SERPL-MCNC: 211 MG/DL (ref 65–100)
HCT VFR BLD AUTO: 44.4 % (ref 35–47)
HGB BLD-MCNC: 15 G/DL (ref 11.5–16)
MAGNESIUM SERPL-MCNC: 1.9 MG/DL (ref 1.6–2.4)
MCH RBC QN AUTO: 28.2 PG (ref 26–34)
MCHC RBC AUTO-ENTMCNC: 33.8 G/DL (ref 30–36.5)
MCV RBC AUTO: 83.6 FL (ref 80–99)
NRBC # BLD: 0 K/UL (ref 0–0.01)
NRBC BLD-RTO: 0 PER 100 WBC
PHOSPHATE SERPL-MCNC: 2.7 MG/DL (ref 2.6–4.7)
PLATELET # BLD AUTO: 230 K/UL (ref 150–400)
PMV BLD AUTO: 12 FL (ref 8.9–12.9)
POTASSIUM SERPL-SCNC: 3.3 MMOL/L (ref 3.5–5.1)
RBC # BLD AUTO: 5.31 M/UL (ref 3.8–5.2)
SERVICE CMNT-IMP: ABNORMAL
SERVICE CMNT-IMP: NORMAL
SODIUM SERPL-SCNC: 135 MMOL/L (ref 136–145)
WBC # BLD AUTO: 10.8 K/UL (ref 3.6–11)

## 2021-02-22 PROCEDURE — 74011250637 HC RX REV CODE- 250/637: Performed by: STUDENT IN AN ORGANIZED HEALTH CARE EDUCATION/TRAINING PROGRAM

## 2021-02-22 PROCEDURE — 77030038269 HC DRN EXT URIN PURWCK BARD -A

## 2021-02-22 PROCEDURE — 97530 THERAPEUTIC ACTIVITIES: CPT

## 2021-02-22 PROCEDURE — 84100 ASSAY OF PHOSPHORUS: CPT

## 2021-02-22 PROCEDURE — 97535 SELF CARE MNGMENT TRAINING: CPT

## 2021-02-22 PROCEDURE — 74011250637 HC RX REV CODE- 250/637: Performed by: NURSE PRACTITIONER

## 2021-02-22 PROCEDURE — 99232 SBSQ HOSP IP/OBS MODERATE 35: CPT | Performed by: FAMILY MEDICINE

## 2021-02-22 PROCEDURE — 36415 COLL VENOUS BLD VENIPUNCTURE: CPT

## 2021-02-22 PROCEDURE — 74011250637 HC RX REV CODE- 250/637: Performed by: FAMILY MEDICINE

## 2021-02-22 PROCEDURE — 74011636637 HC RX REV CODE- 636/637: Performed by: STUDENT IN AN ORGANIZED HEALTH CARE EDUCATION/TRAINING PROGRAM

## 2021-02-22 PROCEDURE — 99231 SBSQ HOSP IP/OBS SF/LOW 25: CPT | Performed by: CLINICAL NURSE SPECIALIST

## 2021-02-22 PROCEDURE — 74011250636 HC RX REV CODE- 250/636: Performed by: STUDENT IN AN ORGANIZED HEALTH CARE EDUCATION/TRAINING PROGRAM

## 2021-02-22 PROCEDURE — 65660000000 HC RM CCU STEPDOWN

## 2021-02-22 PROCEDURE — 85027 COMPLETE CBC AUTOMATED: CPT

## 2021-02-22 PROCEDURE — 80048 BASIC METABOLIC PNL TOTAL CA: CPT

## 2021-02-22 PROCEDURE — 94760 N-INVAS EAR/PLS OXIMETRY 1: CPT

## 2021-02-22 PROCEDURE — 99223 1ST HOSP IP/OBS HIGH 75: CPT | Performed by: INTERNAL MEDICINE

## 2021-02-22 PROCEDURE — 82962 GLUCOSE BLOOD TEST: CPT

## 2021-02-22 PROCEDURE — 93042 RHYTHM ECG REPORT: CPT | Performed by: FAMILY MEDICINE

## 2021-02-22 PROCEDURE — 83735 ASSAY OF MAGNESIUM: CPT

## 2021-02-22 RX ORDER — GENTAMICIN SULFATE 80 MG/100ML
80 INJECTION, SOLUTION INTRAVENOUS ONCE
Status: DISCONTINUED | OUTPATIENT
Start: 2021-02-22 | End: 2021-02-22

## 2021-02-22 RX ORDER — AMOXICILLIN AND CLAVULANATE POTASSIUM 875; 125 MG/1; MG/1
1 TABLET, FILM COATED ORAL EVERY 12 HOURS
Qty: 2 TAB | Refills: 0 | Status: SHIPPED
Start: 2021-02-22 | End: 2021-02-26

## 2021-02-22 RX ORDER — POTASSIUM CHLORIDE 7.45 MG/ML
10 INJECTION INTRAVENOUS
Status: COMPLETED | OUTPATIENT
Start: 2021-02-22 | End: 2021-02-22

## 2021-02-22 RX ORDER — AMOXICILLIN AND CLAVULANATE POTASSIUM 875; 125 MG/1; MG/1
1 TABLET, FILM COATED ORAL EVERY 12 HOURS
Qty: 3 TAB | Refills: 0 | Status: SHIPPED | OUTPATIENT
Start: 2021-02-22 | End: 2021-02-22

## 2021-02-22 RX ORDER — ATORVASTATIN CALCIUM 40 MG/1
40 TABLET, FILM COATED ORAL
Qty: 30 TAB | Refills: 0 | Status: SHIPPED | OUTPATIENT
Start: 2021-02-22 | End: 2021-05-19 | Stop reason: SDUPTHER

## 2021-02-22 RX ORDER — NYSTATIN 100000 [USP'U]/G
POWDER TOPICAL 2 TIMES DAILY
Status: DISCONTINUED | OUTPATIENT
Start: 2021-02-22 | End: 2021-02-26 | Stop reason: HOSPADM

## 2021-02-22 RX ADMIN — INSULIN LISPRO 2 UNITS: 100 INJECTION, SOLUTION INTRAVENOUS; SUBCUTANEOUS at 21:36

## 2021-02-22 RX ADMIN — INSULIN LISPRO 3 UNITS: 100 INJECTION, SOLUTION INTRAVENOUS; SUBCUTANEOUS at 08:29

## 2021-02-22 RX ADMIN — HEPARIN SODIUM 5000 UNITS: 5000 INJECTION INTRAVENOUS; SUBCUTANEOUS at 16:54

## 2021-02-22 RX ADMIN — HEPARIN SODIUM 5000 UNITS: 5000 INJECTION INTRAVENOUS; SUBCUTANEOUS at 08:29

## 2021-02-22 RX ADMIN — ASPIRIN 81 MG: 81 TABLET, CHEWABLE ORAL at 08:28

## 2021-02-22 RX ADMIN — HYDROCHLOROTHIAZIDE 25 MG: 25 TABLET ORAL at 08:28

## 2021-02-22 RX ADMIN — AMOXICILLIN AND CLAVULANATE POTASSIUM 1 TABLET: 875; 125 TABLET, FILM COATED ORAL at 08:28

## 2021-02-22 RX ADMIN — INSULIN LISPRO 2 UNITS: 100 INJECTION, SOLUTION INTRAVENOUS; SUBCUTANEOUS at 11:25

## 2021-02-22 RX ADMIN — NYSTATIN: 100000 POWDER TOPICAL at 19:05

## 2021-02-22 RX ADMIN — HEPARIN SODIUM 5000 UNITS: 5000 INJECTION INTRAVENOUS; SUBCUTANEOUS at 00:17

## 2021-02-22 RX ADMIN — FAMOTIDINE 20 MG: 20 TABLET ORAL at 08:28

## 2021-02-22 RX ADMIN — Medication 10 ML: at 20:42

## 2021-02-22 RX ADMIN — POTASSIUM CHLORIDE 10 MEQ: 10 INJECTION, SOLUTION INTRAVENOUS at 11:25

## 2021-02-22 RX ADMIN — HEPARIN SODIUM 5000 UNITS: 5000 INJECTION INTRAVENOUS; SUBCUTANEOUS at 23:10

## 2021-02-22 RX ADMIN — INSULIN HUMAN 30 UNITS: 100 INJECTION, SUSPENSION SUBCUTANEOUS at 16:53

## 2021-02-22 RX ADMIN — FAMOTIDINE 20 MG: 20 TABLET ORAL at 20:41

## 2021-02-22 RX ADMIN — AMOXICILLIN AND CLAVULANATE POTASSIUM 1 TABLET: 875; 125 TABLET, FILM COATED ORAL at 20:41

## 2021-02-22 RX ADMIN — Medication 10 ML: at 07:03

## 2021-02-22 RX ADMIN — POTASSIUM CHLORIDE 10 MEQ: 10 INJECTION, SOLUTION INTRAVENOUS at 21:38

## 2021-02-22 RX ADMIN — ATORVASTATIN CALCIUM 40 MG: 20 TABLET, FILM COATED ORAL at 20:41

## 2021-02-22 RX ADMIN — INSULIN HUMAN 30 UNITS: 100 INJECTION, SUSPENSION SUBCUTANEOUS at 08:29

## 2021-02-22 RX ADMIN — AMLODIPINE BESYLATE 5 MG: 5 TABLET ORAL at 08:29

## 2021-02-22 RX ADMIN — LOSARTAN POTASSIUM 100 MG: 50 TABLET, FILM COATED ORAL at 19:05

## 2021-02-22 RX ADMIN — POTASSIUM CHLORIDE 10 MEQ: 10 INJECTION, SOLUTION INTRAVENOUS at 11:00

## 2021-02-22 RX ADMIN — POTASSIUM CHLORIDE 10 MEQ: 10 INJECTION, SOLUTION INTRAVENOUS at 08:29

## 2021-02-22 NOTE — PROGRESS NOTES
1:25 PM  Assumed care of patient. Patient was soiled upon arrival from the floor. Patient cleaned up and prepped for procedure. 1:55 PM  Daughter, Liliam Hobbs, called and gave telephone consent for insertion of permanent pacemaker. 2:00 PM  Daughter called back to say that they did not want to proceed with the procedure due to circumstances that happened on the floor. Daughter stated that they are trying to get the patient transferred to another facility. Contacted to EP lab to let them know that the daughter rescinded the consent. 2:15 PM  Daughter, Marylee Borg, called to reiterate that they did not want to move forward with the procedure. Stated that she should be the one that is contacted about her mother. [phone]    2:19 PM  Called to let the nurse know that we would be bringing the patient back up.

## 2021-02-22 NOTE — PROGRESS NOTES
Transition of Care Plan:    RUR 10%  1. Hospital admission for medical management   2. Accepted for rehab at [address]  3. PT, OT following  4. Report has been made to [facility] DSS/APS, see note 2/16 as pt was found down soaked in urine, pt is known patricia  5. Covid done 2/16, negative  6. DC when stable to SNF  7. On a will call for [facility]  8. Cards recommending pacemaker; consent given, then refused and now agreeable again. 9. CM following  CHRISTINA Mendieta Counter

## 2021-02-22 NOTE — PROGRESS NOTES
1950: Called into room by offgoing RN stating the patient is on the floor. Patient found flat on her back lying beside the bed. Patient denies any pain and no visible bleeding, swelling, or redness on her body. Code joaquim called and informed MD of fall; MD ordered head CT.    2115: Spoke to patient's daughter Candis Armenta to inform her of fall. Candis Armenta requested that I also call Cynthia Rangel, the patient's other daughter to inform her. 2230: Spoke to Cynthia Rangel and informed her of patient fall. Had a lengthy discussion with her regarding her concerns regarding patient care and assured her that her concerns will be addressed.

## 2021-02-22 NOTE — PROGRESS NOTES
Sarah  22. Medicine Residency Program       Resident Progress Note in Brief    S: Patient seen and examined at bedside due to unwitnessed fall. Per nurse, pt was found on the floor on her back when she arrived at bedside. Pt states the bed railing gave way and she slide off the bed, falling on her back. She denies head trauma and unchanged sacral pain where her sacral wound is. Pain is 3/10. O:  Visit Vitals  BP (!) 152/59 (BP 1 Location: Right upper arm, BP Patient Position: At rest)   Pulse (!) 46   Temp 97.8 °F (36.6 °C)   Resp 16   Ht 5' 4\" (1.626 m)   Wt 161 lb 13.1 oz (73.4 kg)   SpO2 92%   BMI 27.78 kg/m²     Physical Examination:   General appearance - alert, well appearing, and in no distress  Chest - clear to auscultation, no wheezes, rales or rhonchi, symmetric air entry  Heart - normal rate, regular rhythm, normal S1, S2, no murmurs, rubs, clicks or gallops,   Abdomen - soft, nontender, nondistended, no masses or organomegaly  Neurological - alert, oriented, normal speech, no focal findings  Extremities - peripheral pulses normal, no pedal edema, no clubbing or cyanosis  Skin: no bruises or bleeding    A/P: 76 y.o. female with a PMH of HTN, CAD s/p stent, T2DM on insulin, HLD, GERD, sciatica, insomnia, allergies admitted for AMS, Severe sepsis of unknown source, and DKA. Unwitnessed fall: pt stable and unchanged on exam.   - Nursing to order wound consult as last assessed sacral wound 2/17.    - Will obtain Head CT   - Will continue to monitor for now      Chronic HTN: POA /46.   - Continue meds: Norvasc 5mg daily, Cozaar 100mg daily, HCTZ 25mg daily  - Hydralazine 10mg q6H for BP >160/100  - Continue to monitor and adjust with BP trend          Denise Michaud MD  Family Medicine Resident

## 2021-02-22 NOTE — WOUND CARE
Follow up visit to reevaluate ASSESSMENT: 
Patient awake alert laying in [facility] bed, denies pain at this time, assists with repositioning, Mariaelena Ellison RN and Adam ROY at bedside [place] in place, incontinent care given. All skin folds and bony prominences assessed Bilateral heels skin intact and without erythema. Heels offloaded with pillows. 1. Yeast like redness on buttocks and sacral area, moist areas of superficial denudded skin. Zinc applied. Recommendations: 
Nystatin to buttocks Jackquline Benne redness/rash twice a day with zinc paste Turn reposition approximately every 2 hours and offload heels with pillows at all times in bed. Z-guard cream to buttocks and sacrum daily and as needed with incontinence care Minimize layers of linen/-pads under patient to optimize support surface. Discussed with / resident Transition of Care: Plan to follow needed while admitted to hospital.

## 2021-02-22 NOTE — PROGRESS NOTES
Bedside and Verbal shift change report given to [facility] (oncoming nurse) by Brandin Mg RN (offgoing nurse). Report included the following information SBAR, Kardex, Intake/Output, MAR, Accordion and Recent Results.

## 2021-02-22 NOTE — PROGRESS NOTES
Problem: Self Care Deficits Care Plan (Adult)  Goal: *Acute Goals and Plan of Care (Insert Text)  Description:   FUNCTIONAL STATUS PRIOR TO ADMISSION: Pt is unable to provide PLOF. Per chart, she lives alone in a \"hoarder-type\" situation. Infer she was able to manage ADLs with mod I.      HOME SUPPORT: The patient lived alone. Occupational Therapy Goals  Initiated 2/17/2021  1. Patient will perform grooming with supervision/set-up within 7 day(s). 2.  Patient will perform upper body dressing, seated EOB, with supervision/set-upwithin 7 day(s). 3.  Patient will perform anterior bathing, seated EOB, with supervision/set-up within 7 day(s). 4.  Patient will perform toilet transfers with minimal assistance/contact guard assist within 7 day(s). 5.  Patient will perform all aspects of toileting with minimal assistance/contact guard assist within 7 day(s). 6.  Patient will participate in upper extremity therapeutic exercise/activities with minimal assistance/contact guard assist for 10 minutes within 7 day(s). 7.  Patient will utilize energy conservation techniques during functional activities with verbal cues within 7 day(s). 8.  Patient will follow 100% of commands during ADLs within 7 days. Outcome: Progressing Towards Goal   OCCUPATIONAL THERAPY TREATMENT  Patient: Gera Ray [de-identified]76 y.o. female)  Date: 2/22/2021  Diagnosis: AMS (altered mental status) [R41.82]  Severe sepsis ([address] [A41.9, R65.20]  DKA (diabetic ketoacidoses) ([address] [E11.10] Severe sepsis ([address]  Procedure(s) (LRB):  INSERT PPM DUAL (N/A) Day of Surgery  Precautions: Fall  Chart, occupational therapy assessment, plan of care, and goals were reviewed. ASSESSMENT  Patient continues with skilled OT services and is progressing towards goals. Patient noted with fall overnight- sliding out of bed. Testing performed and scans clear, and patient reports no injuries.   Patient agreeable to activity and aware of plan for pacer placement later today. Patient requires max assist x 2 to come to EOB. Patient with posterior lean in sitting and requires therapist assist for balance. Patient with slow processing, but following commands. Patient declines standing, but agreeable to scooting to [facility]. Patient  requires max assist x 2 and unable to clear bottom from bed to allow changing saturated cristobal pad. Patient returned to supine at end of session, rolling left /right for changing pad and bladder hygiene. Mod assist for donning new gown in supine. Current Level of Function Impacting Discharge (ADLs): max assist x 2    Other factors to consider for discharge:          PLAN :  Patient continues to benefit from skilled intervention to address the above impairments. Continue treatment per established plan of care. to address goals. Recommend with staff:     Recommend next OT session: continue goals    Recommendation for discharge: (in order for the patient to meet his/her long term goals)  Therapy up to 5 days/week in SNF setting    This discharge recommendation:  Has been made in collaboration with the attending provider and/or case management    IF patient discharges home will need the following DME: TBD       SUBJECTIVE:   Patient stated I guess we can try.     OBJECTIVE DATA SUMMARY:   Cognitive/Behavioral Status:  Neurologic State: Alert;Confused  Orientation Level: Disoriented to time;Disoriented to situation  Cognition: Follows commands;Memory loss; Impaired decision making             Functional Mobility and Transfers for ADLs:  Bed Mobility:  Rolling: Maximum assistance;Assist x2  Supine to Sit: Maximum assistance;Assist x2  Sit to Supine:  Total assistance;Assist x2  Scooting: Maximum assistance;Assist x2    Transfers:             Balance:  Sitting: Impaired  Sitting - Static: Fair (occasional)  Sitting - Dynamic: Poor (constant support)  Standing: Impaired    ADL Intervention:                                Toileting  Toileting Assistance: Total assistance(dependent)  Bladder Hygiene: Total assistance (dependent)         Therapeutic Exercises:       Pain:      Activity Tolerance:   Fair    After treatment patient left in no apparent distress:   Supine in bed, Call bell within reach, and Side rails x 3    COMMUNICATION/COLLABORATION:   The patients plan of care was discussed with: Physical therapist and Registered nurse.      Radha Cotter, OTR/L  Time Calculation: 21 mins

## 2021-02-22 NOTE — PROGRESS NOTES
Spoke with RN. Patient currently NPO for cardiac procedure. Ananda Kaplan to advance to mechanical soft diet, thins when returned from procedure.

## 2021-02-22 NOTE — ADVANCED PRACTICE NURSE
Telemetry reviewed: Remains in second degree block 2:1. Had unwitnessed fall overnight ? If side rail gave way and she slid off the bed. Visit Vitals  BP (!) 161/68 (BP 1 Location: Left upper arm, BP Patient Position: At rest)   Pulse (!) 49   Temp 97.5 °F (36.4 °C)   Resp 16   Ht 5' 4\" (1.626 m)   Wt 73.4 kg (161 lb 13.1 oz)   SpO2 92%   BMI 27.78 kg/m²     NPO  EP to see for ?  PPM     K 3.3, being repleted IV

## 2021-02-22 NOTE — CONSULTS
HISTORY OF PRESENTING ILLNESS      Jasmeet Bates is a 76 y.o. female with hyperrtension, diabetes mellitus admitted with hypokalemia noted to have 2:1 AV block, intermittent 3rd degree AVB with persistence of 2:1 AVB despite correction of K.         ACTIVE PROBLEM LIST     Patient Active Problem List    Diagnosis Date Noted    DKA (diabetic ketoacidoses) ([facility] 02/16/2021    Severe sepsis ([facility] 02/16/2021    AMS (altered mental status) 02/16/2021    Insomnia 02/16/2021    Pancreatic cyst 02/16/2021    GERD (gastroesophageal reflux disease) 02/16/2021    Prolonged Q-T interval on ECG 02/16/2021    EVAN (acute kidney injury) ([facility] 02/16/2021    Mobitz type 2 second degree AV block 02/16/2021    Essential hypertension 09/23/2019    Hypercholesteremia 09/23/2019    Controlled type 2 diabetes mellitus without complication, with long-term current use of insulin ([facility] 09/23/2019    Diabetic retinopathy ([facility] 09/10/2019           MEDICATIONS     Current Facility-Administered Medications   Medication Dose Route Frequency    potassium chloride 10 mEq in 100 ml IVPB  10 mEq IntraVENous Q1H    hydroCHLOROthiazide (HYDRODIURIL) tablet 25 mg  25 mg Oral DAILY    famotidine (PEPCID) tablet 20 mg  20 mg Oral Q12H    insulin NPH (NOVOLIN N, HUMULIN N) injection 30 Units  30 Units SubCUTAneous ACB&D    insulin lispro (HUMALOG) injection   SubCUTAneous AC&HS    atorvastatin (LIPITOR) tablet 40 mg  40 mg Oral QHS    aspirin chewable tablet 81 mg  81 mg Oral DAILY    amoxicillin-clavulanate (AUGMENTIN) 875-125 mg per tablet 1 Tab  1 Tab Oral Q12H    amLODIPine (NORVASC) tablet 5 mg  5 mg Oral DAILY    losartan (COZAAR) tablet 100 mg  100 mg Oral QPM    melatonin (rapid dissolve) tablet 5 mg  5 mg Oral QHS PRN    sodium chloride (NS) flush 5-10 mL  5-10 mL IntraVENous PRN    sodium chloride (NS) flush 5-40 mL  5-40 mL IntraVENous Q8H    sodium chloride (NS) flush 5-40 mL  5-40 mL IntraVENous PRN    heparin (porcine) injection 5,000 Units  5,000 Units SubCUTAneous Q8H           PAST MEDICAL HISTORY     Past Medical History:   Diagnosis Date    CAD (coronary artery disease)     Diabetes [facility]     Diabetes ([facility]     type 2    GERD (gastroesophageal reflux disease)     H/O seasonal allergies     Hyperlipidemia     Hypertension     Insomnia     Nausea & vomiting     PUD (peptic ulcer disease)     Small bowel obstruction (HCC)     Vitamin D deficiency            PAST SURGICAL HISTORY     Past Surgical History:   Procedure Laterality Date    HX APPENDECTOMY      HX BREAST BIOPSY      HX GYN  1971    partial hysterectomy    HX GYN  1991    complete hysterectomy    HX HYSTERECTOMY      HX ORTHOPAEDIC      right rotator cuff repair    HX ORTHOPAEDIC      bilaterral wrist surgery    HX ORTHOPAEDIC      right knee surgery    HX ORTHOPAEDIC      bilateral bone spur removal from big toe    HX TONSIL AND ADENOIDECTOMY      HX TONSILLECTOMY      WA BREAST SURGERY PROCEDURE UNLISTED      breast reduction    WA CARDIAC SURG PROCEDURE UNLIST      heart stent          ALLERGIES     Allergies   Allergen Reactions    Advicor [Niacin-Lovastatin] Swelling    Conjugated Estrogens Other (comments)     Headaches      Other reaction(s): Other (comments)  Headaches    Deflazacort Unable to Obtain     Other reaction(s): Unable to Obtain    Erythromycin Nausea Only    Erythromycin Base Nausea and Vomiting    Niacin Swelling    Pitavastatin Other (comments)     Abdominal pain, nausea  Other reaction(s): Other (comments)  Abdominal pain, nausea    Simvastatin Other (comments)     Muscle cramps  Other reaction(s): Other (comments)  Muscle cramps    Unable To Obtain Swelling    Zolpidem Other (comments)     Other reaction(s): Other (comments)    Atorvastatin Other (comments)     Muscle cramps  Other reaction(s):  Other (comments)  Muscle cramps    Triamcinolone Unable to Obtain     Other reaction(s): Unable to Obtain          FAMILY HISTORY     Family History   Problem Relation Age of Onset    Cancer Maternal Aunt         pancreatic cancer    Hypertension Mother     COPD Mother     Diabetes Father     Alcohol abuse Brother         murdered     negative for cardiac disease       SOCIAL HISTORY     Social History     Socioeconomic History    Marital status:      Spouse name: Not on file    Number of children: Not on file    Years of education: Not on file    Highest education level: Not on file   Tobacco Use    Smoking status: Never Smoker    Smokeless tobacco: Never Used   Substance and Sexual Activity    Alcohol use: No    Drug use: No    Sexual activity: Not Currently         MEDICATIONS     Current Facility-Administered Medications   Medication Dose Route Frequency    potassium chloride 10 mEq in 100 ml IVPB  10 mEq IntraVENous Q1H    hydroCHLOROthiazide (HYDRODIURIL) tablet 25 mg  25 mg Oral DAILY    famotidine (PEPCID) tablet 20 mg  20 mg Oral Q12H    insulin NPH (NOVOLIN N, HUMULIN N) injection 30 Units  30 Units SubCUTAneous ACB&D    insulin lispro (HUMALOG) injection   SubCUTAneous AC&HS    atorvastatin (LIPITOR) tablet 40 mg  40 mg Oral QHS    aspirin chewable tablet 81 mg  81 mg Oral DAILY    amoxicillin-clavulanate (AUGMENTIN) 875-125 mg per tablet 1 Tab  1 Tab Oral Q12H    amLODIPine (NORVASC) tablet 5 mg  5 mg Oral DAILY    losartan (COZAAR) tablet 100 mg  100 mg Oral QPM    melatonin (rapid dissolve) tablet 5 mg  5 mg Oral QHS PRN    sodium chloride (NS) flush 5-10 mL  5-10 mL IntraVENous PRN    sodium chloride (NS) flush 5-40 mL  5-40 mL IntraVENous Q8H    sodium chloride (NS) flush 5-40 mL  5-40 mL IntraVENous PRN    heparin (porcine) injection 5,000 Units  5,000 Units SubCUTAneous Q8H       I have reviewed the nurses notes, vitals, problem list, allergy list, medical history, family, social history and medications.        REVIEW OF SYMPTOMS      General: Pt denies excessive weight gain or loss. Pt is able to conduct ADL's  HEENT: Denies blurred vision, headaches, hearing loss, epistaxis and difficulty swallowing. Respiratory: Denies cough, congestion, shortness of breath, MEJIAS, wheezing or stridor. Cardiovascular: Denies precordial pain, palpitations, edema or PND  Gastrointestinal: Denies poor appetite, indigestion, abdominal pain or blood in stool  Genitourinary: Denies hematuria, dysuria, increased urinary frequency  Musculoskeletal: Denies joint pain or swelling from muscles or joints  Neurologic: Denies tremor, paresthesias, headache, or sensory motor disturbance  Psychiatric: Denies confusion, insomnia, depression  Integumentray: Denies rash, itching or ulcers. Hematologic: Denies easy bruising, bleeding       PHYSICAL EXAMINATION      Vitals:    02/21/21 2314 02/22/21 0327 02/22/21 0700 02/22/21 0725   BP:  (!) 170/52  (!) 161/68   Pulse: (!) 51 (!) 45 63 (!) 49   Resp:  17  16   Temp:  97.3 °F (36.3 °C)  97.5 °F (36.4 °C)   SpO2:  92%  92%   Weight:       Height:         General: Well developed, in no acute distress. HEENT: No jaundice, oral mucosa moist, no oral ulcers  Neck: Supple, no stiffness, no lymphadenopathy, supple  Heart:  Normal S1/S2 negative S3 or S4. Regular, no murmur, gallop or rub, no jugular venous distention  Respiratory: Clear bilaterally x 4, no wheezing or rales  Abdomen:   Soft, non-tender, bowel sounds are active.   Extremities:  No edema, normal cap refill, no cyanosis. Musculoskeletal: No clubbing, no deformities  Neuro: A&Ox3, speech clear, gait stable, cooperative, no focal neurologic deficits  Skin: Skin color is normal. No rashes or lesions.  Non diaphoretic, moist.  Vascular: 2+ pulses symmetric in all extremities       DIAGNOSTIC DATA      TELEMETRY: 2:1 AV block       LABORATORY DATA      Lab Results   Component Value Date/Time    WBC 10.8 02/22/2021 06:47 AM    HGB 15.0 02/22/2021 06:47 AM    HCT 44.4 02/22/2021 06:47 AM PLATELET 537 99/49/6141 06:47 AM    MCV 83.6 02/22/2021 06:47 AM      Lab Results   Component Value Date/Time    Sodium 135 (L) 02/22/2021 06:47 AM    Potassium 3.3 (L) 02/22/2021 06:47 AM    Chloride 96 (L) 02/22/2021 06:47 AM    CO2 33 (H) 02/22/2021 06:47 AM    Anion gap 6 02/22/2021 06:47 AM    Glucose 211 (H) 02/22/2021 06:47 AM    BUN 5 (L) 02/22/2021 06:47 AM    Creatinine 0.50 (L) 02/22/2021 06:47 AM    BUN/Creatinine ratio 10 (L) 02/22/2021 06:47 AM    GFR est AA >60 02/22/2021 06:47 AM    GFR est non-AA >60 02/22/2021 06:47 AM    Calcium 9.0 02/22/2021 06:47 AM    Bilirubin, total 0.5 02/16/2021 11:17 AM    Alk. phosphatase 125 (H) 02/16/2021 11:17 AM    Protein, total 9.2 (H) 02/16/2021 11:17 AM    Albumin 4.0 02/16/2021 11:17 AM    Globulin 5.2 (H) 02/16/2021 11:17 AM    A-G Ratio 0.8 (L) 02/16/2021 11:17 AM    ALT (SGPT) 22 02/16/2021 11:17 AM           ASSESSMENT      1. Heart block  2. Hypokalemia -resolved         PLAN     Plan for dual chamber pacemaker today for irreversible heart block. Thank you, Ramesh Garcias MD and Dr. Ubaldo Rojas for involving me in the care of this extraordinarily pleasant female. Please do not hesitate to contact me for further questions/concerns.          Sally Chaves MD  Cardiac Electrophysiology / Cardiology    [address]  [address] [place] [address]  [phone] / [phone] Fax   [phone] / [phone] Fax

## 2021-02-22 NOTE — PROGRESS NOTES
Problem: Mobility Impaired (Adult and Pediatric)  Goal: *Acute Goals and Plan of Care (Insert Text)  Description: FUNCTIONAL STATUS PRIOR TO ADMISSION: Patient was independent and active without use of DME.    HOME SUPPORT PRIOR TO ADMISSION: The patient lived alone with no one to provide assistance. Found in hoarder situation. APS has been contacted. Physical Therapy Goals  Initiated 2/17/2021  1. Patient will move from supine to sit and sit to supine , scoot up and down, and roll side to side in bed with moderate assistance  within 7 day(s). 2.  Patient will transfer from bed to chair and chair to bed with moderate assistance  using the least restrictive device within 7 day(s). 3.  Patient will perform sit to stand with moderate assistance  within 7 day(s). 4.  Patient will ambulate with moderate assistance  for 10 feet with the least restrictive device within 7 day(s). Note:   PHYSICAL THERAPY TREATMENT  Patient: Hosey Lesches [de-identified]76 y.o. female)  Date: 2/22/2021  Diagnosis: AMS (altered mental status) [R41.82]  Severe sepsis ([address] [A41.9, R65.20]  DKA (diabetic ketoacidoses) ([address] [E11.10] Severe sepsis ([address]  Procedure(s) (LRB):  INSERT PPM DUAL (N/A) Day of Surgery  Precautions: Fall  Chart, physical therapy assessment, plan of care and goals were reviewed. ASSESSMENT  Patient continues with skilled PT services and is progressing towards goals. Patient sustained a fall overnight, imaging performed and no acute injuries. Patient today received in supine, agreeable to therapy. She was able to come to sitting at edge of bed with MAX A x2. Still slow to respond and increased time for processing. Once in sitting required more assistance than Friday session but not as much as her initial evaluation. She was MIN A for sitting due to increased posterior lean and required constant verbal cuing for anterior weight shifting. Patient attempted scooting at edge of bed with MAX A x2.   Returned to supine. She was incontinent of urine, linens and cristobal changed and new gown placed on patient. .     Current Level of Function Impacting Discharge (mobility/balance): MAX A-Total A x2 for upright activity    Other factors to consider for discharge:          PLAN :  Patient continues to benefit from skilled intervention to address the above impairments. Continue treatment per established plan of care. to address goals. Recommendation for discharge: (in order for the patient to meet his/her long term goals)  Therapy up to 5 days/week in SNF setting    This discharge recommendation:  A follow-up discussion with the attending provider and/or case management is planned    IF patient discharges home will need the following DME: to be determined (TBD)       SUBJECTIVE:   Patient stated .    OBJECTIVE DATA SUMMARY:   Critical Behavior:  Neurologic State: Alert, Confused  Orientation Level: Disoriented to time, Disoriented to situation  Cognition: Follows commands, Memory loss, Impaired decision making  Safety/Judgement: Decreased awareness of environment, Decreased awareness of need for assistance, Decreased awareness of need for safety, Decreased insight into deficits  Vitals:    02/22/21 0700 02/22/21 0725 02/22/21 1124 02/22/21 1130   BP:  (!) 161/68  (!) 160/71   BP 1 Location:  Left upper arm  Left upper arm   BP Patient Position:  At rest  At rest   Pulse: 63 (!) 49  (!) 49   Resp:  16  16   Temp:  97.5 °F (36.4 °C)  98.3 °F (36.8 °C)   SpO2:  92%  93%   Weight:   68 kg (150 lb)    Height:           Functional Mobility Training:  Bed Mobility:  Rolling: Maximum assistance;Assist x2  Supine to Sit: Maximum assistance;Assist x2  Sit to Supine:  Total assistance;Assist x2  Scooting: Maximum assistance;Assist x2        Transfers:                                   Balance:  Sitting: Impaired  Sitting - Static: Fair (occasional)  Sitting - Dynamic: Poor (constant support)  Standing: Impaired  Ambulation/Gait Training:        unable  Activity Tolerance:   Fair    After treatment patient left in no apparent distress:   Supine in bed, Call bell within reach, Bed / chair alarm activated, and Side rails x 3    COMMUNICATION/COLLABORATION:   The patients plan of care was discussed with: Occupational therapist and Registered nurse.      Harley Henao, PT, DPT   Time Calculation: 19 mins

## 2021-02-22 NOTE — PROGRESS NOTES
Dr. Cierra Bloom and I met with patient, daughter, and patient care advocate after daughter refused pacemaker. Addressed concerns. Patient and daughter consented to pacemaker. Communicated this to Dr. Román Raphael.      Rosanne Garcia MD

## 2021-02-22 NOTE — DIABETES MGMT
[facility]  CLINICAL NURSE SPECIALIST CONSULT  [facility]    FOLLOW-UP NOTE    Presentation   Levar Harley is a 76 y.o. female presented to the ED 2/16/21 by EMS after being found down by neighbors. Per chart review, patient advised she had fallen several days ago and had been unable to get up. Patient found to be in DKA on arrival with BG of 607 and AG of 17 by labs.       HX:   Past Medical History (click to expand or collapse)        Past Medical History:   Diagnosis Date    CAD (coronary artery disease)      Diabetes [facility]      Diabetes ([facility]       type 2    GERD (gastroesophageal reflux disease)      H/O seasonal allergies      Hyperlipidemia      Hypertension      Insomnia      Nausea & vomiting      PUD (peptic ulcer disease)      Small bowel obstruction (HCC)      Vitamin D deficiency              Current clinical course has been uncomplicated.      Diabetes: Patient has known Type 2 diabetes. Home diabetes medication regimen is NPH 30 units twice daily, regular insulin 25 units before meals, and Metformin 500 mg daily. Admission  and A1c 7.2 (9/30/20) indicate poor diabetes control. Subjective   Patient resting in bed with eyes closed, easily aroused to voice. Patient alert and oriented and without complaints at this time. Objective   Physical exam  General Alert, oriented and in no acute distress. Conversant and cooperative. Vital Signs   Visit Vitals  BP (!) 161/68 (BP 1 Location: Left upper arm, BP Patient Position: At rest)   Pulse (!) 49   Temp 97.5 °F (36.4 °C)   Resp 16   Ht 5' 4\" (1.626 m)   Wt 73.4 kg (161 lb 13.1 oz)   SpO2 92%   BMI 27.78 kg/m²     Skin  Warm and dry  Heart   Regular rate and rhythm.  No murmurs, rubs or gallops  Lungs  Clear to auscultation without rales or rhonchi  Extremities No foot wounds    Laboratory  Lab Results   Component Value Date/Time    Hemoglobin A1c 8.8 (H) 02/16/2021 11:17 AM     Lab Results   Component Value Date/Time    LDL, calculated 141.8 (H) 02/18/2021 03:42 AM     Lab Results   Component Value Date/Time    Creatinine 0.50 (L) 02/22/2021 06:47 AM     Lab Results   Component Value Date/Time    Sodium 135 (L) 02/22/2021 06:47 AM    Potassium 3.3 (L) 02/22/2021 06:47 AM    Chloride 96 (L) 02/22/2021 06:47 AM    CO2 33 (H) 02/22/2021 06:47 AM    Anion gap 6 02/22/2021 06:47 AM    Glucose 211 (H) 02/22/2021 06:47 AM    BUN 5 (L) 02/22/2021 06:47 AM    Creatinine 0.50 (L) 02/22/2021 06:47 AM    BUN/Creatinine ratio 10 (L) 02/22/2021 06:47 AM    GFR est AA >60 02/22/2021 06:47 AM    GFR est non-AA >60 02/22/2021 06:47 AM    Calcium 9.0 02/22/2021 06:47 AM    Bilirubin, total 0.5 02/16/2021 11:17 AM    Alk. phosphatase 125 (H) 02/16/2021 11:17 AM    Protein, total 9.2 (H) 02/16/2021 11:17 AM    Albumin 4.0 02/16/2021 11:17 AM    Globulin 5.2 (H) 02/16/2021 11:17 AM    A-G Ratio 0.8 (L) 02/16/2021 11:17 AM    ALT (SGPT) 22 02/16/2021 11:17 AM     Lab Results   Component Value Date/Time    ALT (SGPT) 22 02/16/2021 11:17 AM       Tests 2/16/21  1117 2/16/21  1436 2/16/21  1640 2/16/21 2032 2/17/21  0229   A1c 8.8 - - - -    529 372 188 275   Anion gap 17 18 16 9 11   Serum creatinine 1.83 1.46 1.47 1.40 1.28   GFR 27 35 35 37 41       Factors affecting BG pattern  Factor Dose Comments   Nutrition:  NPO for pacemaker placement today   NPO   Diabetic consistent CHO when diet advances   Drugs:  Vasopressor load  Steroids  HIV   Epogen  Blood transfusion(s)  Other: n/a   n/a  n/a  n/a  n/a  n/a         A1cs inaccurate  A1cs inaccurate   Pain 0/10    Infection Augmentin Bloodstream infection   Other: n/a n/a      Blood glucose pattern        Evaluation   This 76year old female, with Type 2 diabetes, did not achieve diabetes control prior to admission (PTA), as evidenced by admission BG of 607 and A1c of 7.2%.  Based on assessment of diabetes self-care practices, interventions that warrant action while hospitalized include: Unable to assess at this time. The patient would also benefit from diabetes self-management education and support BR[facility]YDA LUN[facility]) after discharge.     During this hospitalization, the patient has not achieved inpatient blood glucose target of 100-180mg/dl. BG's have ranged 211-365 over the past 24 hours. Factors that have played a role include:  [x]? Critical nature of illness state  [x]? Meal/tube feeding disruption  [x]? Compromised insulin absorption or delivery  [x]? Kidney dysfunction     Basal insulin is in use. NPH 30 units twice daily currently ordered, changed from Lantus with first dose this morning.     Bolus insulin isn't in use. Patient is eating meals.     Corrective insulin is in use. Patient has required 25 units of corrective insulin over the past 24 hours.        Insulin drip with glucostabilizer started at 1449 2/16/21. Insulin drip with glucostabilizer discontinued at 0728 today (2/17/21)      To optimize BG control and support a positive health outcome, would utilize the Subcutaneous Insulin Order set (5459).    Impression: It is suspected that with the change in basal insulin to NPH that patient will have improved glycemic control. It is also suspected that bolus insulin will help with improving glycemic control. Assessment and Plan   Nursing Diagnosis Risk for unstable blood glucose pattern   Nursing Intervention Domain 2532 Decision-making Support   Nursing Interventions Examined current inpatient diabetes control   Explored factors facilitating and impeding inpatient management       Recommendations   Recommend:     [x]? Use of Subcutaneous Insulin Order set (8449)  Insulin Use Recommendation   Basal                                      (Based on weight, BMI & GFR) Addresses basic metabolic needs Continue NPH 30 units twice dialy.    Nutritional                                      (Based on CHO load) Addresses nutrition interventions Humalog 5 units with meals, hold if patient eats < 50% CHO on meal tray. Corrective                                       (Offset gaps in dosing) Useful in adjusting insulin dosing Correctional Scale for Normal Sensitivity     200-249- 2units Humalog  [id number] Humalog  [id number] Humalog  [id number] Humalog  Over 400- 10units Humalog     Do NOT hold for NPO; give in addition to meal time insulin dose.     If patient does not eat, -give correction dose only.         [x]? Referral to     [x]? Diabetes Self-Management Training through [facility] (Phone [phone] to schedule appointment)    Time Spent/ Billing Codes     Total time spent with patient: 15 Minutes   I personally reviewed chart, notes, data and current medications in the medical record. I have personally examined and treated the patient at bedside during this period.      [x] [id number] IP subsequent hospital care - 15 minutes     IRINA Barclay Res  Diabetes Clinical Nurse Specialist  [facility]  Access via [address] [place] [address]

## 2021-02-22 NOTE — PROGRESS NOTES
Spiritual Care Assessment/Progress Note  [address]      NAME: Yari Dupree      MRN: [id number]  AGE: 76 y.o.  SEX: female  Jain Affiliation: Non Temple   Language: English     2/22/2021           Spiritual Assessment begun in [facility] 5M1 MED SURG 1 through conversation with:         [x]Patient        [] Family    [] Friend(s)        Reason for Consult: Advance medical directive consult     Spiritual beliefs: (Please include comment if needed)     [x] Identifies with a milton tradition: [employer]         [] Supported by a milton community:            [] Claims no spiritual orientation:           [] Seeking spiritual identity:                [] Adheres to an individual form of spirituality:           [] Not able to assess:                           Identified resources for coping:      [x] Prayer                               [] Music                  [] Guided Imagery     [x] Family/friends                 [] Pet visits     [] Devotional reading                         [] Unknown     [] Other:                                               Interventions offered during this visit: (See comments for more details)    Patient Interventions: Advance medical directive consult, Affirmation of emotions/emotional suffering, Catharsis/review of pertinent events in supportive environment, Initial/Spiritual assessment, patient floor, Prayer (assurance of)           Plan of Care:     [x] Support spiritual and/or cultural needs    [x] Support AMD and/or advance care planning process      [] Support grieving process   [] Coordinate Rites and/or Rituals    [] Coordination with community clergy   [] No spiritual needs identified at this time   [] Detailed Plan of Care below (See Comments)  [] Make referral to Music Therapy  [] Make referral to Pet Therapy     [] Make referral to Addiction services  [] Make referral to [facility]  [] Make referral to Spiritual Care Partner  [] No future visits requested        [x] Follow up upon further referrals     Comments: [facility] in room 518 regarding an in-basket request for AMD information. Ms [facility] was lying quietly in bed; she was frowning and shared that she was upset because her pacemaker insertion had been canceled. She said that she had fallen a couple of nights ago but had not been hurt and she saw no reason why her daughter did not want the procedure to take place. [facility] was oriented to self, place, and situation. Provided Ms [facility] with a copy of the booklet, \"Your Right to Decide\", and a blank AMD.  started reviewing the AMD with Ms [facility] when patient stopped  to say that her daughter that lived next door to her had Power of [place]; she said she did not know if she also had [address]. Ms [facility] agreed to have the AMD information left with her so she could talk it over with her family. Encouraged her to have  notified if she had questions &/or would like to complete an AMD.    Ms [facility] shared that she was a member of [employer] and said she would like to be kept in prayer. She stated her only concern at that time was that she wanted to go home. Acknowledged her feelings and concerns and offered words of support. Assured her of ongoing  availability for support. : Rev. Violetta Douglas.  Media Settler; [facility], to contact [phone] [address] call: [phone]

## 2021-02-22 NOTE — ACP (ADVANCE CARE PLANNING)
Provided Ms aLw with a copy of the booklet, \"Your Right to Decide\", and a blank AMD.  started reviewing the AMD with Ms Law when patient stopped  to say that her daughter that lived next door to her had Power of [place]; she said she did not know if she also had [address]. Ms Law agreed to have the AMD information left with her so she could talk it over with her family. Encouraged her to have  notified if she had questions &/or would like to complete an AMD.  : Rev. Gray Vazquez.  Monica Bejarano; [facility], to contact [phone] Rob Spence call: [phone]

## 2021-02-22 NOTE — PROGRESS NOTES
Comprehensive Nutrition Assessment    Type and Reason for Visit: Reassess    Nutrition Recommendations/Plan:   1. Advance diet to [facility] soft, thins after procedure per SLP. 2. Continue Ensure Enlive BID once diet advanced. Nutrition Assessment:      2/22: F/u. Pt currently NPO for pacemaker placement today. Before today pt was on full liquid diet with good po intakes, 75% meals past couple days. Pt says her appetite is \"alright\" and that she was consuming at least 50% meals. No c/o N/V. Likes [employer]- will continue once diet advanced. Labs- Na 135, K 3.3, -211-241. Meds- hydrodiuril, insulin, KCl.    2/19: 77 y/o female admitted with AMS. PMH includes HTN, DM, GERD. Pt sleeping soundly at time of visit, did not arouse to name. No family in room. Breakfast tray in front of pt, few bites of yogurt eaten. Currently on full liquid diet, but per SLP note today it is OK to advanced to [facility] soft, thin liquids. Per chart, pt fell several days PTA and was unable to get up. Likely not eating during this time. Weight loss noted- 11.5% x3.5 months which is considered significant for time frame. Will add Ensure Enlive to promote intake and monitor acceptance. Labs- K 2.7, phos 2.5, A1c 8.8, -[phone]. Meds- insulin. Intakes:  Patient Vitals for the past 168 hrs:   % Diet Eaten   02/21/21 0900 75 %   02/20/21 0900 75 %   02/17/21 1505 0 %   02/17/21 0900 0 %     Weight hx:   Wt Readings from Last 10 Encounters:   02/22/21 68 kg (150 lb)   02/10/21 70.8 kg (156 lb)   01/27/21 73.9 kg (163 lb)   01/13/21 74.9 kg (165 lb 3.2 oz)   11/02/20 80.1 kg (176 lb 9.6 oz)   09/30/20 79.7 kg (175 lb 9.6 oz)   03/02/20 78.5 kg (173 lb)   10/16/19 79.4 kg (175 lb)   09/23/19 79.4 kg (175 lb)   09/10/19 80.3 kg (177 lb)     Malnutrition Assessment:  Malnutrition Status:  Severe malnutrition    Context:  Acute illness     Findings of the 6 clinical characteristics of malnutrition:   Energy Intake:  7 - 50% or less of est energy requirements for 5 or more days  Weight Loss:  7.00 - Greater than 7.5% over 3 months     Body Fat Loss:  1 - Mild body fat loss, Triceps   Muscle Mass Loss:  1 - Mild muscle mass loss, Hand (interosseous), Temples (temporalis)  Fluid Accumulation:  No significant fluid accumulation,     Strength:  Not performed     Estimated Daily Nutrient Needs:  Energy (kcal): 9378(3762 x 1.3 AF); Weight Used for Energy Requirements: Current  Protein (g): 85(1-1.2 g/kg); Weight Used for Protein Requirements: Current  Fluid (ml/day): 1549; Method Used for Fluid Requirements: 1 ml/kcal      Nutrition Related Findings:  last BM 2/21      Wounds:    Pressure injury(L buttock)       Current Nutrition Therapies:  DIET NUTRITIONAL SUPPLEMENTS Breakfast, Lunch; Ensure Enlive  DIET NPO    Anthropometric Measures:  · Height:  5' 4\" (162.6 cm)  · Current Body Wt:  68 kg (150 lb)   · Admission Body Wt:       · Usual Body Wt:        · Ideal Body Wt:  120 lbs:  130.8 %   · Adjusted Body Weight:   ; Weight Adjustment for: No adjustment   · Adjusted BMI:       · BMI Category: Overweight (BMI 25.0-29. 9)       Nutrition Diagnosis:   · Inadequate oral intake related to inadequate protein-energy intake as evidenced by wounds, weight loss, poor intake prior to admission, intake 0-25%    2/22: Nutrition dx continues. Nutrition Interventions:   Food and/or Nutrient Delivery: Modify current diet, Start oral nutrition supplement  Nutrition Education and Counseling: No recommendations at this time  Coordination of Nutrition Care: Continue to monitor while inpatient    Goals:  PO intake >50% meals + ONS next 5-7 days       Nutrition Monitoring and Evaluation:   Behavioral-Environmental Outcomes: None identified  Food/Nutrient Intake Outcomes: Food and nutrient intake, Supplement intake, Diet advancement/tolerance  Physical Signs/Symptoms Outcomes: Biochemical data, Chewing or swallowing, Weight, GI status    Discharge Planning:     Too soon to determine     Electronically signed by Vamshi Salmeron RDN on 2/22/2021     Contact: [phone]

## 2021-02-23 LAB
ANION GAP SERPL CALC-SCNC: 7 MMOL/L (ref 5–15)
BUN SERPL-MCNC: 5 MG/DL (ref 6–20)
BUN/CREAT SERPL: 9 (ref 12–20)
CALCIUM SERPL-MCNC: 9.4 MG/DL (ref 8.5–10.1)
CHLORIDE SERPL-SCNC: 97 MMOL/L (ref 97–108)
CO2 SERPL-SCNC: 31 MMOL/L (ref 21–32)
COMMENT, HOLDF: NORMAL
COVID-19 RAPID TEST, COVR: NOT DETECTED
CREAT SERPL-MCNC: 0.54 MG/DL (ref 0.55–1.02)
ERYTHROCYTE [DISTWIDTH] IN BLOOD BY AUTOMATED COUNT: 14.2 % (ref 11.5–14.5)
GLUCOSE BLD STRIP.AUTO-MCNC: 122 MG/DL (ref 65–100)
GLUCOSE BLD STRIP.AUTO-MCNC: 190 MG/DL (ref 65–100)
GLUCOSE BLD STRIP.AUTO-MCNC: 219 MG/DL (ref 65–100)
GLUCOSE BLD STRIP.AUTO-MCNC: 306 MG/DL (ref 65–100)
GLUCOSE SERPL-MCNC: 202 MG/DL (ref 65–100)
HCT VFR BLD AUTO: 44.1 % (ref 35–47)
HGB BLD-MCNC: 14.5 G/DL (ref 11.5–16)
MAGNESIUM SERPL-MCNC: 1.9 MG/DL (ref 1.6–2.4)
MCH RBC QN AUTO: 27.8 PG (ref 26–34)
MCHC RBC AUTO-ENTMCNC: 32.9 G/DL (ref 30–36.5)
MCV RBC AUTO: 84.6 FL (ref 80–99)
NRBC # BLD: 0 K/UL (ref 0–0.01)
NRBC BLD-RTO: 0 PER 100 WBC
PHOSPHATE SERPL-MCNC: 2.8 MG/DL (ref 2.6–4.7)
PLATELET # BLD AUTO: 269 K/UL (ref 150–400)
PMV BLD AUTO: 12.2 FL (ref 8.9–12.9)
POTASSIUM SERPL-SCNC: 3.3 MMOL/L (ref 3.5–5.1)
RBC # BLD AUTO: 5.21 M/UL (ref 3.8–5.2)
SAMPLES BEING HELD,HOLD: NORMAL
SARS-COV-2, COV2: NORMAL
SERVICE CMNT-IMP: ABNORMAL
SODIUM SERPL-SCNC: 135 MMOL/L (ref 136–145)
SOURCE, COVRS: NORMAL
WBC # BLD AUTO: 12.7 K/UL (ref 3.6–11)

## 2021-02-23 PROCEDURE — 82962 GLUCOSE BLOOD TEST: CPT

## 2021-02-23 PROCEDURE — 74011250637 HC RX REV CODE- 250/637: Performed by: NURSE PRACTITIONER

## 2021-02-23 PROCEDURE — 92526 ORAL FUNCTION THERAPY: CPT

## 2021-02-23 PROCEDURE — 87635 SARS-COV-2 COVID-19 AMP PRB: CPT

## 2021-02-23 PROCEDURE — 85027 COMPLETE CBC AUTOMATED: CPT

## 2021-02-23 PROCEDURE — 99232 SBSQ HOSP IP/OBS MODERATE 35: CPT | Performed by: FAMILY MEDICINE

## 2021-02-23 PROCEDURE — 74011250636 HC RX REV CODE- 250/636: Performed by: STUDENT IN AN ORGANIZED HEALTH CARE EDUCATION/TRAINING PROGRAM

## 2021-02-23 PROCEDURE — 97530 THERAPEUTIC ACTIVITIES: CPT

## 2021-02-23 PROCEDURE — 83735 ASSAY OF MAGNESIUM: CPT

## 2021-02-23 PROCEDURE — 77030038269 HC DRN EXT URIN PURWCK BARD -A

## 2021-02-23 PROCEDURE — 99231 SBSQ HOSP IP/OBS SF/LOW 25: CPT | Performed by: CLINICAL NURSE SPECIALIST

## 2021-02-23 PROCEDURE — 74011636637 HC RX REV CODE- 636/637: Performed by: STUDENT IN AN ORGANIZED HEALTH CARE EDUCATION/TRAINING PROGRAM

## 2021-02-23 PROCEDURE — 94760 N-INVAS EAR/PLS OXIMETRY 1: CPT

## 2021-02-23 PROCEDURE — 84100 ASSAY OF PHOSPHORUS: CPT

## 2021-02-23 PROCEDURE — 74011250637 HC RX REV CODE- 250/637: Performed by: STUDENT IN AN ORGANIZED HEALTH CARE EDUCATION/TRAINING PROGRAM

## 2021-02-23 PROCEDURE — 36415 COLL VENOUS BLD VENIPUNCTURE: CPT

## 2021-02-23 PROCEDURE — 65660000000 HC RM CCU STEPDOWN

## 2021-02-23 PROCEDURE — 80048 BASIC METABOLIC PNL TOTAL CA: CPT

## 2021-02-23 RX ORDER — INSULIN LISPRO 100 [IU]/ML
3 INJECTION, SOLUTION INTRAVENOUS; SUBCUTANEOUS
Status: DISCONTINUED | OUTPATIENT
Start: 2021-02-23 | End: 2021-02-23

## 2021-02-23 RX ORDER — SODIUM,POTASSIUM PHOSPHATES 280-250MG
2 POWDER IN PACKET (EA) ORAL 2 TIMES DAILY
Status: COMPLETED | OUTPATIENT
Start: 2021-02-23 | End: 2021-02-23

## 2021-02-23 RX ORDER — INSULIN LISPRO 100 [IU]/ML
5 INJECTION, SOLUTION INTRAVENOUS; SUBCUTANEOUS
Status: DISCONTINUED | OUTPATIENT
Start: 2021-02-23 | End: 2021-02-25

## 2021-02-23 RX ORDER — UREA 10 %
2 LOTION (ML) TOPICAL 2 TIMES DAILY
Status: COMPLETED | OUTPATIENT
Start: 2021-02-23 | End: 2021-02-23

## 2021-02-23 RX ORDER — ENOXAPARIN SODIUM 100 MG/ML
40 INJECTION SUBCUTANEOUS EVERY 24 HOURS
Status: DISCONTINUED | OUTPATIENT
Start: 2021-02-23 | End: 2021-02-26 | Stop reason: HOSPADM

## 2021-02-23 RX ORDER — POTASSIUM CHLORIDE 750 MG/1
30 TABLET, FILM COATED, EXTENDED RELEASE ORAL 2 TIMES DAILY
Status: COMPLETED | OUTPATIENT
Start: 2021-02-23 | End: 2021-02-23

## 2021-02-23 RX ADMIN — ASPIRIN 81 MG: 81 TABLET, CHEWABLE ORAL at 08:59

## 2021-02-23 RX ADMIN — FAMOTIDINE 20 MG: 20 TABLET ORAL at 21:14

## 2021-02-23 RX ADMIN — Medication 10 ML: at 21:14

## 2021-02-23 RX ADMIN — INSULIN LISPRO 3 UNITS: 100 INJECTION, SOLUTION INTRAVENOUS; SUBCUTANEOUS at 09:00

## 2021-02-23 RX ADMIN — INSULIN HUMAN 30 UNITS: 100 INJECTION, SUSPENSION SUBCUTANEOUS at 17:05

## 2021-02-23 RX ADMIN — AMLODIPINE BESYLATE 5 MG: 5 TABLET ORAL at 08:59

## 2021-02-23 RX ADMIN — Medication 10 ML: at 05:40

## 2021-02-23 RX ADMIN — Medication 10 ML: at 17:06

## 2021-02-23 RX ADMIN — POTASSIUM & SODIUM PHOSPHATES POWDER PACK 280-160-250 MG 2 PACKET: 280-160-250 PACK at 12:29

## 2021-02-23 RX ADMIN — INSULIN LISPRO 3 UNITS: 100 INJECTION, SOLUTION INTRAVENOUS; SUBCUTANEOUS at 12:28

## 2021-02-23 RX ADMIN — INSULIN LISPRO 7 UNITS: 100 INJECTION, SOLUTION INTRAVENOUS; SUBCUTANEOUS at 12:27

## 2021-02-23 RX ADMIN — POTASSIUM CHLORIDE 30 MEQ: 750 TABLET, FILM COATED, EXTENDED RELEASE ORAL at 12:29

## 2021-02-23 RX ADMIN — FAMOTIDINE 20 MG: 20 TABLET ORAL at 08:59

## 2021-02-23 RX ADMIN — LOSARTAN POTASSIUM 100 MG: 50 TABLET, FILM COATED ORAL at 17:05

## 2021-02-23 RX ADMIN — ATORVASTATIN CALCIUM 40 MG: 20 TABLET, FILM COATED ORAL at 21:13

## 2021-02-23 RX ADMIN — NYSTATIN: 100000 POWDER TOPICAL at 09:01

## 2021-02-23 RX ADMIN — Medication 54 MG: at 12:28

## 2021-02-23 RX ADMIN — Medication 54 MG: at 17:07

## 2021-02-23 RX ADMIN — POTASSIUM CHLORIDE 30 MEQ: 750 TABLET, FILM COATED, EXTENDED RELEASE ORAL at 17:05

## 2021-02-23 RX ADMIN — NYSTATIN: 100000 POWDER TOPICAL at 17:07

## 2021-02-23 RX ADMIN — INSULIN LISPRO 2 UNITS: 100 INJECTION, SOLUTION INTRAVENOUS; SUBCUTANEOUS at 09:00

## 2021-02-23 RX ADMIN — INSULIN HUMAN 30 UNITS: 100 INJECTION, SUSPENSION SUBCUTANEOUS at 09:00

## 2021-02-23 RX ADMIN — ENOXAPARIN SODIUM 40 MG: 100 INJECTION SUBCUTANEOUS at 09:00

## 2021-02-23 RX ADMIN — INSULIN LISPRO 3 UNITS: 100 INJECTION, SOLUTION INTRAVENOUS; SUBCUTANEOUS at 17:06

## 2021-02-23 RX ADMIN — HYDROCHLOROTHIAZIDE 25 MG: 25 TABLET ORAL at 08:59

## 2021-02-23 RX ADMIN — INSULIN LISPRO 5 UNITS: 100 INJECTION, SOLUTION INTRAVENOUS; SUBCUTANEOUS at 17:06

## 2021-02-23 RX ADMIN — POTASSIUM & SODIUM PHOSPHATES POWDER PACK 280-160-250 MG 2 PACKET: 280-160-250 PACK at 17:05

## 2021-02-23 NOTE — PROGRESS NOTES
Problem: Self Care Deficits Care Plan (Adult)  Goal: *Acute Goals and Plan of Care (Insert Text)  Description:   FUNCTIONAL STATUS PRIOR TO ADMISSION: Pt is unable to provide PLOF. Per chart, she lives alone in a \"hoarder-type\" situation. Infer she was able to manage ADLs with mod I.      HOME SUPPORT: The patient lived alone. Occupational Therapy Goals  Initiated 2/17/2021  1. Patient will perform grooming with supervision/set-up within 7 day(s). 2.  Patient will perform upper body dressing, seated EOB, with supervision/set-upwithin 7 day(s). 3.  Patient will perform anterior bathing, seated EOB, with supervision/set-up within 7 day(s). 4.  Patient will perform toilet transfers with minimal assistance/contact guard assist within 7 day(s). 5.  Patient will perform all aspects of toileting with minimal assistance/contact guard assist within 7 day(s). 6.  Patient will participate in upper extremity therapeutic exercise/activities with minimal assistance/contact guard assist for 10 minutes within 7 day(s). 7.  Patient will utilize energy conservation techniques during functional activities with verbal cues within 7 day(s). 8.  Patient will follow 100% of commands during ADLs within 7 days. Outcome: Progressing Towards Goal   OCCUPATIONAL THERAPY TREATMENT  Patient: Danuta Jonas [de-identified]76 y.o. female)  Date: 2/23/2021  Diagnosis: AMS (altered mental status) [R41.82]  Severe sepsis ([address] [A41.9, R65.20]  DKA (diabetic ketoacidoses) ([address] [E11.10] Severe sepsis ([address]  Procedure(s) (LRB):  Cath Procedure Cancelled (N/A) 1 Day Post-Op  Precautions: Fall  Chart, occupational therapy assessment, plan of care, and goals were reviewed. ASSESSMENT  Patient continues with skilled OT services and is progressing towards goals. Pt sat edge of bed assist x 2, impaired balance, decreased command following and decreased activity tolerance.  Doffed/donned gown assist x 1, groaned some during task but did not verbalize where pain/discomfort was. Asked her to point to what hurts but she did not do that either. Pt dep for LB tasks and toileting. Pts daughter present at end of session for lunch. Current Level of Function Impacting Discharge (ADLs): moderate assist UB dress, dep LB tasks and dep toileting    Other factors to consider for discharge:          PLAN :  Patient continues to benefit from skilled intervention to address the above impairments. Continue treatment per established plan of care. to address goals. Recommend with staff: Bed in chair like position for ADl's, there ex, there act, meals    Recommend next OT session: Cont towards goals    Recommendation for discharge: (in order for the patient to meet his/her long term goals)  Therapy up to 5 days/week in SNF setting    This discharge recommendation:  Has not yet been discussed the attending provider and/or case management    IF patient discharges home will need the following DME:        SUBJECTIVE:   Patient stated     OBJECTIVE DATA SUMMARY:   Cognitive/Behavioral Status:  Neurologic State: Alert;Confused  Orientation Level: Oriented to person;Oriented to place  Cognition: Appropriate decision making; Follows commands  Perception: Appears intact  Perseveration: No perseveration noted  Safety/Judgement: Decreased insight into deficits    Functional Mobility and Transfers for ADLs:  Bed Mobility:  Rolling: Maximum assistance;Assist x2  Supine to Sit: Maximum assistance;Assist x2  Sit to Supine: Maximum assistance;Assist x2  Scooting: Maximum assistance;Assist x2    Transfers:      Not tested, max assist x 2 supine to sit       Balance:  Sitting: With support  Sitting - Static: Fair (occasional)  Sitting - Dynamic: Poor (constant support)    ADL Intervention:       Upper Body Dressing Assistance  Dressing Assistance: Moderate assistance  Hospital Gown: Moderate assistance    Lower Body Dressing Assistance  Dressing Assistance:  Total assistance(dependent) Cognitive Retraining  Safety/Judgement: Decreased insight into deficits    Activity Tolerance:   Poor    After treatment patient left in no apparent distress:   Supine in bed    COMMUNICATION/COLLABORATION:   The patients plan of care was discussed with: Physical therapist, Occupational therapist, and Registered nurse.      REGGIE Ashton/L  Time Calculation: 20 mins

## 2021-02-23 NOTE — PROGRESS NOTES
Transition of Care Plan:    RUR 11%  1. Hospital admission for medical management   2. Accepted for rehab at [address]  3. PT, OT following  4. Report has been made to [facility]/APS, see note 2/16 as pt was found down soaked in urine, pt is known patricia  5. Covid done 2/16, negative  6. DC when stable to SNF  7. On a will call for [facility]  8. Cards recommending pacemaker; to be placed on Friday, 2/26  9. CM following  CHRISTINA Thakur RN

## 2021-02-23 NOTE — PROGRESS NOTES
Problem: Dysphagia (Adult)  Goal: *Acute Goals and Plan of Care (Insert Text)  Description: Swallowing goals initiated 2-18-21:  1)  tolerate full liquids without s/s aspiration by 2-19-21:-met  2) tolerate mech soft, thins without s/s aspiration or oral holding by 2-22-21-met  Outcome: Resolved/Met   SPEECH LANGUAGE PATHOLOGY DYSPHAGIA TREATMENT/DISCHARGE  Patient: Delvin Hdez [de-identified]76 y.o. female)  Date: 2/23/2021  Diagnosis: AMS (altered mental status) [R41.82]  Severe sepsis ([address] [A41.9, R65.20]  DKA (diabetic ketoacidoses) ([address] [E11.10] Severe sepsis [address]  Procedure(s) (LRB):  INSERT PPM DUAL (N/A) 1 Day Post-Op  Precautions: aspiration Fall    ASSESSMENT:  Patient tolerating mech soft, thins without s/s aspiration. Excessive chew noted due to slightly loose dentures or non use of dentures. She can feed herself and communicate needs. PLAN:  Ok to continue soft diet, thins. Patient will be discharged from acute skilled speech therapy at this time. Rationale for discharge:  Goals achieved    Discharge Recommendations:  None     SUBJECTIVE:   Patient stated I'm supposed to get the pacemaker Friday. OBJECTIVE:   Cognitive and Communication Status:  Neurologic State: Alert, Appropriate for age, Eyes open spontaneously, Confused  Orientation Level: Oriented to person, Oriented to place  Cognition: Follows commands, Memory loss, Decreased attention/concentration, Impaired decision making    Perception: Appears intact    Perseveration: No perseveration noted    Safety/Judgement: Decreased insight into deficits  Dysphagia Treatment:  Oral Assessment:  Oral Assessment  Labial: No impairment  Dentition: (cleaned her dentures and she placed them. slightly loose)  Oral Hygiene: wFL  Lingual: No impairment  P.O. Trials:  Patient Position: upright in bed  Vocal quality prior to P.O.: No impairment  Consistency Presented:  Thin liquid;Mechanical soft(seen at breakfast)   ORAL PHASE;      Bolus Acceptance: No impairment  Bolus Formation/Control: Impaired(excessive chew. only slightly better when she wore dentures)     Propulsion: Delayed (# of seconds)  Oral Residue: None  PHARYNGEAL PHASE; Initiation of Swallow: No impairment  Laryngeal Elevation: Functional  Aspiration Signs/Symptoms: None   She took meds with RN without issues. Exercises:  Laryngeal Exercises:                                                                                                                                     NOMS:   The NOMS functional outcome measure was used to quantify this patient's level of swallowing impairment. Based on the NOMS, the patient was determined to be at level 6 for swallow function     NOMS Swallowing Levels:  Level 1 (CN): NPO  Level 2 (CM): NPO but takes consistency in therapy  Level 3 (CL): Takes less than 50% of nutrition p.o. and continues with nonoral feedings; and/or safe with mod cues; and/or max diet restriction  Level 4 (CK): Safe swallow but needs mod cues; and/or mod diet restriction; and/or still requires some nonoral feeding/supplements  Level 5 (CJ): Safe swallow with min diet restriction; and/or needs min cues  Level 6 (CI): Independent with p.o.; rare cues; usually self cues; may need to avoid some foods or needs extra time  Level 7 ([address] [place] [address]): Independent for all p.o.  SANCHEZ. (2003). National Outcomes Measurement System (NOMS): Adult Speech-Language Pathology User's Guide. Pain:  Pain Scale 1: Numeric (0 - 10)  Pain Intensity 1: 0       After treatment:   Patient left in no apparent distress in bed and Nursing notified    COMMUNICATION/EDUCATION:   Patient was educated regarding her deficit(s) of h/o dysphagia  as this relates to her diagnosis of AMS. She demonstrated Good understanding as evidenced by discussion. .    The patient's plan of care including recommendations, planned interventions, and recommended diet changes were discussed with: Registered nurse.      Ashwini Carias SLP  Time Calculation: 20 mins

## 2021-02-23 NOTE — PROGRESS NOTES
Problem: Mobility Impaired (Adult and Pediatric)  Goal: *Acute Goals and Plan of Care (Insert Text)  Description: FUNCTIONAL STATUS PRIOR TO ADMISSION: Patient was independent and active without use of DME.    HOME SUPPORT PRIOR TO ADMISSION: The patient lived alone with no one to provide assistance. Found in hoarder situation. APS has been contacted. Physical Therapy Goals  Initiated 2/17/2021  1. Patient will move from supine to sit and sit to supine , scoot up and down, and roll side to side in bed with moderate assistance  within 7 day(s). 2.  Patient will transfer from bed to chair and chair to bed with moderate assistance  using the least restrictive device within 7 day(s). 3.  Patient will perform sit to stand with moderate assistance  within 7 day(s). 4.  Patient will ambulate with moderate assistance  for 10 feet with the least restrictive device within 7 day(s). Outcome: Progressing Towards Goal   PHYSICAL THERAPY TREATMENT  Patient: Brock Balbuena (15 y.o. female)  Date: 2/23/2021  Diagnosis: AMS (altered mental status) [R41.82]  Severe sepsis ([address] [A41.9, R65.20]  DKA (diabetic ketoacidoses) ([address] [E11.10] Severe sepsis ([address]  Procedure(s) (LRB):  Cath Procedure Cancelled (N/A) 1 Day Post-Op  Precautions: Fall  Chart, physical therapy assessment, plan of care and goals were reviewed. ASSESSMENT  Patient continues with skilled PT services and is progressing towards goals. Communicated with nurse who was inside the room giving meds cleared for therapy. Patient supine on bed when received, rolled on the edge of bed max assist x 2, supine to sit max assist x 2, sitting balance poor need constant support for sitting balance. Patient only follow simple commands appears confuse attempted to sit to stand however patient declined complaining of pain but not able to pin point exact locations just says it hurts. Assisted supine on bed and reposition up high on the bed.  Place bed to chair position. Daughter came in after therapy updated daughter about patient therapy verbalized understanding, agreed with all goals set for the patient. Activated bed alarm and notified nurse who agreed to monitor patient. Current Level of Function Impacting Discharge (mobility/balance): max assist x 2 with bed mobility    Other factors to consider for discharge: fall         PLAN :  Patient continues to benefit from skilled intervention to address the above impairments. Continue treatment per established plan of care. to address goals. Recommendation for discharge: (in order for the patient to meet his/her long term goals)  Therapy up to 5 days/week in SNF setting    This discharge recommendation:  Has been made in collaboration with the attending provider and/or case management    IF patient discharges home will need the following DME: patient owns DME required for discharge       SUBJECTIVE:   Patient stated [address]     OBJECTIVE DATA SUMMARY:   Critical Behavior:  Neurologic State: Alert, Appropriate for age, Eyes open spontaneously, Confused  Orientation Level: Oriented to person, Oriented to place  Cognition: Follows commands, Memory loss, Decreased attention/concentration, Impaired decision making  Safety/Judgement: Decreased insight into deficits  Functional Mobility Training:  Bed Mobility:  Rolling: Maximum assistance;Assist x2  Supine to Sit: Maximum assistance;Assist x2  Sit to Supine: Maximum assistance;Assist x2  Scooting: Maximum assistance;Assist x2        Transfers:                                   Balance:  Sitting: With support  Sitting - Static: Fair (occasional)  Sitting - Dynamic: Poor (constant support)  Ambulation/Gait Training:         Therapeutic Exercises:    Instructed patient to continue active range of motion exercise on both legs while up on chair or on bed.     Pain Ratin/10    Activity Tolerance:   Good    After treatment patient left in no apparent distress:   Supine in bed, Heels elevated for pressure relief, Call bell within reach, Bed / chair alarm activated, Caregiver / family present, and Side rails x 3    COMMUNICATION/COLLABORATION:   The patients plan of care was discussed with: Occupational therapy assistant and Registered nurse. Carri Brunson PT,WCC.    Time Calculation: 24 mins

## 2021-02-23 NOTE — PROGRESS NOTES
Bedside shift change report given to Carla Pérez RN (oncoming nurse) by Juve Arzola RN (offgoing nurse). Report included the following information SBAR, Kardex, MAR, Accordion and Recent Results.

## 2021-02-23 NOTE — PROGRESS NOTES
[address] [facility] [address] [place] [address]   Office [phone]  Fax [phone] Residency Attending Addendum:  I saw and evaluated the patient on the day of the encounter with Dr. Naila Brothers, performing the eastman elements of the service. I discussed the findings, assessment and plan with the resident and agree with the resident's findings and plan as documented in the resident's note. Insulin adjusted better glycemic control. Will need repeat PCR for disposition. Slight leukocytosis but no clear source of infection at this time. Continue to monitor. May be ready for discharge in the next day or so if cardiology able to put in pacemaker sooner. Jazmine Alvarenga MD, FAAFP, CAQSM, RMSK           Assessment and Plan     Lio Hazel is a 76 y.o. female with a PMH of HTN, CAD s/p stent, T2DM on insulin, HLD, GERD, sciatica, insomnia, allergies admitted for AMS, Severe sepsis of unknown source, and DKA. 24 Hour Events: Unwitnessed fall. Patient reports sliding off bed when bed rails gave away. CT head neg. Tele: Sinus 52, Type 2 2nd degree AV block o/n    Mobitz Type II 2nd degree AV block: POA EKG with mobitz type 2 AV block. BNP nml. Trop neg x1. ECHO LVEF 60-65%. 2/20 EKG 2:1 AV block. - Cardiac monitoring  - Cardiology consulted, appreciate recs   - Complete heart bloc on tele on 2/20   - Assessing for possible pacemaker today      Severe Sepsis of unknown source: POA LA 3.6 (resolved), WBC 15.9, . CRP 7.08. CXR negative. UCx/RVP neg. S/p 30cc/kg bolus. CT abd/pelvis no acute process. 2/16 BCx 1/4 bottles aerococcus viridans - likely contaminant. Repeat BCx negative. - Augmentin 875 - 125mg BID  - CBC, CMP daily    AMS: CT head/cervical spine negative. Ammonia, TSH, Etoh, UDS wnl. Volatiles - acetone. - CBC, BMP, Mag, Phosphorus    CVA: MRI infarct in thalamus.  CVA head & neck neg.  - ASA 81 mg daily  - Lipitor 40mg qHS  - Neuro consulted, appreciate recs - Speech following    DKA: RESOLVED. POA AGMA, + blood ketones, and hyperglycemia (). S/p insulin gtt.      T2DM: A1C 8.8. Home NPH 30U BID & Regular insulin 25U before meals.   - NPH 30/30  - SSI + POC glu checks q6H  - Hypoglycemia protocol     COVID negative: POA CRP 7.08, Ddimer 0.83, ferritin 124, . COVID PCR negative.       EVAN: RESOLVED. POA Cr 1.83 (BL 0.7)  - Monitor on daily CMP     L buttock wound:  - Wound care      QTC prolonged: POA QTc 543. - Avoid QTc prolonging agents     HTN: POA /46.   - Continue home Norvasc 5mg daily, Cozaar 100mg daily, HCTZ 25mg daily  - Hydralazine 10mg q6H for BP >160/100  - Continue to monitor and adjust with BP trend     HLD: No home meds d/t statin intolerance (muscle pain). - Discussing risk/benefits of statin with patient, patient agrees to try low dose statin and titrate up if tolerated      GERD: Stable. Home Pepcid 20mg BID.  - Continue Pepcid 20mg BID    Allergies: Home Loratadine 10mg daily prn.  - Hold home meds     Insomnia: Home Melatonin 5mg daily prn, Seroquel 50mg qHS. - Hold home meds  - Melatonin 5mg prn      Bilateral low back pain w/Sciatica & Knee OA: F/w Dr. Sheldon Garcia (Ortho) and Dr. Micha Aguilar (pain management).     Pancreatic cyst: Seen on CT A/P and MRI abd  - F/u outpatient GI     FEN/GI: NPO. Activity: Ambulate with assistance. Fall precautions  DVT prophylaxis: SQH  GI prophylaxis:  Pepcid  Disposition: Plan to d/c to SNF. PT/OT/CM consulted. Accepted to SNF. POC: Pasquale Mixon [phone] (daughter). Eleuterio Gill [phone] (daughter).      CODE STATUS:  Full, discussed with daughters    Nora Gaffney MD  Family Medicine Resident         Subjective / Objective     Subjective: Pt was seen and examined at bedside. Afebrile and hemodynamically stable. No SOB, CP. Patient asking for crackers, explained that she is NPO for possible pacemaker placement today.       Objective:  Temp (24hrs), Av.2 °F (36.8 °C), Min:97.5 °F (36.4 °C), Max:98.5 °F (36.9 °C)     Visit Vitals  BP (!) 151/66 (BP 1 Location: Right upper arm, BP Patient Position: At rest)   Pulse (!) 52   Temp 97.5 °F (36.4 °C)   Resp 16   Ht 5' 4\" (1.626 m)   Wt 150 lb (68 kg)   SpO2 90%   BMI 25.75 kg/m²     Physical Exam:   General: No acute distress. Alert. Cooperative. Respiratory: CTAB. No w/r/r/c.   Cardiovascular: Normal S1,S2. No m/r/g.   GI: Nondistended.+ bowel sounds. Nontender. No rebound tenderness or guarding. Extremities: No LE edema  Skin: Warm, dry. No rashes. Neuro: Alert. Sensation intact b/l. Strength 5/5 in all extremities    Respiratory: O2 Flow Rate (L/min): 1 l/min O2 Device: Room air   Date 02/22/21 0700 - 02/23/21 0659 02/23/21 0700 - 02/24/21 0659   Shift 2997-6811 4144-9032 24 Hour Total 1068-4823 5256-6846 24 Hour Total   INTAKE   P.O.  360 360        P. O.  360 360      Shift Total(mL/kg)  360(5.3) 360(5.3)      OUTPUT   Urine(mL/kg/hr) 100(0.1) 1000(1.2) 1100(0.7)        Urine Voided 100  100        Urine Occurrence(s) 1 x 1 x 2 x        Urine Output (mL) (External Female Catheter 02/22/21)  1000 1000      Shift Total(mL/kg) 100(1.5) 1000(14.7) 1100(16.2)      NET -100 -640 -740      Weight (kg) 68 68 68 68 68 68       Inpatient Medications  Current Facility-Administered Medications   Medication Dose Route Frequency    enoxaparin (LOVENOX) injection 40 mg  40 mg SubCUTAneous Q24H    nystatin (MYCOSTATIN) 100,000 unit/gram powder   Topical BID    hydroCHLOROthiazide (HYDRODIURIL) tablet 25 mg  25 mg Oral DAILY    famotidine (PEPCID) tablet 20 mg  20 mg Oral Q12H    insulin NPH (NOVOLIN N, HUMULIN N) injection 30 Units  30 Units SubCUTAneous ACB&D    insulin lispro (HUMALOG) injection   SubCUTAneous AC&HS    atorvastatin (LIPITOR) tablet 40 mg  40 mg Oral QHS    aspirin chewable tablet 81 mg  81 mg Oral DAILY    amLODIPine (NORVASC) tablet 5 mg  5 mg Oral DAILY    losartan (COZAAR) tablet 100 mg  100 mg Oral QPM    melatonin (rapid dissolve) tablet 5 mg  5 mg Oral QHS PRN    sodium chloride (NS) flush 5-10 mL  5-10 mL IntraVENous PRN    sodium chloride (NS) flush 5-40 mL  5-40 mL IntraVENous Q8H    sodium chloride (NS) flush 5-40 mL  5-40 mL IntraVENous PRN         Allergies  Allergies   Allergen Reactions    Advicor [Niacin-Lovastatin] Swelling    Conjugated Estrogens Other (comments)     Headaches      Other reaction(s): Other (comments)  Headaches    Deflazacort Unable to Obtain     Other reaction(s): Unable to Obtain    Erythromycin Nausea Only    Erythromycin Base Nausea and Vomiting    Niacin Swelling    Pitavastatin Other (comments)     Abdominal pain, nausea  Other reaction(s): Other (comments)  Abdominal pain, nausea    Simvastatin Other (comments)     Muscle cramps  Other reaction(s): Other (comments)  Muscle cramps    Unable To Obtain Swelling    Zolpidem Other (comments)     Other reaction(s): Other (comments)    Atorvastatin Other (comments)     Muscle cramps  Other reaction(s): Other (comments)  Muscle cramps    Triamcinolone Unable to Obtain     Other reaction(s): Unable to Obtain         CBC:  Recent Labs     02/23/21  0543 02/22/21  0647 02/21/21  0012   WBC 12.7* 10.8 8.8   HGB 14.5 15.0 13.9   HCT 44.1 44.4 40.9    230 065       Metabolic Panel:  Recent Labs     02/23/21  0543 02/22/21  0647 02/21/21  0012   * 135* 136   K 3.3* 3.3* 3.4*   CL 97 96* 100   CO2 31 33* 33*   BUN 5* 5* 7   CREA 0.54* 0.50* 0.61   * 211* 233*   CA 9.4 9.0 8.9   MG 1.9 1.9 2.0   PHOS 2.8 2.7 1.9*           Procedures: None. Imaging/Diagnostic Studies:  Ct Head Wo Cont 2/16/2021   No acute intracranial process is identified.       Ct Spine Cerv Wo Cont 2/16/2021   1. No fracture or subluxation is identified. There are changes of spondylosis as described above.      Results from [place] encounter on 02/16/21   XR CHEST PORT    Narrative Portable chest single view dated 2/16/2021    History is chest pain    A single portable AP semierect view of the chest was obtained. The cardiac  silhouette is normal in size. There is no evidence of active lung disease. The  costophrenic angles are sharp in appearance. Impression Negative radiographic examination of the chest.             Results from Hospital Encounter encounter on 02/16/21   CT HEAD WO CONT    Narrative EXAM: CT HEAD WO CONT    INDICATION: unwitnessed fall    COMPARISON: 2/18/2021. CONTRAST: None. TECHNIQUE: Unenhanced CT of the head was performed using 5 mm images. Brain and  bone windows were generated. Coronal and sagittal reformats. CT dose reduction  was achieved through use of a standardized protocol tailored for this  examination and automatic exposure control for dose modulation. FINDINGS:  The ventricles and sulci are stable in size, shape and configuration. . There is  unchanged diffuse periventricular white matter disease. There is no intracranial  hemorrhage, extra-axial collection, or mass effect. The basilar cisterns are  open. No CT evidence of acute infarct. The bone windows demonstrate no abnormalities. The visualized portions of the  paranasal sinuses and mastoid air cells are clear. Impression No acute intracranial process. No significant change from the prior study.                        For Billing    Chief Complaint   Patient presents with    Altered mental status   [facility] Problems  Date Reviewed: 2/10/2021          Codes Class Noted POA    DKA (diabetic ketoacidoses) ([facility] ICD-10-CM: E11.10  ICD-9-CM: 250.12  2/16/2021 Unknown        * (Principal) Severe sepsis ([facility] ICD-10-CM: A41.9, R65.20  ICD-9-CM: 038.9, 995.92  2/16/2021 Unknown        AMS (altered mental status) ICD-10-CM: R41.82  ICD-9-CM: 780.97  2/16/2021 Unknown        Insomnia (Chronic) ICD-10-CM: G47.00  ICD-9-CM: 780.52  2/16/2021 Unknown        Pancreatic cyst ICD-10-CM: L55.6  ICD-9-CM: 577.2  2/16/2021 Unknown    Overview Signed 2/16/2021  9:12 PM by Benedict Cr MD     F/u MRI Outpatient             GERD (gastroesophageal reflux disease) (Chronic) ICD-10-CM: K21.9  ICD-9-CM: 530.81  2/16/2021 Unknown        Prolonged Q-T interval on ECG ICD-10-CM: R94.31  ICD-9-CM: 794.31  2/16/2021 Unknown        EVAN (acute kidney injury) ([facility] ICD-10-CM: N17.9  ICD-9-CM: 584.9  2/16/2021 Unknown        Mobitz type 2 second degree AV block ICD-10-CM: I44.1  ICD-9-CM: 426.12  2/16/2021 Unknown        Essential hypertension ICD-10-CM: I10  ICD-9-CM: 401.9  9/23/2019 Yes        Hypercholesteremia ICD-10-CM: E78.00  ICD-9-CM: 272.0  9/23/2019 Yes        Controlled type 2 diabetes mellitus without complication, with long-term current use of insulin ([facility] ICD-10-CM: E11.9, Z79.4  ICD-9-CM: 250.00, V58.67  9/23/2019 Yes

## 2021-02-23 NOTE — PROGRESS NOTES
[address] [facility] [address] [place] [address]   Office [phone]  Fax [phone] Residency Attending Addendum:  I saw and evaluated the patient on the day of the encounter with Dr. Lila Mera, performing the eastman elements of the service. I discussed the findings, assessment and plan with the resident and agree with the resident's findings and plan as documented in the resident's note. Patient with worsening diarrhea. May explain the leukocytosis. We will get stool studies for C. difficile given recent antibiotics. Continue Keflex for now for prophylactic reasons after pacemaker placement. Get CT of the abdomen the pelvis as well. Lillie Morocho MD, FAAFP, CAQSM, RMSK           Assessment and Plan     Fredy Perla is a 76 y.o. female with a PMH of HTN, CAD s/p stent, T2DM on insulin, HLD, GERD, sciatica, insomnia, allergies admitted for AMS, Severe sepsis of unknown source, and DKA. 24 Hour Events: No acute events  Tele: Sinus 61    Mobitz Type II 2nd degree AV block: POA EKG with mobitz type 2 AV block. BNP nml. Trop neg x1. ECHO LVEF 60-65%. Episode of complete heart block on telemetry. 2:1 second degree heart naye persisted on telemetry and repeat EKG during admission.    - Cardiac monitoring  - Cardiology consulted, appreciate recs   - Complete heart bloc on tele on 2/20   - Pacemaker placed today  - CBC, Mg, P, BMP daily     Leukocytosis: Increased loose stools in the setting of recent Abx use. Wound care following for buttock rash/wound.  - C.diff  - Enteric panel    Severe Sepsis of unknown source: RESOLVED. POA LA 3.6 (resolved), WBC 15.9, . CRP 7.08. CXR negative. UCx/RVP neg. CT abd/pelvis no acute process. 2/16 BCx 1/4 bottles aerococcus viridans - likely contaminant. Repeat BCx negative. Completed antibiotic course. AMS: CT head/cervical spine negative. Ammonia, TSH, Etoh, UDS wnl. Volatiles - acetone.    - CBC, BMP, Mag, Phosphorus    CVA: MRI infarct in thalamus. CVA head & neck neg.  - ASA 81 mg daily  - Lipitor 40mg qHS  - Neuro consulted, appreciate recs   - Speech following    DKA: RESOLVED. POA AGMA, + blood ketones, and hyperglycemia (). S/p insulin gtt.      T2DM: A1C 8.8. Home NPH 30U BID & Regular insulin 25U before meals.   - NPH 30/30 +  7U TID before meals  - SSI + POC glu checks ACHS  - Hypoglycemia protocol     COVID negative: POA CRP 7.08, Ddimer 0.83, ferritin 124, . COVID PCR negative.       EVAN: RESOLVED. POA Cr 1.83 (BL 0.7)  - Monitor on daily CMP     L buttock wound:  - Wound care   - Nystatin BID     QTC prolonged: POA QTc 543. - Avoid QTc prolonging agents     HTN: POA /46.   - Continue home Norvasc 5mg daily, Cozaar 100mg daily, HCTZ 25mg daily  - Hydralazine 10mg q6H for BP >160/100  - Continue to monitor and adjust with BP trend     HLD: No home meds d/t statin intolerance (muscle pain). - Discussing risk/benefits of statin with patient, patient agrees to try low dose statin and titrate up if tolerated      GERD: Stable. Home Pepcid 20mg BID.  - Continue Pepcid 20mg BID    Allergies: Home Loratadine 10mg daily prn.  - Hold home meds     Insomnia: Home Melatonin 5mg daily prn, Seroquel 50mg qHS. - Hold home meds  - Melatonin 5mg prn      Bilateral low back pain w/Sciatica & Knee OA: F/w Dr. Malka Mcrae (Ortho) and Dr. Delfino Nowak (pain management).     Pancreatic cyst: Seen on CT A/P and MRI abd  - F/u outpatient GI     FEN/GI: NPO   Activity: Ambulate with assistance. Fall precautions  DVT prophylaxis: Lovenox  GI prophylaxis:  Pepcid  Disposition: Plan to d/c to SNF. PT/OT/CM consulted. Accepted to SNF. POC: [place] [phone] (daughter). Mireille Calvo [phone] (daughter).      CODE STATUS:  Full, discussed with daughters    Pattie Montilla MD  Family Medicine Resident         Subjective / Objective     Subjective: Pt was seen and examined at bedside. Afebrile and hemodynamically stable. No SOB, CP.  Patient asking for crackers, explained that she is NPO for possible pacemaker placement today. Objective:  Temp (24hrs), Av.8 °F (36.6 °C), Min:97.4 °F (36.3 °C), Max:98.2 °F (36.8 °C)     Visit Vitals  /64 (BP 1 Location: Right upper arm, BP Patient Position: At rest)   Pulse (!) 119   Temp 97.4 °F (36.3 °C)   Resp 23   Ht 5' 4\" (1.626 m)   Wt 145 lb 3.2 oz (65.9 kg)   SpO2 93%   BMI 24.92 kg/m²     Physical Exam:   General: No acute distress. Alert. Cooperative. Respiratory: CTAB. No w/r/r/c.   Cardiovascular: Normal S1,S2. No m/r/g.   GI: Nondistended.+ bowel sounds. Nontender. No rebound tenderness or guarding. Extremities: No LE edema  Skin: Warm, dry. No rashes. Neuro: Alert. Sensation intact b/l. Strength 5/5 in all extremities    Respiratory: O2 Flow Rate (L/min): 1 l/min O2 Device: Room air   Date 21 07 - 21 0659 21 07 - 21 0659   Shift 1652-5212 9265-2174 24 Hour Total 1314-7190 0009-1205 24 Hour Total   INTAKE   P.O. 440  440        P. O. 440  440      Shift Total(mL/kg) 440(6.7)  440(6.7)      OUTPUT   Urine(mL/kg/hr) 250(0.3) 0(0) 250(0.2)        Urine Occurrence(s) 2 x  2 x 1 x  1 x     Urine Output (mL) (External Female Catheter 21) 250 0 250      Emesis/NG output           Emesis Occurrence(s) 0 x  0 x 0 x  0 x   Stool           Stool Occurrence(s) 0 x 3 x 3 x 1 x  1 x   Shift Total(mL/kg) 250(3.8) 0(0) 250(3.8)       0 190      Weight (kg) 65.9 65.9 65.9 65.9 65.9 65.9       Inpatient Medications  Current Facility-Administered Medications   Medication Dose Route Frequency    sodium chloride (NS) flush 5-40 mL  5-40 mL IntraVENous Q8H    sodium chloride (NS) flush 5-40 mL  5-40 mL IntraVENous PRN    acetaminophen (TYLENOL) tablet 650 mg  650 mg Oral Q4H PRN    HYDROcodone-acetaminophen (NORCO) 5-325 mg per tablet 1 Tab  1 Tab Oral Q4H PRN    ondansetron (ZOFRAN) injection 4 mg  4 mg IntraVENous Q4H PRN    cephALEXin (KEFLEX) capsule 500 mg 500 mg Oral TID    magnesium sulfate 2 g/50 ml IVPB (premix or compounded)  2 g IntraVENous ONCE    sodium phosphate 15 mmol in 0.9% sodium chloride 250 mL infusion   IntraVENous ONCE    enoxaparin (LOVENOX) injection 40 mg  40 mg SubCUTAneous Q24H    insulin lispro (HUMALOG) injection 5 Units  5 Units SubCUTAneous TIDAC    nystatin (MYCOSTATIN) 100,000 unit/gram powder   Topical BID    hydroCHLOROthiazide (HYDRODIURIL) tablet 25 mg  25 mg Oral DAILY    famotidine (PEPCID) tablet 20 mg  20 mg Oral Q12H    insulin NPH (NOVOLIN N, HUMULIN N) injection 30 Units  30 Units SubCUTAneous ACB&D    insulin lispro (HUMALOG) injection   SubCUTAneous AC&HS    atorvastatin (LIPITOR) tablet 40 mg  40 mg Oral QHS    aspirin chewable tablet 81 mg  81 mg Oral DAILY    amLODIPine (NORVASC) tablet 5 mg  5 mg Oral DAILY    losartan (COZAAR) tablet 100 mg  100 mg Oral QPM    melatonin (rapid dissolve) tablet 5 mg  5 mg Oral QHS PRN    sodium chloride (NS) flush 5-10 mL  5-10 mL IntraVENous PRN    sodium chloride (NS) flush 5-40 mL  5-40 mL IntraVENous Q8H    sodium chloride (NS) flush 5-40 mL  5-40 mL IntraVENous PRN         Allergies  Allergies   Allergen Reactions    Advicor [Niacin-Lovastatin] Swelling    Conjugated Estrogens Other (comments)     Headaches      Other reaction(s): Other (comments)  Headaches    Deflazacort Unable to Obtain     Other reaction(s): Unable to Obtain    Erythromycin Nausea Only    Erythromycin Base Nausea and Vomiting    Niacin Swelling    Pitavastatin Other (comments)     Abdominal pain, nausea  Other reaction(s): Other (comments)  Abdominal pain, nausea    Simvastatin Other (comments)     Muscle cramps  Other reaction(s): Other (comments)  Muscle cramps    Unable To Obtain Swelling    Zolpidem Other (comments)     Other reaction(s): Other (comments)    Atorvastatin Other (comments)     Muscle cramps  Other reaction(s):  Other (comments)  Muscle cramps    Triamcinolone Unable to Obtain     Other reaction(s): Unable to Obtain         CBC:  Recent Labs     02/24/21  0607 02/23/21  0543 02/22/21  0647   WBC 21.8* 12.7* 10.8   HGB 14.9 14.5 15.0   HCT 44.3 44.1 44.4    269 269       Metabolic Panel:  Recent Labs     02/24/21  0607 02/23/21  0543 02/22/21  0647   * 135* 135*   K 4.0 3.3* 3.3*   CL 97 97 96*   CO2 27 31 33*   BUN 11 5* 5*   CREA 0.72 0.54* 0.50*   * 202* 211*   CA 9.2 9.4 9.0   MG 1.7 1.9 1.9   PHOS 2.7 2.8 2.7           Procedures: None. Imaging/Diagnostic Studies:  Ct Head Wo Cont 2/16/2021   No acute intracranial process is identified.       Ct Spine Cerv Wo Cont 2/16/2021   1. No fracture or subluxation is identified. There are changes of spondylosis as described above. Results from [place] encounter on 02/16/21   XR CHEST PORT    Narrative EXAM: XR CHEST PORT    INDICATION: eval for ptx post pacemaker placement    COMPARISON: February 16, 2021    FINDINGS: A portable AP radiograph of the chest was obtained at 0841 hours. The  patient is on a cardiac monitor. The left subclavian pacemaker has one lead  overlying the right atrium the other overlies right ventricle. Heart size is  normal. Lungs are clear. Impression 1. No pneumothorax             Results from Hospital Encounter encounter on 02/16/21   CT HEAD WO CONT    Narrative EXAM: CT HEAD WO CONT    INDICATION: unwitnessed fall    COMPARISON: 2/18/2021. CONTRAST: None. TECHNIQUE: Unenhanced CT of the head was performed using 5 mm images. Brain and  bone windows were generated. Coronal and sagittal reformats. CT dose reduction  was achieved through use of a standardized protocol tailored for this  examination and automatic exposure control for dose modulation. FINDINGS:  The ventricles and sulci are stable in size, shape and configuration. . There is  unchanged diffuse periventricular white matter disease.  There is no intracranial  hemorrhage, extra-axial collection, or mass effect. The basilar cisterns are  open. No CT evidence of acute infarct. The bone windows demonstrate no abnormalities. The visualized portions of the  paranasal sinuses and mastoid air cells are clear. Impression No acute intracranial process. No significant change from the prior study.                        For Billing    Chief Complaint   Patient presents with    Altered mental status   [facility] Problems  Date Reviewed: 2/10/2021          Codes Class Noted POA    DKA (diabetic ketoacidoses) ([facility] ICD-10-CM: E11.10  ICD-9-CM: 250.12  2/16/2021 Unknown        * (Principal) Severe sepsis ([facility] ICD-10-CM: A41.9, R65.20  ICD-9-CM: 038.9, 995.92  2/16/2021 Unknown        AMS (altered mental status) ICD-10-CM: R41.82  ICD-9-CM: 780.97  2/16/2021 Unknown        Insomnia (Chronic) ICD-10-CM: G47.00  ICD-9-CM: 780.52  2/16/2021 Unknown        Pancreatic cyst ICD-10-CM: K86.2  ICD-9-CM: 577.2  2/16/2021 Unknown    Overview Signed 2/16/2021  9:12 PM by Gee Simmons MD     F/u MRI Outpatient             GERD (gastroesophageal reflux disease) (Chronic) ICD-10-CM: K21.9  ICD-9-CM: 530.81  2/16/2021 Unknown        Prolonged Q-T interval on ECG ICD-10-CM: R94.31  ICD-9-CM: 794.31  2/16/2021 Unknown        EVAN (acute kidney injury) ([facility] ICD-10-CM: N17.9  ICD-9-CM: 584.9  2/16/2021 Unknown        Mobitz type 2 second degree AV block ICD-10-CM: I44.1  ICD-9-CM: 426.12  2/16/2021 Unknown        Essential hypertension ICD-10-CM: I10  ICD-9-CM: 401.9  9/23/2019 Yes        Hypercholesteremia ICD-10-CM: E78.00  ICD-9-CM: 272.0  9/23/2019 Yes        Controlled type 2 diabetes mellitus without complication, with long-term current use of insulin ([facility] ICD-10-CM: E11.9, Z79.4  ICD-9-CM: 250.00, V58.67  9/23/2019 Yes

## 2021-02-23 NOTE — PROGRESS NOTES
Bedside shift change report given to [facility] RN (oncoming nurse) by Eduardo Abreu RN (offgoing nurse). Report included the following information SBAR, Kardex, Intake/Output, MAR and Recent Results.

## 2021-02-23 NOTE — DIABETES MGMT
LUCIANO BISHOP  CLINICAL NURSE SPECIALIST CONSULT  [facility]    FOLLOW-UP NOTE    Presentation   Manjit Felix is a 76 y.o. female presented to the ED 2/16/21 by EMS after being found down by neighbors. Per chart review, patient advised she had fallen several days ago and had been unable to get up. Patient found to be in DKA on arrival with BG of 607 and AG of 17 by labs.       HX:   Past Medical History (click to expand or collapse)        Past Medical History:   Diagnosis Date    CAD (coronary artery disease)      [facility]      Diabetes ([facility]       type 2    GERD (gastroesophageal reflux disease)      H/O seasonal allergies      Hyperlipidemia      Hypertension      Insomnia      Nausea & vomiting      PUD (peptic ulcer disease)      Small bowel obstruction (HCC)      Vitamin D deficiency              Current clinical course has been uncomplicated.      Diabetes: Patient has known Type 2 diabetes. Home diabetes medication regimen is NPH 30 units twice daily, regular insulin 25 units before meals, and Metformin 500 mg daily. Admission  and A1c 7.2 (9/30/20) indicate poor diabetes control. Subjective   Patient sitting up in bed, alert and oriented. Slower to respond to questions that yesterday. Objective   Physical exam  General Alert, oriented and in no acute distress. Cooperative, slower to respond to questions today  Vital Signs   Visit Vitals  BP (!) 142/64 (BP 1 Location: Left upper arm, BP Patient Position: At rest;Lying)   Pulse (!) 50   Temp 98.1 °F (36.7 °C)   Resp 14   Ht 5' 4\" (1.626 m)   Wt 65.9 kg (145 lb 3.2 oz)   SpO2 92%   BMI 24.92 kg/m²     Skin  Warm and dry  Heart   Regular rate and rhythm.  No murmurs, rubs or gallops  Lungs  Clear to auscultation without rales or rhonchi  Extremities No foot wounds    Laboratory  Lab Results   Component Value Date/Time    Hemoglobin A1c 8.8 (H) 02/16/2021 11:17 AM     Lab Results   Component Value Date/Time LDL, calculated 141.8 (H) 02/18/2021 03:42 AM     Lab Results   Component Value Date/Time    Creatinine 0.54 (L) 02/23/2021 05:43 AM     Lab Results   Component Value Date/Time    Sodium 135 (L) 02/23/2021 05:43 AM    Potassium 3.3 (L) 02/23/2021 05:43 AM    Chloride 97 02/23/2021 05:43 AM    CO2 31 02/23/2021 05:43 AM    Anion gap 7 02/23/2021 05:43 AM    Glucose 202 (H) 02/23/2021 05:43 AM    BUN 5 (L) 02/23/2021 05:43 AM    Creatinine 0.54 (L) 02/23/2021 05:43 AM    BUN/Creatinine ratio 9 (L) 02/23/2021 05:43 AM    GFR est AA >60 02/23/2021 05:43 AM    GFR est non-AA >60 02/23/2021 05:43 AM    Calcium 9.4 02/23/2021 05:43 AM    Bilirubin, total 0.5 02/16/2021 11:17 AM    Alk. phosphatase 125 (H) 02/16/2021 11:17 AM    Protein, total 9.2 (H) 02/16/2021 11:17 AM    Albumin 4.0 02/16/2021 11:17 AM    Globulin 5.2 (H) 02/16/2021 11:17 AM    A-G Ratio 0.8 (L) 02/16/2021 11:17 AM    ALT (SGPT) 22 02/16/2021 11:17 AM     Lab Results   Component Value Date/Time    ALT (SGPT) 22 02/16/2021 11:17 AM       Tests 2/16/21  1117 2/16/21  1436 2/16/21  1640 2/16/21  2032 2/17/21  0229   A1c 8.8 - - - -    529 372 188 275   Anion gap 17 18 16 9 11   Serum creatinine 1.83 1.46 1.47 1.40 1.28   GFR 27 35 35 37 41       Factors affecting BG pattern  Factor Dose Comments   Nutrition:  Mechanical Soft No concentrated sweets   Mechanical soft, no concentrated sweets      Drugs:  Vasopressor load  Steroids  HIV   Epogen  Blood transfusion(s)  Other: n/a   n/a  n/a  n/a  n/a  n/a         A1cs inaccurate  A1cs inaccurate   Pain 0/10    Infection abx discontinued    Other: n/a n/a      Blood glucose pattern        Evaluation   This 76year old female, with Type 2 diabetes, did not achieve diabetes control prior to admission (PTA), as evidenced by admission BG of 607 and A1c of 7.2%. Based on assessment of diabetes self-care practices, interventions that warrant action while hospitalized include: Unable to assess at this time. The patient would also benefit from diabetes self-management education and support BRISEYDA LUNDY [facility]) after discharge.     During this hospitalization, the patient has not achieved inpatient blood glucose target of 100-180mg/dl. BG's have ranged 119-249 over the past 24 hours. Factors that have played a role include:  [x]? Critical nature of illness state  [x]? Meal/tube feeding disruption  [x]? Compromised insulin absorption or delivery  [x]? Kidney dysfunction     Basal insulin is in use. NPH 30 units twice daily currently ordered.     Bolus insulin isn't in use. Patient is eating meals.     Corrective insulin is in use. Patient has required 9 units of corrective insulin over the past 24 hours.        Insulin drip with glucostabilizer started at 1449 2/16/21. Insulin drip with glucostabilizer discontinued at 0728 today (2/17/21)      To optimize BG control and support a positive health outcome, would utilize the Subcutaneous Insulin Order set (0934).    Impression: It is suspected that with the change in basal insulin to NPH that patient will have improved glycemic control. It is also suspected that bolus insulin will help with improving glycemic control. Assessment and Plan   Nursing Diagnosis Risk for unstable blood glucose pattern   Nursing Intervention Domain 0821 Decision-making Support   Nursing Interventions Examined current inpatient diabetes control   Explored factors facilitating and impeding inpatient management       Recommendations   Recommend:     [x]? Use of Subcutaneous Insulin Order set (7945)  Insulin Use Recommendation   Basal                                      (Based on weight, BMI & GFR) Addresses basic metabolic needs Continue NPH 30 units twice dialy. Nutritional                                      (Based on CHO load) Addresses nutrition interventions Humalog 5 units with meals, hold if patient eats < 50% CHO on meal tray.    Corrective (Offset gaps in dosing) Useful in adjusting insulin dosing Correctional Scale for Normal Sensitivity     200-249- 2units Humalog  [phone] Humalog  [phone] Humalog  [phone] Humalog  Over 400- 10units Humalog     Do NOT hold for NPO; give in addition to meal time insulin dose.     If patient does not eat, -give correction dose only.         [x]? Referral to     [x]? Diabetes Self-Management Training through [facility] (Phone [phone] to schedule appointment)    Time Spent/ Billing Codes     Total time spent with patient: 15 Minutes   I personally reviewed chart, notes, data and current medications in the medical record. I have personally examined and treated the patient at bedside during this period.      [x] 08197 IP subsequent hospital care - 15 minutes     IRINA Diaz  Diabetes Clinical Nurse Specialist  [facility]  Access via [facility]

## 2021-02-23 NOTE — PROGRESS NOTES
Called Daughter Ludy Tyson with update that patient will most likely be able to get pacemaker tomorrow (2/24) at 7am. Daughter expressed understanding and agrees with the plan. Discussed that after pacemaker placement, we will work with case management to have patient transferred to SNF.

## 2021-02-24 ENCOUNTER — APPOINTMENT (OUTPATIENT)
Dept: CT IMAGING | Age: 76
DRG: 853 | End: 2021-02-24
Attending: STUDENT IN AN ORGANIZED HEALTH CARE EDUCATION/TRAINING PROGRAM
Payer: MEDICARE

## 2021-02-24 ENCOUNTER — TELEPHONE (OUTPATIENT)
Dept: FAMILY MEDICINE CLINIC | Age: 76
End: 2021-02-24

## 2021-02-24 ENCOUNTER — APPOINTMENT (OUTPATIENT)
Dept: GENERAL RADIOLOGY | Age: 76
DRG: 853 | End: 2021-02-24
Attending: INTERNAL MEDICINE
Payer: MEDICARE

## 2021-02-24 LAB
ABO + RH BLD: NORMAL
ANION GAP SERPL CALC-SCNC: 9 MMOL/L (ref 5–15)
BACTERIA ISLT: ABNORMAL
BACTERIA SPEC CULT: ABNORMAL
BASOPHILS # BLD: 0 K/UL (ref 0–0.1)
BASOPHILS NFR BLD: 0 % (ref 0–1)
BLOOD GROUP ANTIBODIES SERPL: NORMAL
BUN SERPL-MCNC: 11 MG/DL (ref 6–20)
BUN/CREAT SERPL: 15 (ref 12–20)
CALCIUM SERPL-MCNC: 9.2 MG/DL (ref 8.5–10.1)
CHLORIDE SERPL-SCNC: 97 MMOL/L (ref 97–108)
CO2 SERPL-SCNC: 27 MMOL/L (ref 21–32)
COMMENT, HOLDF: NORMAL
CREAT SERPL-MCNC: 0.72 MG/DL (ref 0.55–1.02)
DIFFERENTIAL METHOD BLD: ABNORMAL
EOSINOPHIL # BLD: 0.2 K/UL (ref 0–0.4)
EOSINOPHIL NFR BLD: 1 % (ref 0–7)
ERYTHROCYTE [DISTWIDTH] IN BLOOD BY AUTOMATED COUNT: 14.4 % (ref 11.5–14.5)
ERYTHROCYTE [DISTWIDTH] IN BLOOD BY AUTOMATED COUNT: 14.7 % (ref 11.5–14.5)
GLUCOSE BLD STRIP.AUTO-MCNC: 180 MG/DL (ref 65–100)
GLUCOSE BLD STRIP.AUTO-MCNC: 237 MG/DL (ref 65–100)
GLUCOSE BLD STRIP.AUTO-MCNC: 242 MG/DL (ref 65–100)
GLUCOSE BLD STRIP.AUTO-MCNC: 264 MG/DL (ref 65–100)
GLUCOSE BLD STRIP.AUTO-MCNC: 306 MG/DL (ref 65–100)
GLUCOSE SERPL-MCNC: 213 MG/DL (ref 65–100)
HCT VFR BLD AUTO: 43.5 % (ref 35–47)
HCT VFR BLD AUTO: 44.3 % (ref 35–47)
HGB BLD-MCNC: 14.7 G/DL (ref 11.5–16)
HGB BLD-MCNC: 14.9 G/DL (ref 11.5–16)
IMM GRANULOCYTES # BLD AUTO: 0 K/UL
IMM GRANULOCYTES NFR BLD AUTO: 0 %
LACTATE SERPL-SCNC: 1.3 MMOL/L (ref 0.4–2)
LYMPHOCYTES # BLD: 2.2 K/UL (ref 0.8–3.5)
LYMPHOCYTES NFR BLD: 9 % (ref 12–49)
MAGNESIUM SERPL-MCNC: 1.7 MG/DL (ref 1.6–2.4)
MCH RBC QN AUTO: 28.4 PG (ref 26–34)
MCH RBC QN AUTO: 28.8 PG (ref 26–34)
MCHC RBC AUTO-ENTMCNC: 33.6 G/DL (ref 30–36.5)
MCHC RBC AUTO-ENTMCNC: 33.8 G/DL (ref 30–36.5)
MCV RBC AUTO: 84.4 FL (ref 80–99)
MCV RBC AUTO: 85.1 FL (ref 80–99)
MONOCYTES # BLD: 2.2 K/UL (ref 0–1)
MONOCYTES NFR BLD: 9 % (ref 5–13)
NEUTS BAND NFR BLD MANUAL: 2 % (ref 0–6)
NEUTS SEG # BLD: 19.3 K/UL (ref 1.8–8)
NEUTS SEG NFR BLD: 79 % (ref 32–75)
NRBC # BLD: 0 K/UL (ref 0–0.01)
NRBC # BLD: 0 K/UL (ref 0–0.01)
NRBC BLD-RTO: 0 PER 100 WBC
NRBC BLD-RTO: 0 PER 100 WBC
OTHER ANTIBIOTIC SUSC ISLT: ABNORMAL
OTHER ANTIBIOTIC SUSC ISLT: ABNORMAL
PHOSPHATE SERPL-MCNC: 2.7 MG/DL (ref 2.6–4.7)
PLATELET # BLD AUTO: 339 K/UL (ref 150–400)
PLATELET # BLD AUTO: 364 K/UL (ref 150–400)
PLATELET COMMENTS,PCOM: ABNORMAL
PMV BLD AUTO: 11.1 FL (ref 8.9–12.9)
PMV BLD AUTO: 11.7 FL (ref 8.9–12.9)
POTASSIUM SERPL-SCNC: 4 MMOL/L (ref 3.5–5.1)
RBC # BLD AUTO: 5.11 M/UL (ref 3.8–5.2)
RBC # BLD AUTO: 5.25 M/UL (ref 3.8–5.2)
RBC MORPH BLD: ABNORMAL
SAMPLES BEING HELD,HOLD: NORMAL
SERVICE CMNT-IMP: ABNORMAL
SODIUM SERPL-SCNC: 133 MMOL/L (ref 136–145)
SOURCE, RSRC70: ABNORMAL
SPECIMEN EXP DATE BLD: NORMAL
SPECIMEN SOURCE: ABNORMAL
WBC # BLD AUTO: 21.8 K/UL (ref 3.6–11)
WBC # BLD AUTO: 23.9 K/UL (ref 3.6–11)
WBC MORPH BLD: ABNORMAL

## 2021-02-24 PROCEDURE — 87040 BLOOD CULTURE FOR BACTERIA: CPT

## 2021-02-24 PROCEDURE — 80048 BASIC METABOLIC PNL TOTAL CA: CPT

## 2021-02-24 PROCEDURE — 85025 COMPLETE CBC W/AUTO DIFF WBC: CPT

## 2021-02-24 PROCEDURE — 99152 MOD SED SAME PHYS/QHP 5/>YRS: CPT | Performed by: INTERNAL MEDICINE

## 2021-02-24 PROCEDURE — 99232 SBSQ HOSP IP/OBS MODERATE 35: CPT | Performed by: FAMILY MEDICINE

## 2021-02-24 PROCEDURE — 2709999900 HC NON-CHARGEABLE SUPPLY: Performed by: INTERNAL MEDICINE

## 2021-02-24 PROCEDURE — 84100 ASSAY OF PHOSPHORUS: CPT

## 2021-02-24 PROCEDURE — 74011000250 HC RX REV CODE- 250: Performed by: INTERNAL MEDICINE

## 2021-02-24 PROCEDURE — 2709999900 HC NON-CHARGEABLE SUPPLY

## 2021-02-24 PROCEDURE — C1785 PMKR, DUAL, RATE-RESP: HCPCS | Performed by: INTERNAL MEDICINE

## 2021-02-24 PROCEDURE — 71045 X-RAY EXAM CHEST 1 VIEW: CPT

## 2021-02-24 PROCEDURE — 87506 IADNA-DNA/RNA PROBE TQ 6-11: CPT

## 2021-02-24 PROCEDURE — C1898 LEAD, PMKR, OTHER THAN TRANS: HCPCS | Performed by: INTERNAL MEDICINE

## 2021-02-24 PROCEDURE — 99153 MOD SED SAME PHYS/QHP EA: CPT | Performed by: INTERNAL MEDICINE

## 2021-02-24 PROCEDURE — 74011000636 HC RX REV CODE- 636: Performed by: INTERNAL MEDICINE

## 2021-02-24 PROCEDURE — 74011636637 HC RX REV CODE- 636/637: Performed by: STUDENT IN AN ORGANIZED HEALTH CARE EDUCATION/TRAINING PROGRAM

## 2021-02-24 PROCEDURE — 74011250636 HC RX REV CODE- 250/636: Performed by: STUDENT IN AN ORGANIZED HEALTH CARE EDUCATION/TRAINING PROGRAM

## 2021-02-24 PROCEDURE — 94760 N-INVAS EAR/PLS OXIMETRY 1: CPT

## 2021-02-24 PROCEDURE — 02H63JZ INSERTION OF PACEMAKER LEAD INTO RIGHT ATRIUM, PERCUTANEOUS APPROACH: ICD-10-PCS | Performed by: INTERNAL MEDICINE

## 2021-02-24 PROCEDURE — 77030037713 HC CLOSR DEV INCIS ZIP STRY -B: Performed by: INTERNAL MEDICINE

## 2021-02-24 PROCEDURE — 83605 ASSAY OF LACTIC ACID: CPT

## 2021-02-24 PROCEDURE — 65660000000 HC RM CCU STEPDOWN

## 2021-02-24 PROCEDURE — 74011250636 HC RX REV CODE- 250/636: Performed by: INTERNAL MEDICINE

## 2021-02-24 PROCEDURE — 77030018547 HC SUT ETHBND1 J&J -B: Performed by: INTERNAL MEDICINE

## 2021-02-24 PROCEDURE — 86900 BLOOD TYPING SEROLOGIC ABO: CPT

## 2021-02-24 PROCEDURE — 74011250637 HC RX REV CODE- 250/637: Performed by: NURSE PRACTITIONER

## 2021-02-24 PROCEDURE — 85027 COMPLETE CBC AUTOMATED: CPT

## 2021-02-24 PROCEDURE — C1893 INTRO/SHEATH, FIXED,NON-PEEL: HCPCS | Performed by: INTERNAL MEDICINE

## 2021-02-24 PROCEDURE — 77030038269 HC DRN EXT URIN PURWCK BARD -A

## 2021-02-24 PROCEDURE — 74011250637 HC RX REV CODE- 250/637: Performed by: STUDENT IN AN ORGANIZED HEALTH CARE EDUCATION/TRAINING PROGRAM

## 2021-02-24 PROCEDURE — 83735 ASSAY OF MAGNESIUM: CPT

## 2021-02-24 PROCEDURE — 33208 INSRT HEART PM ATRIAL & VENT: CPT | Performed by: INTERNAL MEDICINE

## 2021-02-24 PROCEDURE — 0JH606Z INSERTION OF PACEMAKER, DUAL CHAMBER INTO CHEST SUBCUTANEOUS TISSUE AND FASCIA, OPEN APPROACH: ICD-10-PCS | Performed by: INTERNAL MEDICINE

## 2021-02-24 PROCEDURE — 36415 COLL VENOUS BLD VENIPUNCTURE: CPT

## 2021-02-24 PROCEDURE — 87324 CLOSTRIDIUM AG IA: CPT

## 2021-02-24 PROCEDURE — 82962 GLUCOSE BLOOD TEST: CPT

## 2021-02-24 PROCEDURE — 74176 CT ABD & PELVIS W/O CONTRAST: CPT

## 2021-02-24 PROCEDURE — 77030038613 HC SUT PDS STRATA SPIRL J&J -B: Performed by: INTERNAL MEDICINE

## 2021-02-24 PROCEDURE — 77030018729 HC ELECTRD DEFIB PAD CARD -B: Performed by: INTERNAL MEDICINE

## 2021-02-24 PROCEDURE — 74011000258 HC RX REV CODE- 258: Performed by: STUDENT IN AN ORGANIZED HEALTH CARE EDUCATION/TRAINING PROGRAM

## 2021-02-24 PROCEDURE — 02HK3JZ INSERTION OF PACEMAKER LEAD INTO RIGHT VENTRICLE, PERCUTANEOUS APPROACH: ICD-10-PCS | Performed by: INTERNAL MEDICINE

## 2021-02-24 DEVICE — PACE/SENSE LEAD
Type: IMPLANTABLE DEVICE | Status: FUNCTIONAL
Brand: INGEVITY™+

## 2021-02-24 DEVICE — PACEMAKER
Type: IMPLANTABLE DEVICE | Status: FUNCTIONAL
Brand: ACCOLADE™ MRI EL DR

## 2021-02-24 RX ORDER — DILTIAZEM HYDROCHLORIDE 120 MG/1
120 CAPSULE, COATED, EXTENDED RELEASE ORAL ONCE
Status: COMPLETED | OUTPATIENT
Start: 2021-02-24 | End: 2021-02-24

## 2021-02-24 RX ORDER — LIDOCAINE HYDROCHLORIDE 10 MG/ML
INJECTION INFILTRATION; PERINEURAL AS NEEDED
Status: DISCONTINUED | OUTPATIENT
Start: 2021-02-24 | End: 2021-02-24 | Stop reason: HOSPADM

## 2021-02-24 RX ORDER — HYDROCODONE BITARTRATE AND ACETAMINOPHEN 5; 325 MG/1; MG/1
1 TABLET ORAL
Status: DISCONTINUED | OUTPATIENT
Start: 2021-02-24 | End: 2021-02-26 | Stop reason: HOSPADM

## 2021-02-24 RX ORDER — BUPIVACAINE HYDROCHLORIDE 5 MG/ML
INJECTION, SOLUTION EPIDURAL; INTRACAUDAL AS NEEDED
Status: DISCONTINUED | OUTPATIENT
Start: 2021-02-24 | End: 2021-02-24 | Stop reason: HOSPADM

## 2021-02-24 RX ORDER — METRONIDAZOLE 500 MG/100ML
500 INJECTION, SOLUTION INTRAVENOUS EVERY 12 HOURS
Status: DISCONTINUED | OUTPATIENT
Start: 2021-02-24 | End: 2021-02-24

## 2021-02-24 RX ORDER — ACETAMINOPHEN 325 MG/1
650 TABLET ORAL
Status: DISCONTINUED | OUTPATIENT
Start: 2021-02-24 | End: 2021-02-26 | Stop reason: HOSPADM

## 2021-02-24 RX ORDER — LEVOFLOXACIN 5 MG/ML
750 INJECTION, SOLUTION INTRAVENOUS EVERY 24 HOURS
Status: DISCONTINUED | OUTPATIENT
Start: 2021-02-24 | End: 2021-02-24

## 2021-02-24 RX ORDER — CEPHALEXIN 250 MG/1
500 CAPSULE ORAL 3 TIMES DAILY
Status: DISCONTINUED | OUTPATIENT
Start: 2021-02-24 | End: 2021-02-24

## 2021-02-24 RX ORDER — SODIUM CHLORIDE 0.9 % (FLUSH) 0.9 %
5-40 SYRINGE (ML) INJECTION AS NEEDED
Status: DISCONTINUED | OUTPATIENT
Start: 2021-02-24 | End: 2021-02-26 | Stop reason: HOSPADM

## 2021-02-24 RX ORDER — CEFAZOLIN SODIUM 1 G/3ML
INJECTION, POWDER, FOR SOLUTION INTRAMUSCULAR; INTRAVENOUS AS NEEDED
Status: DISCONTINUED | OUTPATIENT
Start: 2021-02-24 | End: 2021-02-24 | Stop reason: HOSPADM

## 2021-02-24 RX ORDER — MAGNESIUM SULFATE HEPTAHYDRATE 40 MG/ML
2 INJECTION, SOLUTION INTRAVENOUS ONCE
Status: ACTIVE | OUTPATIENT
Start: 2021-02-24 | End: 2021-02-24

## 2021-02-24 RX ORDER — ONDANSETRON 2 MG/ML
4 INJECTION INTRAMUSCULAR; INTRAVENOUS
Status: DISCONTINUED | OUTPATIENT
Start: 2021-02-24 | End: 2021-02-25

## 2021-02-24 RX ORDER — FENTANYL CITRATE 50 UG/ML
INJECTION, SOLUTION INTRAMUSCULAR; INTRAVENOUS AS NEEDED
Status: DISCONTINUED | OUTPATIENT
Start: 2021-02-24 | End: 2021-02-24 | Stop reason: HOSPADM

## 2021-02-24 RX ORDER — SODIUM CHLORIDE 0.9 % (FLUSH) 0.9 %
5-40 SYRINGE (ML) INJECTION EVERY 8 HOURS
Status: DISCONTINUED | OUTPATIENT
Start: 2021-02-24 | End: 2021-02-26 | Stop reason: HOSPADM

## 2021-02-24 RX ORDER — HEPARIN SODIUM 200 [USP'U]/100ML
INJECTION, SOLUTION INTRAVENOUS
Status: COMPLETED | OUTPATIENT
Start: 2021-02-24 | End: 2021-02-24

## 2021-02-24 RX ORDER — MIDAZOLAM HYDROCHLORIDE 1 MG/ML
INJECTION, SOLUTION INTRAMUSCULAR; INTRAVENOUS AS NEEDED
Status: DISCONTINUED | OUTPATIENT
Start: 2021-02-24 | End: 2021-02-24 | Stop reason: HOSPADM

## 2021-02-24 RX ADMIN — INSULIN LISPRO 7 UNITS: 100 INJECTION, SOLUTION INTRAVENOUS; SUBCUTANEOUS at 13:05

## 2021-02-24 RX ADMIN — INSULIN LISPRO 5 UNITS: 100 INJECTION, SOLUTION INTRAVENOUS; SUBCUTANEOUS at 17:10

## 2021-02-24 RX ADMIN — Medication 10 ML: at 13:14

## 2021-02-24 RX ADMIN — ATORVASTATIN CALCIUM 40 MG: 20 TABLET, FILM COATED ORAL at 21:36

## 2021-02-24 RX ADMIN — PIPERACILLIN AND TAZOBACTAM 3.38 G: 3; .375 INJECTION, POWDER, LYOPHILIZED, FOR SOLUTION INTRAVENOUS at 20:02

## 2021-02-24 RX ADMIN — NYSTATIN: 100000 POWDER TOPICAL at 17:10

## 2021-02-24 RX ADMIN — LOSARTAN POTASSIUM 100 MG: 50 TABLET, FILM COATED ORAL at 17:09

## 2021-02-24 RX ADMIN — PIPERACILLIN AND TAZOBACTAM 3.38 G: 3; .375 INJECTION, POWDER, LYOPHILIZED, FOR SOLUTION INTRAVENOUS at 13:03

## 2021-02-24 RX ADMIN — Medication 10 ML: at 21:36

## 2021-02-24 RX ADMIN — INSULIN HUMAN 30 UNITS: 100 INJECTION, SUSPENSION SUBCUTANEOUS at 17:10

## 2021-02-24 RX ADMIN — INSULIN LISPRO 5 UNITS: 100 INJECTION, SOLUTION INTRAVENOUS; SUBCUTANEOUS at 13:05

## 2021-02-24 RX ADMIN — DILTIAZEM HYDROCHLORIDE 120 MG: 120 CAPSULE, COATED, EXTENDED RELEASE ORAL at 17:10

## 2021-02-24 RX ADMIN — Medication 10 ML: at 13:06

## 2021-02-24 RX ADMIN — NYSTATIN: 100000 POWDER TOPICAL at 13:01

## 2021-02-24 RX ADMIN — FAMOTIDINE 20 MG: 20 TABLET ORAL at 20:02

## 2021-02-24 RX ADMIN — Medication 10 ML: at 06:08

## 2021-02-24 RX ADMIN — SODIUM CHLORIDE 1000 ML: 9 INJECTION, SOLUTION INTRAVENOUS at 16:02

## 2021-02-24 NOTE — PROGRESS NOTES
Bedside and Verbal shift change report given to Zack Colorado (oncoming nurse) by [employer] (offgoing nurse). Report included the following information SBAR, Kardex and MAR.

## 2021-02-24 NOTE — PROGRESS NOTES
Attempted to work with the patient for Physical Therapy, chart reviewed and discussed with nurse patient just got back from a pace maker placement.  We will defer for now and continue to follow up with the patient for therapy

## 2021-02-24 NOTE — WOUND CARE
Wound follow up: patient sleeping but arouses to instructions. Turned to side for assessment. Buttocks continue to have red like rash,skin dry, small area of  denudding. Blue tube moisturizer applied to buttocks. Patient removed for testing and returned, Anshu Staff nurse at bedside for transfer to bed from [facility]. Reviewed treatment of Nystatin mixed with Zinc paste to buttocks. Zinc applied to buttocks and groin area for redness. Redness to coccyx , slow to ivana. Reposition every 2 hours and use waffle cushion for chair and bed. Will follow

## 2021-02-24 NOTE — TELEPHONE ENCOUNTER
Please advise         ----- Message from Leatha Hinojosa sent at 2/23/2021 12:26 PM EST -----  Regarding: Dr. Karson Suarez Message/Vendor Calls    Caller's first and last name: Luis Eduardo Cole - daughter      Reason for call: Call pt's other daughter as soon as possible, regarding pace maker       Callback required yes/no and why: Yes      Best contact number(s): Gatito Steward - [phone]; Luis Eduardo Cole - [phone]      Details to clarify the request: Caller would like Dr. Javi Cabrera to call her sister at [phone] in regards to the pt possibly receiving a pace maker from [facility].        Leatha Hinojosa

## 2021-02-24 NOTE — PROGRESS NOTES
CM s/w with patient's daughter Nani Bee regarding discharge placement. She deferred to her sister, Edvin Rodriguez ([phone]). CM s/w Ms. Yane Damon, who stated she is main family contact and choice for SNF is the [address] [place] [address]. CM confirmed acceptance with admissions coordinator, Komal Chua at the [address] [place] [address]. [facility]    Transition of Care Plan -RUR 12%:  1. CM following - pacemaker placed this a.m. 2. Plan is for patient to discharge to the [address] [place] [address] when medically stable; insurance authorization has been approved. 3. CM contacted [place] DSS/APS and s/w Bell Laurent to update on discharge plan  4. Rapid test results from 2/23/21 were faxed to the [address] [place] [address]  5.  Patient is on a will call for [facility]    Iver Setting, [facility]

## 2021-02-24 NOTE — PROGRESS NOTES
Verbal shift change report given to [facility], RN (oncoming nurse) by Richard Martins RN (offgoing nurse). Report included the following information SBAR, Kardex, Intake/Output, MAR and Recent Results.

## 2021-02-24 NOTE — PROGRESS NOTES
Bedside and Verbal shift change report given to Zack Colorado (oncoming nurse) by Diaz Elam (offgoing nurse). Report included the following information SBAR, Kardex and MAR.

## 2021-02-24 NOTE — PROGRESS NOTES
TRANSFER - IN REPORT:    Verbal report received from [facility]  (name) on [facility] Notice  being received from Ep lab(unit) for routine progression of care      Report consisted of patients Situation, Background, Assessment and   Recommendations(SBAR). Information from the following report(s) Procedure Summary was reviewed with the receiving nurse. Opportunity for questions and clarification was provided. Assessment completed upon patients arrival to unit and care assumed. 0815: Patient received from EP lab. Patient with aquacel to left upper chest. Clean, dry and intact. No hematoma. VS stable. HR in the 110s upon arrival. Dr. Emma Wiggins aware. Patient with no complaints at this time. Patient resting quietly. Arousable to voice    0840: Xray at bedside. 5498: Patient paced in the 110s since arrival. Asymptomatic. Dafne Amaral RN at bedside. Will notify Dr. Emma Wiggins. No new orders at this time. 2491: Incontinence care complete. Patient tolerated. No complaints at this time. 1000: TRANSFER - OUT REPORT:    Verbal report given to Anshu RN(name) on [facility] Notice  being transferred to 5th floor(unit) for routine progression of care       Report consisted of patients Situation, Background, Assessment and   Recommendations(SBAR). Information from the following report(s) SBAR, Kardex, Intake/Output, Recent Results and Cardiac Rhythm paced was reviewed with the receiving nurse. Lines:   Peripheral IV 02/22/21 Anterior; Left Forearm (Active)   Site Assessment Clean, dry, & intact 02/24/21 0830   Phlebitis Assessment 0 02/24/21 0830   Infiltration Assessment 0 02/24/21 0830   Dressing Status Clean, dry, & intact 02/24/21 0830   Dressing Type Transparent 02/24/21 0830   Hub Color/Line Status Blue; Infusing 02/24/21 0830   Action Taken Open ports on tubing capped 02/23/21 2025   Alcohol Cap Used Yes 02/23/21 2025        Opportunity for questions and clarification was provided.       Patient transported with: Registered Nurse     36: Patient transported upstairs. Patient with aquacel to left upper chest. Clean, dry and intact. No hematoma. VS stable. HR continue in the 110s. Dr. Emily Denver aware. Patient with no complaints at this time. Patient resting quietly. Patient wakes appropriately. Nods to appropriately to nurse's questions.

## 2021-02-24 NOTE — PROGRESS NOTES
Occupational therapy note:  Chart reviewed. Patient with pacemaker placement earlier today. Spoke with patient RN who requests OT defer therapy treatment as patient has been sleeping since procedure. Will follow up with patient tomorrow for continued OT services. Radha Cotter MS OTR/L

## 2021-02-24 NOTE — PROCEDURES
Dual chamber pacemaker implantation performed  CXR now; if no PTX acceptable for DC from EP perspective    Keflex x 5 days  Wound check in office in 2 weeks

## 2021-02-24 NOTE — PROGRESS NOTES
1904    Received call from [employer] that pt's p waves were occurring after QRS at times and they would send a strip to the unit. Communicated to primary RN.

## 2021-02-25 LAB
ANION GAP SERPL CALC-SCNC: 9 MMOL/L (ref 5–15)
APPEARANCE UR: ABNORMAL
BACTERIA URNS QL MICRO: ABNORMAL /HPF
BASOPHILS # BLD: 0.2 K/UL (ref 0–0.1)
BASOPHILS NFR BLD: 1 % (ref 0–1)
BILIRUB UR QL: NEGATIVE
BUN SERPL-MCNC: 16 MG/DL (ref 6–20)
BUN/CREAT SERPL: 22 (ref 12–20)
C DIFF GDH STL QL: POSITIVE
C DIFF TOX A+B STL QL IA: POSITIVE
CALCIUM SERPL-MCNC: 8.6 MG/DL (ref 8.5–10.1)
CAMPYLOBACTER SPECIES, DNA: NEGATIVE
CHLORIDE SERPL-SCNC: 99 MMOL/L (ref 97–108)
CO2 SERPL-SCNC: 27 MMOL/L (ref 21–32)
COLOR UR: ABNORMAL
CREAT SERPL-MCNC: 0.74 MG/DL (ref 0.55–1.02)
DIFFERENTIAL METHOD BLD: ABNORMAL
ENTEROTOXIGEN E COLI, DNA: NEGATIVE
EOSINOPHIL # BLD: 0 K/UL (ref 0–0.4)
EOSINOPHIL NFR BLD: 0 % (ref 0–7)
EPITH CASTS URNS QL MICRO: ABNORMAL /LPF
ERYTHROCYTE [DISTWIDTH] IN BLOOD BY AUTOMATED COUNT: 15 % (ref 11.5–14.5)
GLUCOSE BLD STRIP.AUTO-MCNC: 179 MG/DL (ref 65–100)
GLUCOSE BLD STRIP.AUTO-MCNC: 207 MG/DL (ref 65–100)
GLUCOSE BLD STRIP.AUTO-MCNC: 259 MG/DL (ref 65–100)
GLUCOSE BLD STRIP.AUTO-MCNC: 265 MG/DL (ref 65–100)
GLUCOSE SERPL-MCNC: 204 MG/DL (ref 65–100)
GLUCOSE UR STRIP.AUTO-MCNC: >1000 MG/DL
HCT VFR BLD AUTO: 39 % (ref 35–47)
HGB BLD-MCNC: 13.1 G/DL (ref 11.5–16)
HGB UR QL STRIP: ABNORMAL
IMM GRANULOCYTES # BLD AUTO: 0 K/UL (ref 0–0.04)
IMM GRANULOCYTES NFR BLD AUTO: 0 % (ref 0–0.5)
INTERPRETATION: ABNORMAL
KETONES UR QL STRIP.AUTO: 15 MG/DL
LEUKOCYTE ESTERASE UR QL STRIP.AUTO: ABNORMAL
LYMPHOCYTES # BLD: 1.1 K/UL (ref 0.8–3.5)
LYMPHOCYTES NFR BLD: 5 % (ref 12–49)
MAGNESIUM SERPL-MCNC: 1.8 MG/DL (ref 1.6–2.4)
MCH RBC QN AUTO: 28.8 PG (ref 26–34)
MCHC RBC AUTO-ENTMCNC: 33.6 G/DL (ref 30–36.5)
MCV RBC AUTO: 85.7 FL (ref 80–99)
MONOCYTES # BLD: 1.5 K/UL (ref 0–1)
MONOCYTES NFR BLD: 7 % (ref 5–13)
MYELOCYTES NFR BLD MANUAL: 1 %
NEUTS BAND NFR BLD MANUAL: 7 %
NEUTS SEG # BLD: 19 K/UL (ref 1.8–8)
NEUTS SEG NFR BLD: 79 % (ref 32–75)
NITRITE UR QL STRIP.AUTO: NEGATIVE
NRBC # BLD: 0 K/UL (ref 0–0.01)
NRBC BLD-RTO: 0 PER 100 WBC
P SHIGELLOIDES DNA STL QL NAA+PROBE: NEGATIVE
PH UR STRIP: 5 [PH] (ref 5–8)
PHOSPHATE SERPL-MCNC: 3.3 MG/DL (ref 2.6–4.7)
PLATELET # BLD AUTO: 317 K/UL (ref 150–400)
PMV BLD AUTO: 10.9 FL (ref 8.9–12.9)
POTASSIUM SERPL-SCNC: 3.4 MMOL/L (ref 3.5–5.1)
PROT UR STRIP-MCNC: ABNORMAL MG/DL
RBC # BLD AUTO: 4.55 M/UL (ref 3.8–5.2)
RBC #/AREA URNS HPF: ABNORMAL /HPF (ref 0–5)
RBC MORPH BLD: ABNORMAL
SALMONELLA SPECIES, DNA: NEGATIVE
SARS-COV-2, COV2: NORMAL
SARS-COV-2, XPLCVT: NOT DETECTED
SERVICE CMNT-IMP: ABNORMAL
SHIGA TOXIN PRODUCING, DNA: NEGATIVE
SHIGELLA SP+EIEC IPAH STL QL NAA+PROBE: NEGATIVE
SODIUM SERPL-SCNC: 135 MMOL/L (ref 136–145)
SOURCE, COVRS: NORMAL
SP GR UR REFRACTOMETRY: 1.02 (ref 1–1.03)
UROBILINOGEN UR QL STRIP.AUTO: 0.2 EU/DL (ref 0.2–1)
VIBRIO SPECIES, DNA: NEGATIVE
WBC # BLD AUTO: 22.1 K/UL (ref 3.6–11)
WBC MORPH BLD: ABNORMAL
WBC URNS QL MICRO: >100 /HPF (ref 0–4)
Y. ENTEROCOLITICA, DNA: NEGATIVE
YEAST BUDDING URNS QL: PRESENT
YEAST URNS QL MICRO: PRESENT

## 2021-02-25 PROCEDURE — 81001 URINALYSIS AUTO W/SCOPE: CPT

## 2021-02-25 PROCEDURE — 94760 N-INVAS EAR/PLS OXIMETRY 1: CPT

## 2021-02-25 PROCEDURE — 80048 BASIC METABOLIC PNL TOTAL CA: CPT

## 2021-02-25 PROCEDURE — 74011000258 HC RX REV CODE- 258: Performed by: STUDENT IN AN ORGANIZED HEALTH CARE EDUCATION/TRAINING PROGRAM

## 2021-02-25 PROCEDURE — 74011250636 HC RX REV CODE- 250/636: Performed by: STUDENT IN AN ORGANIZED HEALTH CARE EDUCATION/TRAINING PROGRAM

## 2021-02-25 PROCEDURE — 82962 GLUCOSE BLOOD TEST: CPT

## 2021-02-25 PROCEDURE — 65660000000 HC RM CCU STEPDOWN

## 2021-02-25 PROCEDURE — 85025 COMPLETE CBC W/AUTO DIFF WBC: CPT

## 2021-02-25 PROCEDURE — 74011250637 HC RX REV CODE- 250/637: Performed by: STUDENT IN AN ORGANIZED HEALTH CARE EDUCATION/TRAINING PROGRAM

## 2021-02-25 PROCEDURE — 97530 THERAPEUTIC ACTIVITIES: CPT

## 2021-02-25 PROCEDURE — 83735 ASSAY OF MAGNESIUM: CPT

## 2021-02-25 PROCEDURE — 74011636637 HC RX REV CODE- 636/637: Performed by: STUDENT IN AN ORGANIZED HEALTH CARE EDUCATION/TRAINING PROGRAM

## 2021-02-25 PROCEDURE — 87106 FUNGI IDENTIFICATION YEAST: CPT

## 2021-02-25 PROCEDURE — 87086 URINE CULTURE/COLONY COUNT: CPT

## 2021-02-25 PROCEDURE — 84100 ASSAY OF PHOSPHORUS: CPT

## 2021-02-25 PROCEDURE — 97535 SELF CARE MNGMENT TRAINING: CPT

## 2021-02-25 PROCEDURE — 36415 COLL VENOUS BLD VENIPUNCTURE: CPT

## 2021-02-25 PROCEDURE — U0003 INFECTIOUS AGENT DETECTION BY NUCLEIC ACID (DNA OR RNA); SEVERE ACUTE RESPIRATORY SYNDROME CORONAVIRUS 2 (SARS-COV-2) (CORONAVIRUS DISEASE [COVID-19]), AMPLIFIED PROBE TECHNIQUE, MAKING USE OF HIGH THROUGHPUT TECHNOLOGIES AS DESCRIBED BY CMS-2020-01-R: HCPCS

## 2021-02-25 RX ORDER — NYSTATIN 100000 [USP'U]/G
POWDER TOPICAL 2 TIMES DAILY
Qty: 1 BOTTLE | Refills: 0 | Status: SHIPPED
Start: 2021-02-26 | End: 2021-05-10

## 2021-02-25 RX ORDER — VANCOMYCIN HYDROCHLORIDE 250 MG/5ML
125 POWDER, FOR SOLUTION ORAL EVERY 6 HOURS
Status: DISCONTINUED | OUTPATIENT
Start: 2021-02-25 | End: 2021-02-26 | Stop reason: HOSPADM

## 2021-02-25 RX ORDER — POTASSIUM CHLORIDE 750 MG/1
20 TABLET, FILM COATED, EXTENDED RELEASE ORAL 2 TIMES DAILY
Status: COMPLETED | OUTPATIENT
Start: 2021-02-25 | End: 2021-02-25

## 2021-02-25 RX ORDER — POTASSIUM CHLORIDE 750 MG/1
20 TABLET, FILM COATED, EXTENDED RELEASE ORAL 2 TIMES DAILY
Status: DISCONTINUED | OUTPATIENT
Start: 2021-02-25 | End: 2021-02-25

## 2021-02-25 RX ORDER — POTASSIUM CHLORIDE 7.45 MG/ML
10 INJECTION INTRAVENOUS
Status: COMPLETED | OUTPATIENT
Start: 2021-02-25 | End: 2021-02-25

## 2021-02-25 RX ORDER — INSULIN LISPRO 100 [IU]/ML
9 INJECTION, SOLUTION INTRAVENOUS; SUBCUTANEOUS
Status: DISCONTINUED | OUTPATIENT
Start: 2021-02-26 | End: 2021-02-26 | Stop reason: HOSPADM

## 2021-02-25 RX ORDER — SODIUM CHLORIDE 9 MG/ML
100 INJECTION, SOLUTION INTRAVENOUS CONTINUOUS
Status: DISCONTINUED | OUTPATIENT
Start: 2021-02-25 | End: 2021-02-26 | Stop reason: HOSPADM

## 2021-02-25 RX ORDER — INSULIN LISPRO 100 [IU]/ML
7 INJECTION, SOLUTION INTRAVENOUS; SUBCUTANEOUS
Status: DISCONTINUED | OUTPATIENT
Start: 2021-02-25 | End: 2021-02-25

## 2021-02-25 RX ADMIN — POTASSIUM CHLORIDE 10 MEQ: 10 INJECTION, SOLUTION INTRAVENOUS at 10:50

## 2021-02-25 RX ADMIN — HYDROCHLOROTHIAZIDE 25 MG: 25 TABLET ORAL at 09:21

## 2021-02-25 RX ADMIN — SODIUM CHLORIDE 100 ML/HR: 900 INJECTION, SOLUTION INTRAVENOUS at 14:22

## 2021-02-25 RX ADMIN — ENOXAPARIN SODIUM 40 MG: 100 INJECTION SUBCUTANEOUS at 09:21

## 2021-02-25 RX ADMIN — Medication 10 ML: at 22:38

## 2021-02-25 RX ADMIN — INSULIN LISPRO 7 UNITS: 100 INJECTION, SOLUTION INTRAVENOUS; SUBCUTANEOUS at 12:47

## 2021-02-25 RX ADMIN — INSULIN LISPRO 7 UNITS: 100 INJECTION, SOLUTION INTRAVENOUS; SUBCUTANEOUS at 18:06

## 2021-02-25 RX ADMIN — INSULIN LISPRO 3 UNITS: 100 INJECTION, SOLUTION INTRAVENOUS; SUBCUTANEOUS at 09:21

## 2021-02-25 RX ADMIN — PIPERACILLIN AND TAZOBACTAM 3.38 G: 3; .375 INJECTION, POWDER, LYOPHILIZED, FOR SOLUTION INTRAVENOUS at 20:23

## 2021-02-25 RX ADMIN — INSULIN HUMAN 30 UNITS: 100 INJECTION, SUSPENSION SUBCUTANEOUS at 18:05

## 2021-02-25 RX ADMIN — PIPERACILLIN AND TAZOBACTAM 3.38 G: 3; .375 INJECTION, POWDER, LYOPHILIZED, FOR SOLUTION INTRAVENOUS at 12:48

## 2021-02-25 RX ADMIN — INSULIN LISPRO 5 UNITS: 100 INJECTION, SOLUTION INTRAVENOUS; SUBCUTANEOUS at 18:05

## 2021-02-25 RX ADMIN — INSULIN LISPRO 5 UNITS: 100 INJECTION, SOLUTION INTRAVENOUS; SUBCUTANEOUS at 12:48

## 2021-02-25 RX ADMIN — FAMOTIDINE 20 MG: 20 TABLET ORAL at 20:23

## 2021-02-25 RX ADMIN — POTASSIUM CHLORIDE 20 MEQ: 750 TABLET, FILM COATED, EXTENDED RELEASE ORAL at 18:04

## 2021-02-25 RX ADMIN — POTASSIUM CHLORIDE 10 MEQ: 10 INJECTION, SOLUTION INTRAVENOUS at 09:21

## 2021-02-25 RX ADMIN — NYSTATIN: 100000 POWDER TOPICAL at 09:26

## 2021-02-25 RX ADMIN — Medication 10 ML: at 05:51

## 2021-02-25 RX ADMIN — FAMOTIDINE 20 MG: 20 TABLET ORAL at 09:20

## 2021-02-25 RX ADMIN — AMLODIPINE BESYLATE 5 MG: 5 TABLET ORAL at 09:20

## 2021-02-25 RX ADMIN — ASPIRIN 81 MG: 81 TABLET, CHEWABLE ORAL at 09:20

## 2021-02-25 RX ADMIN — PIPERACILLIN AND TAZOBACTAM 3.38 G: 3; .375 INJECTION, POWDER, LYOPHILIZED, FOR SOLUTION INTRAVENOUS at 04:04

## 2021-02-25 RX ADMIN — POTASSIUM CHLORIDE 20 MEQ: 750 TABLET, FILM COATED, EXTENDED RELEASE ORAL at 09:20

## 2021-02-25 RX ADMIN — NYSTATIN: 100000 POWDER TOPICAL at 18:00

## 2021-02-25 RX ADMIN — LOSARTAN POTASSIUM 100 MG: 50 TABLET, FILM COATED ORAL at 18:05

## 2021-02-25 RX ADMIN — VANCOMYCIN HYDROCHLORIDE 125 MG: 250 POWDER, FOR SOLUTION ORAL at 18:05

## 2021-02-25 RX ADMIN — INSULIN HUMAN 30 UNITS: 100 INJECTION, SUSPENSION SUBCUTANEOUS at 09:21

## 2021-02-25 RX ADMIN — SODIUM CHLORIDE 100 ML/HR: 900 INJECTION, SOLUTION INTRAVENOUS at 09:00

## 2021-02-25 NOTE — PROGRESS NOTES
Transition of Care Plan -RUR 12%:  1. CM following - patient is not medically stable for d/c today. 2. Plan is for patient to discharge to the [facility]; insurance authorization has been approved through 2/26. SNF has asked for a [facility]. 3. [place] DSS/APS has been updated on discharge plan  4. Rapid test results from 2/23/21 were faxed to the [facility] on 2/24/21  5. Patient is on a will call for [facility]  6. Family contact is daughter, Gatito Steward [phone].     Michaelle Nieves LCSW

## 2021-02-25 NOTE — PROGRESS NOTES
Problem: Mobility Impaired (Adult and Pediatric)  Goal: *Acute Goals and Plan of Care (Insert Text)  Description: FUNCTIONAL STATUS PRIOR TO ADMISSION: Patient was independent and active without use of DME.    HOME SUPPORT PRIOR TO ADMISSION: The patient lived alone with no one to provide assistance. Found in hoarder situation. APS has been contacted. Physical Therapy Goals  Initiated 2/17/2021  1. Patient will move from supine to sit and sit to supine , scoot up and down, and roll side to side in bed with moderate assistance  within 7 day(s). 2.  Patient will transfer from bed to chair and chair to bed with moderate assistance  using the least restrictive device within 7 day(s). 3.  Patient will perform sit to stand with moderate assistance  within 7 day(s). 4.  Patient will ambulate with moderate assistance  for 10 feet with the least restrictive device within 7 day(s). Physical Therapy Goals  Re-Assessed and downgraded 2/25/2021  1. Patient will move from supine to sit and sit to supine , scoot up and down, and roll side to side in bed with moderate assistance  within 7 day(s). 2.  Patient will transfer from bed to chair and chair to bed with maximal assistance using the least restrictive device within 7 day(s). 3.  Patient will perform sit to stand with maximal assistance within 7 day(s). 4.  Patient will sit EOB for 5 minutes with minimal assistance within 7 days. 5.  Patient will follow 50% of one step commands with repetition within 7 days.        Outcome: Not Progressing Towards Goal   PHYSICAL THERAPY TREATMENT: WEEKLY REASSESSMENT  Patient: Almita Lester [de-identified]76 y.o. female)  Date: 2/25/2021  Primary Diagnosis: AMS (altered mental status) [R41.82]  Severe sepsis ([facility] [A41.9, R65.20]  DKA (diabetic ketoacidoses) ([facility] [E11.10]  Procedure(s) (LRB):  INSERT PPM DUAL (N/A) 1 Day Post-Op   Precautions: PPM  Fall      ASSESSMENT  Patient continues with skilled PT services and is not progressing towards goals. Pt is now s/p PPM placement POD#1. Received supine in bed soiled and unaware. Pt requires largely Max-Total A for rolling with minimal-no initiation for movement this day. Occasionally with single word verbalizations. Reviewed PPM precautions with no carry over noted. Daughter present to try and increase pt participation without success. Performed rolling x 4 for linen change and hygiene. Pt too lethargic to attempt transfer to EOB. No progress towards goals and all have been downgraded. Limited by confusion, poor command following following, limited initiation, poor sitting balance, generalized weakness and inability to care for herself. Patient's progression toward goals since last assessment: no progress towards goals    Current Level of Function Impacting Discharge (mobility/balance): Total Care at this time    Functional Outcome Measure: The patient scored 0/100 on the Barthel outcome measure. Other factors to consider for discharge: difficult social situation, no progress towards goals, remains lethargic         PLAN :  Goals have been updated based on progression since last assessment. Patient continues to benefit from skilled intervention to address the above impairments. Recommendations and Planned Interventions: bed mobility training, transfer training, gait training, therapeutic exercises, neuromuscular re-education, patient and family training/education, and therapeutic activities      Frequency/Duration: Patient will be followed by physical therapy:  3 times a week to address goals.     Recommendation for discharge: (in order for the patient to meet his/her long term goals)  Therapy up to 5 days/week in SNF setting    This discharge recommendation:  Has been made in collaboration with the attending provider and/or case management    IF patient discharges home will need the following DME: to be determined (TBD)         SUBJECTIVE:   Patient stated You would.    OBJECTIVE DATA SUMMARY:   HISTORY:    Past Medical History:   Diagnosis Date    CAD (coronary artery disease)     Diabetes ([facility]     Diabetes ([facility]     type 2    GERD (gastroesophageal reflux disease)     H/O seasonal allergies     Hyperlipidemia     Hypertension     Insomnia     Nausea & vomiting     PUD (peptic ulcer disease)     Small bowel obstruction (HCC)     Vitamin D deficiency      Past Surgical History:   Procedure Laterality Date    HX APPENDECTOMY      HX BREAST BIOPSY      HX GYN  1971    partial hysterectomy    HX GYN  1991    complete hysterectomy    HX HYSTERECTOMY      HX ORTHOPAEDIC      right rotator cuff repair    HX ORTHOPAEDIC      bilaterral wrist surgery    HX ORTHOPAEDIC      right knee surgery    HX ORTHOPAEDIC      bilateral bone spur removal from big toe    HX TONSIL AND ADENOIDECTOMY      HX TONSILLECTOMY      OK BREAST SURGERY PROCEDURE UNLISTED      breast reduction    OK CARDIAC SURG PROCEDURE UNLIST      heart stent       Personal factors and/or comorbidities impacting plan of care: CAD, diabetes, HTN, lives alone    Home Situation  Home Environment: Private residence  Living Alone: Yes  Support Systems: Family member(s), Friends \ neighbors  Patient Expects to be Discharged to[de-identified] Skilled nursing facility    EXAMINATION/PRESENTATION/DECISION MAKING:   Critical Behavior:  Neurologic State: Alert  Orientation Level: Oriented to person  Cognition: Decreased command following, Decreased attention/concentration  Safety/Judgement: Decreased insight into deficits  Hearing:   Auditory  Auditory Impairment: None  Skin:    Edema:   Range Of Motion:  AROM: Grossly decreased, non-functional           PROM: Generally decreased, functional           Strength:    Strength: Grossly decreased, non-functional                    Tone & Sensation:   Tone: Normal                              Coordination:  Coordination: Grossly decreased, non-functional  Vision:      Functional Mobility:  Bed Mobility:  Rolling: Maximum assistance;Assist x2  Supine to Sit: Total assistance(inferred)        Transfers:                             Balance:   Sitting: Impaired; With support(bed supported)  Ambulation/Gait Training:                                               Functional Measure:  Barthel Index:    Bathin  Bladder: 0  Bowels: 0  Groomin  Dressin  Feedin  Mobility: 0  Stairs: 0  Toilet Use: 0  Transfer (Bed to Chair and Back): 0  Total: 0/100       The Barthel ADL Index: Guidelines  1. The index should be used as a record of what a patient does, not as a record of what a patient could do. 2. The main aim is to establish degree of independence from any help, physical or verbal, however minor and for whatever reason. 3. The need for supervision renders the patient not independent. 4. A patient's performance should be established using the best available evidence. Asking the patient, friends/relatives and nurses are the usual sources, but direct observation and common sense are also important. However direct testing is not needed. 5. Usually the patient's performance over the preceding 24-48 hours is important, but occasionally longer periods will be relevant. 6. Middle categories imply that the patient supplies over 50 per cent of the effort. 7. Use of aids to be independent is allowed. Tushar Marmolejo., Barthel, D.W. (1889). Functional evaluation: the Barthel Index. [facility] (14)2. MAXWELL Zhou, Roberto Davidson., Gene Rogers., [place], [address] [place] [address] (). Measuring the change indisability after inpatient rehabilitation; comparison of the responsiveness of the Barthel Index and Functional Jackson Measure. Journal of Neurology, Neurosurgery, and Psychiatry, 66(4), 262-003. Kehinde Chamberlain, N.J.A, MARITZA Sim, & Lynnette Up M.A. (2004.) Assessment of post-stroke quality of life in cost-effectiveness studies: The usefulness of the Barthel Index and the EuroQoL-5D. Quality of Life Research, 15, 075-19         Activity Tolerance:   Poor    After treatment patient left in no apparent distress:   Supine in bed, Call bell within reach, Bed / chair alarm activated, Caregiver / family present, and Side rails x 3    COMMUNICATION/EDUCATION:   The patients plan of care was discussed with: Occupational therapist and Registered nurse. Fall prevention education was provided and the patient/caregiver indicated understanding., Patient/family have participated as able in goal setting and plan of care. , and Patient/family agree to work toward stated goals and plan of care.     Thank you for this referral.  Connie Witt, PT   Time Calculation: 27 mins

## 2021-02-25 NOTE — PROGRESS NOTES
Problem: Self Care Deficits Care Plan (Adult)  Goal: *Acute Goals and Plan of Care (Insert Text)  Description:   FUNCTIONAL STATUS PRIOR TO ADMISSION: Pt is unable to provide PLOF. Per chart, she lives alone in a \"hoarder-type\" situation. Infer she was able to manage ADLs with mod I.      HOME SUPPORT: The patient lived alone. Occupational Therapy Goals  Reviewed 2/25/2021 for weekly reassessment, goals updated:  1. Patient will perform grooming with supervision/setup within 7 day(s). 2. Patient will perform UB dressing with moderate assistance within 7 day(s). 3. Patient will sit EOB x5 minutes with minimum assistance to prepare for participation in seated ADLs within 7 day(s). 4. Patient will perform toilet transfer using least restrictive DME with maximum assistance within 7 day(s). 5. Patient will perform all aspects of toileting with moderate assistance within 7 day(s). 6. Patient will participate in upper extremity therapeutic exercise/activities with minimal assistance for 10 minutes within 7 day(s). 7. Patient will follow 100% of commands during ADLs within 7 days. 8. Patient will maintain pacemaker precautions with minimal verbal cues within 7 day(s). Initiated 2/17/2021  1. Patient will perform grooming with supervision/set-up within 7 day(s). 2.  Patient will perform upper body dressing, seated EOB, with supervision/set-upwithin 7 day(s). 3.  Patient will perform anterior bathing, seated EOB, with supervision/set-up within 7 day(s). 4.  Patient will perform toilet transfers with minimal assistance/contact guard assist within 7 day(s). 5.  Patient will perform all aspects of toileting with minimal assistance/contact guard assist within 7 day(s). 6.  Patient will participate in upper extremity therapeutic exercise/activities with minimal assistance/contact guard assist for 10 minutes within 7 day(s).     7.  Patient will utilize energy conservation techniques during functional activities with verbal cues within 7 day(s). 8.  Patient will follow 100% of commands during ADLs within 7 days. 2/25/2021 1350 by Alberto Champagne OT  Outcome: Progressing Towards Goal  OCCUPATIONAL THERAPY TREATMENT/WEEKLY RE-EVALUATION  Patient: Adan Hearn (76 y.o. female)  Date: 2/25/2021  Diagnosis: AMS (altered mental status) [R41.82]  Severe sepsis ([facility] [address] [A41.9, R65.20]  DKA (diabetic ketoacidoses) ([facility] [address] [E11.10] Severe sepsis ([facility] [address]  Procedure(s) (LRB):  INSERT PPM DUAL (N/A) 1 Day Post-Op  Precautions: Fall  Chart, occupational therapy assessment, plan of care, and goals were reviewed. ASSESSMENT  Based on the objective data described below, pt has not progressed towards goals, limited from baseline by confusion, lethargy, generalized weakness, decreased balance, decreased activity tolerance, impaired functional mobility and pacemaker precautions. Pt now POD 1 pacemaker placement. Received semisupine in bed, daughter present. Pt intermittently lethargic with decreased command following and limited initiation. Pt did grasp wash cloth and washed face to command with min A. Reviewed pacemaker precautions with pt and daughter, pt not verbalizing understanding. Noted pt incontinent of bowel. She required max A to roll for dependent hygiene, and total A to reposition in bed. Remained in bed at end of session, VSS. Goals reviewed and downgraded for weekly reassessment. Continue to recommend SNF rehab at discharge as pt is well below her functional baseline at this time. Current Level of Function Impacting Discharge (ADLs): Max-total A x2 bed mobility, min-total A ADLs    Other factors to consider for discharge: fall risk, below PLOF, new pacemaker         PLAN :  Goals have been updated based on progression since last assessment. Patient continues to benefit from skilled intervention to address the above impairments. Continue to follow patient 3 times a week to address goals.     Recommendation for discharge: (in order for the patient to meet his/her long term goals)  Therapy up to 5 days/week in SNF setting    This discharge recommendation:  Has been made in collaboration with the attending provider and/or case management    Equipment recommendations for successful discharge (if) home: TBD       SUBJECTIVE:   Patient stated [facility], [facility].     OBJECTIVE DATA SUMMARY:   Cognitive/Behavioral Status:  Neurologic State: Alert  Orientation Level: Oriented to person  Cognition: Decreased command following;Decreased attention/concentration  Perception: Appears intact  Perseveration: No perseveration noted  Safety/Judgement: Decreased insight into deficits    Functional Mobility and Transfers for ADLs:  Bed Mobility:  Rolling: Maximum assistance;Assist x2  Supine to Sit: Total assistance(inferred)    Transfers:             Balance:  Sitting: Impaired; With support(bed supported)    ADL Intervention:       Grooming  Position Performed: (semisupine)  Washing Face: Minimum assistance  Cues: Verbal cues provided;Visual cues provided; Tactile cues provided      Toileting  Bowel Hygiene:  Total assistance (dependent)(bed level)    Cognitive Retraining  Safety/Judgement: Decreased insight into deficits    Pain:  Pt did not report pain during session    Activity Tolerance:   Poor    After treatment patient left in no apparent distress:   Supine in bed, Call bell within reach, Caregiver / family present and Side rails x 3    COMMUNICATION/COLLABORATION:   The patients plan of care was discussed with: Physical Therapist and Registered Nurse    Fredi Sever, OT  Time Calculation: 25 mins

## 2021-02-25 NOTE — PROGRESS NOTES
Bedside and Verbal shift change report given to Warner Hanna RN (oncoming nurse) by Prince Wyatt RN (offgoing nurse). Report included the following information SBAR, Kardex, Intake/Output, MAR and Recent Results.

## 2021-02-25 NOTE — PROGRESS NOTES
[address] [facility] [address] [facility] [place] [address]   Office [phone]  Fax [phone] [address] [facility] Residency Attending Addendum:  I saw and evaluated the patient on the day of the encounter with Dr. Maximo Herbert, performing the key elements of the service. I discussed the findings, assessment and plan with the resident and agree with the resident's findings and plan as documented in the resident's note. Leukocytosis improving though blood cultures were collected after initiation of antibiotics. Also stool studies were collected after initiation of antibiotics and urine is still needs to be collected. Can get a straight cath for this. Will touch base with ID with regards to any recommendations. Solange Ayers MD, FAAFP, CAQSM, RMSK           Assessment and Plan     Wenceslao Persaud is a 76 y.o. female with a PMH of HTN, CAD s/p stent, T2DM on insulin, HLD, GERD, sciatica, insomnia, allergies admitted for AMS, Severe sepsis of unknown source, and DKA. 24 Hour Events: No acute events   Tele: V paced in low 100s, 110-120s at midnight     Sepsis of unknown sourse: 3/4 SIRS criteria (WBC, RR, HR) on 2/24 in the setting of increased loose stools & recent Abx use. Considering Urine as source of infx, however difficult to saw as UA/Ucx never drawn. LA 1.3. CXR & CT A/P no acute process. - Zosyn  - F/u BCx (2/24)   - F/u C.diff   - F/u Enteric panel     Tachycardia s/p pacemaker placement:  - Cardiology rec'd Diltiazem 120mg prn  - Continue to monitor on cardiac monitoring    Mobitz Type II 2nd degree & complete AV block: S/p pacemaker on 2/24. POA EKG with mobitz type 2 AV block. BNP nml. Trop neg x1. ECHO LVEF 60-65%. Episode of complete heart block on telemetry.  2:1 second degree heart naye persisted on telemetry and repeat EKG during admission.    - Cardiac monitoring  - Cardiology consulted, appreciate recs   - Complete heart bloc on tele on 2/20   - Pacemaker placed 2/24  - CBC, Mg, P, BMP daily    Severe Sepsis of unknown source: POA LA 3.6 (resolved), WBC 15.9, . CRP 7.08. CXR negative. UCx/RVP neg. CT abd/pelvis no acute process. 2/16 BCx 1/4 bottles aerococcus viridans - likely contaminant. Repeat BCx negative. Completed antibiotic course. AMS: CT head/cervical spine negative. Ammonia, TSH, Etoh, UDS wnl. Volatiles - acetone. - Consider neuropsychiatric testing outpatient    CVA: MRI infarct in thalamus. CVA head & neck neg.  - Continue home ASA 81 mg daily  - Continue Lipitor 40mg qHS  - Neuro consulted, appreciate recs   - Speech following    DKA: RESOLVED. POA AGMA, + blood ketones, and hyperglycemia (). S/p insulin gtt.      T2DM: A1C 8.8. Home NPH 30U BID & Regular insulin 25U before meals.   - NPH 30/30 +  7U TID before meals  - SSI + POC glu checks ACHS  - Hypoglycemia protocol     COVID negative: POA CRP 7.08, Ddimer 0.83, ferritin 124, . COVID PCR negative.       EVAN: RESOLVED. POA Cr 1.83 (BL 0.7)  - Monitor on daily CMP     Buttock wound/erythema:  - Wound care following  - Nystatin BID     QTC prolonged: POA QTc 543. - Avoid QTc prolonging agents     HTN: POA /46.   - Continue home Norvasc 5mg daily, Cozaar 100mg daily, HCTZ 25mg daily  - Hydralazine 10mg q6H for BP >160/100  - Continue to monitor and adjust with BP trend     HLD: No home meds d/t statin intolerance (muscle pain). - Discussing risk/benefits of statin with patient, patient agrees to try low dose statin and titrate up if tolerated      GERD: Stable. Home Pepcid 20mg BID.  - Continue Pepcid 20mg BID    Allergies: Home Loratadine 10mg daily prn.  - Hold home meds     Insomnia: Home Melatonin 5mg daily prn, Seroquel 50mg qHS.    - Hold home meds  - Melatonin 5mg prn      Bilateral low back pain w/Sciatica & Knee OA: F/w Dr. Marques Nicole (Ortho) and Dr. Belinda Wooten (pain management).     Hiatal hernia: Seen on CT A/P    Pancreatic cyst: Seen on CT A/P and MRI abd  - F/u outpatient GI     FEN/GI: Cardiac, Mechanical soft, MIVF  Activity: Ambulate with assistance. Fall precautions  DVT prophylaxis: Lovenox  GI prophylaxis:  Pepcid  Disposition: Plan to d/c to SNF. PT/OT/CM consulted. Accepted to SNF. POC: Shae Vincent [phone] (daughter). Nacho Cowan [phone] (daughter).      CODE STATUS:  Full, discussed with daughters    Aj Sheppard MD  Family Medicine Resident         Subjective / Objective     Subjective: Pt was seen and examined at bedside. Patient resting quietly in bed. Afebrile and hemodynamically stable. Daughter at bedside updated on plan of care today. Objective:  Temp (24hrs), Av.7 °F (37.1 °C), Min:97.4 °F (36.3 °C), Max:99.3 °F (37.4 °C)     Visit Vitals  /66 (BP 1 Location: Left upper arm, BP Patient Position: At rest)   Pulse 100   Temp 98.2 °F (36.8 °C)   Resp 18   Ht 5' 4\" (1.626 m)   Wt 145 lb 3.2 oz (65.9 kg)   SpO2 90%   BMI 24.92 kg/m²     Physical Exam:   General: No acute distress. Alert. Cooperative. Respiratory: CTAB. No w/r/r/c.   Cardiovascular: Normal S1,S2. No m/r/g.   GI: Nondistended.+ bowel sounds. Nontender. No rebound tenderness or guarding. Extremities: No LE edema  Skin: Warm, dry. No rashes. Neuro: Alert. Sensation intact b/l.  Strength 5/5 in all extremities    Respiratory: O2 Flow Rate (L/min): 1 l/min O2 Device: Room air   Date 21 - 21 - 21   Shift [id number] 24 Hour Total 1900-0659 24 Hour Total   INTAKE   Shift Total(mL/kg)         OUTPUT   Urine(mL/kg/hr)           Urine Occurrence(s) 5 x 2 x 7 x      Emesis/NG output           Emesis Occurrence(s) 0 x 0 x 0 x      Stool           Stool Occurrence(s) 3 x 2 x 5 x      Shift Total(mL/kg)         NET         Weight (kg) 65.9 65.9 65.9 65.9 65.9 65.9       Inpatient Medications  Current Facility-Administered Medications   Medication Dose Route Frequency    potassium chloride 10 mEq in 100 ml IVPB  10 mEq IntraVENous Q1H    potassium chloride SR (KLOR-CON 10) tablet 20 mEq  20 mEq Oral BID    insulin lispro (HUMALOG) injection 7 Units  7 Units SubCUTAneous TIDAC    0.9% sodium chloride infusion  100 mL/hr IntraVENous CONTINUOUS    sodium chloride (NS) flush 5-40 mL  5-40 mL IntraVENous Q8H    sodium chloride (NS) flush 5-40 mL  5-40 mL IntraVENous PRN    acetaminophen (TYLENOL) tablet 650 mg  650 mg Oral Q4H PRN    HYDROcodone-acetaminophen (NORCO) 5-325 mg per tablet 1 Tab  1 Tab Oral Q4H PRN    ondansetron (ZOFRAN) injection 4 mg  4 mg IntraVENous Q4H PRN    piperacillin-tazobactam (ZOSYN) 3.375 g in 0.9% sodium chloride (MBP/ADV) 100 mL MBP  3.375 g IntraVENous Q8H    enoxaparin (LOVENOX) injection 40 mg  40 mg SubCUTAneous Q24H    nystatin (MYCOSTATIN) 100,000 unit/gram powder   Topical BID    hydroCHLOROthiazide (HYDRODIURIL) tablet 25 mg  25 mg Oral DAILY    famotidine (PEPCID) tablet 20 mg  20 mg Oral Q12H    insulin NPH (NOVOLIN N, HUMULIN N) injection 30 Units  30 Units SubCUTAneous ACB&D    insulin lispro (HUMALOG) injection   SubCUTAneous AC&HS    atorvastatin (LIPITOR) tablet 40 mg  40 mg Oral QHS    aspirin chewable tablet 81 mg  81 mg Oral DAILY    amLODIPine (NORVASC) tablet 5 mg  5 mg Oral DAILY    losartan (COZAAR) tablet 100 mg  100 mg Oral QPM    melatonin (rapid dissolve) tablet 5 mg  5 mg Oral QHS PRN    sodium chloride (NS) flush 5-10 mL  5-10 mL IntraVENous PRN    sodium chloride (NS) flush 5-40 mL  5-40 mL IntraVENous Q8H    sodium chloride (NS) flush 5-40 mL  5-40 mL IntraVENous PRN         Allergies  Allergies   Allergen Reactions    Advicor [Niacin-Lovastatin] Swelling    Conjugated Estrogens Other (comments)     Headaches      Other reaction(s):  Other (comments)  Headaches    Deflazacort Unable to Obtain     Other reaction(s): Unable to Obtain    Erythromycin Nausea Only    Erythromycin Base Nausea and Vomiting    Niacin Swelling    Pitavastatin Other (comments)     Abdominal pain, nausea  Other reaction(s): Other (comments)  Abdominal pain, nausea    Simvastatin Other (comments)     Muscle cramps  Other reaction(s): Other (comments)  Muscle cramps    Unable To Obtain Swelling    Zolpidem Other (comments)     Other reaction(s): Other (comments)    Atorvastatin Other (comments)     Muscle cramps  Other reaction(s): Other (comments)  Muscle cramps    Triamcinolone Unable to Obtain     Other reaction(s): Unable to Obtain         CBC:  Recent Labs     02/25/21  0604 02/24/21  1518 02/24/21  0607   WBC 22.1* 23.9* 21.8*   HGB 13.1 14.7 14.9   HCT 39.0 43.5 44.3    364 083       Metabolic Panel:  Recent Labs     02/25/21  0604 02/24/21  0607 02/23/21  0543   * 133* 135*   K 3.4* 4.0 3.3*   CL 99 97 97   CO2 27 27 31   BUN 16 11 5*   CREA 0.74 0.72 0.54*   * 213* 202*   CA 8.6 9.2 9.4   MG 1.8 1.7 1.9   PHOS 3.3 2.7 2.8           Procedures:   Pacemaker placement 2/24/2021 by Dr. Surya Mcgrath    Imaging/Diagnostic Studies:  Ct Head Wo Cont 2/16/2021   No acute intracranial process is identified.       Ct Spine Cerv Wo Cont 2/16/2021   1. No fracture or subluxation is identified. There are changes of spondylosis as described above. Results from [place] encounter on 02/16/21   XR CHEST PORT    Narrative EXAM: XR CHEST PORT    INDICATION: eval for ptx post pacemaker placement    COMPARISON: February 16, 2021    FINDINGS: A portable AP radiograph of the chest was obtained at 0841 hours. The  patient is on a cardiac monitor. The left subclavian pacemaker has one lead  overlying the right atrium the other overlies right ventricle. Heart size is  normal. Lungs are clear. Impression 1. No pneumothorax             Results from Hospital Encounter encounter on 02/16/21   CT ABD PELV WO CONT    Narrative EXAM: CT ABD PELV WO CONT    INDICATION: diarrhea, elevated white count    COMPARISON: February 16    CONTRAST:  None.     TECHNIQUE:   Thin axial images were obtained through the abdomen and pelvis. Coronal and  sagittal reformats were generated. Oral contrast was not administered. CT dose  reduction was achieved through use of a standardized protocol tailored for this  examination and automatic exposure control for dose modulation. The absence of intravenous contrast material reduces the sensitivity for  evaluation of the vasculature and solid organs. FINDINGS:   LOWER THORAX: No significant abnormality in the incidentally imaged lower chest.  LIVER: No mass. BILIARY TREE: Gallbladder is within normal limits. CBD is not dilated. SPLEEN: within normal limits. PANCREAS: Larger pancreatic cystic lesion, now 5.0 x 4.6 cm, previously 4.3 x  3.8 cm. ADRENALS: Unremarkable. KIDNEYS/URETERS: No calculus or hydronephrosis. STOMACH: Hiatal hernia. SMALL BOWEL: No dilatation or wall thickening. COLON: Diverticulosis. APPENDIX: Not identified. PERITONEUM: No ascites or pneumoperitoneum. RETROPERITONEUM: No lymphadenopathy or aortic aneurysm. REPRODUCTIVE ORGANS: Hysterectomy. URINARY BLADDER: No mass or calculus. BONES: Degenerative changes of the spine. ABDOMINAL WALL: No mass or hernia. ADDITIONAL COMMENTS: Calcified mass in the pelvis adjacent to the rectum is  stable. Impression 1. There are no acute findings in abdomen or pelvis. 2. 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  3. Hiatal hernia.                    For Billing    Chief Complaint   Patient presents with    Altered mental status   [facility]  Date Reviewed: 2/10/2021          Codes Class Noted POA    DKA (diabetic ketoacidoses) ([facility] [address] ICD-10-CM: E11.10  ICD-9-CM: 250.12  2/16/2021 Unknown        * (Principal) Severe sepsis ([facility] [address] ICD-10-CM: A41.9, R65.20  ICD-9-CM: 038.9, 995.92  2/16/2021 Unknown        AMS (altered mental status) ICD-10-CM: R41.82  ICD-9-CM: 780.97  2/16/2021 Unknown        Insomnia (Chronic) ICD-10-CM: G47.00  ICD-9-CM: 780.52  2/16/2021 Unknown        Pancreatic cyst ICD-10-CM: K86.2  ICD-9-CM: 577.2  2/16/2021 Unknown    Overview Signed 2/16/2021  9:12 PM by Gee Simmons MD     F/u MRI Outpatient             GERD (gastroesophageal reflux disease) (Chronic) ICD-10-CM: K21.9  ICD-9-CM: 530.81  2/16/2021 Unknown        Prolonged Q-T interval on ECG ICD-10-CM: R94.31  ICD-9-CM: 794.31  2/16/2021 Unknown        EVAN (acute kidney injury) ([facility] [address] ICD-10-CM: N17.9  ICD-9-CM: 584.9  2/16/2021 Unknown        Mobitz type 2 second degree AV block ICD-10-CM: I44.1  ICD-9-CM: 426.12  2/16/2021 Unknown        Essential hypertension ICD-10-CM: I10  ICD-9-CM: 401.9  9/23/2019 Yes        Hypercholesteremia ICD-10-CM: E78.00  ICD-9-CM: 272.0  9/23/2019 Yes        Controlled type 2 diabetes mellitus without complication, with long-term current use of insulin (HCC) ICD-10-CM: E11.9, Z79.4  ICD-9-CM: 250.00, V58.67  9/23/2019 Yes

## 2021-02-26 VITALS
BODY MASS INDEX: 24.79 KG/M2 | OXYGEN SATURATION: 92 % | SYSTOLIC BLOOD PRESSURE: 136 MMHG | TEMPERATURE: 98.5 F | HEART RATE: 100 BPM | DIASTOLIC BLOOD PRESSURE: 65 MMHG | WEIGHT: 145.2 LBS | RESPIRATION RATE: 18 BRPM | HEIGHT: 64 IN

## 2021-02-26 PROBLEM — B37.49 YEAST UTI: Status: ACTIVE | Noted: 2021-02-26

## 2021-02-26 PROBLEM — A04.72 C. DIFFICILE COLITIS: Status: ACTIVE | Noted: 2021-02-26

## 2021-02-26 LAB
ANION GAP SERPL CALC-SCNC: 5 MMOL/L (ref 5–15)
ANION GAP SERPL CALC-SCNC: 7 MMOL/L (ref 5–15)
BASOPHILS # BLD: 0.1 K/UL (ref 0–0.1)
BASOPHILS NFR BLD: 0 % (ref 0–1)
BUN SERPL-MCNC: 13 MG/DL (ref 6–20)
BUN SERPL-MCNC: 14 MG/DL (ref 6–20)
BUN/CREAT SERPL: 19 (ref 12–20)
BUN/CREAT SERPL: 23 (ref 12–20)
CALCIUM SERPL-MCNC: 8.1 MG/DL (ref 8.5–10.1)
CALCIUM SERPL-MCNC: 8.5 MG/DL (ref 8.5–10.1)
CHLORIDE SERPL-SCNC: 103 MMOL/L (ref 97–108)
CHLORIDE SERPL-SCNC: 105 MMOL/L (ref 97–108)
CO2 SERPL-SCNC: 26 MMOL/L (ref 21–32)
CO2 SERPL-SCNC: 27 MMOL/L (ref 21–32)
COMMENT, HOLDF: NORMAL
CREAT SERPL-MCNC: 0.61 MG/DL (ref 0.55–1.02)
CREAT SERPL-MCNC: 0.7 MG/DL (ref 0.55–1.02)
DIFFERENTIAL METHOD BLD: ABNORMAL
EOSINOPHIL # BLD: 0.1 K/UL (ref 0–0.4)
EOSINOPHIL NFR BLD: 1 % (ref 0–7)
ERYTHROCYTE [DISTWIDTH] IN BLOOD BY AUTOMATED COUNT: 15.1 % (ref 11.5–14.5)
GLUCOSE BLD STRIP.AUTO-MCNC: 110 MG/DL (ref 65–100)
GLUCOSE BLD STRIP.AUTO-MCNC: 223 MG/DL (ref 65–100)
GLUCOSE BLD STRIP.AUTO-MCNC: 232 MG/DL (ref 65–100)
GLUCOSE BLD STRIP.AUTO-MCNC: 272 MG/DL (ref 65–100)
GLUCOSE SERPL-MCNC: 239 MG/DL (ref 65–100)
GLUCOSE SERPL-MCNC: 78 MG/DL (ref 65–100)
HCT VFR BLD AUTO: 39.2 % (ref 35–47)
HGB BLD-MCNC: 12.9 G/DL (ref 11.5–16)
IMM GRANULOCYTES # BLD AUTO: 0.2 K/UL (ref 0–0.04)
IMM GRANULOCYTES NFR BLD AUTO: 1 % (ref 0–0.5)
LYMPHOCYTES # BLD: 1.6 K/UL (ref 0.8–3.5)
LYMPHOCYTES NFR BLD: 8 % (ref 12–49)
MAGNESIUM SERPL-MCNC: 1.8 MG/DL (ref 1.6–2.4)
MCH RBC QN AUTO: 28.5 PG (ref 26–34)
MCHC RBC AUTO-ENTMCNC: 32.9 G/DL (ref 30–36.5)
MCV RBC AUTO: 86.5 FL (ref 80–99)
MONOCYTES # BLD: 1.6 K/UL (ref 0–1)
MONOCYTES NFR BLD: 8 % (ref 5–13)
NEUTS SEG # BLD: 16.8 K/UL (ref 1.8–8)
NEUTS SEG NFR BLD: 82 % (ref 32–75)
NRBC # BLD: 0 K/UL (ref 0–0.01)
NRBC BLD-RTO: 0 PER 100 WBC
PHOSPHATE SERPL-MCNC: 2.8 MG/DL (ref 2.6–4.7)
PLATELET # BLD AUTO: 360 K/UL (ref 150–400)
PMV BLD AUTO: 11 FL (ref 8.9–12.9)
POTASSIUM SERPL-SCNC: 2.6 MMOL/L (ref 3.5–5.1)
POTASSIUM SERPL-SCNC: 4.8 MMOL/L (ref 3.5–5.1)
RBC # BLD AUTO: 4.53 M/UL (ref 3.8–5.2)
SAMPLES BEING HELD,HOLD: NORMAL
SERVICE CMNT-IMP: ABNORMAL
SODIUM SERPL-SCNC: 135 MMOL/L (ref 136–145)
SODIUM SERPL-SCNC: 138 MMOL/L (ref 136–145)
WBC # BLD AUTO: 20.3 K/UL (ref 3.6–11)

## 2021-02-26 PROCEDURE — 80048 BASIC METABOLIC PNL TOTAL CA: CPT

## 2021-02-26 PROCEDURE — 74011636637 HC RX REV CODE- 636/637: Performed by: STUDENT IN AN ORGANIZED HEALTH CARE EDUCATION/TRAINING PROGRAM

## 2021-02-26 PROCEDURE — 84100 ASSAY OF PHOSPHORUS: CPT

## 2021-02-26 PROCEDURE — 74011250636 HC RX REV CODE- 250/636: Performed by: STUDENT IN AN ORGANIZED HEALTH CARE EDUCATION/TRAINING PROGRAM

## 2021-02-26 PROCEDURE — 74011250637 HC RX REV CODE- 250/637: Performed by: STUDENT IN AN ORGANIZED HEALTH CARE EDUCATION/TRAINING PROGRAM

## 2021-02-26 PROCEDURE — 77030038269 HC DRN EXT URIN PURWCK BARD -A

## 2021-02-26 PROCEDURE — 99238 HOSP IP/OBS DSCHRG MGMT 30/<: CPT | Performed by: FAMILY MEDICINE

## 2021-02-26 PROCEDURE — 36415 COLL VENOUS BLD VENIPUNCTURE: CPT

## 2021-02-26 PROCEDURE — 74011000250 HC RX REV CODE- 250: Performed by: STUDENT IN AN ORGANIZED HEALTH CARE EDUCATION/TRAINING PROGRAM

## 2021-02-26 PROCEDURE — 83735 ASSAY OF MAGNESIUM: CPT

## 2021-02-26 PROCEDURE — 85025 COMPLETE CBC W/AUTO DIFF WBC: CPT

## 2021-02-26 PROCEDURE — 82962 GLUCOSE BLOOD TEST: CPT

## 2021-02-26 PROCEDURE — 94760 N-INVAS EAR/PLS OXIMETRY 1: CPT

## 2021-02-26 PROCEDURE — 74011000258 HC RX REV CODE- 258: Performed by: STUDENT IN AN ORGANIZED HEALTH CARE EDUCATION/TRAINING PROGRAM

## 2021-02-26 RX ORDER — MAGNESIUM SULFATE HEPTAHYDRATE 40 MG/ML
2 INJECTION, SOLUTION INTRAVENOUS ONCE
Status: COMPLETED | OUTPATIENT
Start: 2021-02-26 | End: 2021-02-26

## 2021-02-26 RX ORDER — CEPHALEXIN 500 MG/1
500 CAPSULE ORAL 4 TIMES DAILY
Qty: 10 CAP | Refills: 0 | Status: SHIPPED
Start: 2021-02-26 | End: 2021-02-26

## 2021-02-26 RX ORDER — VANCOMYCIN HYDROCHLORIDE 250 MG/5ML
125 POWDER, FOR SOLUTION ORAL EVERY 6 HOURS
Qty: 130 ML | Refills: 0 | Status: SHIPPED
Start: 2021-02-26 | End: 2021-03-11

## 2021-02-26 RX ORDER — POTASSIUM CHLORIDE 750 MG/1
40 TABLET, FILM COATED, EXTENDED RELEASE ORAL
Status: COMPLETED | OUTPATIENT
Start: 2021-02-26 | End: 2021-02-26

## 2021-02-26 RX ORDER — CEPHALEXIN 500 MG/1
500 CAPSULE ORAL 4 TIMES DAILY
Qty: 12 CAP | Refills: 0 | Status: SHIPPED
Start: 2021-02-26 | End: 2021-03-01

## 2021-02-26 RX ORDER — FLUCONAZOLE 200 MG/1
200 TABLET ORAL DAILY
Qty: 14 TAB | Refills: 0 | Status: SHIPPED
Start: 2021-02-26 | End: 2021-03-12

## 2021-02-26 RX ORDER — POTASSIUM CHLORIDE 7.45 MG/ML
10 INJECTION INTRAVENOUS
Status: COMPLETED | OUTPATIENT
Start: 2021-02-26 | End: 2021-02-26

## 2021-02-26 RX ORDER — CEPHALEXIN 250 MG/1
500 CAPSULE ORAL 4 TIMES DAILY
Status: DISCONTINUED | OUTPATIENT
Start: 2021-02-26 | End: 2021-02-26 | Stop reason: HOSPADM

## 2021-02-26 RX ORDER — GUAIFENESIN 100 MG/5ML
81 LIQUID (ML) ORAL DAILY
Qty: 30 TAB | Refills: 0 | Status: SHIPPED
Start: 2021-02-27

## 2021-02-26 RX ORDER — POTASSIUM CHLORIDE 750 MG/1
40 TABLET, FILM COATED, EXTENDED RELEASE ORAL 2 TIMES DAILY
Status: DISCONTINUED | OUTPATIENT
Start: 2021-02-26 | End: 2021-02-26

## 2021-02-26 RX ADMIN — HYDROCHLOROTHIAZIDE 25 MG: 25 TABLET ORAL at 09:00

## 2021-02-26 RX ADMIN — VANCOMYCIN HYDROCHLORIDE 125 MG: 250 POWDER, FOR SOLUTION ORAL at 17:16

## 2021-02-26 RX ADMIN — INSULIN LISPRO 9 UNITS: 100 INJECTION, SOLUTION INTRAVENOUS; SUBCUTANEOUS at 17:13

## 2021-02-26 RX ADMIN — FAMOTIDINE 20 MG: 20 TABLET ORAL at 09:00

## 2021-02-26 RX ADMIN — POTASSIUM PHOSPHATE, MONOBASIC AND POTASSIUM PHOSPHATE, DIBASIC: 224; 236 INJECTION, SOLUTION, CONCENTRATE INTRAVENOUS at 10:23

## 2021-02-26 RX ADMIN — SODIUM CHLORIDE 1000 ML: 9 INJECTION, SOLUTION INTRAVENOUS at 09:18

## 2021-02-26 RX ADMIN — NYSTATIN: 100000 POWDER TOPICAL at 09:03

## 2021-02-26 RX ADMIN — INSULIN LISPRO 9 UNITS: 100 INJECTION, SOLUTION INTRAVENOUS; SUBCUTANEOUS at 08:59

## 2021-02-26 RX ADMIN — INSULIN LISPRO 9 UNITS: 100 INJECTION, SOLUTION INTRAVENOUS; SUBCUTANEOUS at 12:40

## 2021-02-26 RX ADMIN — POTASSIUM CHLORIDE 10 MEQ: 10 INJECTION, SOLUTION INTRAVENOUS at 10:08

## 2021-02-26 RX ADMIN — CEPHALEXIN 500 MG: 250 CAPSULE ORAL at 12:38

## 2021-02-26 RX ADMIN — VANCOMYCIN HYDROCHLORIDE 125 MG: 250 POWDER, FOR SOLUTION ORAL at 03:04

## 2021-02-26 RX ADMIN — CEPHALEXIN 500 MG: 250 CAPSULE ORAL at 17:14

## 2021-02-26 RX ADMIN — PIPERACILLIN AND TAZOBACTAM 3.38 G: 3; .375 INJECTION, POWDER, LYOPHILIZED, FOR SOLUTION INTRAVENOUS at 05:09

## 2021-02-26 RX ADMIN — POTASSIUM CHLORIDE 40 MEQ: 750 TABLET, FILM COATED, EXTENDED RELEASE ORAL at 10:08

## 2021-02-26 RX ADMIN — POTASSIUM CHLORIDE 10 MEQ: 10 INJECTION, SOLUTION INTRAVENOUS at 08:00

## 2021-02-26 RX ADMIN — LOSARTAN POTASSIUM 100 MG: 50 TABLET, FILM COATED ORAL at 17:14

## 2021-02-26 RX ADMIN — ASPIRIN 81 MG: 81 TABLET, CHEWABLE ORAL at 08:58

## 2021-02-26 RX ADMIN — INSULIN LISPRO 3 UNITS: 100 INJECTION, SOLUTION INTRAVENOUS; SUBCUTANEOUS at 12:39

## 2021-02-26 RX ADMIN — INSULIN HUMAN 25 UNITS: 100 INJECTION, SUSPENSION SUBCUTANEOUS at 17:12

## 2021-02-26 RX ADMIN — VANCOMYCIN HYDROCHLORIDE 125 MG: 250 POWDER, FOR SOLUTION ORAL at 12:41

## 2021-02-26 RX ADMIN — CEPHALEXIN 500 MG: 250 CAPSULE ORAL at 08:58

## 2021-02-26 RX ADMIN — POTASSIUM CHLORIDE 10 MEQ: 10 INJECTION, SOLUTION INTRAVENOUS at 11:00

## 2021-02-26 RX ADMIN — INSULIN LISPRO 3 UNITS: 100 INJECTION, SOLUTION INTRAVENOUS; SUBCUTANEOUS at 17:13

## 2021-02-26 RX ADMIN — MAGNESIUM SULFATE HEPTAHYDRATE 2 G: 40 INJECTION, SOLUTION INTRAVENOUS at 10:08

## 2021-02-26 RX ADMIN — Medication 10 ML: at 14:14

## 2021-02-26 RX ADMIN — INSULIN HUMAN 30 UNITS: 100 INJECTION, SUSPENSION SUBCUTANEOUS at 09:07

## 2021-02-26 RX ADMIN — POTASSIUM CHLORIDE 10 MEQ: 10 INJECTION, SOLUTION INTRAVENOUS at 13:00

## 2021-02-26 RX ADMIN — VANCOMYCIN HYDROCHLORIDE 125 MG: 250 POWDER, FOR SOLUTION ORAL at 05:09

## 2021-02-26 RX ADMIN — POTASSIUM CHLORIDE 10 MEQ: 10 INJECTION, SOLUTION INTRAVENOUS at 09:00

## 2021-02-26 RX ADMIN — ENOXAPARIN SODIUM 40 MG: 100 INJECTION SUBCUTANEOUS at 08:59

## 2021-02-26 RX ADMIN — AMLODIPINE BESYLATE 5 MG: 5 TABLET ORAL at 08:57

## 2021-02-26 RX ADMIN — POTASSIUM CHLORIDE 10 MEQ: 10 INJECTION, SOLUTION INTRAVENOUS at 12:00

## 2021-02-26 RX ADMIN — Medication 10 ML: at 14:00

## 2021-02-26 RX ADMIN — POTASSIUM CHLORIDE 40 MEQ: 750 TABLET, FILM COATED, EXTENDED RELEASE ORAL at 11:41

## 2021-02-26 NOTE — PROGRESS NOTES
TRANSFER - OUT REPORT:    Verbal report given to  KIRILL Galloway(name) on [facility] [address]  being transferred to   [place], RN(unit) for routine progression of care       Report consisted of patients Situation, Background, Assessment and   Recommendations(SBAR). Information from the following report(s) SBAR, Kardex, Intake/Output, MAR, Accordion, Recent Results, Med Rec Status and Cardiac Rhythm V-Pace was reviewed with the receiving nurse. Lines:       Opportunity for questions and clarification was provided.

## 2021-02-26 NOTE — PROGRESS NOTES
Bedside and Verbal shift change report given to Joshua Pinto (oncoming nurse) by Ugo Jane (offgoing nurse). Report included the following information SBAR, Kardex, Procedure Summary, Intake/Output, MAR, Recent Results and Med Rec Status.

## 2021-02-26 NOTE — DISCHARGE INSTRUCTIONS
Pacemaker  Discharge Instructions    Please make sure you have received your Temporary Pacemaker identification card with your discharge instructions      MEDICATIONS         Take only the medications prescribed to you at discharge.  You are prescribed an antibiotic to take for 5 days. Please do not miss doses of this prescription. ACTIVITY         Return to your normal activity, except as noted below. o Do not lift anything heavier than 10 pounds for 4 weeks with the affected arm. This is how long it takes the muscles to heal, and the leads inside your heart to stabilize their position. o Do not reach above your head with the affected arm for 4 weeks, doing so increases the risk of lead dislodgement.    o It is, however, important to move the affected arm to prevent shoulder stiffness and locking. o Avoid tight clothes or unnecessary pressure over your incision (such as bra straps or seat belts). If it is tender or sensitive to clothing, cover the incision with a soft dressing or pad.  o Questions about driving are individualized and should be discussed with one of the EP Physicians prior to discharge. SHOWERING         Leave the bandage over your incision until your clinic follow up in 10-14 days after the Pacemaker implant. You bandage will be removed in clinic during that appointment.  It is important to keep the bandaged area clean and dry. You may shower around the site until the bandage is removed in clinic. Thereafter, you may shower after the bandage is removed, washing it gently with soap and water. Do not apply any lotions, powders, or perfumes to the incision line.  Avoid submerging your incision in water (tub baths, hot tubs, or swimming) for four weeks.  Underneath the dressing.  o You will most likely have a Zipline dressing over the incision. This is made with plastic zip ties to hold the incision together and promote better healing.  You may note dried blood around this dressing which is normal. Do not attempt to pull this off.   o If you have white steri-strips over your incision (underneath the gauze dressing), they will curl up at the end and fall off, usually within 10 days. Do not pull them off.  - OR -   o You may have a different type of closure for the incision including a dermabond adhesive which will slowly peel and come off on its own once you are able to shower. DISCHARGE PRECAUTIONS         Record your temperature every day, at the same time, until your 10-14 day follow up. A temperature of 100.5 F, or higher, can be the first sign of infection. This should be reported to your Doctor immediately.  You can have an MRI after 6 weeks. You must be aware that any strong magnet or magnetic field can affect your Pacemaker. In general, be careful of metal detectors, heavy machinery, and any area where arc-welding is performed. When approaching a security checkpoint show your Pacemaker ID Card to security personnel.  Always tell your doctor or dentist that you have a Pacemaker. In some cases, antibiotics may be prescribed before certain procedures.  Your temporary identification will be given to you with these instructions. Keep your Pacemaker card in your wallet or on your person at all times. You should receive your permanent card, although this may take up to 8-12 weeks. If you do not receive your permanent card, please call the office at [phone] or the phone number provided on your temporary card for the pacemaker company. TAKING YOUR PULSE         Take your pulse the same time every day, preferably in the morning, until your follow up.  Sit down and rest for 5 minutes prior to taking your pulse.  Take your pulse for 1 full minute, use a clock or stop watch with a second hand.  To feel your pulse, use the first two fingers of one hand; place them on the thumb side of the wrist of the opposite hand.   The pulse will be steady, regular and throbbing.  Call the physician if your pulse is less than 40 beats per minute. SYMPTOMS THAT NEED TO BE REPORTED IMMEDIATELY         Temperature more than 100.4 F     Redness or warmth at the incision site, or pain for longer than the first 5 days after the implant.  Drainage from the incision site.  Swelling around the incision site.  Shortness of breath.  Rapid heart rate or palpitations.  Dizziness, lightheadedness, fainting.  Slow pulse below 40 beats per minute.  REMEMBER: If you feel something is an emergency or cannot be handled over the phone, call 911 or go to the closest emergency room. Serge Melton MD  Cardiac Electrophysiology / Cardiology    [address], [address] [facility], [address], [address] [place] [address] Drive         [facility]  [phone] / [phone] Fax       [phone] / [phone] Fax        DRIVING:  DMV Transient Ischemic Attack and/or Cerebral Vascular Accident Policy   It is the policy of the Department of Motor Vehicles, based on guidance and recommendations from the Medical Advisory Board, that if a  suffers a Transient Ischemic Attack (TIA), the s privilege to operate a motor vehicle will be suspended for three months. The three-month suspension may be shortened for drivers who have suffered a TIA, provided that treatment has been provided mitigating the risk of reoccurrence and there is no impact on a s ability to operate a motor vehicle safely. These cases may be referred to the [address] for their guidance and recommendations. If a  suffers a Cerebral Vascular Accident (CVA), the s privilege to operate a motor vehicle will be suspended for six months.  This six-month suspension period may be shortened if [facility] receives information from the 's health care provider indicating that the  has fully recovered. These cases may be referred to the [address] for its guidance and recommendations. Following the six-month suspension, [facility] will request an evaluation with a certified  rehabilitation specialist if the  has suffered paralysis or cognitive changes due to the CVA. If the s physical and cognitive information is not indicated in the medical report received by the agency, [facility] will request it from the health care provider.    ([email or web address]). Based on the information received about the s cognitive abilities, the  may also be subject to the [facility]. The  is also required to furnish an updated Vision Report with an examination date that is not older than 90 days and occurs after the TIA/CVA, in accordance with [place] Code Section 46.2-311. [facility] may impose additional requirements on the individual depending on the information received by the agency. [email or web address]    Patient Discharge Instructions    Jacob Lombardo / [id number] : 1945    Admitted 2021 Discharged: 2021 9:07 PM     Primary care provider: Mercy Graves MD    Discharging provider:  Jonathan Corey MD  - Family Medicine Resident  Jan Kessler MD, MD - Family Medicine, Attending    . . . . . . . . . . . . . . . . . . . . . . . . . . . . . . . . . . . . . . . . . . . . . . . . . . . . . . . . . . . . . . . . . . . . . . . .     . . . . . . . . . . . . . . . . . . . . . . . . . . . . . . . . . . . . . . . . . . . . . . . . . . . . . . . . . . . . . . . . . . . . . . .       Medication Changes:  1. New Medications:  Diflucan 200mg daily, Keflex 500mg QID, Vancomycin 125mg q6H po, Lipitor 40mg qHS, ASA 81mg daily  2.  Modified Medications: Regular insulin 9U before meals (decreased from 25U)  3. Discontinued Medications:        HOSPITAL COURSE  76 y.o. female with a PMH of HTN, CAD s/p stent, T2DM on insulin, HLD, GERD, sciatica, insomnia, allergies presented with altered mental status. Admitted for DKA and severe sepsis. Treated with insulin drip, fluids, and broad spec antibtiotics. Blood cultures grew aerococcus viridans in 1/4 bottles, a likely contaminant. Repeat blood cultures were negative. AMS work up revealed infarct in thalamus. Neurology following during admission. Patient started on a statin and tolerating medication at discharge. AMS resolved, patient's mentation likely at baseline on discharge. Intermittent 3rd degree AVB and persistence of 2:1 AVB despite electrolyte correction noted on telemetry. Cardiology placed pacemaker. Patient tested positive for C diff colitis and started on po Vanc. Case management contacted [place] [facility] due to concern for social/living situation. Patient discharged to SNF. Severe Sepsis: CXR negative. UCx/RVP neg. CT abd/pelvis no acute process. 2/16 BCx 1/4 bottles aerococcus viridans - likely contaminant. Repeat BCx negative. Completed antibiotic course (Cefepime, Vanc, Flagyl > Augmentin). Repeat UA pos 1+mendez, lg LE. C diff positive. - Continue  Vancomycin 125mg q6H po for C. Diff infection  - Continue Diflucan 200mg daily for 14 days  - PCP: follow up final blood cultures (negative to date) & urine culture - yeast UTI    Mobitz Type II 2nd & 3rd degree AV block: S/p pacemaker. ECHO LVEF 60-65%. - Continue Keflex 500mg QID  - F/u Cardiology    AMS: RESOLVED. CT head/cervical spine negative. MRI infarct in thalamus. CVA head & neck neg. Ammonia, TSH, Etoh, UDS wnl. Volatiles - acetone. Patient likely at baseline mentation on discharge. - PCP: Consider neuropsychiatric testing     DKA: RESOLVED. POA AGMA, + blood ketones, and hyperglycemia (). S/p insulin gtt.     CVA: MRI infarct in thalamus.  CVA head & neck neg.   - Continue ASA 81 mg daily  - Continue Lipitor 40mg daily  - F/u Neurology     Dysphagia: Tolerating mechanical soft diet. -Recommend speech evaluation & continued speech therapy      T2DM: A1C 8.8. Home NPH 30U BID & Regular insulin 25U before meals.   - Continue NPH 30U BID  - Insulin Regular 9U before meals   - Discontinue Regular insulin 25U before meals   - Adjust SSI and mealtime insulin as needed  - F/u PCP    Pancreatic cyst: Noted on CT A/P and MRI abd. Repeat CT A/P during admission reported enlarging cystic pancreatic mass, which likely represents pseudocyst.   - F/u PCP  - F/u outpatient GI     Hiatal hernia: noted on CT A/P.  - PCP: consider referral to Surgery      COVID negative: COVID PCR negative x2 during admission.       EVAN: RESOLVED. POA Cr 1.83 (BL 0.7)     Buttock wound/rash: Followed by Wound care. - Continue repositioning and Nystatin powder BID      QTC prolonged: POA QTc 543. - Avoid QTc prolonging agents     HTN: POA /46.  - Continue home Norvasc 5mg daily, HCTZ 25mg daily, Cozaar 100mg daily     HLD: No home meds previously d/t statin intolerance (muscle pain). - Continue Lipitor 40mg qHS  - F/u PCP     GERD: Stable. - Continue Pepcid 20mg BID     Allergies: Stable. - Continue home Loratadine 10mg daily prn.     Insomnia: Stable.    - Continue home Melatonin 10mg daily prn, Seroquel 50mg qHS.     Bilateral low back pain w/Sciatica & Knee OA: F/w Dr. Candida Santo (Ortho) and Dr. Brandon Helms (pain management).         FOLLOW-UP CARE RECOMMENDATIONS:    APPOINTMENTS:  Follow-up Information     Follow up With Specialties Details Why Contact Sb Pascual NP Nurse Practitioner In 2 weeks Hospital Follow up [address]  [address]      Radha Negron MD Gastroenterology Call To make an appointment to see the GI doctors about your pancreatic cyst [address] [facility] Eleanor Davies MD Cardiology In 2 weeks office will call with date and time of appt  [address] [facility],[address]      Emely Sandoval MD Family Medicine Call Follow up with PCP after hospital stay [address]  [phone]              It is very important that you keep follow-up appointment(s). Bring discharge papers, medication list (and/or medication bottles) to follow-up appointments for review by outpatient provider(s). FOLLOW-UP TESTS RECOMMENDED:   · Gastroenterology doctors for pancreatic cyst    ONGOING TREATMENT PLAN: Vancomycin po for C diff, Keflex for prophylaxis post pacemaker, Diflucan for yeast UTI     PENDING TEST RESULTS:  At the time of discharge the following test results are still pending: Blood cultures (negative to date), Urine culture   Please review these results as they become available. Specific symptoms to watch for: chest pain, shortness of breath, fever, chills, nausea, vomiting, diarrhea, change in mentation, falling, weakness, bleeding. DIET:  Mechanical soft thins     ACTIVITY:  Activity as tolerated    WOUND CARE: None     EQUIPMENT needed:  None    INCIDENTAL FINDINGS:  Pancreatic cyst (likely pseudocyst), Hiatal hernia    GOALS OF CARE:       [x] Eventual return to home/independent/assisted living       [] Long term SNF       [] Hospice    Patient condition at discharge:   Functional status       [] Poor       [x] Deconditioned       [] Independent  Cognition       [x] Lucid       [] Forgetful (Some senescence)       [] Dementia  Catheters/lines (plus indication)       [] Hill       [] PICC           [] PEG       [x] None  Code status       [x] Full code       [] DNR  . . . . . . . . . . . . . . . . . . . . . . . . . . . . . . . . . . . . . . . . . . . . . . . . . . . . . . . . . . . . . . . . . . . . . . . Deepika Money      CHRONIC MEDICAL CONDITIONS:  Problem List as of 2/25/2021 Date Reviewed: 2/10/2021          Codes Class Noted - Resolved    DKA (diabetic ketoacidoses) ([facility] ICD-10-CM: E11.10  ICD-9-CM: 250.12  2/16/2021 - Present        * (Principal) Severe sepsis ([facility] ICD-10-CM: A41.9, R65.20  ICD-9-CM: 038.9, 995.92  2/16/2021 - Present        AMS (altered mental status) ICD-10-CM: R41.82  ICD-9-CM: 780.97  2/16/2021 - Present        Insomnia (Chronic) ICD-10-CM: G47.00  ICD-9-CM: 780.52  2/16/2021 - Present        Pancreatic cyst ICD-10-CM: K86.2  ICD-9-CM: 577.2  2/16/2021 - Present    Overview Signed 2/16/2021  9:12 PM by Emperatriz Pierre MD     F/u MRI Outpatient             GERD (gastroesophageal reflux disease) (Chronic) ICD-10-CM: K21.9  ICD-9-CM: 530.81  2/16/2021 - Present        Prolonged Q-T interval on ECG ICD-10-CM: R94.31  ICD-9-CM: 794.31  2/16/2021 - Present        EVAN (acute kidney injury) ([facility] ICD-10-CM: N17.9  ICD-9-CM: 584.9  2/16/2021 - Present        Mobitz type 2 second degree AV block ICD-10-CM: I44.1  ICD-9-CM: 426.12  2/16/2021 - Present        Essential hypertension ICD-10-CM: I10  ICD-9-CM: 401.9  9/23/2019 - Present        Hypercholesteremia ICD-10-CM: E78.00  ICD-9-CM: 272.0  9/23/2019 - Present        Controlled type 2 diabetes mellitus without complication, with long-term current use of insulin (HCC) ICD-10-CM: E11.9, Z79.4  ICD-9-CM: 250.00, V58.67  9/23/2019 - Present        Diabetic retinopathy ([facility] ICD-10-CM: E11.319  ICD-9-CM: 250.50, 362.01  9/10/2019 - Present              Information obtained by :   I understand that if any problems occur once I am at home I am to contact my physician. I understand and acknowledge receipt of the instructions indicated above.                                                                                                                                              Physician's or R.N.'s Signature                                                                  Date/Time Patient or Representative Signature                                                          Date/Time

## 2021-02-26 NOTE — PROGRESS NOTES
6:21 PM   02/26/21     [facility] has arrived to transport patient to [facility]. Daughter is aware. Transportation  Packet on chart. Call placed to [facility] [address] that patient is in route.     Tu Valdes RN CCM

## 2021-02-26 NOTE — DISCHARGE SUMMARY
[address] [facility] [address] [place] [address]   Office [phone]  Fax [phone]       Discharge / Transfer / Off-Service Note     Name: Celestine Arora MRN: [id number]  Sex: Female   YOB: 1945  Age: 76 y.o. PCP: Teresita Queen MD     Date of admission: 2/16/2021  Date of discharge/transfer: 2/26/2021    Attending physician at admission: Chelsey Gonzalez MD     Attending physician at discharge/transfer: Akshat Birch     Resident physician at discharge/transfer: Sourav Conklin MD     Consultants during hospitalization  IP CONSULT TO PRIMARY CARE PROVIDER  IP CONSULT TO PRIMARY CARE PROVIDER  [facility] [address]     Admission diagnoses   AMS (altered mental status) [R41.82]  Severe sepsis ([facility] [A41.9, R65.20]  DKA (diabetic ketoacidoses) ([facility] [address] [E11.10]    Recommended follow-up after discharge  1. PCP  2. Neurology  3. Cardiology  4. GI    Things to follow up on with PCP:  Final blood culture & urine culture  Monitor for signs of statin intolerance  Referral to GI for likely pancreatic pseudocyst  Consider neuropsychiatric testing   Hiatal hernia - consider     Medication Changes:  1. New Medications: Diflucan 200mg daily, ASA 81mg daily, Keflex 500mg QID, Vancomycin 125mg q6H po, Lipitor 40mg qHS  2. Modified Medications: Regular insulin 9U before meals (decreased to 25U)   3. Discontinued Medications: None      HOSPITAL COURSE    76 y.o. female with a PMH of HTN, CAD s/p stent, T2DM on insulin, HLD, GERD, sciatica, insomnia, allergies presented with altered mental status. Admitted for DKA and severe sepsis. Treated with insulin drip, fluids, and broad spec antibtiotics. Blood cultures grew aerococcus viridans in 1/4 bottles, a likely contaminant. Repeat blood cultures were negative. AMS work up revealed infarct in thalamus. Neurology following during admission. Patient started on a statin and tolerating medication at discharge.  AMS resolved, patient's mentation likely at baseline on discharge. Intermittent 3rd degree AVB and persistence of 2:1 AVB despite electrolyte correction noted on telemetry. Cardiology placed pacemaker. Patient tested positive for C diff colitis and started on po Vanc. Case management contacted [facility] due to concern for social/living situation. Patient discharged to SNF. Severe Sepsis: CXR negative. UCx/RVP neg. CT abd/pelvis no acute process. 2/16 BCx 1/4 bottles aerococcus viridans - likely contaminant. Repeat BCx negative. Completed antibiotic course (Cefepime, Vanc, Flagyl > Augmentin). Repeat UA pos 1+bac, lg LE. UCx yeast >[id number] colonies. C diff positive. - Continue  Vancomycin 125mg q6H po for C. Diff infection  - Continue Diflucan 200mg daily for 14 days for yeast UTI  - PCP: follow up final blood cultures (negative to date) & urine culture    Mobitz Type II 2nd & 3rd degree AV block: S/p pacemaker. ECHO LVEF 60-65%. - Continue Keflex 500mg QID for post pacemaker prophylaxis  - F/u Cardiology    AMS: RESOLVED. CT head/cervical spine negative. MRI infarct in thalamus. CVA head & neck neg. Ammonia, TSH, Etoh, UDS wnl. Volatiles - acetone. Patient likely at baseline mentation on discharge. - PCP: Consider neuropsychiatric testing     DKA: RESOLVED. POA AGMA, + blood ketones, and hyperglycemia (). S/p insulin gtt.     CVA: MRI infarct in thalamus. CVA head & neck neg.   - Continue ASA 81 mg daily  - Continue Lipitor 40mg daily  - F/u Neurology     Dysphagia: Tolerating mechanical soft diet. -Recommend speech evaluation & continued speech therapy      T2DM: A1C 8.8. Home NPH 30U BID & Regular insulin 25U before meals.   - Continue NPH 30U BID  - Insulin Regular 9U before meals   - Discontinue Regular insulin 25U before meals   - Adjust SSI and mealtime insulin as needed  - F/u PCP    Pancreatic cyst: Noted on CT A/P and MRI abd.  Repeat CT A/P during admission reported enlarging cystic pancreatic mass, which likely represents pseudocyst.   - F/u PCP  - F/u outpatient GI     Hiatal hernia: noted on CT A/P.  - PCP: consider referral to Surgery      COVID negative: COVID PCR negative x2 during admission.       EVAN: RESOLVED. POA Cr 1.83 (BL 0.7)     Buttock wound/rash: Followed by Wound care. - Continue repositioning and Nystatin powder BID      QTC prolonged: POA QTc 543. - Avoid QTc prolonging agents     HTN: POA /46.  - Continue home Norvasc 5mg daily, HCTZ 25mg daily, Cozaar 100mg daily     HLD: No home meds previously d/t statin intolerance (muscle pain). - Continue Lipitor 40mg qHS  - F/u PCP     GERD: Stable. - Continue Pepcid 20mg BID     Allergies: Stable. - Continue home Loratadine 10mg daily prn.     Insomnia: Stable. - Continue home Melatonin 10mg daily prn, Seroquel 50mg qHS.     Bilateral low back pain w/Sciatica & Knee OA: F/w Dr. Sheldon Garcia (Ortho) and Dr. Micha Aguilar (pain management).         Physical exam at discharge:    Vitals Reviewed. Visit Vitals  BP (!) 115/54   Pulse 100   Temp 98 °F (36.7 °C)   Resp 18   Ht 5' 4\" (1.626 m)   Wt 145 lb 3.2 oz (65.9 kg)   SpO2 92%   BMI 24.92 kg/m²        Physical Exam:     General Oriented to person, place, and time and well-developed. Appears well-nourished, no distress. Not diaphoretic. HENT Head Normocephalic and atraumatic. Eyes Conjunctivae are normal, no discharge. No scleral icterus. Neck No thyromegaly present. No cervical adenopathy. Cardio Normal rate. Exam reveals no gallop and no friction rub. No murmur heard. Pulmonary Effort normal and breath sounds normal. No respiratory distress. No wheezes, no rales. Abdominal Soft. Bowel sounds normal. No distension. No tenderness. Extremities No edema of lower extremities. No tenderness. Distal pulses intact. Neurological Sensation intact b/l. Strength 5/5 in all extremities   Dermatology Skin is warm and dry. No rash noted. No erythema or pallor.     Psychiatric Affect and judgment normal.        Condition at discharge: Stable    Labs  Recent Labs     02/26/21  0512 02/25/21  0604 02/24/21  1518   WBC 20.3* 22.1* 23.9*   HGB 12.9 13.1 14.7   HCT 39.2 39.0 43.5    317 364     Recent Labs     02/26/21  0512 02/25/21  0604 02/24/21  0607    135* 133*   K 2.6* 3.4* 4.0    99 97   CO2 26 27 27   BUN 14 16 11   CREA 0.61 0.74 0.72   GLU 78 204* 213*   CA 8.5 8.6 9.2   MG 1.8 1.8 1.7   PHOS 2.8 3.3 2.7     No results for input(s): ALT, AP, TBIL, TBILI, TP, ALB, GLOB, GGT, AML, LPSE in the last 72 hours.     No lab exists for component: SGOT, GPT, AMYP, HLPSE  Recent Labs     02/26/21  1155 02/26/21  0719 02/25/21  2125 02/25/21  1615 02/25/21  1133   GLUCPOC 232* 110* 179* 259* 265*       Microbiology  Results     Procedure Component Value Units Date/Time    CULTURE, URINE [[id number]]  (Abnormal) Collected: 02/25/21 1444    Order Status: Completed Specimen: Urine from Clean catch Updated: 02/26/21 1448     Special Requests: NO SPECIAL REQUESTS        [place] Count --        >100,000  COLONIES/mL       Culture result: YEAST       C. DIFFICILE AG & TOXIN A/B [[id number]]  (Abnormal) Collected: 02/24/21 1518    Order Status: Completed Specimen: Stool Updated: 02/25/21 1655     [address] [place] ANTIGEN Positive        C. difficile toxin Positive        INTERPRETATION       POSITIVE FOR TOXIGENIC C. DIFFICILE          CULTURE, STOOL- (Enteric Bacteria Panel, DNA) [[id number]] Collected: 02/24/21 1518    Order Status: Completed Specimen: Stool Updated: 02/25/21 1901     Shigella species, DNA Negative        Campylobacter species, DNA Negative        Vibrio species, DNA Negative        Enterotoxigen E Coli, DNA Negative        Shiga toxin producing, DNA Negative        Salmonella species, DNA Negative        P. shigelloides, DNA Negative        Y. enterocolitica, DNA Negative       CULTURE, BLOOD, PAIRED [[id number]] Collected: 02/24/21 1518    Order Status: Completed Specimen: Blood Updated: 02/26/21 0645     Special Requests: NO SPECIAL REQUESTS        Culture result: NO GROWTH 2 DAYS       CULTURE, BLOOD [[id number]]     Order Status: Canceled Specimen: Blood     COVID-19 RAPID TEST [[id number]] Collected: 02/23/21 1743    Order Status: Completed Specimen: Nasopharyngeal Updated: 02/23/21 1810     Specimen source Nasopharyngeal        COVID-19 rapid test Not detected        Comment: Rapid Abbott ID Now       Rapid NAAT:  The specimen is NEGATIVE for SARS-CoV-2, the novel coronavirus associated with COVID-19. Negative results should be treated as presumptive and, if inconsistent with clinical signs and symptoms or necessary for patient management, should be tested with an alternative molecular assay. Negative results do not preclude SARS-CoV-2 infection and should not be used as the sole basis for patient management decisions. This test has been authorized by the FDA under an Emergency Use Authorization (EUA) for use by authorized laboratories.    Fact sheet for Healthcare Providers: iTendency.uy  Fact sheet for Patients: iTendency.uy       Methodology: Isothermal Nucleic Acid Amplification         COVID-19 RAPID TEST [[id number]]     Order Status: Canceled     COVID-19 RAPID TEST [[id number]]     Order Status: Canceled     CULTURE, BLOOD, PAIRED [[id number]] Collected: 02/17/21 1125    Order Status: Completed Specimen: Blood Updated: 02/22/21 0529     Special Requests: NO SPECIAL REQUESTS        Culture result: NO GROWTH 5 DAYS       RESPIRATORY VIRUS PANEL W/COVID-19, PCR [[id number]] Collected: 02/16/21 1645    Order Status: Completed Specimen: Nasopharyngeal Updated: 02/16/21 2355     Adenovirus Not detected        Coronavirus 229E Not detected        Coronavirus HKU1 Not detected        Coronavirus CVNL63 Not detected        Coronavirus OC43 Not detected        Metapneumovirus Not detected        Rhinovirus and Enterovirus Not detected        Influenza A Not detected        Influenza A, subtype H1 Not detected        Influenza A, subtype H3 Not detected        INFLUENZA A H1N1 PCR Not detected        Influenza B Not detected        Parainfluenza 1 Not detected        Parainfluenza 2 Not detected        Parainfluenza 3 Not detected        Parainfluenza virus 4 Not detected        RSV by PCR Not detected        B. parapertussis, PCR Not detected        Bordetella pertussis - PCR Not detected        Chlamydophila pneumoniae DNA, QL, PCR Not detected        Mycoplasma pneumoniae DNA, QL, PCR Not detected        SARS-CoV-2, PCR Not detected       RESPIRATORY VIRUS PANEL W/COVID-19, PCR [[id number]]     Order Status: Canceled Specimen: NASOPHARYNGEAL SWAB     CULTURE, URINE [[id number]] Collected: 02/16/21 1157    Order Status: Completed Specimen: Cath Urine Updated: 02/18/21 0805     Special Requests: NO SPECIAL REQUESTS        Culture result: No growth (<1,000 CFU/ML)       CULTURE, BLOOD, PAIRED [[id number]]  (Abnormal) Collected: 02/16/21 1126    Order Status: Completed Specimen: Blood Updated: 02/24/21 0809     Special Requests: NO SPECIAL REQUESTS        Culture result:       AEROCOCCUS VIRIDANS GROWING IN 1 OF 4 BOTTLES DRAWN (SITE = RFA) SENDING TO REFERENCE LAB FOR SENSITIVITIES                  REMAINING BOTTLE(S) HAS/HAVE NO GROWTH IN 5 DAYS            . Debbie Erma REFER TO [id number] FOR  AEROCOCCUS VIRIDANS SENSITIVITIES    MICRO Blakedanya Louis [[id number]] Collected: 02/16/21 1126    Order Status: Completed Updated: 02/17/21 1017    SUSCEPTIBILITY, AER + ANAEROB [[id number]]  (Abnormal) Collected: 02/16/21 1126    Order Status: Completed Specimen: MICROBIAL ISOLATE Updated: 02/24/21 1637     Source BLOOD        Susceptibility, Aer + Anaerob Final Report Below        Comment: (NOTE)  Performed At: [address] [id number]  Benny Murray MD CLARITA:[id number]  CORRECTED ON 02/24 AT 1636: PREVIOUSLY REPORTED AS Preliminary report         Siddhartha SCHMIDT [[id number]]  (Abnormal) Collected: 02/16/21 1126    Order Status: Completed Specimen: Miscellaneous sample Updated: 02/24/21 1637     Source BLOOD        Aer+Anaer Suscept Result 1 Aerococcus viridans        Comment: (NOTE)  Identification performed by account, not confirmed by this  laboratory. Testing performed by broth microdilution. Antimicrobial suscept. Comment        Comment: (NOTE)       ** S = Susceptible; I = Intermediate; R = Resistant **                    P = Positive; N = Negative             MICS are expressed in micrograms per mL    Antibiotic                 RSLT#1    RSLT#2    RSLT#3    RSLT#4  Cefepime                       S  Cefotaxime                     S  Ceftriaxone                    S  Chloramphenicol                R  Clindamycin                    S  Erythromycin                   R  Levofloxacin                   R  Penicillin                     S  Vancomycin                     S  Performed At: [address] [id number]  Miles Rogers MD KP:[id number]                  Procedures / Diagnostic Studies  Pacemaker placement 2/24/2021 by Dr. Queta Rivera    Result Date: 2/17/2021  Small acute infarct posterior lateral to the left thalamus Volume loss and moderate White matter changes which may be due to small vessel ischemic change    Mri Abd W Wo Cont    Result Date: 2/18/2021  1. 4.7 x 3.7 x 4.7 cm cystic lesion between the CBD and the pancreatic head has no enhancing component and has a nonaggressive appearance. Differential diagnosis includes CBD choledochocyst, simple pancreatic cyst, chronic pseudocyst, and less likely intraductal papillary mucinous neoplasm. Although there is mass effect on the CBD, there is no biliary obstruction or dilatation. 2. No acute process on MRI.  Recommendation: Outpatient follow-up with gastroenterology to determine need for follow-up imaging versus ERCP. Ct Head Wo Cont    Result Date: 2/21/2021  No acute intracranial process. No significant change from the prior study. Ct Head Wo Cont    Result Date: 2/16/2021  No acute intracranial process is identified. Ct Spine Cerv Wo Cont    Result Date: 2/16/2021  1. No fracture or subluxation is identified. There are changes of spondylosis as described above. Ct Abd Pelv Wo Cont    Result Date: 2/24/2021  1. There are no acute findings in abdomen or pelvis. 2. The cystic pancreatic mass is larger suggesting this likely represents a pseudocyst. 3. Hiatal hernia. Ct Abd Pelv Wo Cont    Result Date: 2/16/2021  1. No evidence of acute process in the abdomen or pelvis. 2. Cystic lesion in the head of the pancreas measuring 4.3 x 3.8 x 4.7 cm, with differential considerations including mucinous cystic neoplasm (favored), serous cystadenoma, or large side branch IPMN among others. MRI abdomen on a nonemergent basis is recommended for further evaluation. 3. Nonspecific chronic calcified cystic lesion in the right ischiorectal fossa measuring 4.0 x 2.4 cm. 4. Severe calcific atherosclerosis of the abdominal aorta. 5. Additional findings as above. Xr Chest Port    Result Date: 2/24/2021  1. No pneumothorax    Xr Chest Port    Result Date: 2/16/2021  Negative radiographic examination of the chest.    Cta Head Neck W Cont    Result Date: 2/18/2021  1. No large vessel occlusion or intracranial aneurysm. 2.  Calcified plaque in the proximal internal carotid arteries bilaterally without flow-limiting stenosis.        Chronic diagnoses   Problem List as of 2/26/2021 Date Reviewed: 2/10/2021          Codes Class Noted - Resolved    C. difficile colitis ICD-10-CM: A04.72  ICD-9-CM: 008.45  2/26/2021 - Present        Yeast UTI ICD-10-CM: B37.49  ICD-9-CM: 112.2  2/26/2021 - Present        DKA (diabetic ketoacidoses) ([facility] ICD-10-CM: E11.10  ICD-9-CM: 250.12  2/16/2021 - Present        * (Principal) Severe sepsis ([facility] ICD-10-CM: A41.9, R65.20  ICD-9-CM: 038.9, 995.92  2/16/2021 - Present        AMS (altered mental status) ICD-10-CM: R41.82  ICD-9-CM: 780.97  2/16/2021 - Present        Insomnia (Chronic) ICD-10-CM: G47.00  ICD-9-CM: 780.52  2/16/2021 - Present        Pancreatic cyst ICD-10-CM: K86.2  ICD-9-CM: 577.2  2/16/2021 - Present    Overview Signed 2/16/2021  9:12 PM by Susie Forman MD     F/u MRI Outpatient             GERD (gastroesophageal reflux disease) (Chronic) ICD-10-CM: K21.9  ICD-9-CM: 530.81  2/16/2021 - Present        Prolonged Q-T interval on ECG ICD-10-CM: R94.31  ICD-9-CM: 794.31  2/16/2021 - Present        EVAN (acute kidney injury) ([facility] ICD-10-CM: N17.9  ICD-9-CM: 584.9  2/16/2021 - Present        Mobitz type 2 second degree AV block ICD-10-CM: I44.1  ICD-9-CM: 426.12  2/16/2021 - Present        Essential hypertension ICD-10-CM: I10  ICD-9-CM: 401.9  9/23/2019 - Present        Hypercholesteremia ICD-10-CM: E78.00  ICD-9-CM: 272.0  9/23/2019 - Present        Controlled type 2 diabetes mellitus without complication, with long-term current use of insulin (HCC) ICD-10-CM: E11.9, Z79.4  ICD-9-CM: 250.00, V58.67  9/23/2019 - Present        Diabetic retinopathy ([facility] ICD-10-CM: E11.319  ICD-9-CM: 250.50, 362.01  9/10/2019 - Present              Discharge/Transfer Medications  Current Discharge Medication List      START taking these medications    Details   aspirin 81 mg chewable tablet Take 1 Tab by mouth daily. Qty: 30 Tab, Refills: 0      vancomycin (FIRVANQ) 50 mg/mL oral solution Take 2.5 mL by mouth every six (6) hours for 13 days. Qty: 130 mL, Refills: 0      cephALEXin (KEFLEX) 500 mg capsule Take 1 Cap by mouth four (4) times daily for 12 doses. Qty: 12 Cap, Refills: 0      fluconazole (DIFLUCAN) 200 mg tablet Take 1 Tab by mouth daily for 14 days. FDA advises cautious prescribing of oral fluconazole in pregnancy.   Qty: 14 Tab, Refills: 0      nystatin (MYCOSTATIN) powder Apply  to affected area two (2) times a day. Qty: 1 Bottle, Refills: 0      atorvastatin (LIPITOR) 40 mg tablet Take 1 Tab by mouth nightly. Qty: 30 Tab, Refills: 0         CONTINUE these medications which have CHANGED    Details   insulin regular (NOVOLIN R, HUMULIN R) 100 unit/mL injection 9 Units by SubCUTAneous route Before breakfast, lunch, and dinner. 9 units by subcutaneous route before breakfast, lunch, and dinner  Qty: 1 Vial, Refills: 0         CONTINUE these medications which have NOT CHANGED    Details   insulin NPH (NOVOLIN N, HUMULIN N) 100 unit/mL injection 30-40 Units by SubCUTAneous route Before breakfast and dinner. losartan (COZAAR) 100 mg tablet Take 1 Tab by mouth daily. Qty: 90 Tab, Refills: 1    Associated Diagnoses: Essential hypertension      famotidine (PEPCID) 20 mg tablet Take 1 Tab by mouth two (2) times a day. Qty: 180 Tab, Refills: 1    Associated Diagnoses: Gastroesophageal reflux disease without esophagitis      hydroCHLOROthiazide (HYDRODIURIL) 25 mg tablet Take 1 Tab by mouth daily. Qty: 90 Tab, Refills: 1    Associated Diagnoses: Essential hypertension      amLODIPine (NORVASC) 5 mg tablet Take 1 Tab by mouth daily. Qty: 90 Tab, Refills: 1    Associated Diagnoses: Essential hypertension      QUEtiapine (SEROquel) 50 mg tablet Take 1 Tab by mouth nightly. Qty: 90 Tab, Refills: 1    Associated Diagnoses: Insomnia, unspecified type      augmented betamethasone dipropionate (DIPROLENE-AF) 0.05 % topical cream Apply  to affected area two (2) times a day. Qty: 30 g, Refills: 1    Associated Diagnoses: Psoriasis      butalbital-aspirin-caffeine (FIORINAL) capsule Take 1 Cap by mouth daily as needed for Headache (Takes rarely). loratadine 10 mg cap Take 10 mg by mouth daily as needed. Indications: inflammation of the nose due to an allergy      melatonin 5 mg cap capsule Take 5 mg by mouth nightly as needed.       ascorbic acid, vitamin C, (VITAMIN C) 500 mg tablet Take 1,000 mg by mouth daily. naproxen sodium 220 mg Cap Take 1 Tab by mouth daily as needed. Diet: Mechanical soft diet per speech eval     Activity:  As tolerated    Disposition: SNF. PT/OT evaluated patient and recommended SNF care during hospital stay.      Discharge instructions to patient/family  Please seek medical attention for any new or worsening symptoms particularly fever, chest pain, shortness of breath, abdominal pain, nausea, vomiting    Follow up plans/appointments  Follow-up Information     Follow up With Specialties Details Why Contact Info    Leida Gregory NP Nurse Practitioner In 2 weeks Hospital Follow up [place] [address]  [address]      Adri Evangelista MD Gastroenterology Call To make an appointment to see the GI doctors about your pancreatic cyst [address]       Louie Byrd MD Cardiology In 2 weeks office will call with date and time of appt  [address]  [address] [facility]      Janet Reed MD Family Medicine Call Follow up with PCP after hospital stay [address] [facility]             [facility] [place]  Family Medicine Resident       For Billing    Chief Complaint   Patient presents with    Altered mental status   [facility] Problems  Date Reviewed: 2/10/2021          Codes Class Noted POA    C. difficile colitis ICD-10-CM: A04.72  ICD-9-CM: 008.45  2/26/2021 Unknown        Yeast UTI ICD-10-CM: B37.49  ICD-9-CM: 112.2  2/26/2021 Unknown        DKA (diabetic ketoacidoses) ([facility]) ICD-10-CM: E11.10  ICD-9-CM: 250.12  2/16/2021 Unknown        * (Principal) Severe sepsis ([facility]) ICD-10-CM: A41.9, R65.20  ICD-9-CM: 038.9, 995.92  2/16/2021 Unknown        AMS (altered mental status) ICD-10-CM: R41.82  ICD-9-CM: 780.97  2/16/2021 Unknown        Insomnia (Chronic) ICD-10-CM: G47.00  ICD-9-CM: 780.52  2/16/2021 Unknown        Pancreatic cyst ICD-10-CM: K86.2  ICD-9-CM: 577.2  2/16/2021 Unknown    Overview Signed 2/16/2021  9:12 PM by Susie Forman MD     F/u MRI Outpatient             GERD (gastroesophageal reflux disease) (Chronic) ICD-10-CM: K21.9  ICD-9-CM: 530.81  2/16/2021 Unknown        Prolonged Q-T interval on ECG ICD-10-CM: R94.31  ICD-9-CM: 794.31  2/16/2021 Unknown        EVAN (acute kidney injury) ([facility] ICD-10-CM: N17.9  ICD-9-CM: 584.9  2/16/2021 Unknown        Mobitz type 2 second degree AV block ICD-10-CM: I44.1  ICD-9-CM: 426.12  2/16/2021 Unknown        Essential hypertension ICD-10-CM: I10  ICD-9-CM: 401.9  9/23/2019 Yes        Hypercholesteremia ICD-10-CM: E78.00  ICD-9-CM: 272.0  9/23/2019 Yes        Controlled type 2 diabetes mellitus without complication, with long-term current use of insulin (HCC) ICD-10-CM: E11.9, Z79.4  ICD-9-CM: 250.00, V58.67  9/23/2019 Yes

## 2021-02-26 NOTE — PROGRESS NOTES
Transition of Care Plan to SNF/Rehab    Communication to Patient/Family:  Met with patient and family and they are agreeable to the transition plan. The Plan for Transition of Care is related to the following treatment goals: AMS, Severe Sepsis, DKA    The Patient and/or patient representative was provided with a choice of provider and agrees with the discharge plan. Yes [x] No []    A Freedom of choice list was provided with basic dialogue that supports the patient's individualized plan of care/goals and shares the quality data associated with the providers. Yes [x] No []    SNF/Rehab Transition:  Patient has been accepted to the [facility] SNF/Rehab and meets criteria for admission. Patient will transported by [facility] and expected to leave at 4:30pm.     Communication to SNF/Rehab:  Bedside RN, Hakeem Gregory, has been notified to update the transition plan to the facility and call report [phone]. Discharge information has been updated on the AVS. And communicated to facility via Zipments/All Scripts, or CC link. Discharge instructions via CC link    Nursing Please include all hard scripts for controlled substances, med rec and dc summary, and AVS in packet. Reviewed and confirmed with facility, [facility], admissions coordinator, can manage the patient care needs for the following:     Roxana Herbert with (X) only those applicable:  Medication:  [x]Medications are available at the facility  []IV Antibiotics    []Controlled Substance  hard copies available sent.   []Weekly Labs    Equipment:  []CPAP/BiPAP  []Wound Vacuum  []Hill or Urinary Device  []PICC/Central Line  []Nebulizer  []Ventilator    Treatment:  []Isolation (for MRSA, VRE, etc.)  []Surgical Drain Management  []Tracheostomy Care  []Dressing Changes  []Dialysis with transportation  []PEG Care  []Oxygen  []Daily Weights for Heart Failure    Dietary:  []Any diet limitations  []Tube Feedings   []Total Parenteral Management (TPN)    Financial Resources:  []Medicaid Application Completed    [x]UAI to be completed on 2/26/21  []A screening has previously been completed. []Level II Completed    [] Private pay individual who will not become   financially eligible for Medicaid within 6 months from admission to a [address] [place] [address] facility. [] Individual refused to have screening conducted. []Medicaid Application Completed    []The screening denied because it was determined individual did not need/did not qualify for nursing facility level of care. [] Out of state residents seeking direct admission to a [address] facility. [] Individuals who are inpatients of an out of state hospital, or in state or out of state veterans/ hospital and seek direct admission to a [address] facility  [] Individuals who are pateints or residents of a state owned/operated facility that is licensed by Department of [employer] and seek direct admission to [address] [place] [address]  [] A screening not required for enrollment in [facility] services as set out in [address] [place] [address] 00-  [] [facility] staff shall perform screenings of the [facility] clients. Advanced Care Plan:  []Surrogate Decision Maker of Care  []POA  []Communicated Code Status and copy sent.     Other:

## 2021-02-27 LAB
BACTERIA SPEC CULT: ABNORMAL
CC UR VC: ABNORMAL
SERVICE CMNT-IMP: ABNORMAL

## 2021-03-01 ENCOUNTER — PATIENT OUTREACH (OUTPATIENT)
Dept: CASE MANAGEMENT | Age: 76
End: 2021-03-01

## 2021-03-01 LAB
BACTERIA SPEC CULT: NORMAL
SERVICE CMNT-IMP: NORMAL

## 2021-03-01 NOTE — PROGRESS NOTES
ALVIN successfully processed - faxed to The [address] of [facility].  PAYTON Allred

## 2021-03-15 ENCOUNTER — PATIENT OUTREACH (OUTPATIENT)
Dept: CASE MANAGEMENT | Age: 76
End: 2021-03-15

## 2021-03-29 ENCOUNTER — PATIENT OUTREACH (OUTPATIENT)
Dept: CASE MANAGEMENT | Age: 76
End: 2021-03-29

## 2021-03-29 NOTE — PROGRESS NOTES
Per patient daughter, patient was readmitted to [facility] ([facility]) In [place] and discharged to a [place] [address] in [place]. No transition of care outreach indicated due to patient discharge to a [address] [place] [address].

## 2021-05-10 ENCOUNTER — OFFICE VISIT (OUTPATIENT)
Dept: FAMILY MEDICINE CLINIC | Age: 76
End: 2021-05-10
Payer: MEDICARE

## 2021-05-10 VITALS
OXYGEN SATURATION: 97 % | TEMPERATURE: 98.2 F | HEIGHT: 64 IN | SYSTOLIC BLOOD PRESSURE: 104 MMHG | DIASTOLIC BLOOD PRESSURE: 70 MMHG | BODY MASS INDEX: 24.41 KG/M2 | HEART RATE: 102 BPM | RESPIRATION RATE: 14 BRPM | WEIGHT: 143 LBS

## 2021-05-10 DIAGNOSIS — L03.317 CELLULITIS OF LEFT BUTTOCK: ICD-10-CM

## 2021-05-10 DIAGNOSIS — Z79.4 CONTROLLED TYPE 2 DIABETES MELLITUS WITHOUT COMPLICATION, WITH LONG-TERM CURRENT USE OF INSULIN (HCC): ICD-10-CM

## 2021-05-10 DIAGNOSIS — Z09 HOSPITAL DISCHARGE FOLLOW-UP: ICD-10-CM

## 2021-05-10 DIAGNOSIS — I10 ESSENTIAL HYPERTENSION: Primary | ICD-10-CM

## 2021-05-10 DIAGNOSIS — E78.5 HYPERLIPIDEMIA, UNSPECIFIED HYPERLIPIDEMIA TYPE: ICD-10-CM

## 2021-05-10 DIAGNOSIS — E11.9 CONTROLLED TYPE 2 DIABETES MELLITUS WITHOUT COMPLICATION, WITH LONG-TERM CURRENT USE OF INSULIN (HCC): ICD-10-CM

## 2021-05-10 PROCEDURE — G8510 SCR DEP NEG, NO PLAN REQD: HCPCS | Performed by: FAMILY MEDICINE

## 2021-05-10 PROCEDURE — 99214 OFFICE O/P EST MOD 30 MIN: CPT | Performed by: FAMILY MEDICINE

## 2021-05-10 PROCEDURE — G8536 NO DOC ELDER MAL SCRN: HCPCS | Performed by: FAMILY MEDICINE

## 2021-05-10 PROCEDURE — 1090F PRES/ABSN URINE INCON ASSESS: CPT | Performed by: FAMILY MEDICINE

## 2021-05-10 PROCEDURE — G8427 DOCREV CUR MEDS BY ELIG CLIN: HCPCS | Performed by: FAMILY MEDICINE

## 2021-05-10 PROCEDURE — G8420 CALC BMI NORM PARAMETERS: HCPCS | Performed by: FAMILY MEDICINE

## 2021-05-10 PROCEDURE — 3052F HG A1C>EQUAL 8.0%<EQUAL 9.0%: CPT | Performed by: FAMILY MEDICINE

## 2021-05-10 PROCEDURE — 1101F PT FALLS ASSESS-DOCD LE1/YR: CPT | Performed by: FAMILY MEDICINE

## 2021-05-10 PROCEDURE — G8754 DIAS BP LESS 90: HCPCS | Performed by: FAMILY MEDICINE

## 2021-05-10 PROCEDURE — G8399 PT W/DXA RESULTS DOCUMENT: HCPCS | Performed by: FAMILY MEDICINE

## 2021-05-10 PROCEDURE — G8752 SYS BP LESS 140: HCPCS | Performed by: FAMILY MEDICINE

## 2021-05-10 NOTE — PROGRESS NOTES
Truitt Ahumada  68 y.o. female  1945  CMQ:[id number]  Roxana Bravo [facility]  Progress Note     Encounter Date: 5/10/2021    Assessment and Plan:     Encounter Diagnoses     ICD-10-CM ICD-9-CM   1. Essential hypertension  I10 401.9   2. Controlled type 2 diabetes mellitus without complication, with long-term current use of insulin (HCC)  E11.9 250.00    Z79.4 V58.67   3. Cellulitis of left buttock  L03.317 682.5   4. Hyperlipidemia, unspecified hyperlipidemia type  E78.5 272.4   5. Hospital discharge follow-up  Z09 V67.59       1. Essential hypertension  At goal,   Patient's daughter to get me copy of labs from rehab. If creatinine is good and edema persists with good BP, we could stop amlodipine and resume diuretic    2. Controlled type 2 diabetes mellitus without complication, with long-term current use of insulin (HCC)  Control seems OK. Unclear if her home regimen is what she was on at Nursing home. Currently blood sugars ok  TO report any hypoglycemia so we can adjust insulin as needed    3. Cellulitis of left buttock  Heeled pressure ulcers on exam today. 4. Hyperlipidemia, unspecified hyperlipidemia type  Continue statin    5. Hospital discharge follow-up  Stable after discharge. Patient's daughter to pursue treatment of C diff with her rehab doctor. I have none of those records. Report any recurrence of fever. She has had three episodes that appear to be infection. Unclear if source to which this was attributed was confirmed. FU one month again sooner if low sugars and to ER if recurrence of fever. I have discussed the diagnosis with the patient and the intended plan as seen in the above orders. she has expressed understanding. The patient has received an after-visit summary and questions were answered concerning future plans. I have discussed medication side effects and warnings with the patient as well.     Electronically Signed: Delisa Valladares MD    Current Medications after this visit     Current Outpatient Medications   Medication Sig    insulin regular (NOVOLIN R, HUMULIN R) 100 unit/mL injection 9 Units by SubCUTAneous route Before breakfast, lunch, and dinner. 9 units by subcutaneous route before breakfast, lunch, and dinner    aspirin 81 mg chewable tablet Take 1 Tab by mouth daily.  atorvastatin (LIPITOR) 40 mg tablet Take 1 Tab by mouth nightly.  insulin NPH (NOVOLIN N, HUMULIN N) 100 unit/mL injection 30 Units by SubCUTAneous route Before breakfast and dinner.  losartan (COZAAR) 100 mg tablet Take 1 Tab by mouth daily.  famotidine (PEPCID) 20 mg tablet Take 1 Tab by mouth two (2) times a day.  hydroCHLOROthiazide (HYDRODIURIL) 25 mg tablet Take 1 Tab by mouth daily.  amLODIPine (NORVASC) 5 mg tablet Take 1 Tab by mouth daily.  augmented betamethasone dipropionate (DIPROLENE-AF) 0.05 % topical cream Apply  to affected area two (2) times a day.  butalbital-aspirin-caffeine (FIORINAL) capsule Take 1 Cap by mouth daily as needed for Headache (Takes rarely).  ascorbic acid, vitamin C, (VITAMIN C) 500 mg tablet Take 1,000 mg by mouth daily. No current facility-administered medications for this visit.       Medications Discontinued During This Encounter   Medication Reason    QUEtiapine (SEROquel) 50 mg tablet Not A Current Medication    melatonin 5 mg cap capsule Not A Current Medication    naproxen sodium 220 mg Cap Not A Current Medication    loratadine 10 mg cap Not A Current Medication    nystatin (MYCOSTATIN) powder Not A Current Medication     ~~~~~~~~~~~~~~~~~~~~~~~~~~~~~~~~~~~~~~~~~~~~~~~~~~~~~~~~~~~    Chief Complaint   Patient presents with   [facility] Follow Up      x 3 visit     Arm Pain     Right seems to be worst        History provided by patient and daughter  History of Present Illness   Regis Young is a 68 y.o. female who presents to clinic today for:  Hospital Follow Up ( x 3 visit ) and Arm Pain (Right seems to be worst )    Hospitalized 3 times and rehab three times since I saw her last.  [facility]. 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 Hospitalized X 2 at [facility] for fever since then and had dx of UTI and C diff. Most recent discharge and rehab stopped 15 days ago. Having trouble getting PO Vancomycin. Fasting sugar this morning 126  No low sugars at all since going back to 30 U BID of Novolin N  Novolin R 5 or 10 depending on premeal sugar  5 if over 120 and 10 if over 200    Hypertension  On amlodipine and losartan  HCTZ no longer being given. Med list reviewed and reconciled. Health Maintenance  Will do at future visit  Health Maintenance Due   Topic Date Due    COVID-19 Vaccine (1) Never done    Shingrix Vaccine Age 50> (2 of 2) 12/11/2019    Medicare Yearly Exam  10/16/2020    MICROALBUMIN Q1  03/02/2021     Review of Systems   Review of Systems   Constitutional: Negative for chills, fever and weight loss. HENT: Negative for congestion and sore throat. Respiratory: Negative for cough and shortness of breath. Cardiovascular: Positive for leg swelling. Negative for chest pain. Gastrointestinal: Negative for abdominal pain, blood in stool, nausea and vomiting. Genitourinary: Positive for frequency and urgency. Negative for dysuria and hematuria. Psychiatric/Behavioral: Positive for memory loss. Negative for depression. The patient is not nervous/anxious. Vitals/Objective:     Vitals:    05/10/21 1121   BP: 104/70   Pulse: (!) 102   Resp: 14   Temp: 98.2 °F (36.8 °C)   TempSrc: Temporal   SpO2: 97%   Weight: 143 lb (64.9 kg)   Height: 5' 4\" (1.626 m)     Body mass index is 24.55 kg/m².     Wt Readings from Last 3 Encounters:   05/10/21 143 lb (64.9 kg)   02/23/21 145 lb 3.2 oz (65.9 kg) 02/10/21 156 lb (70.8 kg)         Objective  Physical Exam  Vitals signs and nursing note reviewed. Constitutional:       Appearance: Normal appearance. She is not toxic-appearing. HENT:      Head: Normocephalic and atraumatic. Neck:      Musculoskeletal: No muscular tenderness. Cardiovascular:      Rate and Rhythm: Normal rate and regular rhythm. Heart sounds: Normal heart sounds. No murmur. No gallop. Pulmonary:      Effort: Pulmonary effort is normal. No respiratory distress. Breath sounds: Normal breath sounds. No wheezing, rhonchi or rales. Musculoskeletal:      Right lower le+ Pitting Edema present. Left lower le+ Pitting Edema present. Lymphadenopathy:      Cervical: No cervical adenopathy. Neurological:      Mental Status: She is alert. Psychiatric:         Mood and Affect: Mood normal.         Behavior: Behavior normal.         Thought Content: Thought content normal.         Judgment: Judgment normal.       Buttocks, no skin breakdown over sacrum or around anus      No results found for this or any previous visit (from the past 24 hour(s)). Disposition     Follow-up and Dispositions  ·   Return in about 1 month (around 6/10/2021) for Diabetes follow up, Blood pressure follow up. Future Appointments   Date Time Provider Jeffrey Willard   2021  2:00 PM PACEMAKER, STFRANCES [facility] BS AMB   2021  2:20 PM [facility]   2021 11:20 AM Prabha Rosa MD [facility] BS AMB   2021 10:30 AM [address] [facility] BS AMB       History   Patient's past medical, surgical and family histories were reviewed and updated.     Past Medical History:   Diagnosis Date    CAD (coronary artery disease)     [facility]     Diabetes ([facility]     type 2    GERD (gastroesophageal reflux disease)     H/O seasonal allergies     Hyperlipidemia     Hypertension     Insomnia     Nausea & vomiting     PUD (peptic ulcer disease)     Small bowel obstruction ([facility]     Vitamin D deficiency      Past Surgical History:   Procedure Laterality Date    HX APPENDECTOMY      HX BREAST BIOPSY      HX GYN  1971    partial hysterectomy    HX GYN  1991    complete hysterectomy    HX HYSTERECTOMY      HX ORTHOPAEDIC      right rotator cuff repair    HX ORTHOPAEDIC      bilaterral wrist surgery    HX ORTHOPAEDIC      right knee surgery    HX ORTHOPAEDIC      bilateral bone spur removal from big toe    HX TONSIL AND ADENOIDECTOMY      HX TONSILLECTOMY      CA BREAST SURGERY PROCEDURE UNLISTED      breast reduction    CA CARDIAC SURG PROCEDURE UNLIST      heart stent    CA INS NEW/RPLCMT PRM PM W/TRANSV ELTRD ATRIAL&VENT N/A 2/24/2021    INSERT PPM DUAL performed by Manuel Jacobsen MD at [facility]     Family History   Problem Relation Age of Onset    Cancer Maternal Aunt         pancreatic cancer    Hypertension Mother     COPD Mother     Diabetes Father     Alcohol abuse Brother         murdered      Social History     Tobacco Use    Smoking status: Never Smoker    Smokeless tobacco: Never Used   Substance Use Topics    Alcohol use: No    Drug use: No       Allergies     Allergies   Allergen Reactions    Advicor [Niacin-Lovastatin] Swelling    Conjugated Estrogens Other (comments)     Headaches      Other reaction(s): Other (comments)  Headaches    Deflazacort Unable to Obtain     Other reaction(s): Unable to Obtain    Erythromycin Nausea Only    Erythromycin Base Nausea and Vomiting    Niacin Swelling    Pitavastatin Other (comments)     Abdominal pain, nausea  Other reaction(s): Other (comments)  Abdominal pain, nausea    Simvastatin Other (comments)     Muscle cramps  Other reaction(s): Other (comments)  Muscle cramps    Unable To Obtain Swelling    Zolpidem Other (comments)     Other reaction(s): Other (comments)    Atorvastatin Other (comments)     Muscle cramps  Other reaction(s):  Other (comments)  Muscle cramps    Triamcinolone Unable to Obtain     Other reaction(s): Unable to Obtain

## 2021-05-10 NOTE — PROGRESS NOTES
Identified pt with two pt identifiers(name and ). Reviewed record in preparation for visit and have obtained necessary documentation. Chief Complaint   Patient presents with   [facility] Follow Up      x 3 visit     Arm Pain     Right seems to be worst         Health Maintenance Due   Topic    COVID-19 Vaccine (1)    Shingrix Vaccine Age 50> (2 of 2)    Medicare Yearly Exam     MICROALBUMIN Q1        Coordination of Care Questionnaire:  :   1) Have you been to an emergency room, urgent care, or hospitalized since your last visit? If yes, where when, and reason for visit? Yes        2. Have seen or consulted any other health care provider since your last visit? If yes, where when, and reason for visit?  No

## 2021-05-19 ENCOUNTER — TELEPHONE (OUTPATIENT)
Dept: PHARMACY | Age: 76
End: 2021-05-19

## 2021-05-19 RX ORDER — ATORVASTATIN CALCIUM 40 MG/1
40 TABLET, FILM COATED ORAL DAILY
Qty: 90 TABLET | Refills: 1 | Status: SHIPPED | OUTPATIENT
Start: 2021-05-19

## 2021-05-19 NOTE — TELEPHONE ENCOUNTER
Francesco Guerra MD, patient/daughter would like 90-ds rx and now to [address] [place] [address] in [place]. Rx pended for your signature/modification as appropriate    LOV: 5/10/21  Next: 6/1/21    Thank you,  Haim Miranda, PharmD, [address]  Direct: [phone]  Department, toll free: [phone], option 7      =========================================================   Called and reached daughter, Eden Ann. Would prefer 90-ds atorvastatin, losartan, amlodipine, famotidine and HCTZ rxs to [address] [place] [address] in [place]. Updated pharmacy preference list. As we were ending call, daughter reports patient was readmitted 5/17. Would still like the rxs updated to 90-ds, as she doubts any medications will change, but asks that I send a message to PCP office asking them to contact her to discuss patient's readmission - see other telephone encounter.      Appears needs atorvastatin reordered for 90ds; other rxs should have refill of 90ds remaining with [facility] in [facility], which can be transferred to [address] [place] [address] in [place]

## 2021-05-19 NOTE — TELEPHONE ENCOUNTER
Dr. Robert He and/or office staff:  - Daughter, Crow Mcgarry, asks for call regarding her mom: [phone] - states she was readmitted to hospital ([facility]) Monday evening    (I was outreaching to patient/daughter regarding medication adherence; other telephone encounter today)    Thank you,  Nani Escoto, PharmD, [address] [place] [address]  Direct: [phone]  Department, toll free: [phone], option 7

## 2021-05-19 NOTE — TELEPHONE ENCOUNTER
CLINICAL PHARMACY: ADHERENCE REVIEW  Identified care gap per [facility]; fills at [facility]: Statin adherence    Last Visit: 5/10/21    Patient also appears to be prescribed: ARB (fills at [facility])    Patient not found in Outcomes MTM    ASSESSMENT  ACE/ARB ADHERENCE    Per Insurance Records through 5/3/21 (YTREMIGIO [place] = 100%; Potential Fail Date: 7/11/21): Losartan 100mg last filled on 4/13/21 for 30 day supply. Next refill due: 5/14/21    Per chart, multiple hospital/SNF admissions  - losartan 100mg daily rx 2/10/21 for #90, 1 refill to [facility] in [place]  - Per external dispense, #30 filled 5/7/21 at [facility] in [place]    BP Readings from Last 3 Encounters:   05/10/21 104/70   02/26/21 136/65   02/10/21 (!) 162/51     Estimated Creatinine Clearance: 59 mL/min (by C-G formula based on SCr of 0.7 mg/dL). DIABETES ADHERENCE    Per Insurance Records through 5/3/21: not reported (insulin)    Lab Results   Component Value Date/Time    Hemoglobin A1c 8.8 (H) 02/16/2021 11:17 AM    Hemoglobin A1c 7.2 (H) 09/30/2020 02:46 PM    Hemoglobin A1c 7.7 (H) 03/02/2020 02:48 PM     STATIN ADHERENCE    Per Insurance Records through 5/3/21 (MARGARITA Kelly = 61%; Potential Fail Date: 6/11/21):    Atorvastatin 40mg last filled on 3/16/21 for 30 day supply at [facility]. Next refill due: 4/14/21    Per chart, likely 0 refills remain  - Per external dispense, #30 filled 5/7/21 at [facility] in [place]    Lab Results   Component Value Date/Time    Cholesterol, total 224 (H) 02/18/2021 03:42 AM    HDL Cholesterol 47 02/18/2021 03:42 AM    LDL, calculated 141.8 (H) 02/18/2021 03:42 AM    VLDL, calculated 35.2 02/18/2021 03:42 AM    Triglyceride 176 (H) 02/18/2021 03:42 AM    CHOL/HDL Ratio 4.8 02/18/2021 03:42 AM     ALT (SGPT)   Date Value Ref Range Status   02/16/2021 22 12 - 78 U/L Final     AST (SGOT)   Date Value Ref Range Status   02/16/2021 10 (L) 15 - 37 U/L Final     The ASCVD Risk score (Yane Nieto., et al., 2013) failed to calculate for the following reasons: The patient has a prior MI or stroke diagnosis     PLAN  The following are interventions that have been identified:  - Patient overdue refilling atorvastatin (refill was due 4/14 - possibly refilled 5/7? Has had hospital/SNF admission)   - Patient eligible for 90 day supply of atorvastatin, losartan - need rxs to [facility] in [facility]? Attempting to reach patient/daughter to review.  Unable to leave message asking for return call.     Future Appointments   Date Time Provider Jeffrey Willard   5/28/2021  2:00 PM PACEMAKER, STFRANCES [facility] BS AMB   5/28/2021  2:20 PM Juanis Riggs MD [facility] BS AMB   6/1/2021 11:20 AM Barbara Ty MD [facility] BS AMB   8/24/2021 10:30 AM [place] [address], [facility] BS AMB       Kenneth Chapman, PharmD, [address]  Direct: [phone]  Department, toll free: [phone], option 7

## 2021-05-20 NOTE — TELEPHONE ENCOUNTER
Spoke to [facility] in [place]. Confirm rec'd atorvastatin rx and 90-ds ready to , cost $38 and not billed to insurance (was too early to fill). Asked that they back rx out and keep on profile, to use for 90-ds fill when refill is due in .  [facility] also reports they can see the other rxs with refills remaining at the [facility] in [place] - they can have transfer to have on file when patient ready for refill.    =========================================================   For Pharmacy [address] [place] [address] Road in place: No   Recommendation Provided To: Provider: 1 via Note to Provider , Patient/Caregiver: 1 via Telephone and Pharmacy: 5    Intervention Detail: Adherence Monitorin and Refill(s) Provided   Gap Closed?: Yes   Total # of Interventions Recommended: 2   Total # of Interventions Accepted: 2   Intervention Accepted By: Provider: 1, Patient/Caregiver: 1 and Pharmacy: 5   Time Spent (min): 20

## 2021-06-09 ENCOUNTER — TELEPHONE (OUTPATIENT)
Dept: FAMILY MEDICINE CLINIC | Age: 76
End: 2021-06-09

## 2021-06-09 NOTE — TELEPHONE ENCOUNTER
----- Message from Bridgett Herrera sent at 6/9/2021 10:17 AM EDT -----  Regarding: Dr. Giles Plane: [phone]  General Message/Vendor Calls    Caller's first and last name: Ivan Garcia, Daughter. Reason for call: Needs to speak to Dr. Zina Krause. Callback required yes/no and why: Yes, call from pcp. Best contact number(s): [phone]      Details to clarify the request: N/a.       Bridgett Herrera

## 2021-06-09 NOTE — TELEPHONE ENCOUNTER
RC- to Mrs. Karine Michel ( daughter )     Spoke with pt daughter, she states that this is the fourth time her mom has had cdiff. This has been a recurrent problem for the past 5 months. Mrs. [facility] is in rehab but they want to d/c her, but she is still positive for cdiff. Would like to talk to you about some option,because Mrs. Holland is getting  Weaker everyday and starting to have skin breakdown

## 2021-07-02 ENCOUNTER — PATIENT OUTREACH (OUTPATIENT)
Dept: FAMILY MEDICINE CLINIC | Age: 76
End: 2021-07-02

## 2021-07-02 NOTE — PROGRESS NOTES
Per pt's daughter, patient permanently living with her and she is primary caregiver. Patient will be changing PCP next week. First appt with Lázaro Wall with be 7/6/21. Patient has appt with GI for Cdiff on 7/8/21. Ambulatory Care Management Note    Date/Time:  7/2/2021 9:33 AM    This patient was received as a referral from [facility]. Ambulatory Care Manager outreached to patient today to offer care management services. Introduction to self and role of care manager provided. Patient accepted care management services at this time. Follow up call scheduled at this time. Patient has Ambulatory Care Manager's contact number for for any questions or concerns.

## 2021-07-19 ENCOUNTER — PATIENT OUTREACH (OUTPATIENT)
Dept: FAMILY MEDICINE CLINIC | Age: 76
End: 2021-07-19

## 2021-08-08 ENCOUNTER — HOSPITAL ENCOUNTER (EMERGENCY)
Age: 76
Discharge: HOME | End: 2021-08-08
Attending: EMERGENCY MEDICINE
Payer: MEDICARE

## 2021-08-08 VITALS
HEIGHT: 64 IN | OXYGEN SATURATION: 97 % | DIASTOLIC BLOOD PRESSURE: 62 MMHG | BODY MASS INDEX: 25.56 KG/M2 | RESPIRATION RATE: 16 BRPM | WEIGHT: 149.69 LBS | HEART RATE: 81 BPM | TEMPERATURE: 98.5 F | SYSTOLIC BLOOD PRESSURE: 114 MMHG

## 2021-08-08 DIAGNOSIS — R73.9 HYPERGLYCEMIA: Primary | ICD-10-CM

## 2021-08-08 DIAGNOSIS — Z91.119 NONCOMPLIANCE OF PATIENT WITH DIETARY REGIMEN: ICD-10-CM

## 2021-08-08 LAB
ALBUMIN SERPL-MCNC: 3.1 G/DL (ref 3.5–5)
ALBUMIN/GLOB SERPL: 0.8 {RATIO} (ref 1.1–2.2)
ALP SERPL-CCNC: 102 U/L (ref 45–117)
ALT SERPL-CCNC: 17 U/L (ref 12–78)
ANION GAP SERPL CALC-SCNC: 11 MMOL/L (ref 5–15)
APPEARANCE UR: ABNORMAL
AST SERPL-CCNC: 11 U/L (ref 15–37)
BACTERIA URNS QL MICRO: NEGATIVE /HPF
BASOPHILS # BLD: 0.1 K/UL (ref 0–0.1)
BASOPHILS NFR BLD: 1 % (ref 0–1)
BILIRUB SERPL-MCNC: 0.3 MG/DL (ref 0.2–1)
BILIRUB UR QL: NEGATIVE
BUN SERPL-MCNC: 16 MG/DL (ref 6–20)
BUN/CREAT SERPL: 22 (ref 12–20)
CALCIUM SERPL-MCNC: 9 MG/DL (ref 8.5–10.1)
CHLORIDE SERPL-SCNC: 100 MMOL/L (ref 97–108)
CO2 SERPL-SCNC: 28 MMOL/L (ref 21–32)
COLOR UR: ABNORMAL
CREAT SERPL-MCNC: 0.74 MG/DL (ref 0.55–1.02)
DIFFERENTIAL METHOD BLD: ABNORMAL
EOSINOPHIL # BLD: 0.2 K/UL (ref 0–0.4)
EOSINOPHIL NFR BLD: 2 % (ref 0–7)
EPITH CASTS URNS QL MICRO: ABNORMAL /LPF
ERYTHROCYTE [DISTWIDTH] IN BLOOD BY AUTOMATED COUNT: 14.8 % (ref 11.5–14.5)
GLOBULIN SER CALC-MCNC: 4.1 G/DL (ref 2–4)
GLUCOSE BLD STRIP.AUTO-MCNC: 190 MG/DL (ref 65–117)
GLUCOSE BLD STRIP.AUTO-MCNC: 313 MG/DL (ref 65–117)
GLUCOSE SERPL-MCNC: 254 MG/DL (ref 65–100)
GLUCOSE UR STRIP.AUTO-MCNC: NEGATIVE MG/DL
HCT VFR BLD AUTO: 36.7 % (ref 35–47)
HGB BLD-MCNC: 12.1 G/DL (ref 11.5–16)
HGB UR QL STRIP: NEGATIVE
IMM GRANULOCYTES # BLD AUTO: 0 K/UL (ref 0–0.04)
IMM GRANULOCYTES NFR BLD AUTO: 0 % (ref 0–0.5)
KETONES UR QL STRIP.AUTO: NEGATIVE MG/DL
LEUKOCYTE ESTERASE UR QL STRIP.AUTO: ABNORMAL
LYMPHOCYTES # BLD: 3.1 K/UL (ref 0.8–3.5)
LYMPHOCYTES NFR BLD: 34 % (ref 12–49)
MCH RBC QN AUTO: 27.3 PG (ref 26–34)
MCHC RBC AUTO-ENTMCNC: 33 G/DL (ref 30–36.5)
MCV RBC AUTO: 82.7 FL (ref 80–99)
MONOCYTES # BLD: 0.7 K/UL (ref 0–1)
MONOCYTES NFR BLD: 8 % (ref 5–13)
MUCOUS THREADS URNS QL MICRO: ABNORMAL /LPF
NEUTS SEG # BLD: 5 K/UL (ref 1.8–8)
NEUTS SEG NFR BLD: 56 % (ref 32–75)
NITRITE UR QL STRIP.AUTO: NEGATIVE
NRBC # BLD: 0 K/UL (ref 0–0.01)
NRBC BLD-RTO: 0 PER 100 WBC
PH UR STRIP: 6 [PH] (ref 5–8)
PLATELET # BLD AUTO: 341 K/UL (ref 150–400)
PMV BLD AUTO: 10.8 FL (ref 8.9–12.9)
POTASSIUM SERPL-SCNC: 3.7 MMOL/L (ref 3.5–5.1)
PROT SERPL-MCNC: 7.2 G/DL (ref 6.4–8.2)
PROT UR STRIP-MCNC: NEGATIVE MG/DL
RBC # BLD AUTO: 4.44 M/UL (ref 3.8–5.2)
RBC #/AREA URNS HPF: ABNORMAL /HPF (ref 0–5)
SERVICE CMNT-IMP: ABNORMAL
SERVICE CMNT-IMP: ABNORMAL
SODIUM SERPL-SCNC: 139 MMOL/L (ref 136–145)
SP GR UR REFRACTOMETRY: 1.02 (ref 1–1.03)
UR CULT HOLD, URHOLD: NORMAL
UROBILINOGEN UR QL STRIP.AUTO: 0.2 EU/DL (ref 0.2–1)
WBC # BLD AUTO: 9.1 K/UL (ref 3.6–11)
WBC URNS QL MICRO: ABNORMAL /HPF (ref 0–4)

## 2021-08-08 PROCEDURE — 96360 HYDRATION IV INFUSION INIT: CPT

## 2021-08-08 PROCEDURE — 80053 COMPREHEN METABOLIC PANEL: CPT

## 2021-08-08 PROCEDURE — 74011250636 HC RX REV CODE- 250/636: Performed by: EMERGENCY MEDICINE

## 2021-08-08 PROCEDURE — 85025 COMPLETE CBC W/AUTO DIFF WBC: CPT

## 2021-08-08 PROCEDURE — 99284 EMERGENCY DEPT VISIT MOD MDM: CPT

## 2021-08-08 PROCEDURE — 81001 URINALYSIS AUTO W/SCOPE: CPT

## 2021-08-08 PROCEDURE — 82962 GLUCOSE BLOOD TEST: CPT

## 2021-08-08 PROCEDURE — 96361 HYDRATE IV INFUSION ADD-ON: CPT

## 2021-08-08 PROCEDURE — 36415 COLL VENOUS BLD VENIPUNCTURE: CPT

## 2021-08-08 RX ORDER — INSULIN GLARGINE 100 [IU]/ML
25 INJECTION, SOLUTION SUBCUTANEOUS 2 TIMES DAILY
COMMUNITY

## 2021-08-08 RX ADMIN — SODIUM CHLORIDE 500 ML: 9 INJECTION, SOLUTION INTRAVENOUS at 20:29

## 2021-08-08 NOTE — ED NOTES
Bedside shift change report given to [address] (oncoming nurse) by Doris Gaming RN (offgoing nurse). Report included the following information SBAR.

## 2021-08-08 NOTE — ED PROVIDER NOTES
Kalpesh Reeves is a 67 yo F with Diabetes, CAD, HTN and GERD who presents to the ED with elevated blood glucose. Her granddaughter states that they were attending her grandson's birthday party and she ate pizza, a slice of cake and then a chocolate cupcake. Her granddaughter checked her glucose and iw was 256, she gave her 6u fast acting insulin and then 4 more unites and rechecked her sugars and they went to 400 and 531. They started to get worried so they gave her a dose of her family members glipizide and then brought her to the ED. They do not know the dose of the glipizide. PAtient states \"I am tired of everyone hovering and controlling what I do. \"           Past Medical History:   Diagnosis Date    CAD (coronary artery disease)     Diabetes ([address]     type 2 on insulin.     GERD (gastroesophageal reflux disease)     H/O seasonal allergies     Hyperlipidemia     Hypertension     Insomnia     Nausea & vomiting     PUD (peptic ulcer disease)     Small bowel obstruction (HCC)     Vitamin D deficiency        Past Surgical History:   Procedure Laterality Date    HX APPENDECTOMY      HX BREAST BIOPSY      HX GYN  1971    partial hysterectomy    HX GYN  1991    complete hysterectomy    HX HYSTERECTOMY      HX ORTHOPAEDIC      right rotator cuff repair    HX ORTHOPAEDIC      bilaterral wrist surgery    HX ORTHOPAEDIC      right knee surgery    HX ORTHOPAEDIC      bilateral bone spur removal from big toe    HX TONSIL AND ADENOIDECTOMY      HX TONSILLECTOMY      MA BREAST SURGERY PROCEDURE UNLISTED      breast reduction    MA CARDIAC SURG PROCEDURE UNLIST      heart stent    MA INS NEW/RPLCMT PRM PM W/TRANSV ELTRD ATRIAL&VENT N/A 2/24/2021    INSERT PPM DUAL performed by Darian Grullon MD at [facility]         Family History:   Problem Relation Age of Onset    Cancer Maternal Aunt         pancreatic cancer    Hypertension Mother     COPD Mother     Diabetes Father     Alcohol abuse Brother         murdered        Social History     Socioeconomic History    Marital status:      Spouse name: Not on file    Number of children: Not on file    Years of education: Not on file    Highest education level: Not on file   Occupational History    Not on file   Tobacco Use    Smoking status: Never Smoker    Smokeless tobacco: Never Used   Vaping Use    Vaping Use: Never used   Substance and Sexual Activity    Alcohol use: No    Drug use: No    Sexual activity: Not Currently   Other Topics Concern    Not on file   Social History Narrative    Not on file     Social Determinants of Health     Financial Resource Strain:     Difficulty of Paying Living Expenses:    Food Insecurity:     Worried About Running Out of Food in the Last Year:     [address] in the Last Year:    Transportation Needs:     Lack of Transportation (Medical):  Lack of Transportation (Non-Medical):    Physical Activity:     Days of Exercise per Week:     Minutes of Exercise per Session:    Stress:     Feeling of Stress :    Social Connections:     Frequency of Communication with Friends and Family:     Frequency of Social Gatherings with Friends and Family:     Attends Religion Services:     Active Member of Clubs or Organizations:     Attends Club or Organization Meetings:     Marital Status:    Intimate Partner Violence:     Fear of Current or Ex-Partner:     Emotionally Abused:     Physically Abused:     Sexually Abused: ALLERGIES: Advicor [niacin-lovastatin], Conjugated estrogens, Deflazacort, Erythromycin, Erythromycin base, Niacin, Pitavastatin, Simvastatin, Unable to obtain, Zolpidem, Atorvastatin, and Triamcinolone    Review of Systems   Constitutional: Negative for fever. HENT: Negative for sore throat. Eyes: Negative for visual disturbance. Respiratory: Negative for cough. Cardiovascular: Negative for chest pain.    Gastrointestinal: Negative for abdominal pain.   Genitourinary: Negative for dysuria. Musculoskeletal: Negative for back pain. Skin: Negative for rash. Neurological: Negative for headaches. Vitals:    08/08/21 1825   BP: 114/62   Pulse: 81   Resp: 16   Temp: 98.5 °F (36.9 °C)   SpO2: 97%   Weight: 67.9 kg (149 lb 11.1 oz)   Height: 5' 4\" (1.626 m)            Physical Exam  Vitals and nursing note reviewed. Constitutional:       General: She is not in acute distress. Appearance: She is well-developed. HENT:      Head: Normocephalic and atraumatic. Eyes:      Conjunctiva/sclera: Conjunctivae normal.   Neck:      Trachea: Phonation normal.   Cardiovascular:      Rate and Rhythm: Normal rate and regular rhythm. Pulses: Normal pulses. Pulmonary:      Effort: Pulmonary effort is normal. No respiratory distress. Abdominal:      General: There is no distension. Tenderness: There is no abdominal tenderness. Musculoskeletal:         General: No tenderness. Normal range of motion. Cervical back: Normal range of motion. Skin:     General: Skin is warm and dry. Neurological:      Mental Status: She is alert. She is not disoriented. Motor: No abnormal muscle tone. MDM       8:29 PM  LAbs reviewed and CMP significant of Glucose 254 but otherwise normal, CBC normal.  UA without glucose or ketones. Receiving IVNS bolus now. Anticipate discharge home. 10:05 PM  Repeat glucose improved. PAtient remains in no distress. Will discharge home.    Procedures

## 2021-08-08 NOTE — ED TRIAGE NOTES
Pt was wheeled to the treatment area accompanied by her daughter. Pt states \" today we were at a birthday party we checked her sugar it was 256 we gave her 6 units at around 430pm so she had a piece of cake. We gave her 4 more units about an hour ago at 530pm. Then we checked her sugar 40minutes ago it was 400 then we checked her 30 min after that it was 531 I gave her a glipizide that was my sisters medicine I don't know the dose it was around 6pm that she got the glipizide. \" Pt states \"I have not had any symptoms of high blood sugar. \" Pt appears in no distress.  POC GLUC 313

## 2021-08-09 NOTE — ED NOTES
The pt is discharged home with follow-up instructions. The pt is accompanied by her daughter and verbalizes understanding of the discharge instructions.

## 2021-08-25 ENCOUNTER — OFFICE VISIT (OUTPATIENT)
Dept: CARDIOLOGY CLINIC | Age: 76
End: 2021-08-25

## 2021-08-25 ENCOUNTER — OFFICE VISIT (OUTPATIENT)
Dept: CARDIOLOGY CLINIC | Age: 76
End: 2021-08-25
Payer: MEDICARE

## 2021-08-25 VITALS
WEIGHT: 148 LBS | SYSTOLIC BLOOD PRESSURE: 108 MMHG | HEIGHT: 64 IN | RESPIRATION RATE: 20 BRPM | OXYGEN SATURATION: 98 % | BODY MASS INDEX: 25.27 KG/M2 | DIASTOLIC BLOOD PRESSURE: 70 MMHG | HEART RATE: 75 BPM

## 2021-08-25 DIAGNOSIS — R94.31 PROLONGED Q-T INTERVAL ON ECG: Primary | ICD-10-CM

## 2021-08-25 DIAGNOSIS — Z95.0 CARDIAC PACEMAKER IN SITU: Primary | ICD-10-CM

## 2021-08-25 PROCEDURE — G8417 CALC BMI ABV UP PARAM F/U: HCPCS | Performed by: INTERNAL MEDICINE

## 2021-08-25 PROCEDURE — G8399 PT W/DXA RESULTS DOCUMENT: HCPCS | Performed by: INTERNAL MEDICINE

## 2021-08-25 PROCEDURE — 99215 OFFICE O/P EST HI 40 MIN: CPT | Performed by: INTERNAL MEDICINE

## 2021-08-25 PROCEDURE — G8510 SCR DEP NEG, NO PLAN REQD: HCPCS | Performed by: INTERNAL MEDICINE

## 2021-08-25 PROCEDURE — 93280 PM DEVICE PROGR EVAL DUAL: CPT | Performed by: NURSE PRACTITIONER

## 2021-08-25 PROCEDURE — 1101F PT FALLS ASSESS-DOCD LE1/YR: CPT | Performed by: INTERNAL MEDICINE

## 2021-08-25 PROCEDURE — G8536 NO DOC ELDER MAL SCRN: HCPCS | Performed by: INTERNAL MEDICINE

## 2021-08-25 PROCEDURE — G8427 DOCREV CUR MEDS BY ELIG CLIN: HCPCS | Performed by: INTERNAL MEDICINE

## 2021-08-25 PROCEDURE — G8752 SYS BP LESS 140: HCPCS | Performed by: INTERNAL MEDICINE

## 2021-08-25 PROCEDURE — 1090F PRES/ABSN URINE INCON ASSESS: CPT | Performed by: INTERNAL MEDICINE

## 2021-08-25 PROCEDURE — G8754 DIAS BP LESS 90: HCPCS | Performed by: INTERNAL MEDICINE

## 2021-08-25 RX ORDER — MIRTAZAPINE 15 MG/1
15 TABLET, FILM COATED ORAL
COMMUNITY
Start: 2021-07-15

## 2021-08-25 RX ORDER — DICLOFENAC SODIUM 10 MG/G
2 GEL TOPICAL
COMMUNITY

## 2021-08-25 NOTE — PROGRESS NOTES
Room #: 5      Visit Vitals  /70 (BP 1 Location: Left upper arm, BP Patient Position: Sitting, BP Cuff Size: Adult)   Pulse 75   Resp 20   Ht 5' 4\" (1.626 m)   Wt 148 lb (67.1 kg)   SpO2 98%   BMI 25.40 kg/m²         Chest pain:  NO  Shortness of breath:  NO  Edema: NO  Palpitations, skipped beats, rapid heartbeat:  NO  Dizziness:  NO    1. Have you been to the ER, urgent care clinic since your last visit? Hospitalized since your last visit? No    2. Have you seen or consulted any other health care providers outside of the [address] [place] [address] since your last visit? Include any pap smears or colon screening.  No      Refills:  NO

## 2021-08-25 NOTE — PROGRESS NOTES
HISTORY OF PRESENTING ILLNESS      Nicolette Ramirez is a 68 y.o. female with hyperrtension, diabetes mellitus admitted with hypokalemia noted to have 2:1 AV block, intermittent 3rd degree AVB with persistence of 2:1 AVB despite correction of K. She underwent dual chamber pacemaker implantation on 2/24/2021. Interrogation shows normal device functioning and incision site has healed well. PAST MEDICAL HISTORY     Past Medical History:   Diagnosis Date    CAD (coronary artery disease)     Diabetes ([address]     type 2 on insulin.  GERD (gastroesophageal reflux disease)     H/O seasonal allergies     Hyperlipidemia     Hypertension     Insomnia     Nausea & vomiting     PUD (peptic ulcer disease)     Small bowel obstruction (HCC)     Vitamin D deficiency            PAST SURGICAL HISTORY     Past Surgical History:   Procedure Laterality Date    HX APPENDECTOMY      HX BREAST BIOPSY      HX GYN  1971    partial hysterectomy    HX GYN  1991    complete hysterectomy    HX HYSTERECTOMY      HX ORTHOPAEDIC      right rotator cuff repair    HX ORTHOPAEDIC      bilaterral wrist surgery    HX ORTHOPAEDIC      right knee surgery    HX ORTHOPAEDIC      bilateral bone spur removal from big toe    HX TONSIL AND ADENOIDECTOMY      HX TONSILLECTOMY      OH BREAST SURGERY PROCEDURE UNLISTED      breast reduction    OH CARDIAC SURG PROCEDURE UNLIST      heart stent    OH INS NEW/RPLCMT PRM PM W/TRANSV ELTRD ATRIAL&VENT N/A 2/24/2021    INSERT PPM DUAL performed by [facility] Patel MD [facility]          ALLERGIES     Allergies   Allergen Reactions    Advicor [Niacin-Lovastatin] Swelling    Conjugated Estrogens Other (comments)     Headaches      Other reaction(s):  Other (comments)  Headaches    Deflazacort Unable to Obtain     Other reaction(s): Unable to Obtain    Erythromycin Nausea Only    Erythromycin Base Nausea and Vomiting    Niacin Swelling    Pitavastatin Other (comments)     Abdominal pain, nausea  Other reaction(s): Other (comments)  Abdominal pain, nausea    Simvastatin Other (comments)     Muscle cramps  Other reaction(s): Other (comments)  Muscle cramps    Unable To Obtain Swelling    Zolpidem Other (comments)     Other reaction(s): Other (comments)    Atorvastatin Other (comments)     Muscle cramps  Other reaction(s): Other (comments)  Muscle cramps    Triamcinolone Unable to Obtain     Other reaction(s): Unable to Obtain          FAMILY HISTORY     Family History   Problem Relation Age of Onset    Cancer Maternal Aunt         pancreatic cancer    Hypertension Mother     COPD Mother     Diabetes Father     Alcohol abuse Brother         murdered     negative for cardiac disease       SOCIAL HISTORY     Social History     Socioeconomic History    Marital status:      Spouse name: Not on file    Number of children: Not on file    Years of education: Not on file    Highest education level: Not on file   Tobacco Use    Smoking status: Never Smoker    Smokeless tobacco: Never Used   Vaping Use    Vaping Use: Never used   Substance and Sexual Activity    Alcohol use: No    Drug use: No    Sexual activity: Not Currently     Social Determinants of Health     Financial Resource Strain:     Difficulty of Paying Living Expenses:    Food Insecurity:     Worried About Running Out of Food in the Last Year:     Ran Out of Food in the Last Year:    Transportation Needs:     Lack of Transportation (Medical):      Lack of Transportation (Non-Medical):    Physical Activity:     Days of Exercise per Week:     Minutes of Exercise per Session:    Stress:     Feeling of Stress :    Social Connections:     Frequency of Communication with Friends and Family:     Frequency of Social Gatherings with Friends and Family:     Attends [employer] Services:     Active Member of Clubs or Organizations:     Attends Club or Organization Meetings:     Marital Status:          MEDICATIONS     Current Outpatient Medications   Medication Sig    diclofenac (Voltaren) 1 % gel Apply 2 g to affected area four (4) times daily as needed for Pain. PRN as needed    mirtazapine (REMERON) 15 mg tablet Take 15 mg by mouth nightly.  insulin glargine (Lantus U-100 Insulin) 100 unit/mL injection 25 Units by SubCUTAneous route two (2) times a day. 25u morning and night    atorvastatin (LIPITOR) 40 mg tablet Take 1 Tablet by mouth daily.  insulin regular (NOVOLIN R, HUMULIN R) 100 unit/mL injection 9 Units by SubCUTAneous route Before breakfast, lunch, and dinner. 9 units by subcutaneous route before breakfast, lunch, and dinner    aspirin 81 mg chewable tablet Take 1 Tab by mouth daily.  losartan (COZAAR) 100 mg tablet Take 1 Tab by mouth daily.  famotidine (PEPCID) 20 mg tablet Take 1 Tab by mouth two (2) times a day.  amLODIPine (NORVASC) 5 mg tablet Take 1 Tab by mouth daily.  butalbital-aspirin-caffeine (FIORINAL) capsule Take 1 Cap by mouth daily as needed for Headache (Takes rarely).  augmented betamethasone dipropionate (DIPROLENE-AF) 0.05 % topical cream Apply  to affected area two (2) times a day. (Patient not taking: Reported on 8/25/2021)     No current facility-administered medications for this visit. I have reviewed the nurses notes, vitals, problem list, allergy list, medical history, family, social history and medications. REVIEW OF SYMPTOMS      General: Pt denies excessive weight gain or loss. Pt is able to conduct ADL's  HEENT: Denies blurred vision, headaches, hearing loss, epistaxis and difficulty swallowing. Respiratory: Denies cough, congestion, shortness of breath, MEJIAS, wheezing or stridor.   Cardiovascular: Denies precordial pain, palpitations, edema or PND  Gastrointestinal: Denies poor appetite, indigestion, abdominal pain or blood in stool  Genitourinary: Denies hematuria, dysuria, increased urinary frequency  Musculoskeletal: Denies joint pain or swelling from muscles or joints  Neurologic: Denies tremor, paresthesias, headache, or sensory motor disturbance  Psychiatric: Denies confusion, insomnia, depression  Integumentray: Denies rash, itching or ulcers. Hematologic: Denies easy bruising, bleeding       PHYSICAL EXAMINATION      Vitals: see vitals section  General: Well developed, in no acute distress. HEENT: No jaundice, oral mucosa moist, no oral ulcers  Neck: Supple, no stiffness, no lymphadenopathy, supple  Heart:  Normal S1/S2 negative S3 or S4. Regular, no murmur, gallop or rub, no jugular venous distention  Respiratory: Clear bilaterally x 4, no wheezing or rales  Abdomen:   Soft, non-tender, bowel sounds are active. Extremities:  No edema, normal cap refill, no cyanosis. Musculoskeletal: No clubbing, no deformities  Neuro: A&Ox3, speech clear, gait stable, cooperative, no focal neurologic deficits  Skin: Skin color is normal. No rashes or lesions.  Non diaphoretic, moist.  Vascular: 2+ pulses symmetric in all extremities       DIAGNOSTIC DATA      EKG:   Visit Vitals  /70 (BP 1 Location: Left upper arm, BP Patient Position: Sitting, BP Cuff Size: Adult)   Pulse 75   Resp 20   Ht 5' 4\" (1.626 m)   Wt 148 lb (67.1 kg)   SpO2 98%   BMI 25.40 kg/m²        LABORATORY DATA      Lab Results   Component Value Date/Time    WBC 9.1 08/08/2021 07:41 PM    HGB 12.1 08/08/2021 07:41 PM    HCT 36.7 08/08/2021 07:41 PM    PLATELET 371 17/09/1611 07:41 PM    MCV 82.7 08/08/2021 07:41 PM      Lab Results   Component Value Date/Time    Sodium 139 08/08/2021 07:41 PM    Potassium 3.7 08/08/2021 07:41 PM    Chloride 100 08/08/2021 07:41 PM    CO2 28 08/08/2021 07:41 PM    Anion gap 11 08/08/2021 07:41 PM    Glucose 254 (H) 08/08/2021 07:41 PM    BUN 16 08/08/2021 07:41 PM    Creatinine 0.74 08/08/2021 07:41 PM    BUN/Creatinine ratio 22 (H) 08/08/2021 07:41 PM    GFR est AA >60 08/08/2021 07:41 PM    GFR est non-AA >60 08/08/2021 07:41 PM    Calcium 9.0 08/08/2021 07:41 PM    Bilirubin, total 0.3 08/08/2021 07:41 PM    Alk. phosphatase 102 08/08/2021 07:41 PM    Protein, total 7.2 08/08/2021 07:41 PM    Albumin 3.1 (L) 08/08/2021 07:41 PM    Globulin 4.1 (H) 08/08/2021 07:41 PM    A-G Ratio 0.8 (L) 08/08/2021 07:41 PM    ALT (SGPT) 17 08/08/2021 07:41 PM           ASSESSMENT   1. Heart block  2. Hypokalemia -resolved  3. Pacemaker       PLAN     Follow-up in device clinic         FOLLOW-UP     1 year    Thank you, Marium Maldonado for allowing me to participate in the care of this extraordinarily pleasant female. Please do not hesitate to contact me for further questions/concerns.          Amberly Liao MD  Cardiac Electrophysiology / Cardiology    [address]  [facility], [facility], [address] [place] [address]    [facility]  [phone] / [phone] Fax   [phone] / [phone] Fax

## 2021-11-03 ENCOUNTER — HOSPITAL ENCOUNTER (EMERGENCY)
Age: 76
Discharge: HOME | End: 2021-11-03
Attending: EMERGENCY MEDICINE
Payer: MEDICARE

## 2021-11-03 ENCOUNTER — APPOINTMENT (OUTPATIENT)
Dept: CT IMAGING | Age: 76
End: 2021-11-03
Attending: EMERGENCY MEDICINE
Payer: MEDICARE

## 2021-11-03 VITALS
DIASTOLIC BLOOD PRESSURE: 63 MMHG | SYSTOLIC BLOOD PRESSURE: 137 MMHG | HEIGHT: 64 IN | TEMPERATURE: 97.6 F | OXYGEN SATURATION: 98 % | WEIGHT: 147.71 LBS | BODY MASS INDEX: 25.22 KG/M2 | RESPIRATION RATE: 20 BRPM | HEART RATE: 81 BPM

## 2021-11-03 DIAGNOSIS — R42 VERTIGO: Primary | ICD-10-CM

## 2021-11-03 LAB
ALBUMIN SERPL-MCNC: 3.1 G/DL (ref 3.5–5)
ALBUMIN/GLOB SERPL: 0.7 {RATIO} (ref 1.1–2.2)
ALP SERPL-CCNC: 116 U/L (ref 45–117)
ALT SERPL-CCNC: 11 U/L (ref 12–78)
ANION GAP SERPL CALC-SCNC: 6 MMOL/L (ref 5–15)
AST SERPL-CCNC: 7 U/L (ref 15–37)
BASOPHILS # BLD: 0 K/UL (ref 0–0.1)
BASOPHILS NFR BLD: 1 % (ref 0–1)
BILIRUB SERPL-MCNC: 0.3 MG/DL (ref 0.2–1)
BUN SERPL-MCNC: 12 MG/DL (ref 6–20)
BUN/CREAT SERPL: 14 (ref 12–20)
CALCIUM SERPL-MCNC: 8.9 MG/DL (ref 8.5–10.1)
CHLORIDE SERPL-SCNC: 100 MMOL/L (ref 97–108)
CO2 SERPL-SCNC: 29 MMOL/L (ref 21–32)
CREAT SERPL-MCNC: 0.85 MG/DL (ref 0.55–1.02)
DIFFERENTIAL METHOD BLD: NORMAL
EOSINOPHIL # BLD: 0.2 K/UL (ref 0–0.4)
EOSINOPHIL NFR BLD: 2 % (ref 0–7)
ERYTHROCYTE [DISTWIDTH] IN BLOOD BY AUTOMATED COUNT: 14.2 % (ref 11.5–14.5)
GLOBULIN SER CALC-MCNC: 4.4 G/DL (ref 2–4)
GLUCOSE SERPL-MCNC: 325 MG/DL (ref 65–100)
HCT VFR BLD AUTO: 38.6 % (ref 35–47)
HGB BLD-MCNC: 12.2 G/DL (ref 11.5–16)
IMM GRANULOCYTES # BLD AUTO: 0 K/UL (ref 0–0.04)
IMM GRANULOCYTES NFR BLD AUTO: 0 % (ref 0–0.5)
LYMPHOCYTES # BLD: 2.5 K/UL (ref 0.8–3.5)
LYMPHOCYTES NFR BLD: 29 % (ref 12–49)
MAGNESIUM SERPL-MCNC: 1.7 MG/DL (ref 1.6–2.4)
MCH RBC QN AUTO: 26.2 PG (ref 26–34)
MCHC RBC AUTO-ENTMCNC: 31.6 G/DL (ref 30–36.5)
MCV RBC AUTO: 83 FL (ref 80–99)
MONOCYTES # BLD: 0.6 K/UL (ref 0–1)
MONOCYTES NFR BLD: 7 % (ref 5–13)
NEUTS SEG # BLD: 5.3 K/UL (ref 1.8–8)
NEUTS SEG NFR BLD: 61 % (ref 32–75)
NRBC # BLD: 0 K/UL (ref 0–0.01)
NRBC BLD-RTO: 0 PER 100 WBC
PLATELET # BLD AUTO: 321 K/UL (ref 150–400)
PMV BLD AUTO: 11 FL (ref 8.9–12.9)
POTASSIUM SERPL-SCNC: 4 MMOL/L (ref 3.5–5.1)
PROT SERPL-MCNC: 7.5 G/DL (ref 6.4–8.2)
RBC # BLD AUTO: 4.65 M/UL (ref 3.8–5.2)
SODIUM SERPL-SCNC: 135 MMOL/L (ref 136–145)
WBC # BLD AUTO: 8.6 K/UL (ref 3.6–11)

## 2021-11-03 PROCEDURE — 83735 ASSAY OF MAGNESIUM: CPT

## 2021-11-03 PROCEDURE — 70450 CT HEAD/BRAIN W/O DYE: CPT

## 2021-11-03 PROCEDURE — 74011250637 HC RX REV CODE- 250/637: Performed by: EMERGENCY MEDICINE

## 2021-11-03 PROCEDURE — 93005 ELECTROCARDIOGRAM TRACING: CPT

## 2021-11-03 PROCEDURE — 80053 COMPREHEN METABOLIC PANEL: CPT

## 2021-11-03 PROCEDURE — 36415 COLL VENOUS BLD VENIPUNCTURE: CPT

## 2021-11-03 PROCEDURE — 85025 COMPLETE CBC W/AUTO DIFF WBC: CPT

## 2021-11-03 PROCEDURE — 74011250636 HC RX REV CODE- 250/636: Performed by: EMERGENCY MEDICINE

## 2021-11-03 PROCEDURE — 99285 EMERGENCY DEPT VISIT HI MDM: CPT

## 2021-11-03 RX ORDER — MECLIZINE HCL 12.5 MG 12.5 MG/1
12.5 TABLET ORAL
Qty: 30 TABLET | Refills: 0 | Status: SHIPPED | OUTPATIENT
Start: 2021-11-03 | End: 2021-11-13

## 2021-11-03 RX ORDER — MECLIZINE HCL 12.5 MG 12.5 MG/1
12.5 TABLET ORAL
Status: COMPLETED | OUTPATIENT
Start: 2021-11-03 | End: 2021-11-03

## 2021-11-03 RX ORDER — FAMOTIDINE 20 MG/1
20 TABLET, FILM COATED ORAL
Status: COMPLETED | OUTPATIENT
Start: 2021-11-03 | End: 2021-11-03

## 2021-11-03 RX ADMIN — SODIUM CHLORIDE 500 ML: 9 INJECTION, SOLUTION INTRAVENOUS at 19:06

## 2021-11-03 RX ADMIN — MECLIZINE 12.5 MG: 12.5 TABLET ORAL at 19:06

## 2021-11-03 RX ADMIN — FAMOTIDINE 20 MG: 20 TABLET, FILM COATED ORAL at 19:06

## 2021-11-03 NOTE — ED NOTES
Assume care of patient awake and alert, no signs of distress. IV fluids in progress, will be discharged  After completion.

## 2021-11-03 NOTE — ED NOTES
Medications late due nurse with other patient. Spoke with md to notify that patient had not received yet . Patient will receive fluids prior to discharge. May choose whether or not she wants to have the famotidine  Or meclizine.

## 2021-11-03 NOTE — ED PROVIDER NOTES
The history is provided by the patient. Dizziness  This is a new problem. The current episode started 12 to 24 hours ago. The problem has been resolved. There was no focality noted. Primary symptoms include loss of balance (feels like the room is spinning to the right). Pertinent negatives include no focal weakness, no loss of sensation, no speech difficulty, no visual change, no mental status change and no disorientation. There has been no fever. Pertinent negatives include no shortness of breath, no chest pain, no vomiting, no altered mental status, no headaches, no nausea, no bowel incontinence and no bladder incontinence. There were no medications administered prior to arrival. Associated medical issues do not include trauma, seizures or CVA. Past Medical History:   Diagnosis Date    CAD (coronary artery disease)     Diabetes ([facility] [address]     type 2 on insulin.     GERD (gastroesophageal reflux disease)     H/O seasonal allergies     Hyperlipidemia     Hypertension     Insomnia     Nausea & vomiting     PUD (peptic ulcer disease)     Small bowel obstruction (HCC)     Vitamin D deficiency        Past Surgical History:   Procedure Laterality Date    HX APPENDECTOMY      HX BREAST BIOPSY      HX GYN  1971    partial hysterectomy    HX GYN  1991    complete hysterectomy    HX HYSTERECTOMY      HX ORTHOPAEDIC      right rotator cuff repair    HX ORTHOPAEDIC      bilaterral wrist surgery    HX ORTHOPAEDIC      right knee surgery    HX ORTHOPAEDIC      bilateral bone spur removal from big toe    HX TONSIL AND ADENOIDECTOMY      HX TONSILLECTOMY      CT BREAST SURGERY PROCEDURE UNLISTED      breast reduction    CT CARDIAC SURG PROCEDURE UNLIST      heart stent    CT INS NEW/RPLCMT PRM PM W/TRANSV ELTRD ATRIAL&VENT N/A 2/24/2021    INSERT PPM DUAL performed by Jamilah Garcia MD at [facility]         Family History:   Problem Relation Age of Onset    Cancer Maternal Aunt pancreatic cancer    Hypertension Mother     COPD Mother     Diabetes Father     Alcohol abuse Brother         murdered        Social History     Socioeconomic History    Marital status:      Spouse name: Not on file    Number of children: Not on file    Years of education: Not on file    Highest education level: Not on file   Occupational History    Not on file   Tobacco Use    Smoking status: Never Smoker    Smokeless tobacco: Never Used   Vaping Use    Vaping Use: Never used   Substance and Sexual Activity    Alcohol use: No    Drug use: No    Sexual activity: Not Currently   Other Topics Concern    Not on file   Social History Narrative    Not on file     Social Determinants of Health     Financial Resource Strain:     Difficulty of Paying Living Expenses: Not on file   Food Insecurity:     Worried About [address] in the Last Year: Not on file    Suzanne of Food in the Last Year: Not on file   Transportation Needs:     Lack of Transportation (Medical): Not on file    Lack of Transportation (Non-Medical):  Not on file   Physical Activity:     Days of Exercise per Week: Not on file    Minutes of Exercise per Session: Not on file   Stress:     Feeling of Stress : Not on file   Social Connections:     Frequency of Communication with Friends and Family: Not on file    Frequency of Social Gatherings with Friends and Family: Not on file    Attends Religion Services: Not on file    Active Member of [address] [place] [address] [employer] or Organizations: Not on file    Attends Club or Organization Meetings: Not on file    Marital Status: Not on file   Intimate Partner Violence:     Fear of Current or Ex-Partner: Not on file    Emotionally Abused: Not on file    Physically Abused: Not on file    Sexually Abused: Not on file   Housing Stability:     Unable to Pay for Housing in the Last Year: Not on file    Number of [place] in the Last Year: Not on file    Unstable Housing in the Last Year: Not on file         ALLERGIES: Advicor [niacin-lovastatin], Conjugated estrogens, Deflazacort, Erythromycin, Erythromycin base, Niacin, Pitavastatin, Simvastatin, Unable to obtain, Zolpidem, Atorvastatin, and Triamcinolone    Review of Systems   Constitutional: Negative for chills and fever. Respiratory: Negative for shortness of breath. Cardiovascular: Negative for chest pain. Gastrointestinal: Negative for abdominal pain, bowel incontinence, constipation, diarrhea, nausea and vomiting. Genitourinary: Negative for bladder incontinence. Neurological: Positive for dizziness and loss of balance (feels like the room is spinning to the right). Negative for focal weakness, speech difficulty, light-headedness and headaches. All other systems reviewed and are negative. Vitals:    11/03/21 1632   BP: (!) 121/56   Pulse: 87   Resp: 20   Temp: 97.6 °F (36.4 °C)   SpO2: 98%   Weight: 67 kg (147 lb 11.3 oz)   Height: 5' 4\" (1.626 m)            Physical Exam  Vitals and nursing note reviewed. Constitutional:       Appearance: Normal appearance. She is well-developed. She is not ill-appearing. HENT:      Head: Normocephalic and atraumatic. Eyes:      Extraocular Movements: Extraocular movements intact. Conjunctiva/sclera: Conjunctivae normal.      Pupils: Pupils are equal, round, and reactive to light. Cardiovascular:      Rate and Rhythm: Normal rate and regular rhythm. Pulmonary:      Effort: Pulmonary effort is normal.      Breath sounds: Normal breath sounds. Abdominal:      General: There is no distension. Palpations: Abdomen is soft. Tenderness: There is no abdominal tenderness. Musculoskeletal:      Cervical back: Normal range of motion and neck supple. Skin:     General: Skin is warm and dry. Capillary Refill: Capillary refill takes less than 2 seconds. Neurological:      General: No focal deficit present.       Mental Status: She is alert and oriented to person, place, and time. Cranial Nerves: No cranial nerve deficit. Sensory: No sensory deficit. Motor: No weakness. Coordination: Coordination normal. Finger-Nose-Finger Test and Heel to Shin Test normal.      Gait: Gait normal.   Psychiatric:         Mood and Affect: Mood normal.         Behavior: Behavior normal.          [facility]  ED Course as of 11/03/21 1853 Wed Nov 03, 2021 1853 EKG at 5:31 PMAtrial sensed ventricular paced rhythm, 83 bpm, 100% capture, no ectopyEKG interpreted by Surya Hidalgo MD  [IO]      ED Course User Index  [IO] Kobe Magana MD       Procedures      The patient is resting comfortably and feels better, is alert, talkative, interactive and in no distress. The repeat examination is unremarkable and benign. The patient is neurologically intact, has a normal mental status and is ambulatory in the ED. The history, exam, diagnostic testing (if any) and the patient's current condition do not suggest arrhythmia, STEMI, seizure, meningitis, stroke, sepsis, subarachnoid hemorrhage, intracranial bleeding, encephalitis or other significant pathology that would warrant further testing, continued ED treatment, admission, neurological consultation, or other specialist evaluation at this point. The vital signs have been stable. The patient's condition is stable and appropriate for discharge. The patient will pursue further outpatient evaluation with the primary care physician or other designated or consulting physician as indicated in the discharge instructions.

## 2021-11-04 LAB
ATRIAL RATE: 83 BPM
CALCULATED P AXIS, ECG09: 9 DEGREES
CALCULATED R AXIS, ECG10: -91 DEGREES
CALCULATED T AXIS, ECG11: 55 DEGREES
DIAGNOSIS, 93000: NORMAL
P-R INTERVAL, ECG05: 172 MS
Q-T INTERVAL, ECG07: 420 MS
QRS DURATION, ECG06: 122 MS
QTC CALCULATION (BEZET), ECG08: 493 MS
VENTRICULAR RATE, ECG03: 83 BPM

## 2022-02-17 ENCOUNTER — OFFICE VISIT (OUTPATIENT)
Dept: CARDIOLOGY CLINIC | Age: 77
End: 2022-02-17
Payer: MEDICARE

## 2022-02-17 DIAGNOSIS — Z95.0 CARDIAC PACEMAKER IN SITU: Primary | ICD-10-CM

## 2022-02-17 PROCEDURE — 93294 REM INTERROG EVL PM/LDLS PM: CPT | Performed by: INTERNAL MEDICINE

## 2022-02-17 PROCEDURE — 93296 REM INTERROG EVL PM/IDS: CPT | Performed by: INTERNAL MEDICINE

## 2022-02-17 NOTE — LETTER
2/18/2022 2:41 PM    Ms. Kaz Lauren [age]  [address]      Dear Ms. Kaz Lauren 7715,    We have received your recent remote monitor check of your implanted device on 2/17/22. Your remaining estimated battery life is 13.5 years and your device is working normally & appropriately. Your next remote monitor check is scheduled for 5/26/22. This is NOT an in-clinic appointment. This transmission is sent from your home monitor. Please make sure your home monitor is plugged into power and within 10 feet of where you sleep. If you have difficulty sending a transmission, please do NOT call our office. Instead, call tech support for your device as they are better able to assist.    [facility])  [phone]    Your next clinic/office check is scheduled for Monday, 8/29/2022 at 2:20 pm.  You will have your device checked then see the provider. Please bring a complete list of your medications with strengths and dosages to this appointment. Also, be prepared to discuss any symptoms you may be having since your last visit. Please plan to arrive 10 minutes early to allow time for check-in. [employer] parking is CLOSED once again, due to [address]. The parking lot entrance that is next to [address] is also CLOSED. If you have difficulty walking from the parking lot, please arrange to have someone drop you off to allow time to check into the office. You are allowed to have one visitor accompany you to your appointment and no children under the age of 13 are allowed. Masks are mandatory while in the building. If you have any questions, please call the Pacemaker/ICD clinic at the [address] [place] [address] location at [phone]. We appreciate you staying remotely connected! Sincerely,    Renetta LOPEZ, RN  Cardiac Device Clinic Coordinator  [facility].[address]  [address], [address] [facility]  [phone]

## 2022-03-18 PROBLEM — R94.31 PROLONGED Q-T INTERVAL ON ECG: Status: ACTIVE | Noted: 2021-02-16

## 2022-03-18 PROBLEM — R41.82 AMS (ALTERED MENTAL STATUS): Status: ACTIVE | Noted: 2021-02-16

## 2022-03-18 PROBLEM — I44.1 MOBITZ TYPE 2 SECOND DEGREE AV BLOCK: Status: ACTIVE | Noted: 2021-02-16

## 2022-03-19 PROBLEM — G47.00 INSOMNIA: Status: ACTIVE | Noted: 2021-02-16

## 2022-03-19 PROBLEM — E11.319 DIABETIC RETINOPATHY (HCC): Status: ACTIVE | Noted: 2019-09-10

## 2022-03-19 PROBLEM — R65.20 SEVERE SEPSIS (HCC): Status: ACTIVE | Noted: 2021-02-16

## 2022-03-19 PROBLEM — A41.9 SEVERE SEPSIS (HCC): Status: ACTIVE | Noted: 2021-02-16

## 2022-03-19 PROBLEM — K86.2 PANCREATIC CYST: Status: ACTIVE | Noted: 2021-02-16

## 2022-03-19 PROBLEM — A04.72 C. DIFFICILE COLITIS: Status: ACTIVE | Noted: 2021-02-26

## 2022-03-19 PROBLEM — K21.9 GERD (GASTROESOPHAGEAL REFLUX DISEASE): Status: ACTIVE | Noted: 2021-02-16

## 2022-03-19 PROBLEM — E78.00 HYPERCHOLESTEREMIA: Status: ACTIVE | Noted: 2019-09-23

## 2022-03-20 PROBLEM — B37.49 YEAST UTI: Status: ACTIVE | Noted: 2021-02-26

## 2022-03-20 PROBLEM — I10 ESSENTIAL HYPERTENSION: Status: ACTIVE | Noted: 2019-09-23

## 2022-03-20 PROBLEM — E11.9 CONTROLLED TYPE 2 DIABETES MELLITUS WITHOUT COMPLICATION, WITH LONG-TERM CURRENT USE OF INSULIN (HCC): Status: ACTIVE | Noted: 2019-09-23

## 2022-03-20 PROBLEM — N17.9 AKI (ACUTE KIDNEY INJURY) (HCC): Status: ACTIVE | Noted: 2021-02-16

## 2022-03-20 PROBLEM — Z79.4 CONTROLLED TYPE 2 DIABETES MELLITUS WITHOUT COMPLICATION, WITH LONG-TERM CURRENT USE OF INSULIN (HCC): Status: ACTIVE | Noted: 2019-09-23

## 2022-05-31 ENCOUNTER — OFFICE VISIT (OUTPATIENT)
Dept: CARDIOLOGY CLINIC | Age: 77
End: 2022-05-31
Payer: MEDICARE

## 2022-05-31 DIAGNOSIS — Z95.0 CARDIAC PACEMAKER IN SITU: Primary | ICD-10-CM

## 2022-05-31 PROCEDURE — 93296 REM INTERROG EVL PM/IDS: CPT | Performed by: INTERNAL MEDICINE

## 2022-05-31 PROCEDURE — 93294 REM INTERROG EVL PM/LDLS PM: CPT | Performed by: INTERNAL MEDICINE

## 2022-10-25 PROBLEM — Z95.0 PACEMAKER: Status: ACTIVE | Noted: 2022-10-25

## 2022-12-01 ENCOUNTER — OFFICE VISIT (OUTPATIENT)
Dept: CARDIOLOGY CLINIC | Age: 77
End: 2022-12-01
Payer: MEDICARE

## 2022-12-01 DIAGNOSIS — Z95.0 CARDIAC PACEMAKER IN SITU: Primary | ICD-10-CM

## 2023-03-07 ENCOUNTER — OFFICE VISIT (OUTPATIENT)
Dept: CARDIOLOGY CLINIC | Age: 78
End: 2023-03-07

## 2023-03-07 DIAGNOSIS — Z95.0 CARDIAC PACEMAKER IN SITU: Primary | ICD-10-CM

## 2023-03-08 ENCOUNTER — APPOINTMENT (OUTPATIENT)
Dept: CT IMAGING | Age: 78
End: 2023-03-08
Attending: EMERGENCY MEDICINE
Payer: MEDICARE

## 2023-03-08 ENCOUNTER — HOSPITAL ENCOUNTER (EMERGENCY)
Age: 78
Discharge: HOME | End: 2023-03-08
Attending: EMERGENCY MEDICINE
Payer: MEDICARE

## 2023-03-08 VITALS
HEART RATE: 88 BPM | OXYGEN SATURATION: 94 % | BODY MASS INDEX: 26.29 KG/M2 | HEIGHT: 64 IN | SYSTOLIC BLOOD PRESSURE: 124 MMHG | TEMPERATURE: 98.4 F | DIASTOLIC BLOOD PRESSURE: 69 MMHG | WEIGHT: 154 LBS | RESPIRATION RATE: 16 BRPM

## 2023-03-08 DIAGNOSIS — K86.9 PANCREATIC LESION: ICD-10-CM

## 2023-03-08 DIAGNOSIS — K80.20 CALCULUS OF GALLBLADDER WITHOUT CHOLECYSTITIS WITHOUT OBSTRUCTION: ICD-10-CM

## 2023-03-08 DIAGNOSIS — R79.89 ABNORMAL LFTS: ICD-10-CM

## 2023-03-08 DIAGNOSIS — K57.32 SIGMOID DIVERTICULITIS: Primary | ICD-10-CM

## 2023-03-08 DIAGNOSIS — N17.9 AKI (ACUTE KIDNEY INJURY) (HCC): ICD-10-CM

## 2023-03-08 LAB
ALBUMIN SERPL-MCNC: 3.1 G/DL (ref 3.5–5)
ALBUMIN/GLOB SERPL: 0.6 (ref 1.1–2.2)
ALP SERPL-CCNC: 198 U/L (ref 45–117)
ALT SERPL-CCNC: 83 U/L (ref 12–78)
ANION GAP SERPL CALC-SCNC: 7 MMOL/L (ref 5–15)
AST SERPL-CCNC: 28 U/L (ref 15–37)
BASOPHILS # BLD: 0.1 K/UL (ref 0–0.1)
BASOPHILS NFR BLD: 1 % (ref 0–1)
BILIRUB DIRECT SERPL-MCNC: 0.2 MG/DL (ref 0–0.2)
BILIRUB SERPL-MCNC: 0.6 MG/DL (ref 0.2–1)
BUN SERPL-MCNC: 12 MG/DL (ref 6–20)
BUN/CREAT SERPL: 9 (ref 12–20)
CALCIUM SERPL-MCNC: 9.5 MG/DL (ref 8.5–10.1)
CHLORIDE SERPL-SCNC: 102 MMOL/L (ref 97–108)
CO2 SERPL-SCNC: 26 MMOL/L (ref 21–32)
COMMENT, HOLDF: NORMAL
CREAT SERPL-MCNC: 1.38 MG/DL (ref 0.55–1.02)
DIFFERENTIAL METHOD BLD: ABNORMAL
EOSINOPHIL # BLD: 0.1 K/UL (ref 0–0.4)
EOSINOPHIL NFR BLD: 1 % (ref 0–7)
ERYTHROCYTE [DISTWIDTH] IN BLOOD BY AUTOMATED COUNT: 15.4 % (ref 11.5–14.5)
GLOBULIN SER CALC-MCNC: 5.2 G/DL (ref 2–4)
GLUCOSE SERPL-MCNC: 142 MG/DL (ref 65–100)
HCT VFR BLD AUTO: 40.5 % (ref 35–47)
HGB BLD-MCNC: 13.1 G/DL (ref 11.5–16)
IMM GRANULOCYTES # BLD AUTO: 0.1 K/UL (ref 0–0.04)
IMM GRANULOCYTES NFR BLD AUTO: 1 % (ref 0–0.5)
LACTATE BLD-SCNC: 1.63 MMOL/L (ref 0.4–2)
LIPASE SERPL-CCNC: 153 U/L (ref 73–393)
LYMPHOCYTES # BLD: 2.1 K/UL (ref 0.8–3.5)
LYMPHOCYTES NFR BLD: 16 % (ref 12–49)
MCH RBC QN AUTO: 27.2 PG (ref 26–34)
MCHC RBC AUTO-ENTMCNC: 32.3 G/DL (ref 30–36.5)
MCV RBC AUTO: 84.2 FL (ref 80–99)
MONOCYTES # BLD: 0.8 K/UL (ref 0–1)
MONOCYTES NFR BLD: 6 % (ref 5–13)
NEUTS SEG # BLD: 9.5 K/UL (ref 1.8–8)
NEUTS SEG NFR BLD: 75 % (ref 32–75)
NRBC # BLD: 0 K/UL (ref 0–0.01)
NRBC BLD-RTO: 0 PER 100 WBC
PLATELET # BLD AUTO: 427 K/UL (ref 150–400)
PMV BLD AUTO: 10.6 FL (ref 8.9–12.9)
POTASSIUM SERPL-SCNC: 3.7 MMOL/L (ref 3.5–5.1)
PROT SERPL-MCNC: 8.3 G/DL (ref 6.4–8.2)
RBC # BLD AUTO: 4.81 M/UL (ref 3.8–5.2)
SAMPLES BEING HELD,HOLD: NORMAL
SODIUM SERPL-SCNC: 135 MMOL/L (ref 136–145)
WBC # BLD AUTO: 12.6 K/UL (ref 3.6–11)

## 2023-03-08 PROCEDURE — 83605 ASSAY OF LACTIC ACID: CPT

## 2023-03-08 PROCEDURE — 83690 ASSAY OF LIPASE: CPT

## 2023-03-08 PROCEDURE — 96374 THER/PROPH/DIAG INJ IV PUSH: CPT

## 2023-03-08 PROCEDURE — 96375 TX/PRO/DX INJ NEW DRUG ADDON: CPT

## 2023-03-08 PROCEDURE — 85025 COMPLETE CBC W/AUTO DIFF WBC: CPT

## 2023-03-08 PROCEDURE — 74011250636 HC RX REV CODE- 250/636: Performed by: EMERGENCY MEDICINE

## 2023-03-08 PROCEDURE — 74176 CT ABD & PELVIS W/O CONTRAST: CPT

## 2023-03-08 PROCEDURE — 80076 HEPATIC FUNCTION PANEL: CPT

## 2023-03-08 PROCEDURE — 80048 BASIC METABOLIC PNL TOTAL CA: CPT

## 2023-03-08 PROCEDURE — 99284 EMERGENCY DEPT VISIT MOD MDM: CPT

## 2023-03-08 PROCEDURE — 36415 COLL VENOUS BLD VENIPUNCTURE: CPT

## 2023-03-08 RX ORDER — ONDANSETRON 2 MG/ML
4 INJECTION INTRAMUSCULAR; INTRAVENOUS
Status: COMPLETED | OUTPATIENT
Start: 2023-03-08 | End: 2023-03-08

## 2023-03-08 RX ORDER — MORPHINE SULFATE 4 MG/ML
4 INJECTION INTRAVENOUS
Status: COMPLETED | OUTPATIENT
Start: 2023-03-08 | End: 2023-03-08

## 2023-03-08 RX ORDER — ONDANSETRON 4 MG/1
4 TABLET, ORALLY DISINTEGRATING ORAL
Qty: 12 TABLET | Refills: 0 | Status: SHIPPED | OUTPATIENT
Start: 2023-03-08

## 2023-03-08 RX ORDER — AMOXICILLIN AND CLAVULANATE POTASSIUM 875; 125 MG/1; MG/1
1 TABLET, FILM COATED ORAL 2 TIMES DAILY
Qty: 20 TABLET | Refills: 0 | Status: SHIPPED | OUTPATIENT
Start: 2023-03-08 | End: 2023-03-18

## 2023-03-08 RX ADMIN — SODIUM CHLORIDE 1000 ML: 9 INJECTION, SOLUTION INTRAVENOUS at 16:32

## 2023-03-08 RX ADMIN — MORPHINE SULFATE 4 MG: 4 INJECTION INTRAVENOUS at 16:28

## 2023-03-08 RX ADMIN — ONDANSETRON 4 MG: 2 INJECTION INTRAMUSCULAR; INTRAVENOUS at 16:26

## 2023-03-08 NOTE — ED PROVIDER NOTES
77F w/ hx CAD, IDDM, HLD, HTN, PUD, SBO, CVA p/w 1d LLQ abd pain. Pt reports 1d sharp and constant b/l lower abd pain radiating to LLQ associated w/ multiple episodes of N/V. Last BM last night w/o blood or melena. No CP/SOB, dizziness, syncope. No F/C, cough, URI like symptoms. No urinary symptoms. Daughter states that this is pt's 1th ED visit for this all at OSH including [place]. Prior hysterectomy but no other abd surgeries. Past Medical History:   Diagnosis Date    CAD (coronary artery disease)     Diabetes ([facility] [address]     type 2 on insulin.     GERD (gastroesophageal reflux disease)     H/O seasonal allergies     Hyperlipidemia     Hypertension     Insomnia     Nausea & vomiting     PUD (peptic ulcer disease)     Small bowel obstruction (HCC)     Vitamin D deficiency        Past Surgical History:   Procedure Laterality Date    HX APPENDECTOMY      HX BREAST BIOPSY      HX GYN  1971    partial hysterectomy    HX GYN  1991    complete hysterectomy    HX HYSTERECTOMY      HX ORTHOPAEDIC      right rotator cuff repair    HX ORTHOPAEDIC      bilaterral wrist surgery    HX ORTHOPAEDIC      right knee surgery    HX ORTHOPAEDIC      bilateral bone spur removal from big toe    HX TONSIL AND ADENOIDECTOMY      HX TONSILLECTOMY      [place] BREAST SURGERY PROCEDURE UN[place]ED      breast reduction    [place] CARDIAC SURG PROCEDURE UNLIST      heart stent    [place] INS NEW/RPLCMT PRM PM W/TRANSV ELTRD ATRIAL&VENT N/A 2/24/2021    INSERT PPM DUAL performed by Maciel Correia MD at [address] [facility]         Family History:   Problem Relation Age of Onset    Cancer Maternal Aunt         pancreatic cancer    Hypertension Mother     COPD Mother     Diabetes Father     Alcohol abuse Brother         murdered        Social History     Socioeconomic History    Marital status:      Spouse name: Not on file    Number of children: Not on file    Years of education: Not on file    Highest education level: Not on file   Occupational History    Not on file   Tobacco Use    Smoking status: Never    Smokeless tobacco: Never   Vaping Use    Vaping Use: Never used   Substance and Sexual Activity    Alcohol use: No    Drug use: No    Sexual activity: Not Currently   Other Topics Concern    Not on file   Social History Narrative    Not on file     Social Determinants of Health     Financial Resource Strain: Not on file   Food Insecurity: Not on file   Transportation Needs: Not on file   Physical Activity: Not on file   Stress: Not on file   Social Connections: Not on file   Intimate Partner Violence: Not on file   Housing Stability: Not on file         ALLERGIES: Advicor [niacin-lovastatin], Conjugated estrogens, Deflazacort, Erythromycin, Erythromycin base, Niacin, Pitavastatin, Simvastatin, Unable to obtain, Zolpidem, Atorvastatin, and Triamcinolone    Review of Systems   Constitutional:  Negative for chills, diaphoresis and fever. HENT:  Negative for facial swelling, mouth sores, nosebleeds, trouble swallowing and voice change. Eyes:  Negative for pain and visual disturbance. Respiratory:  Negative for apnea, cough, choking, shortness of breath, wheezing and stridor. Cardiovascular:  Negative for chest pain, palpitations and leg swelling. Gastrointestinal:  Positive for abdominal pain, nausea and vomiting. Negative for abdominal distention, blood in stool and diarrhea. Genitourinary:  Negative for difficulty urinating, dysuria, flank pain, hematuria and pelvic pain. Musculoskeletal:  Negative for joint swelling. Skin:  Negative for color change and rash. Allergic/Immunologic: Negative for immunocompromised state. Neurological:  Negative for dizziness, seizures, syncope, speech difficulty and light-headedness. Hematological:  Does not bruise/bleed easily. Psychiatric/Behavioral:  Negative for agitation and behavioral problems.       Vitals:    03/08/23 1502 03/08/23 1635 03/08/23 1851   BP: (!) 101/57 104/68 124/69   Pulse: 90 87 88   Resp: 16 16 16   Temp: 98.4 °F (36.9 °C)     SpO2: 93% 93% 94%   Weight: 69.9 kg (154 lb)     Height: 5' 4\" (1.626 m)              Physical Exam  Vitals and nursing note reviewed. Constitutional:       General: She is not in acute distress. Appearance: Normal appearance. She is not ill-appearing or toxic-appearing. HENT:      Head: Normocephalic and atraumatic. Right Ear: External ear normal.      Left Ear: External ear normal.      Nose: Nose normal.      Mouth/Throat:      Mouth: Mucous membranes are moist.      Pharynx: Oropharynx is clear. No oropharyngeal exudate or posterior oropharyngeal erythema. Eyes:      General: No scleral icterus. Extraocular Movements: Extraocular movements intact. Conjunctiva/sclera: Conjunctivae normal.      Pupils: Pupils are equal, round, and reactive to light. Cardiovascular:      Rate and Rhythm: Normal rate and regular rhythm. Pulses: Normal pulses. Heart sounds: Normal heart sounds. No murmur heard. No friction rub. No gallop. Pulmonary:      Effort: Pulmonary effort is normal. No respiratory distress. Breath sounds: Normal breath sounds. No stridor. No wheezing, rhonchi or rales. Abdominal:      General: There is no distension. Palpations: Abdomen is soft. Tenderness: There is no abdominal tenderness. There is no guarding or rebound. Musculoskeletal:         General: No tenderness or deformity. Normal range of motion. Cervical back: Normal range of motion and neck supple. No rigidity. Right lower leg: No edema. Left lower leg: No edema. Skin:     General: Skin is warm. Capillary Refill: Capillary refill takes less than 2 seconds. Coloration: Skin is not jaundiced. Neurological:      General: No focal deficit present. Mental Status: She is alert. Cranial Nerves: No cranial nerve deficit. Sensory: No sensory deficit. Motor: No weakness.       Coordination: Coordination normal.   Psychiatric:         Mood and Affect: Mood normal.         Behavior: Behavior normal.         Thought Content: Thought content normal.         Judgment: Judgment normal.      I personally reviewed and independently interpreted EKG, labs and imaging results. LABORATORY TESTS:  Admission on 03/08/2023, Discharged on 03/08/2023   Component Date Value Ref Range Status    WBC 03/08/2023 12.6 (A)  3.6 - 11.0 K/uL Final    RBC 03/08/2023 4.81  3.80 - 5.20 M/uL Final    HGB 03/08/2023 13.1  11.5 - 16.0 g/dL Final    HCT 03/08/2023 40.5  35.0 - 47.0 % Final    MCV 03/08/2023 84.2  80.0 - 99.0 FL Final    MCH 03/08/2023 27.2  26.0 - 34.0 PG Final    MCHC 03/08/2023 32.3  30.0 - 36.5 g/dL Final    RDW 03/08/2023 15.4 (A)  11.5 - 14.5 % Final    PLATELET 09/38/3689 882 (A)  150 - 400 K/uL Final    MPV 03/08/2023 10.6  8.9 - 12.9 FL Final    NRBC 03/08/2023 0.0  0  WBC Final    ABSOLUTE NRBC 03/08/2023 0.00  0.00 - 0.01 K/uL Final    NEUTROPHILS 03/08/2023 75  32 - 75 % Final    LYMPHOCYTES 03/08/2023 16  12 - 49 % Final    MONOCYTES 03/08/2023 6  5 - 13 % Final    EOSINOPHILS 03/08/2023 1  0 - 7 % Final    BASOPHILS 03/08/2023 1  0 - 1 % Final    IMMATURE GRANULOCYTES 03/08/2023 1 (A)  0.0 - 0.5 % Final    ABS. NEUTROPHILS 03/08/2023 9.5 (A)  1.8 - 8.0 K/UL Final    ABS. LYMPHOCYTES 03/08/2023 2.1  0.8 - 3.5 K/UL Final    ABS. MONOCYTES 03/08/2023 0.8  0.0 - 1.0 K/UL Final    ABS. EOSINOPHILS 03/08/2023 0.1  0.0 - 0.4 K/UL Final    ABS. BASOPHILS 03/08/2023 0.1  0.0 - 0.1 K/UL Final    ABS. IMM.  GRANS. 03/08/2023 0.1 (A)  0.00 - 0.04 K/UL Final    DF 03/08/2023 AUTOMATED    Final    Sodium 03/08/2023 135 (A)  136 - 145 mmol/L Final    Potassium 03/08/2023 3.7  3.5 - 5.1 mmol/L Final    Chloride 03/08/2023 102  97 - 108 mmol/L Final    CO2 03/08/2023 26  21 - 32 mmol/L Final    Anion gap 03/08/2023 7  5 - 15 mmol/L Final    Glucose 03/08/2023 142 (A)  65 - 100 mg/dL Final    BUN 03/08/2023 12 6 - 20 MG/DL Final    Creatinine 03/08/2023 1.38 (A)  0.55 - 1.02 MG/DL Final    BUN/Creatinine ratio 03/08/2023 9 (A)  12 - 20   Final    eGFR 03/08/2023 39 (A)  >60 ml/min/1.73m2 Final    Comment:      Pediatric calculator link: Tyesha.at. org/professionals/kdoqi/gfr_calculatorped       These results are not intended for use in patients <25years of age. eGFR results are calculated without a race factor using  the 2021 CKD-EPI equation. Careful clinical correlation is recommended, particularly when comparing to results calculated using previous equations. The CKD-EPI equation is less accurate in patients with extremes of muscle mass, extra-renal metabolism of creatinine, excessive creatine ingestion, or following therapy that affects renal tubular secretion. Calcium 03/08/2023 9.5  8.5 - 10.1 MG/DL Final    Protein, total 03/08/2023 8.3 (A)  6.4 - 8.2 g/dL Final    Albumin 03/08/2023 3.1 (A)  3.5 - 5.0 g/dL Final    Globulin 03/08/2023 5.2 (A)  2.0 - 4.0 g/dL Final    A-G Ratio 03/08/2023 0.6 (A)  1.1 - 2.2   Final    Bilirubin, total 03/08/2023 0.6  0.2 - 1.0 MG/DL Final    Bilirubin, direct 03/08/2023 0.2  0.0 - 0.2 MG/DL Final    Alk. phosphatase 03/08/2023 198 (A)  45 - 117 U/L Final    AST (SGOT) 03/08/2023 28  15 - 37 U/L Final    ALT (SGPT) 03/08/2023 83 (A)  12 - 78 U/L Final    Lipase 03/08/2023 153  73 - 393 U/L Final    Lactic Acid (POC) 03/08/2023 1.63  0.40 - 2.00 mmol/L Final    SAMPLES BEING HELD 03/08/2023 1BLUE,1DK GRN,1RED   Final    COMMENT 03/08/2023 Add-on orders for these samples will be processed based on acceptable specimen integrity and analyte stability, which may vary by analyte. Final       IMAGING RESULTS:  CT ABD PELV WO CONT   Final Result   Slight sigmoid diverticulitis. No fluid collection to suggest abscess. Interval   increase in the size of the cystic lesion in the head of the pancreas.  Prominent   retroperitoneal and mesenteric lymph nodes as compared to prior examination. MEDICATIONS GIVEN:  Medications   sodium chloride 0.9 % bolus infusion 1,000 mL (0 mL IntraVENous IV Completed 3/8/23 1857)   morphine injection 4 mg (4 mg IntraVENous Given 3/8/23 1628)   ondansetron (ZOFRAN) injection 4 mg (4 mg IntraVENous Given 3/8/23 1626)       IMPRESSION:  1. Sigmoid diverticulitis    2. EVAN (acute kidney injury) ([facility] [address]    3. Abnormal LFTs    4. Calculus of gallbladder without cholecystitis without obstruction    5. Pancreatic lesion        PLAN:  - Grace Singh MD      Medical Decision Making  77F w/ hx CAD, IDDM, HLD, HTN, PUD, SBO, CVA p/w 1d LLQ abd pain w/ N/V. Pt well appearing, afebrile, hemodynamically stable w/o resp distress. No peritonitis on abd exam. Ddx includes appendicitis vs pyelo/UTI vs kidney stone vs acute cholecystitis/choledocholithiasis/cholangitis vs diverticulitis/colitis vs obstruction vs volvulus/intussusception vs incarcerated/strangulated hernia vs pancreatitis vs PUD vs gastritis, less likely ACS, AAA or mesenteric ischemia/ischemic bowel based on presentation. Ordered CTAP, labs, UA. Monitor and reassess. 1900 Pt doing well and feeling better. CTAP showing sigmoid diverticulitis and cholelithiasis w/o evidence of cholecystitis or biliary obstruction. Also shows known pancreatic lesion (pt already knows about this). Labs show EVAN and mild transaminitis. Mild leukocytosis but normal lactate. Low concern for sepsis at this time. Will start on augmentin and probiotics w/ zofran PRN. Advised to follow up w/ GI. Patient given specific return precautions and explained signs/symptoms for which to come back to ED immediately but otherwise advised to f/u w/ PCP over next 2-3days. Amount and/or Complexity of Data Reviewed  Independent Historian: caregiver  External Data Reviewed: ECG. Labs: ordered. Decision-making details documented in ED Course. Radiology: ordered and independent interpretation performed.  Decision-making details documented in ED Course. ECG/medicine tests: ordered and independent interpretation performed. Decision-making details documented in ED Course. Risk  Prescription drug management.            Procedures

## 2023-03-08 NOTE — ED TRIAGE NOTES
Patient c/o abdominal pain since last night   That is worse today. Pt also c/o n/v today. Pt was discharged from [facility] on   Sunday. Patient was dx with \"cyst on pancreas\".

## 2023-03-13 NOTE — PROGRESS NOTES
Scheduled remote transmission. Device functioning appropriately as programmed. See scanned documents.  Chargeable visit
within normal limits

## 2023-05-23 RX ORDER — MIRTAZAPINE 15 MG/1
15 TABLET, FILM COATED ORAL NIGHTLY
COMMUNITY
Start: 2021-07-15

## 2023-05-23 RX ORDER — ASPIRIN 81 MG/1
81 TABLET, CHEWABLE ORAL DAILY
COMMUNITY
Start: 2021-02-27

## 2023-05-23 RX ORDER — AMLODIPINE BESYLATE 5 MG/1
5 TABLET ORAL DAILY
COMMUNITY
Start: 2021-02-10

## 2023-05-23 RX ORDER — BUTALBITAL, ASPIRIN, AND CAFFEINE 325; 50; 40 MG/1; MG/1; MG/1
1 CAPSULE ORAL DAILY PRN
COMMUNITY

## 2023-05-23 RX ORDER — INSULIN GLARGINE 100 [IU]/ML
25 INJECTION, SOLUTION SUBCUTANEOUS 2 TIMES DAILY
COMMUNITY

## 2023-05-23 RX ORDER — ATORVASTATIN CALCIUM 40 MG/1
40 TABLET, FILM COATED ORAL DAILY
COMMUNITY
Start: 2021-05-19

## 2023-05-23 RX ORDER — FAMOTIDINE 20 MG/1
20 TABLET, FILM COATED ORAL 2 TIMES DAILY
COMMUNITY
Start: 2021-02-10

## 2023-05-23 RX ORDER — ONDANSETRON 4 MG/1
4 TABLET, ORALLY DISINTEGRATING ORAL EVERY 8 HOURS PRN
COMMUNITY
Start: 2023-03-08

## 2023-05-23 RX ORDER — LOSARTAN POTASSIUM 100 MG/1
100 TABLET ORAL DAILY
COMMUNITY
Start: 2021-02-10

## 2023-12-24 NOTE — CONSULTS
[place]: [id number] [address] Neurology  [facility]  [phone]      Name:   Alyssia Kelley record #: [id number]  Admission Date: 2/16/2021     Who Consulted: Dr. Ernestine Ruby    Reason for Consult:   infarct on brain mri    HISTORY OF PRESENT ILLNESS:     This is a 76 y.o. female who was admitted on 2/16/201 for AMS. Ms. Law presented tot he ED after being found on the floor for unknown duration (maximum of 2 days). Patient reports she fell and was unable to get up, but has difficulty remembering what happened and is unclear on how long she was down. Daughter spoke with patient on 2/14. Son in law visited patient's residence when patient was not answering phone this AM and found patient lying on the ground. Patient had fallen and unable to get up or call for help. Daughter reports patient is fully alert & oriented at baseline. Daughter expressed concern regarding patient's hygiene and reports hoarding in the house. Patient is combative towards any family interventions or assistance. Daughters would like patient to go to SNF Per chart review, PCP concerned for medication noncompliance. Upon admission she was oriented to name and location only, her lactate was 3.6, she was in DKA, had a 2nd degree AV block, and multiple wounds. After her metabolic derangement was corrected, she remained encephalopathic so an MRI completed. The Neurology Service is asked to evaluate for stroke. Neuro-imaging:     CT Head: (2/16/2021):  No acute intracranial process is identified    EKG: normal EKG, normal sinus rhythm, unchanged from previous tracings, normal sinus rhythm. Care Plan discussed with:  Patient x   Family    RN    Care Manager    Consultant/Specialist:         Thank you for allowing the Neurology Service the pleasure of participating in the care of your patient.   This patient will be discussed with my collaborating care team physician,  Dr. Olya Bee and he may have further recommendations regarding this patient's care      Leanna Hightower, ACNP-BC  ====================      Attending Attestation:       I have reviewed the documentation provided by the nurse practitioner, Mrs. Pamela Pat, and we have discussed her findings and the clinical impression. I have formulated with her the proposed management plans for this patient. Additionally,  I have personally evaluated the patient to verify the history and to confirm physical findings. Below are my additional comments:  20-year-old lady admitted after being found down for perhaps 2 days. Her daughter comes in at the bedside today and tells me that at baseline the patient is cognitively intact, drives, takes care of her finances etc.  She is always been a hoarder and that has not changed. She has not been compliant or it is difficult to keep her compliant with her medical regimen. The patient was initially quite somnolent but arousable and when her daughter entered the room she perked up quite nicely. As part of her work-up MRIs demonstrated a small thalamic infarct left-sided. She had a CTA of the head and neck that was unremarkable. Films are reviewed. Lipid panel has demonstrated an LDL of 142 and she has been started on Lipitor 40 mg. She is now on aspirin suppository awaiting clearance by speech for p.o. On examination again she perks up when her daughter comes into the room. She answers questions appropriately when her daughter asked him not so much for me  No cranial nerve deficit. Moves all extremities    Impression/plan  20-year-old with incidentally found left thalamic infarct  Discussed with the daughter and the patient today the need to rigorously control her modifiable risk factors including her diabetes as well as her lipids and blood pressure etc.  Of note hemoglobin A1c was 8.8.   We will do EEG to look for epileptiform abnormalities, routine type  If this is unremarkable then the team will follow-up as needed. She should follow with nurse practitioner Diego in the office 2 weeks from discharge in terms of the stroke    Pearson Peabody, MD                         Assessment/Plan:     1. Thalamic Stroke:    ·  mg pr until able to tolerate po, then will need ASA 81 mg  · Neurochecks:  Every 4 hours  · Blood Sugar Goal:  140-180  · BP Goal: Less than 180/105 for 24 hours  · Nursing to do stroke/TIA education  · Telemetry for at least 24 hours    Stroke work up  · A1C:  8.8  · LDL:  141. 8. atorvastatin 40 mg started. Pt appears to have developed muscle cramps in the past from atorvastatin, however in light of her acute stroke, it is worth trying again. · TTE:  Estimated LVEF is 60 - 65%  · MRI:  Small acute infarct posterior lateral to the left thalamus  · Carotid vascular imaging:  Calcified plaque in the proximal internal carotid arteries bilaterally without flow-limiting stenosis. Risk factors for stroke include:  DM, HTN, CAD, HLD, physical inactivity  · Discussed with patient    · Discussed signs/symptoms of stroke and when to call 911    2. Mobility:   · Has not been OOB. · PT/OT to eval for rehab    3. Diet:    · Failed STAND, working with SLP    4. VTE Prophylaxes:   · Per Primary team           Review of Systems: 10 point ROS was not preformed as pt is non verbal  Allergies: Allergies   Allergen Reactions    Advicor [Niacin-Lovastatin] Swelling    Conjugated Estrogens Other (comments)     Headaches      Other reaction(s): Other (comments)  Headaches    Deflazacort Unable to Obtain     Other reaction(s): Unable to Obtain    Erythromycin Nausea Only    Erythromycin Base Nausea and Vomiting    Niacin Swelling    Pitavastatin Other (comments)     Abdominal pain, nausea  Other reaction(s): Other (comments)  Abdominal pain, nausea    Simvastatin Other (comments)     Muscle cramps  Other reaction(s):  Other (comments)  Muscle cramps    Unable To Obtain Swelling    Zolpidem Other (comments)     Other reaction(s): Other (comments)    Atorvastatin Other (comments)     Muscle cramps  Other reaction(s): Other (comments)  Muscle cramps    Triamcinolone Unable to Obtain     Other reaction(s): Unable to Obtain       Outpatient Meds  No current facility-administered medications on file prior to encounter. Current Outpatient Medications on File Prior to Encounter   Medication Sig Dispense Refill    losartan (COZAAR) 100 mg tablet Take 1 Tab by mouth daily. 90 Tab 1    famotidine (PEPCID) 20 mg tablet Take 1 Tab by mouth two (2) times a day. 180 Tab 1    hydroCHLOROthiazide (HYDRODIURIL) 25 mg tablet Take 1 Tab by mouth daily. 90 Tab 1    amLODIPine (NORVASC) 5 mg tablet Take 1 Tab by mouth daily. 90 Tab 1    QUEtiapine (SEROquel) 50 mg tablet Take 1 Tab by mouth nightly. 90 Tab 1    insulin regular (NovoLIN R Regular U-100 Insuln) 100 unit/mL injection INJECT 25 UNITS SUBCUTANEOUSLY 5-10 MINUES BEFORE MEALS AS DIRECTED 50 mL 1    augmented betamethasone dipropionate (DIPROLENE-AF) 0.05 % topical cream Apply  to affected area two (2) times a day. 30 g 1    insulin NPH (NovoLIN N NPH U-100 Insulin) 100 unit/mL injection 30 Units by SubCUTAneous route two (2) times a day. 60 mL 1    butalbital-aspirin-caffeine (FIORINAL) capsule Take 1 Cap by mouth daily as needed for Headache (Takes rarely).  loratadine 10 mg cap Take 10 mg by mouth daily as needed. Indications: inflammation of the nose due to an allergy      melatonin 5 mg cap capsule Take 5 mg by mouth nightly as needed.  ascorbic acid, vitamin C, (VITAMIN C) 500 mg tablet Take 1,000 mg by mouth daily.  naproxen sodium 220 mg Cap Take 1 Tab by mouth daily as needed.          Inpatient Meds    Current Facility-Administered Medications   Medication Dose Route Frequency Provider Last Rate Last Admin    potassium phosphate 20 mmol in 0.9% sodium chloride 250 mL infusion   IntraVENous ONCE Ede Monzon MD  iopamidoL (ISOVUE-370) 76 % injection 100 mL  100 mL IntraVENous RAD ONCE Javed Day MD        insulin glargine (LANTUS) injection 25 Units  25 Units SubCUTAneous DAILY Erin Welch MD        dextrose (D50W) injection syrg 12.5-25 g  25-50 mL IntraVENous PRN Chloe Daugherty MD        0.45% sodium chloride infusion  110 mL/hr IntraVENous CONTINUOUS [facility],  mL/hr at 02/17/21 2311 110 mL/hr at 02/17/21 2311    insulin lispro (HUMALOG) injection   SubCUTAneous Q6H Erin Welch MD   2 Units at 02/18/21 0513    vancomycin (VANCOCIN) 1,000 mg in 0.9% sodium chloride 250 mL (VIAL-MATE)  1,000 mg IntraVENous Q12H Masood QUICK MD   1,000 mg at 02/18/21 0007    cefepime (MAXIPIME) 2 g in sterile water (preservative free) 10 mL IV syringe  2 g IntraVENous Q12H [facility], DO   2 g at 02/18/21 0819    hydrALAZINE (APRESOLINE) 20 mg/mL injection 10 mg  10 mg IntraVENous Q6H PRN [facility], DO   10 mg at 02/18/21 0507    sodium chloride (NS) flush 5-10 mL  5-10 mL IntraVENous PRN Garrett Hashimoto, MD        sodium chloride (NS) flush 5-40 mL  5-40 mL IntraVENous Q8H Rachelle Enriquez MD   10 mL at 02/18/21 0507    sodium chloride (NS) flush 5-40 mL  5-40 mL IntraVENous PRN Rachelle Enriquez MD        metroNIDAZOLE (FLAGYL) IVPB premix 500 mg  500 mg IntraVENous Q8H Rachelle Enriquez  mL/hr at 02/18/21 0817 500 mg at 02/18/21 0817    heparin (porcine) injection 5,000 Units  5,000 Units SubCUTAneous Q8H Rachelle Enriquez MD   5,000 Units at 02/18/21 0507    Vancomycin- Pharmacy Dosing per Level   Other Rx Dosing/Monitoring [facility], DO        amLODIPine (NORVASC) tablet 5 mg  5 mg Oral QHS Mary TORREZ NP   Stopped at 02/16/21 2100    famotidine (PF) (PEPCID) 20 mg in 0.9% sodium chloride 10 mL injection  20 mg IntraVENous QPM Rachelle Enriquez MD   20 mg at 02/17/21 0676           Past Medical History:   Diagnosis Date    CAD (coronary artery disease)     Diabetes [facility]     Diabetes ([facility]     type 2    GERD (gastroesophageal reflux disease)     H/O seasonal allergies     Hyperlipidemia     Hypertension     Insomnia     Nausea & vomiting     PUD (peptic ulcer disease)     Small bowel obstruction (HCC)     Vitamin D deficiency        Past Surgical History:   Procedure Laterality Date    HX APPENDECTOMY      HX BREAST BIOPSY      HX GYN  1971    partial hysterectomy    HX GYN  1991    complete hysterectomy    HX HYSTERECTOMY      HX ORTHOPAEDIC      right rotator cuff repair    HX ORTHOPAEDIC      bilaterral wrist surgery    HX ORTHOPAEDIC      right knee surgery    HX ORTHOPAEDIC      bilateral bone spur removal from big toe    HX TONSIL AND ADENOIDECTOMY      HX TONSILLECTOMY      GA BREAST SURGERY PROCEDURE UNLISTED      breast reduction    GA CARDIAC SURG PROCEDURE UNLIST      heart stent       family history includes Alcohol abuse in her brother; COPD in her mother; Cancer in her maternal aunt; Diabetes in her father; Hypertension in her mother. reports that she has never smoked. She has never used smokeless tobacco. She reports that she does not drink alcohol or use drugs.            Lab Results (last 24 hrs)  Recent Results (from the past 24 hour(s))   VANCOMYCIN, RANDOM    Collection Time: 02/17/21 11:25 AM   Result Value Ref Range    Vancomycin, random 5.2 UG/ML   CULTURE, BLOOD, PAIRED    Collection Time: 02/17/21 11:25 AM    Specimen: Blood   Result Value Ref Range    Special Requests: NO SPECIAL REQUESTS      Culture result: NO GROWTH AFTER 19 HOURS     GLUCOSE, POC    Collection Time: 02/17/21 12:30 PM   Result Value Ref Range    Glucose (POC) 204 (H) 65 - 100 mg/dL    Performed by Gailen Peoples, POC    Collection Time: 02/17/21  5:44 PM   Result Value Ref Range    Glucose (POC) 216 (H) 65 - 100 mg/dL    Performed by Gailen Peoples, POC    Collection Time: 02/17/21 11:14 PM   Result Value Ref Range Glucose (POC) 216 (H) 65 - 100 mg/dL    Performed by Aura Bell    CBC WITH AUTOMATED DIFF    Collection Time: 02/18/21  3:42 AM   Result Value Ref Range    WBC 8.2 3.6 - 11.0 K/uL    RBC 5.15 3.80 - 5.20 M/uL    HGB 14.3 11.5 - 16.0 g/dL    HCT 44.1 35.0 - 47.0 %    MCV 85.6 80.0 - 99.0 FL    MCH 27.8 26.0 - 34.0 PG    MCHC 32.4 30.0 - 36.5 g/dL    RDW 14.7 (H) 11.5 - 14.5 %    PLATELET 329 744 - 020 K/uL    MPV 11.4 8.9 - 12.9 FL    NRBC 0.0 0  WBC    ABSOLUTE NRBC 0.00 0.00 - 0.01 K/uL    NEUTROPHILS 75 32 - 75 %    LYMPHOCYTES 16 12 - 49 %    MONOCYTES 9 5 - 13 %    EOSINOPHILS 0 0 - 7 %    BASOPHILS 0 0 - 1 %    IMMATURE GRANULOCYTES 0 0.0 - 0.5 %    ABS. NEUTROPHILS 6.1 1.8 - 8.0 K/UL    ABS. LYMPHOCYTES 1.3 0.8 - 3.5 K/UL    ABS. MONOCYTES 0.8 0.0 - 1.0 K/UL    ABS. EOSINOPHILS 0.0 0.0 - 0.4 K/UL    ABS. BASOPHILS 0.0 0.0 - 0.1 K/UL    ABS. IMM.  GRANS. 0.0 0.00 - 0.04 K/UL    DF AUTOMATED     MAGNESIUM    Collection Time: 02/18/21  3:42 AM   Result Value Ref Range    Magnesium 2.0 1.6 - 2.4 mg/dL   PHOSPHORUS    Collection Time: 02/18/21  3:42 AM   Result Value Ref Range    Phosphorus 1.9 (L) 2.6 - 4.7 MG/DL   METABOLIC PANEL, BASIC    Collection Time: 02/18/21  3:42 AM   Result Value Ref Range    Sodium 146 (H) 136 - 145 mmol/L    Potassium 3.6 3.5 - 5.1 mmol/L    Chloride 113 (H) 97 - 108 mmol/L    CO2 27 21 - 32 mmol/L    Anion gap 6 5 - 15 mmol/L    Glucose 189 (H) 65 - 100 mg/dL    BUN 25 (H) 6 - 20 MG/DL    Creatinine 0.83 0.55 - 1.02 MG/DL    BUN/Creatinine ratio 30 (H) 12 - 20      GFR est AA >60 >60 ml/min/1.73m2    GFR est non-AA >60 >60 ml/min/1.73m2    Calcium 9.4 8.5 - 10.1 MG/DL   GLUCOSE, POC    Collection Time: 02/18/21  5:02 AM   Result Value Ref Range    Glucose (POC) 185 (H) 65 - 100 mg/dL    Performed by [address] [place] [address], POC    Collection Time: 02/18/21  6:05 AM   Result Value Ref Range    Glucose (POC) 177 (H) 65 - 100 mg/dL    Performed by Brice Santo (PCT) Clear

## 2025-05-30 NOTE — PATIENT INSTRUCTIONS
Copied from CRM #[id number]. Topic: Medications - Medication Refill  >> May 30, 2025  8:12 AM Trista wrote:  Who Called: Peter Stahl    Refill or New Rx:Refill  RX Name and Strength:lisdexamfetamine (VYVANSE) 30 MG capsule   How is the patient currently taking it? (ex. 1XDay):1 xday  Is this a 30 day or 90 day RX:30 capsule  Local or Mail Order:local   List of preferred pharmacies on file (remove unneeded): [unfilled]  If different Pharmacy is requested, enter Pharmacy information here including location and phone number: : [facility] pharmacy [address] [place] [address] [phone]  Ordering Provider:cindi      Preferred Method of Contact: Phone Call  Patient's Preferred Phone Number on File: [phone]   Best Call Back Number, if different:  Additional Information: refill request, pt called and stated pharmacy above  needs approval to refill,**due to current [facility] pharmacy in [place] is out of stock**,   please advise. thanks   Leg and Ankle Edema: Care Instructions  Your Care Instructions  Swelling in the legs, ankles, and feet is called edema. It is common after you sit or stand for a while. Long plane flights or car rides often cause swelling in the legs and feet. You may also have swelling if you have to stand for long periods of time at your job. Problems with the veins in the legs (varicose veins) and changes in hormones can also cause swelling. Sometimes the swelling in the ankles and feet is caused by a more serious problem, such as heart failure, infection, blood clots, or liver or kidney disease. Follow-up care is a key part of your treatment and safety. Be sure to make and go to all appointments, and call your doctor if you are having problems. It's also a good idea to know your test results and keep a list of the medicines you take. How can you care for yourself at home? · If your doctor gave you medicine, take it as prescribed. Call your doctor if you think you are having a problem with your medicine. · Whenever you are resting, raise your legs up. Try to keep the swollen area higher than the level of your heart. · Take breaks from standing or sitting in one position. ? Walk around to increase the blood flow in your lower legs. ? Move your feet and ankles often while you stand, or tighten and relax your leg muscles. · Wear support stockings. Put them on in the morning, before swelling gets worse. · Eat a balanced diet. Lose weight if you need to. · Limit the amount of salt (sodium) in your diet. Salt holds fluid in the body and may increase swelling. When should you call for help? Call 911 anytime you think you may need emergency care. For example, call if:    · You have symptoms of a blood clot in your lung (called a pulmonary embolism). These may include:  ? Sudden chest pain. ? Trouble breathing. ?  Coughing up blood.    Call your doctor now or seek immediate medical care if:    · You have signs of a blood clot, such as:  ? Pain in your calf, back of the knee, thigh, or groin. ? Redness and swelling in your leg or groin.     · You have symptoms of infection, such as:  ? Increased pain, swelling, warmth, or redness. ? Red streaks or pus. ? A fever.    Watch closely for changes in your health, and be sure to contact your doctor if:    · Your swelling is getting worse.     · You have new or worsening pain in your legs.     · You do not get better as expected. Where can you learn more? Go to http://guy-raina.info/. Enter Y401 in the search box to learn more about \"Leg and Ankle Edema: Care Instructions. \"  Current as of: September 23, 2018  Content Version: 12.1  © 2627-8082 [employer]. Care instructions adapted under license by [employer] (which disclaims liability or warranty for this information). If you have questions about a medical condition or this instruction, always ask your healthcare professional. [place] [address] any warranty or liability for your use of this information.

## (undated) DEVICE — SUTURE ETHBND EXCEL SZ 2 L30IN NONABSORBABLE GRN L40MM V-37 MX69G

## (undated) DEVICE — DRESSING POSTOP AG PRISMASEAL 3.5X6IN

## (undated) DEVICE — ZIP 8I SURGICAL SKIN CLOSURE DEVICE: Brand: ZIP 8I SURGICAL SKIN CLOSURE DEVICE

## (undated) DEVICE — SUTURE STRATAFIX SPRL PDS + SZ 2-0 L9IN ABSRB VLT CT-1 SXPP1B456

## (undated) DEVICE — LIMB HOLDER, WRIST/ANKLE: Brand: DEROYAL

## (undated) DEVICE — INTRO SHTH 7FR 13X20CM -- TEARAWAY

## (undated) DEVICE — REM POLYHESIVE ADULT PATIENT RETURN ELECTRODE: Brand: VALLEYLAB

## (undated) DEVICE — SUTURE ABSORBABLE 3-0 PS-1 18 IN UD MONOCRYL + STRATAFIX SXMP1B102

## (undated) DEVICE — MEDI-TRACE CADENCE ADULT, DEFIBRILLATION ELECTRODE -RTS  (10 PR/PK) - PHYSIO-CONTROL: Brand: MEDI-TRACE CADENCE

## (undated) DEVICE — Z CONVERTED USE 2276060 DRESSING NONADHESIVE W3XL4IN WHT COT OUCHLSS RECT BONDED

## (undated) DEVICE — 3M™ IOBAN™ 2 ANTIMICROBIAL INCISE DRAPE 6640EZ: Brand: IOBAN™ 2

## (undated) DEVICE — PACEMAKER PACK: Brand: MEDLINE INDUSTRIES, INC.